# Patient Record
Sex: MALE | Race: WHITE | HISPANIC OR LATINO | Employment: FULL TIME | URBAN - METROPOLITAN AREA
[De-identification: names, ages, dates, MRNs, and addresses within clinical notes are randomized per-mention and may not be internally consistent; named-entity substitution may affect disease eponyms.]

---

## 2017-01-28 ENCOUNTER — HOSPITAL ENCOUNTER (OUTPATIENT)
Dept: MRI IMAGING | Facility: HOSPITAL | Age: 72
Discharge: HOME | End: 2017-01-28
Attending: INTERNAL MEDICINE | Admitting: INTERNAL MEDICINE

## 2017-01-28 LAB
BASOPHILS # BLD AUTO: 0.1 10*3/UL (ref 0–0.2)
BASOPHILS NFR BLD AUTO: 1 % (ref 0–2)
CREAT BLDA-MCNC: 0.5 MG/DL (ref 0.6–1.3)
DIFFERENTIAL METHOD BLD: (no result)
EOSINOPHIL # BLD AUTO: 0.2 10*3/UL (ref 0–0.3)
EOSINOPHIL # BLD AUTO: 3 % (ref 0–3)
ERYTHROCYTE [DISTWIDTH] IN BLOOD BY AUTOMATED COUNT: 14.2 % (ref 11.5–14.5)
HCT VFR BLD AUTO: 35.4 % (ref 40–54)
HGB BLD-MCNC: 12.1 G/DL (ref 14–18)
LYMPHOCYTES # BLD AUTO: 2.2 10*3/UL (ref 0.8–4.8)
LYMPHOCYTES NFR BLD AUTO: 31 % (ref 18–42)
MCH RBC QN AUTO: 30.6 PG (ref 26–32)
MCHC RBC AUTO-ENTMCNC: 34.2 G/DL (ref 32–36)
MCV RBC AUTO: 89.7 FL (ref 80–94)
MONOCYTES # BLD AUTO: 0.6 10*3/UL (ref 0.1–1.3)
MONOCYTES NFR BLD AUTO: 8 % (ref 2–11)
NEUTROPHILS # BLD AUTO: 4 10*3/UL (ref 2.3–8.6)
NEUTROPHILS NFR BLD AUTO: 57 % (ref 50–75)
NRBC BLD AUTO-RTO: 0 /100{WBCS}
NRBC/RBC NFR BLD MANUAL: 0 10*3/UL
PLATELET # BLD AUTO: 185 10*3/UL (ref 150–450)
PMV BLD AUTO: 8.3 FL (ref 7.4–10.4)
RBC # BLD AUTO: 3.95 10*6/UL (ref 4.6–6)
WBC # BLD AUTO: 7.1 10*3/UL (ref 4.5–11.5)

## 2017-01-31 ENCOUNTER — HOSPITAL ENCOUNTER (OUTPATIENT)
Dept: ONCOLOGY | Facility: CLINIC | Age: 72
Discharge: HOME | End: 2017-01-31
Attending: INTERNAL MEDICINE | Admitting: INTERNAL MEDICINE

## 2017-02-17 ENCOUNTER — HOSPITAL ENCOUNTER (OUTPATIENT)
Dept: CT IMAGING | Facility: HOSPITAL | Age: 72
Discharge: HOME | End: 2017-02-17
Attending: INTERNAL MEDICINE | Admitting: INTERNAL MEDICINE

## 2017-03-17 ENCOUNTER — HOSPITAL ENCOUNTER (OUTPATIENT)
Dept: OTHER | Facility: HOSPITAL | Age: 72
Setting detail: SPECIMEN
Discharge: HOME | End: 2017-03-17
Attending: NURSE PRACTITIONER | Admitting: NURSE PRACTITIONER

## 2017-03-17 LAB
ALBUMIN SERPL-MCNC: 4.4 G/DL (ref 3.5–4.8)
ALBUMIN/GLOB SERPL: 1.5 {RATIO} (ref 1–1.7)
ALP SERPL-CCNC: 94 IU/L (ref 32–91)
ALT SERPL-CCNC: 18 IU/L (ref 17–63)
ANION GAP SERPL CALC-SCNC: 14.1 MMOL/L (ref 10–20)
AST SERPL-CCNC: 17 IU/L (ref 15–41)
BILIRUB SERPL-MCNC: 0.6 MG/DL (ref 0.3–1.2)
BUN SERPL-MCNC: 14 MG/DL (ref 8–20)
BUN/CREAT SERPL: 23.3 (ref 6.2–20.3)
CALCIUM SERPL-MCNC: 10.5 MG/DL (ref 8.9–10.3)
CHLORIDE SERPL-SCNC: 103 MMOL/L (ref 101–111)
CHOLEST SERPL-MCNC: 165 MG/DL
CHOLEST/HDLC SERPL: 3.5 {RATIO}
CONV CO2: 28 MMOL/L (ref 22–32)
CONV LDL CHOLESTEROL DIRECT: 77 MG/DL (ref 0–100)
CONV TOTAL PROTEIN: 7.3 G/DL (ref 6.1–7.9)
CREAT UR-MCNC: 0.6 MG/DL (ref 0.7–1.2)
GLOBULIN UR ELPH-MCNC: 2.9 G/DL (ref 2.5–3.8)
GLUCOSE SERPL-MCNC: 136 MG/DL (ref 65–99)
HDLC SERPL-MCNC: 47 MG/DL
LDLC/HDLC SERPL: 1.6 {RATIO}
LIPID INTERPRETATION: ABNORMAL
POTASSIUM SERPL-SCNC: 4.1 MMOL/L (ref 3.6–5.1)
PSA SERPL-MCNC: 1.47 NG/ML (ref 0–4)
SODIUM SERPL-SCNC: 141 MMOL/L (ref 136–144)
TRIGL SERPL-MCNC: 301 MG/DL
VLDLC SERPL CALC-MCNC: 41.7 MG/DL

## 2017-06-06 ENCOUNTER — HOSPITAL ENCOUNTER (OUTPATIENT)
Dept: ONCOLOGY | Facility: CLINIC | Age: 72
Discharge: HOME | End: 2017-06-06
Attending: INTERNAL MEDICINE | Admitting: INTERNAL MEDICINE

## 2017-06-21 ENCOUNTER — HOSPITAL ENCOUNTER (OUTPATIENT)
Dept: OTHER | Facility: HOSPITAL | Age: 72
Setting detail: SPECIMEN
Discharge: HOME | End: 2017-06-21
Attending: NURSE PRACTITIONER | Admitting: NURSE PRACTITIONER

## 2017-06-21 LAB
ANION GAP SERPL CALC-SCNC: 13.9 MMOL/L (ref 10–20)
BUN SERPL-MCNC: 13 MG/DL (ref 8–20)
BUN/CREAT SERPL: 18.6 (ref 6.2–20.3)
CALCIUM SERPL-MCNC: 10.1 MG/DL (ref 8.9–10.3)
CHLORIDE SERPL-SCNC: 106 MMOL/L (ref 101–111)
CONV CO2: 25 MMOL/L (ref 22–32)
CREAT UR-MCNC: 0.7 MG/DL (ref 0.7–1.2)
GLUCOSE SERPL-MCNC: 177 MG/DL (ref 65–99)
POTASSIUM SERPL-SCNC: 3.9 MMOL/L (ref 3.6–5.1)
SODIUM SERPL-SCNC: 141 MMOL/L (ref 136–144)

## 2017-09-08 ENCOUNTER — HOSPITAL ENCOUNTER (OUTPATIENT)
Dept: ONCOLOGY | Facility: HOSPITAL | Age: 72
Discharge: HOME | End: 2017-09-08
Attending: INTERNAL MEDICINE | Admitting: INTERNAL MEDICINE

## 2017-09-19 ENCOUNTER — HOSPITAL ENCOUNTER (OUTPATIENT)
Dept: ONCOLOGY | Facility: CLINIC | Age: 72
Discharge: HOME | End: 2017-09-19
Attending: INTERNAL MEDICINE | Admitting: INTERNAL MEDICINE

## 2017-09-29 ENCOUNTER — HOSPITAL ENCOUNTER (OUTPATIENT)
Dept: OTHER | Facility: HOSPITAL | Age: 72
Setting detail: SPECIMEN
Discharge: HOME | End: 2017-09-29
Attending: NURSE PRACTITIONER | Admitting: NURSE PRACTITIONER

## 2018-03-06 ENCOUNTER — HOSPITAL ENCOUNTER (OUTPATIENT)
Dept: ONCOLOGY | Facility: CLINIC | Age: 73
Discharge: HOME | End: 2018-03-06
Attending: INTERNAL MEDICINE | Admitting: INTERNAL MEDICINE

## 2018-03-30 ENCOUNTER — HOSPITAL ENCOUNTER (OUTPATIENT)
Dept: MRI IMAGING | Facility: HOSPITAL | Age: 73
Discharge: HOME | End: 2018-03-30
Attending: INTERNAL MEDICINE | Admitting: INTERNAL MEDICINE

## 2018-04-03 ENCOUNTER — HOSPITAL ENCOUNTER (OUTPATIENT)
Dept: FAMILY MEDICINE CLINIC | Facility: CLINIC | Age: 73
Setting detail: SPECIMEN
Discharge: HOME | End: 2018-04-03
Attending: FAMILY MEDICINE | Admitting: FAMILY MEDICINE

## 2018-04-04 LAB
ALBUMIN SERPL-MCNC: 4.7 G/DL (ref 3.5–4.8)
ALBUMIN/GLOB SERPL: 1.6 {RATIO} (ref 1–1.7)
ALP SERPL-CCNC: 85 IU/L (ref 32–91)
ALT SERPL-CCNC: 21 IU/L (ref 17–63)
ANION GAP SERPL CALC-SCNC: 12.3 MMOL/L (ref 10–20)
AST SERPL-CCNC: 23 IU/L (ref 15–41)
BASOPHILS # BLD AUTO: 0.1 10*3/UL (ref 0–0.2)
BASOPHILS NFR BLD AUTO: 1 % (ref 0–2)
BILIRUB SERPL-MCNC: 0.1 MG/DL (ref 0.3–1.2)
BUN SERPL-MCNC: 12 MG/DL (ref 8–20)
BUN/CREAT SERPL: 20 (ref 6.2–20.3)
CALCIUM SERPL-MCNC: 10 MG/DL (ref 8.9–10.3)
CHLORIDE SERPL-SCNC: 104 MMOL/L (ref 101–111)
CONV CO2: 25 MMOL/L (ref 22–32)
CONV TOTAL PROTEIN: 7.6 G/DL (ref 6.1–7.9)
CREAT UR-MCNC: 0.6 MG/DL (ref 0.7–1.2)
DIFFERENTIAL METHOD BLD: (no result)
EOSINOPHIL # BLD AUTO: 0.3 10*3/UL (ref 0–0.3)
EOSINOPHIL # BLD AUTO: 4 % (ref 0–3)
ERYTHROCYTE [DISTWIDTH] IN BLOOD BY AUTOMATED COUNT: 14.6 % (ref 11.5–14.5)
GLOBULIN UR ELPH-MCNC: 2.9 G/DL (ref 2.5–3.8)
GLUCOSE SERPL-MCNC: 116 MG/DL (ref 65–99)
HCT VFR BLD AUTO: 36 % (ref 40–54)
HGB BLD-MCNC: 12 G/DL (ref 14–18)
LYMPHOCYTES # BLD AUTO: 3.6 10*3/UL (ref 0.8–4.8)
LYMPHOCYTES NFR BLD AUTO: 44 % (ref 18–42)
MCH RBC QN AUTO: 30.3 PG (ref 26–32)
MCHC RBC AUTO-ENTMCNC: 33.4 G/DL (ref 32–36)
MCV RBC AUTO: 90.6 FL (ref 80–94)
MONOCYTES # BLD AUTO: 0.6 10*3/UL (ref 0.1–1.3)
MONOCYTES NFR BLD AUTO: 8 % (ref 2–11)
NEUTROPHILS # BLD AUTO: 3.4 10*3/UL (ref 2.3–8.6)
NEUTROPHILS NFR BLD AUTO: 43 % (ref 50–75)
NRBC BLD AUTO-RTO: 0 /100{WBCS}
NRBC/RBC NFR BLD MANUAL: 0 10*3/UL
PLATELET # BLD AUTO: 215 10*3/UL (ref 150–450)
PMV BLD AUTO: 8.7 FL (ref 7.4–10.4)
POTASSIUM SERPL-SCNC: 4.3 MMOL/L (ref 3.6–5.1)
RBC # BLD AUTO: 3.97 10*6/UL (ref 4.6–6)
SODIUM SERPL-SCNC: 137 MMOL/L (ref 136–144)
WBC # BLD AUTO: 8 10*3/UL (ref 4.5–11.5)

## 2018-04-17 ENCOUNTER — HOSPITAL ENCOUNTER (OUTPATIENT)
Dept: ONCOLOGY | Facility: CLINIC | Age: 73
Discharge: HOME | End: 2018-04-17
Attending: INTERNAL MEDICINE | Admitting: INTERNAL MEDICINE

## 2018-08-24 ENCOUNTER — HOSPITAL ENCOUNTER (OUTPATIENT)
Dept: ONCOLOGY | Facility: HOSPITAL | Age: 73
Discharge: HOME | End: 2018-08-24
Attending: INTERNAL MEDICINE | Admitting: INTERNAL MEDICINE

## 2018-08-24 LAB — CREAT BLDA-MCNC: 1 MG/DL (ref 0.6–1.3)

## 2018-08-28 ENCOUNTER — HOSPITAL ENCOUNTER (OUTPATIENT)
Dept: ONCOLOGY | Facility: CLINIC | Age: 73
Discharge: HOME | End: 2018-08-28
Attending: INTERNAL MEDICINE | Admitting: INTERNAL MEDICINE

## 2018-09-05 ENCOUNTER — HOSPITAL ENCOUNTER (OUTPATIENT)
Dept: FAMILY MEDICINE CLINIC | Facility: CLINIC | Age: 73
Setting detail: SPECIMEN
Discharge: HOME | End: 2018-09-05
Attending: FAMILY MEDICINE | Admitting: FAMILY MEDICINE

## 2018-09-05 LAB
BASOPHILS # BLD AUTO: 0 10*3/UL (ref 0–0.2)
BASOPHILS NFR BLD AUTO: 1 % (ref 0–2)
CASTS URNS QL MICRO: NORMAL /[LPF]
CONV BACTERIA IN URINE MICRO: NEGATIVE
CONV HYALINE CASTS IN URINE MICRO: NORMAL /[LPF] (ref 0–5)
CONV MICROALBUM.,U,RANDOM: 26 MG/L
CONV SMALL ROUND CELLS: NORMAL /[HPF]
CREAT 24H UR-MCNC: 75.8 MG/DL
DIFFERENTIAL METHOD BLD: (no result)
EOSINOPHIL # BLD AUTO: 0.2 10*3/UL (ref 0–0.3)
EOSINOPHIL # BLD AUTO: 3 % (ref 0–3)
ERYTHROCYTE [DISTWIDTH] IN BLOOD BY AUTOMATED COUNT: 14.3 % (ref 11.5–14.5)
HCT VFR BLD AUTO: 35.3 % (ref 40–54)
HGB BLD-MCNC: 11.8 G/DL (ref 14–18)
LYMPHOCYTES # BLD AUTO: 2.5 10*3/UL (ref 0.8–4.8)
LYMPHOCYTES NFR BLD AUTO: 39 % (ref 18–42)
MCH RBC QN AUTO: 30.3 PG (ref 26–32)
MCHC RBC AUTO-ENTMCNC: 33.4 G/DL (ref 32–36)
MCV RBC AUTO: 90.9 FL (ref 80–94)
MICROALBUMIN/CREAT UR: 34.3 UG/MG
MONOCYTES # BLD AUTO: 0.5 10*3/UL (ref 0.1–1.3)
MONOCYTES NFR BLD AUTO: 8 % (ref 2–11)
NEUTROPHILS # BLD AUTO: 3.2 10*3/UL (ref 2.3–8.6)
NEUTROPHILS NFR BLD AUTO: 49 % (ref 50–75)
NRBC BLD AUTO-RTO: 0 /100{WBCS}
NRBC/RBC NFR BLD MANUAL: 0 10*3/UL
PLATELET # BLD AUTO: 195 10*3/UL (ref 150–450)
PMV BLD AUTO: 8.7 FL (ref 7.4–10.4)
RBC # BLD AUTO: 3.88 10*6/UL (ref 4.6–6)
RBC #/AREA URNS HPF: 1 /[HPF] (ref 0–3)
SPERM URNS QL MICRO: NORMAL /[HPF]
SQUAMOUS SPT QL MICRO: 0 /[HPF] (ref 0–5)
UNIDENT CRYS URNS QL MICRO: NORMAL /[HPF]
WBC # BLD AUTO: 6.4 10*3/UL (ref 4.5–11.5)
WBC #/AREA URNS HPF: 1 /[HPF] (ref 0–5)
YEAST SPEC QL WET PREP: NORMAL /[HPF]

## 2018-09-06 LAB
ALBUMIN SERPL-MCNC: 4.3 G/DL (ref 3.5–4.8)
ALBUMIN/GLOB SERPL: 1.4 {RATIO} (ref 1–1.7)
ALP SERPL-CCNC: 100 IU/L (ref 32–91)
ALT SERPL-CCNC: 18 IU/L (ref 17–63)
ANION GAP SERPL CALC-SCNC: 12.3 MMOL/L (ref 10–20)
AST SERPL-CCNC: 20 IU/L (ref 15–41)
BILIRUB SERPL-MCNC: 0.2 MG/DL (ref 0.3–1.2)
BUN SERPL-MCNC: 21 MG/DL (ref 8–20)
BUN/CREAT SERPL: 21 (ref 6.2–20.3)
CALCIUM SERPL-MCNC: 9.7 MG/DL (ref 8.9–10.3)
CHLORIDE SERPL-SCNC: 104 MMOL/L (ref 101–111)
CONV CO2: 26 MMOL/L (ref 22–32)
CONV TOTAL PROTEIN: 7.3 G/DL (ref 6.1–7.9)
CREAT UR-MCNC: 1 MG/DL (ref 0.7–1.2)
GLOBULIN UR ELPH-MCNC: 3 G/DL (ref 2.5–3.8)
GLUCOSE SERPL-MCNC: 368 MG/DL (ref 65–99)
IRON SERPL-MCNC: 59 UG/DL (ref 45–182)
POTASSIUM SERPL-SCNC: 4.3 MMOL/L (ref 3.6–5.1)
SODIUM SERPL-SCNC: 138 MMOL/L (ref 136–144)

## 2018-10-05 ENCOUNTER — HOSPITAL ENCOUNTER (OUTPATIENT)
Dept: OTHER | Facility: HOSPITAL | Age: 73
Discharge: HOME | End: 2018-10-05
Attending: INTERNAL MEDICINE | Admitting: INTERNAL MEDICINE

## 2019-03-05 ENCOUNTER — HOSPITAL ENCOUNTER (OUTPATIENT)
Dept: ONCOLOGY | Facility: CLINIC | Age: 74
Discharge: HOME | End: 2019-03-05
Attending: INTERNAL MEDICINE | Admitting: INTERNAL MEDICINE

## 2019-03-23 ENCOUNTER — HOSPITAL ENCOUNTER (OUTPATIENT)
Dept: MRI IMAGING | Facility: HOSPITAL | Age: 74
Discharge: HOME | End: 2019-03-23
Attending: INTERNAL MEDICINE | Admitting: INTERNAL MEDICINE

## 2019-03-25 LAB — CREAT BLDA-MCNC: 0.9 MG/DL (ref 0.6–1.3)

## 2019-05-07 ENCOUNTER — CONVERSION ENCOUNTER (OUTPATIENT)
Dept: OTHER | Facility: HOSPITAL | Age: 74
End: 2019-05-07

## 2019-05-08 ENCOUNTER — HOSPITAL ENCOUNTER (OUTPATIENT)
Dept: FAMILY MEDICINE CLINIC | Facility: CLINIC | Age: 74
Setting detail: SPECIMEN
Discharge: HOME | End: 2019-05-08
Attending: FAMILY MEDICINE | Admitting: FAMILY MEDICINE

## 2019-05-08 LAB
ALBUMIN SERPL-MCNC: 4.4 G/DL (ref 3.5–4.8)
ALBUMIN/GLOB SERPL: 1.6 {RATIO} (ref 1–1.7)
ALP SERPL-CCNC: 80 IU/L (ref 32–91)
ALT SERPL-CCNC: 15 IU/L (ref 17–63)
ANION GAP SERPL CALC-SCNC: 16.5 MMOL/L (ref 10–20)
AST SERPL-CCNC: 17 IU/L (ref 15–41)
BASOPHILS # BLD AUTO: 0 10*3/UL (ref 0–0.2)
BASOPHILS NFR BLD AUTO: 1 % (ref 0–2)
BILIRUB SERPL-MCNC: 0.9 MG/DL (ref 0.3–1.2)
BUN SERPL-MCNC: 27 MG/DL (ref 8–20)
BUN/CREAT SERPL: 20.8 (ref 6.2–20.3)
CALCIUM SERPL-MCNC: 9.7 MG/DL (ref 8.9–10.3)
CASTS URNS QL MICRO: NORMAL /[LPF]
CHLORIDE SERPL-SCNC: 105 MMOL/L (ref 101–111)
CHOLEST SERPL-MCNC: 111 MG/DL
CHOLEST/HDLC SERPL: 3.1 {RATIO}
CONV BACTERIA IN URINE MICRO: NEGATIVE
CONV CO2: 22 MMOL/L (ref 22–32)
CONV HYALINE CASTS IN URINE MICRO: 2 /[LPF] (ref 0–5)
CONV LDL CHOLESTEROL DIRECT: 44 MG/DL (ref 0–100)
CONV SMALL ROUND CELLS: NORMAL /[HPF]
CONV TOTAL PROTEIN: 7.2 G/DL (ref 6.1–7.9)
CREAT UR-MCNC: 1.3 MG/DL (ref 0.7–1.2)
DIFFERENTIAL METHOD BLD: (no result)
EOSINOPHIL # BLD AUTO: 0.3 10*3/UL (ref 0–0.3)
EOSINOPHIL # BLD AUTO: 4 % (ref 0–3)
ERYTHROCYTE [DISTWIDTH] IN BLOOD BY AUTOMATED COUNT: 13.9 % (ref 11.5–14.5)
GLOBULIN UR ELPH-MCNC: 2.8 G/DL (ref 2.5–3.8)
GLUCOSE SERPL-MCNC: 101 MG/DL (ref 65–99)
HCT VFR BLD AUTO: 34.1 % (ref 40–54)
HDLC SERPL-MCNC: 36 MG/DL
HGB BLD-MCNC: 11.4 G/DL (ref 14–18)
LDLC/HDLC SERPL: 1.2 {RATIO}
LIPID INTERPRETATION: ABNORMAL
LYMPHOCYTES # BLD AUTO: 2.9 10*3/UL (ref 0.8–4.8)
LYMPHOCYTES NFR BLD AUTO: 38 % (ref 18–42)
MCH RBC QN AUTO: 30.8 PG (ref 26–32)
MCHC RBC AUTO-ENTMCNC: 33.4 G/DL (ref 32–36)
MCV RBC AUTO: 92.2 FL (ref 80–94)
MONOCYTES # BLD AUTO: 0.5 10*3/UL (ref 0.1–1.3)
MONOCYTES NFR BLD AUTO: 7 % (ref 2–11)
NEUTROPHILS # BLD AUTO: 3.8 10*3/UL (ref 2.3–8.6)
NEUTROPHILS NFR BLD AUTO: 50 % (ref 50–75)
NRBC BLD AUTO-RTO: 0 /100{WBCS}
NRBC/RBC NFR BLD MANUAL: 0 10*3/UL
PLATELET # BLD AUTO: 168 10*3/UL (ref 150–450)
PMV BLD AUTO: 10.6 FL (ref 7.4–10.4)
POTASSIUM SERPL-SCNC: 4.5 MMOL/L (ref 3.6–5.1)
RBC # BLD AUTO: 3.69 10*6/UL (ref 4.6–6)
RBC #/AREA URNS HPF: 0 /[HPF] (ref 0–3)
SODIUM SERPL-SCNC: 139 MMOL/L (ref 136–144)
SPERM URNS QL MICRO: NORMAL /[HPF]
SQUAMOUS SPT QL MICRO: 0 /[HPF] (ref 0–5)
TRIGL SERPL-MCNC: 260 MG/DL
UNIDENT CRYS URNS QL MICRO: NORMAL /[HPF]
VLDLC SERPL CALC-MCNC: 31.2 MG/DL
WBC # BLD AUTO: 7.6 10*3/UL (ref 4.5–11.5)
WBC #/AREA URNS HPF: 0 /[HPF] (ref 0–5)
YEAST SPEC QL WET PREP: NORMAL /[HPF]

## 2019-05-09 LAB — 25(OH)D3 SERPL-MCNC: 32 NG/ML (ref 30–100)

## 2019-06-04 VITALS — BODY MASS INDEX: 24.72 KG/M2 | WEIGHT: 144.8 LBS | RESPIRATION RATE: 16 BRPM | HEIGHT: 64 IN

## 2019-06-06 NOTE — PROGRESS NOTES
Visit Type:  Follow-up Visit  Referring Provider:  Marla Bailey MD  Primary Provider:  Lynn BAILON, Marla RENEE    CC:  DM2 & HTN.    History of Present Illness:  Patient presents in office today for a follow up on his Type II DM and HTN.     Patient states that he does not test his blood sugar and is unable to tell when he is hyper/hypoglycemic. In house random glucose was 172. Patient reports that he had 2-3 cookies around 1 pm. In house A1c resulted at 7.2%.      Vital Signs:    Patient Profile:    74 Years Old Male  Height:     64 inches (162.56 cm)  Weight:     144.8 pounds  BMI:        24.85     Resp:       16 per minute  (left arm)    Patient has a risk of falls? Yes  Patient in pain?    No    Problems: Active problems were reviewed with the patient during this visit.  Medications: Medications were reviewed with the patient during this visit.  Allergies: Allergies were reviewed with the patient during this visit.  No Known Allergy.        Vitals Entered By: Devika Oquendo LPN (May  7, 2019 3:30 PM)    Active Medications (reviewed today):  METFORMIN HCL ER (OSM) 1000 MG ORAL TABLET EXTENDED RELEASE 24 HOUR (METFORMIN HCL) Take 1 tablet by mouth daily  TRADJENTA 5 MG ORAL TABLET (LINAGLIPTIN) take 1 tablet daily  NEEDS APPT TO RECEIVE FURTHER REFILLS  IRON 325 (65 FE) MG ORAL TABLET (FERROUS SULFATE) 1 tab po qd  GLIMEPIRIDE 4 MG ORAL TABLET (GLIMEPIRIDE) Take 1 tablet by mouth daily  NEEDS APPT TO RECEIVE FURTHER REFILLS  LIPITOR 40 MG ORAL TABLET (ATORVASTATIN CALCIUM) Take one by mouth at HS  NEEDS APPT TO RECEIVE FURTHER REFILLS  CARVEDILOL 12.5 MG ORAL TABLET (CARVEDILOL) 2 tabs po q am and 1 tab po q pm  NEEDS APPT TO RECEIVE FURTHER REFILLS  LOSARTAN POTASSIUM 100 MG TABLET (LOSARTAN POTASSIUM) TAKE ONE TABLET BY MOUTH EVERY DAY  NEEDS APPT TO RECEIVE FURTHER REFILLS  ZYRTEC ALLERGY 10 MG ORAL TABLET (CETIRIZINE HCL) 1 tab po qd  ASPIRIN 81 MG ORAL TABLET (ASPIRIN) 1 tab po qd  IRON SUPPLEMENT 325 (65 FE) MG  ORAL TABLET (FERROUS SULFATE) 1 tab po qd  FISH OIL 1000 MG ORAL CAPSULE (OMEGA-3 FATTY ACIDS) 1 cap po qd  BLOOD GLUCOSE MONITOR SYSTEM w/Device KIT (BLOOD GLUCOSE MONITORING SUPPL) test blood sugars twice daily    Current Allergies (reviewed today):  No known allergies    Current Medications (including medications started today):   METFORMIN HCL ER (OSM) 1000 MG ORAL TABLET EXTENDED RELEASE 24 HOUR (METFORMIN HCL) Take 1 tablet by mouth daily  TRADJENTA 5 MG ORAL TABLET (LINAGLIPTIN) take 1 tablet daily  IRON 325 (65 FE) MG ORAL TABLET (FERROUS SULFATE) 1 tab po qd  GLIMEPIRIDE 4 MG ORAL TABLET (GLIMEPIRIDE) Take 1 tablet by mouth daily  LIPITOR 40 MG ORAL TABLET (ATORVASTATIN CALCIUM) Take one by mouth at HS  CARVEDILOL 12.5 MG ORAL TABLET (CARVEDILOL) 2 tabs po q am and 1 tab po q pm  LOSARTAN POTASSIUM 100 MG TABLET (LOSARTAN POTASSIUM) TAKE ONE TABLET BY MOUTH EVERY DAY  ZYRTEC ALLERGY 10 MG ORAL TABLET (CETIRIZINE HCL) 1 tab po qd  ASPIRIN 81 MG ORAL TABLET (ASPIRIN) 1 tab po qd  IRON SUPPLEMENT 325 (65 FE) MG ORAL TABLET (FERROUS SULFATE) 1 tab po qd  FISH OIL 1000 MG ORAL CAPSULE (OMEGA-3 FATTY ACIDS) 1 cap po qd  BLOOD GLUCOSE MONITOR SYSTEM w/Device KIT (BLOOD GLUCOSE MONITORING SUPPL) test blood sugars twice daily      Past Medical History:     Reviewed history from 2016 and no changes required:        Diabetes, Type 2        G E R D        Hyperlipidemia        Hypertension        No Drug Allergies?          sinus mass 2015        non hodgkin b cell  lymphoma        osteoarthritis        BPH        CKD        microalbuminuria    Past Surgical History:     Reviewed history from 2013 and no changes required:        Back Surgery    Family History Summary:      Reviewed history Last on 2018 and no changes required:2019  First Degree Blood Relative - Has No Known Family History - Entered On: 2018    General Comments - FH:  Father:   Mother:   Siblings:       Social  History:     Reviewed history from 12/05/2018 and no changes required:        Passive Smoke: N        Alcohol Use: N        Drug Use: N        HIV/High Risk: N        Regular Exercise: Y        Hx Domestic Abuse: N        Scientology Affecting Care: N        Smoking History:        Patient has never smoked.        Risk Factors:     Smoked Tobacco Use:  Never smoker  Smokeless Tobacco Use:  Never  Passive smoke exposure:  no  Drug use:  no  HIV high-risk behavior:  no  Caffeine use:  <1 drinks per day  Alcohol use:  no  Exercise:  yes     Times per week:  1  Seatbelt use:  100 %  Sun Exposure:  occasionally    Dietary Counseling: yes    Previous Tobacco Use: Signed On - 12/05/2018  Smoked Tobacco Use:  Never smoker  Smokeless Tobacco Use:  Never  Passive smoke exposure:  no  Drug use:  no  HIV high-risk behavior:  no  Caffeine use:  <1 drinks per day    Previous Alcohol Use: Signed On - 12/05/2018  Alcohol use:  no  Exercise:  yes     Times per week:  1  Seatbelt use:  100 %  Sun Exposure:  occasionally    Dietary Counseling: yes        Review of Systems        See HPI      Physical Exam    General:      well developed, well nourished, in no acute distress.    Head:      normocephalic and atraumatic.    Eyes:      PERRL/EOM intact, conjunctiva and sclera clear with out nystagmus.    Ears:      TM's intact and clear with normal canals with grossly normal hearing.    Nose:      no deformity, discharge, inflammation, or lesions.    Mouth:      no deformity or lesions with good dentition.    Neck:      no masses, thyromegaly, or abnormal cervical nodes.    Lungs:      clear bilaterally to auscultation.    Heart:      non-displaced PMI, chest non-tender; regular rate and rhythm, S1, S2 without murmurs, rubs, or gallops  Abdomen:       normal bowel sounds; no hepatosplenomegaly no ventral,umbilical hernias or masses noted.    Msk:      no deformity or scoliosis noted of thoracic or lumbar spine.  joint tenderness.     Pulses:      pulses normal in all 4 extremities.    Neurologic:      no focal deficits, cranial nerves II-XII grossly intact with normal sensation, reflexes, coordination, muscle strength and tone.      Diabetes Management Exam:      Foot Exam (with socks and/or shoes not present):        Inspection:           Left foot: normal           Right foot: normal        Nails:           Left foot: fungal infection           Right foot: fungal infection        Sensory-Pinprick/Light touch:           Left medial foot (L-4): normal        Sensory-Monofilament:           Left foot: normal           Right foot: normal        Pulses:           pulses normal in all 4 extremities.        Labwork:   Most Recent Lab Results:   LDL: 91 mg/dL 09/29/2017  HbA1c: : 8.5 % 12/05/2018      Impression & Recommendations:    Problem # 1:  Diabetes mellitus, type 2, uncontrolled (ICD-250.00) (IKV03-M94.65)  Assessment: Improved    His updated medication list for this problem includes:     Metformin Hcl Er (osm) 1000 Mg Oral Tablet Extended Release 24 Hour (Metformin hcl) ..... Take 1 tablet by mouth daily     Tradjenta 5 Mg Oral Tablet (Linagliptin) ..... Take 1 tablet daily     Glimepiride 4 Mg Oral Tablet (Glimepiride) ..... Take 1 tablet by mouth daily     Losartan Potassium 100 Mg Tablet (Losartan potassium) ..... Take one tablet by mouth every day     Aspirin 81 Mg Oral Tablet (Aspirin) ..... 1 tab po qd    Orders:  Glucose (73364)  Hgb A1c - In house (CPT-50265)  [facility] COMPREHENSIVE METABOLIC PANEL (CMP) (MPC)  [facility] URINALYSIS MICROSCOPIC ONLY (UAMIC)  US BILATERAL RENAL (CPT-43995)      Problem # 2:  HYPERTENSION (ICD-401.9) (NXI96-W13)  Assessment: Improved    His updated medication list for this problem includes:     Carvedilol 12.5 Mg Oral Tablet (Carvedilol) ..... 2 tabs po q am and 1 tab po q pm     Losartan Potassium 100 Mg Tablet (Losartan potassium) ..... Take one tablet by mouth every day    Orders:  [facility] COMPREHENSIVE METABOLIC  PANEL (CMP) ([facility])   BILATERAL RENAL (CPT-04600)      Problem # 3:  HYPERLIPIDEMIA (ICD-272.4) (FBX40-H54.5)  Assessment: Improved    His updated medication list for this problem includes:     Lipitor 40 Mg Oral Tablet (Atorvastatin calcium) ..... Take one by mouth at hs    Orders:  [facility] LIPID PANEL (LIPID)      Problem # 4:  Onychomycosis, toenails (ICD-110.1) (DYS58-S93.1)  Assessment: Unchanged    Problem # 5:  Arthralgia (ICD-719.40) (QKT32-C72.50)  Assessment: Unchanged    Orders:  [facility] CBC W/DIFF; PATH REVIEW IF INDICATED (CBC)      Problem # 6:  Vitamin D deficiency (ICD-268.9) (USC25-Q36.9)  Assessment: Unchanged    Orders:  [facility] VITAMIN D TOTAL (VITD)      Problem # 7:  Microalbuminuria (ICD-791.0) (OFY97-U27.9)    Medications Added to Medication List This Visit:  1)  Tradjenta 5 Mg Oral Tablet (Linagliptin) .... Take 1 tablet daily  2)  Glimepiride 4 Mg Oral Tablet (Glimepiride) .... Take 1 tablet by mouth daily  3)  Lipitor 40 Mg Oral Tablet (Atorvastatin calcium) .... Take one by mouth at hs  4)  Carvedilol 12.5 Mg Oral Tablet (Carvedilol) .... 2 tabs po q am and 1 tab po q pm  5)  Losartan Potassium 100 Mg Tablet (Losartan potassium) .... Take one tablet by mouth every day    Medications:  CARVEDILOL 12.5 MG ORAL TABLET (CARVEDILOL) 2 tabs po q am and 1 tab po q pm  #90[Tablet] x 0      Entered by: Devika Oquendo LPN      Authorized by:  Marla Bailey MD      Electronically signed by:   Devika Oquendo LPN on 05/29/2019      Method used:    Electronically to               [facility]* (retail)              [address], [place]  [id number]              Ph: [phone]              Fax: [phone]      RxID:   [id number]  LIPITOR 40 MG ORAL TABLET (ATORVASTATIN CALCIUM) Take one by mouth at HS  #90[Tablet] x 0      Entered by: Devika Oquendo LPN      Authorized by:  Marla Bailey MD      Electronically signed by:   Devika Oquendo LPN on 05/29/2019      Method used:    Electronically to                [facility]* (retail)              [address] [facility], [place]  [id number]              Ph: [phone]              Fax: [phone]      RxID:   [id number]  TRADJENTA 5 MG ORAL TABLET (LINAGLIPTIN) take 1 tablet daily  #90[Tablet] x 0      Entered by: Devika Oquendo LPN      Authorized by:  Marla Bailey MD      Electronically signed by:   Devika Oquendo LPN on 05/29/2019      Method used:    Electronically to               [facility]* (retail)              [address] [place] [address], [place]  [id number]              Ph: [phone]              Fax: [phone]      RxID:   [id number]  GLIMEPIRIDE 4 MG ORAL TABLET (GLIMEPIRIDE) Take 1 tablet by mouth daily  #90[Tablet] x 0      Entered by: Devika Oquendo LPN      Authorized by:  Marla Bailey MD      Electronically signed by:   Devika Oquendo LPN on 05/29/2019      Method used:    Electronically to               [facility]* (retail)              [address] [place] [address], [place]  [id number]              Ph: [phone]              Fax: [phone]      RxID:   [id number]  TRADJENTA 5 MG ORAL TABLET (LINAGLIPTIN) take 1 tablet daily  #90[Tablet] x 0      Entered by: Devika Oquendo LPN      Authorized by:  Marla Bailey MD      Electronically signed by:   Devika Oquendo LPN on 05/08/2019      Method used:    Electronically to               [facility]* (retail)              [address] [place] [address], [place]  [id number]              Ph: [phone]              Fax: [phone]      RxID:   [id number]  LOSARTAN POTASSIUM 100 MG TABLET (LOSARTAN POTASSIUM) TAKE ONE TABLET BY MOUTH EVERY DAY  #90[Tablet] x 0      Entered by: Devika Oquendo LPN      Authorized by:  Marla Bailey MD      Electronically signed by:   Devika Oquendo LPN on 05/08/2019      Method used:    Electronically to               [facility]* (retail)              [address] [place] [address], [place]  [id number]               Ph: [phone]              Fax: [phone]      RxID:   [id number]  LIPITOR 40 MG ORAL TABLET (ATORVASTATIN CALCIUM) Take one by mouth at HS  #90[Tablet] x 0      Entered by: Devika Oquendo LPN      Authorized by:  Marla Bailey MD      Electronically signed by:   Devika Oquendo LPN on 05/08/2019      Method used:    Electronically to               [facility]* (retail)              [address], [place]  [id number]              Ph: [phone]              Fax: [phone]      RxID:   [id number]  GLIMEPIRIDE 4 MG ORAL TABLET (GLIMEPIRIDE) Take 1 tablet by mouth daily  #90[Tablet] x 0      Entered by: Devika Oquendo LPN      Authorized by:  Marla Bailey MD      Electronically signed by:   Devika Oquendo LPN on 05/08/2019      Method used:    Electronically to               [facility]* (retail)              [address], [place]  [id number]              Ph: [phone]              Fax: [phone]      RxID:   [id number]        Patient Instructions:  1)  fu labs  2)  refill meds as needed  3)  keep fu with oncology  4)  consider renal ultrasound    ]    Lab Entry  Test Date: 05/07/2019                          Value        Units        H/L   Reference    HgbA1c:               7.2          %             H    (4-7)          Electronically signed by Marla Bailey MD on 06/01/2019 at 4:15 PM  ________________________________________________________________________       Disclaimer: Converted Note message may not contain all data elements that existed in the legacy source system. Please see [employer] for the original note details.

## 2019-08-12 RX ORDER — LOSARTAN POTASSIUM 100 MG/1
1 TABLET ORAL EVERY 24 HOURS
COMMUNITY
Start: 2018-06-29 | End: 2019-08-12 | Stop reason: SDUPTHER

## 2019-08-12 RX ORDER — LOSARTAN POTASSIUM 100 MG/1
100 TABLET ORAL EVERY 24 HOURS
Qty: 30 TABLET | Refills: 6 | Status: SHIPPED | OUTPATIENT
Start: 2019-08-12 | End: 2020-03-09 | Stop reason: SDUPTHER

## 2019-08-22 ENCOUNTER — TRANSCRIBE ORDERS (OUTPATIENT)
Dept: ADMINISTRATIVE | Facility: HOSPITAL | Age: 74
End: 2019-08-22

## 2019-08-28 ENCOUNTER — TRANSCRIBE ORDERS (OUTPATIENT)
Dept: ADMINISTRATIVE | Facility: HOSPITAL | Age: 74
End: 2019-08-28

## 2019-08-28 DIAGNOSIS — C85.91: Primary | ICD-10-CM

## 2019-09-01 ENCOUNTER — HOSPITAL ENCOUNTER (OUTPATIENT)
Dept: MRI IMAGING | Facility: HOSPITAL | Age: 74
Discharge: HOME | End: 2019-09-01
Admitting: INTERNAL MEDICINE

## 2019-09-01 DIAGNOSIS — C85.91: ICD-10-CM

## 2019-09-01 PROCEDURE — 70553 MRI BRAIN STEM W/O & W/DYE: CPT

## 2019-09-01 PROCEDURE — A9576 INJ PROHANCE MULTIPACK: HCPCS | Performed by: INTERNAL MEDICINE

## 2019-09-01 PROCEDURE — 25010000002 GADOTERIDOL PER 1 ML: Performed by: INTERNAL MEDICINE

## 2019-09-01 RX ADMIN — GADOTERIDOL 15 ML: 279.3 INJECTION, SOLUTION INTRAVENOUS at 15:00

## 2019-09-10 ENCOUNTER — OFFICE VISIT (OUTPATIENT)
Dept: ONCOLOGY | Facility: CLINIC | Age: 74
End: 2019-09-10

## 2019-09-10 VITALS
WEIGHT: 143 LBS | HEIGHT: 60 IN | DIASTOLIC BLOOD PRESSURE: 67 MMHG | SYSTOLIC BLOOD PRESSURE: 180 MMHG | BODY MASS INDEX: 28.07 KG/M2 | TEMPERATURE: 98.2 F | HEART RATE: 60 BPM | RESPIRATION RATE: 18 BRPM

## 2019-09-10 DIAGNOSIS — M81.8 OTHER OSTEOPOROSIS WITHOUT CURRENT PATHOLOGICAL FRACTURE: ICD-10-CM

## 2019-09-10 DIAGNOSIS — C85.90 NON-HODGKIN'S LYMPHOMA, UNSPECIFIED BODY REGION, UNSPECIFIED NON-HODGKIN LYMPHOMA TYPE (HCC): Primary | ICD-10-CM

## 2019-09-10 DIAGNOSIS — D64.9 ANEMIA, UNSPECIFIED TYPE: ICD-10-CM

## 2019-09-10 DIAGNOSIS — M17.5 OTHER SECONDARY OSTEOARTHRITIS OF RIGHT KNEE: ICD-10-CM

## 2019-09-10 PROBLEM — R53.83 FATIGUE: Status: ACTIVE | Noted: 2018-09-05

## 2019-09-10 PROBLEM — N40.0 PROSTATIC HYPERTROPHY: Status: ACTIVE | Noted: 2019-09-10

## 2019-09-10 PROBLEM — R80.9 MICROALBUMINURIA: Status: ACTIVE | Noted: 2019-05-07

## 2019-09-10 PROBLEM — I10 HYPERTENSION: Status: ACTIVE | Noted: 2019-09-10

## 2019-09-10 PROBLEM — E11.65 TYPE 2 DIABETES MELLITUS WITH HYPERGLYCEMIA (HCC): Status: ACTIVE | Noted: 2017-09-29

## 2019-09-10 PROBLEM — E11.9 DIABETES MELLITUS, TYPE II: Status: ACTIVE | Noted: 2017-03-17

## 2019-09-10 PROBLEM — R63.4 WEIGHT LOSS: Status: ACTIVE | Noted: 2018-09-05

## 2019-09-10 PROBLEM — K76.9 LIVER LESION: Status: ACTIVE | Noted: 2019-09-10

## 2019-09-10 PROBLEM — K21.9 GASTROESOPHAGEAL REFLUX DISEASE: Status: ACTIVE | Noted: 2019-09-10

## 2019-09-10 PROBLEM — M25.561 KNEE PAIN, RIGHT: Status: ACTIVE | Noted: 2017-03-17

## 2019-09-10 PROBLEM — N13.8 BENIGN PROSTATIC HYPERPLASIA WITH URINARY OBSTRUCTION: Status: ACTIVE | Noted: 2017-03-17

## 2019-09-10 PROBLEM — E78.5 HYPERLIPIDEMIA: Status: ACTIVE | Noted: 2019-09-10

## 2019-09-10 PROBLEM — B35.1 ONYCHOMYCOSIS OF TOENAIL: Status: ACTIVE | Noted: 2019-05-07

## 2019-09-10 PROBLEM — M81.0 OSTEOPOROSIS: Status: ACTIVE | Noted: 2019-09-10

## 2019-09-10 PROBLEM — M25.50 ARTHRALGIA: Status: ACTIVE | Noted: 2018-09-05

## 2019-09-10 PROBLEM — E55.9 VITAMIN D DEFICIENCY: Status: ACTIVE | Noted: 2019-05-07

## 2019-09-10 PROBLEM — N40.1 BENIGN PROSTATIC HYPERPLASIA WITH URINARY OBSTRUCTION: Status: ACTIVE | Noted: 2017-03-17

## 2019-09-10 PROBLEM — M17.9 OSTEOARTHRITIS OF KNEE: Status: ACTIVE | Noted: 2019-09-10

## 2019-09-10 PROCEDURE — 99214 OFFICE O/P EST MOD 30 MIN: CPT | Performed by: INTERNAL MEDICINE

## 2019-09-10 PROCEDURE — G0463 HOSPITAL OUTPT CLINIC VISIT: HCPCS | Performed by: INTERNAL MEDICINE

## 2019-09-10 RX ORDER — POLYVINYL ALCOHOL, POVIDONE .5; .6 G/100ML; G/100ML
LIQUID OPHTHALMIC
Refills: 6 | COMMUNITY
Start: 2019-07-29 | End: 2020-10-13

## 2019-09-10 RX ORDER — GLIMEPIRIDE 4 MG/1
4 TABLET ORAL DAILY
Refills: 0 | COMMUNITY
Start: 2019-08-09 | End: 2019-10-23 | Stop reason: SDUPTHER

## 2019-09-10 RX ORDER — IBANDRONATE SODIUM 150 MG/1
TABLET, FILM COATED ORAL
Refills: 5 | COMMUNITY
Start: 2019-08-16 | End: 2020-06-09 | Stop reason: SDUPTHER

## 2019-09-10 RX ORDER — ATORVASTATIN CALCIUM 40 MG/1
40 TABLET, FILM COATED ORAL
Refills: 0 | COMMUNITY
Start: 2019-08-09 | End: 2019-11-20 | Stop reason: SDUPTHER

## 2019-09-10 RX ORDER — CARVEDILOL 12.5 MG/1
TABLET ORAL
Refills: 0 | COMMUNITY
Start: 2019-07-25 | End: 2019-09-11 | Stop reason: SDUPTHER

## 2019-09-10 RX ORDER — METFORMIN HYDROCHLORIDE 500 MG/1
500 TABLET, EXTENDED RELEASE ORAL 2 TIMES DAILY
Refills: 2 | COMMUNITY
Start: 2019-08-13 | End: 2019-10-22 | Stop reason: SDUPTHER

## 2019-09-10 NOTE — PROGRESS NOTES
HEMATOLOGY ONCOLOGY FOLLOW UP        Patient name: Ramon Silva  : 1945  MRN: [id number]  Primary Care Physician: Marla Bailey MD  Referring Physician: Marla Bailey MD  Reason For Consult:     Chief Complaint   Patient presents with   • Follow-up     hx of non-hodgkins B-cell lymphoma   • Follow-up     osteoporosis       History of Present Illness:  Mr. Silva is a 73-year-old male with a history of testicular lymphoma diagnosed in , status post chemotherapy with R-CHOP, who has been in complete remission for several years.  He presented to his primary care provider, Mariam Price, with symptoms of left-sided facial pain radiating to the eye, left-sided headache, and he was referred to ENT, Dr. Harry, for further evaluation.  CT scan of the paranasal sinuses was performed without contrast on 11/30/15 and it showed marked bony destruction and an expansile soft tissue mass involving the left frontal, left ethmoid, left sphenoid and left maxillary sinus.  There was a soft tissue mass encroaching in the left orbit and left side of the face towards the muscles of mastication as well as through the superior wall of the left sphenoid sinus.  After evaluation, Dr. Harry ordered MRI of the brain with and without contrast and that showed a multi-lobulated mass filling and expanding the left maxillary sinus as well as the left ethmoid air cells in the left locule of the sphenoid sinus, with extension into the left orbit and left medial cranial fossa.  Dr. Harry performed fiberoptic nasal endoscopy and biopsy of the left nasal mass on 12/11/15.  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  Cyclin D1 was negative.  All of these stains were done by immunohistochemistry.  Sections of the nasal mass showed diffuse infiltrate by predominantly  intermediate-to-large sized lymphoid cells.  The cells have a vesicular nuclei and inconspicuous cytoplasm.  FISH was negative for rearrangements involving BCL6, MYC, and IGH/BCL-2 [t(14;18)].  FISH for MYC/IGH was negative.  The patient denied any B symptoms, such as fevers, chills, night sweats, weight loss or fatigue.  He did not report any lymphadenopathy.  Dr. Harry referred the patient to us for further evaluation of his lymphoma.    Today, 12/30/15, the patient presents for initial evaluation.  He denies any fever, chills, night sweats, weight loss or lymph node swelling.  His only symptom is left facial pain.  The patient also reports left eye blurry vision.  • 12/30/15 - WBC 8.3, hemoglobin 13.3, platelet count 233, MCV 87.5.  • 1/7/16 - Echocardiogram:  Ejection fraction 50-55%.  Normal LVEF.  There is slight impaired LV relaxation.  • 1/8/16 - PET/CT scan:  Soft tissue mass originating in the left maxillary sinus erodes through the medial sinus wall spilling out into the nasopharynx.  It abates the nasal septum.  Lesion measures about 4 x 4 cm in AP and transverse dimension.  It measures about 8 cm in the cephalocaudal dimension.  SUV is 10.8  In the abdomen, there is a focus of activity in the left lobe of the liver which measures about 2 cm and has SUV 9.4.    • 1/11/16 - Bone marrow biopsy:  Normal cellular bone marrow 25-35%.  Adequate iron stores.  No malignancy.  Flow cytometry is negative.  Normal karyotype.  • 1/22/16 - Patient underwent left subclavian Port-A-Cath placement.  • 1/25/16 - WBC 8.1, hemoglobin 12.2, platelet count 216, MCV 87.1.  • 1/26/16 - Patient was seen at [facility] by Dr. Jamal Pisano.  He has recommended R-GCVP combination chemo treatment.  If the patient does not achieve complete response or if he has liver involvement, he will consider autologous stem cell transplantation after high-dose chemotherapy.  If lymphoma is present in the  liver, this tumor will likely represent a recurrent lymphoma rather than a new primary lymphoma of the sinuses.  • 2/2/16 - Hepatitis panel negative.  • 2/4/16 - Patient started chemotherapy with Rituximab, Gemcitabine, Cytoxan, Vincristine, and Prednisolone (R-GCVP q. 21 days regimen).  • 2/24/16 - Patient received cycle 2 of R-GCVP.  • 3/1/16 - Patient received intrathecal chemotherapy cycle 1 with Cytarabine plus Hydrocortisone plus Methotrexate.  • 3/2/16 - Patient received cycle 2, day 8 of Gemcitabine.  WBC 3.9, platelet count 145,000.  • 3/9/16 - WBC 9.1, hemoglobin 9.2, platelet count 33,000.  • 3/10/16 - Platelet count 32,000.  • 3/16/16 - Patient received cycle 3 of R-GCVP day 1.  • 3/23/16 - Creatinine 0.85, BUN 19.  Retic count 19,950.  Ferritin 369.  Erythropoietin 26.9.  Iron saturation 11%, TIBC 22.  CBC showed WBC 7.3, hemoglobin 8.1, platelet count 395,000, MCV 87.  • 3/23/16 - Patient received cycle 3, day 8 of Gemcitabine.  Patient received Neulasta 6 mg.   • 3/26/16 - Brain MRI with and without contrast:  There is residual soft tissue mass located in the left ethmoid and sphenoid sinus which is much smaller compared to the previous exam, now measuring 2.8 x 1.8 cm.  There is better aeration in the left maxillary sinus.  • 3/26/16 - MRI abdomen with and without contrast:   A small 1.3 x 1.1 cm rim enhancing lesion is seen near the dome of the medial segment of the left lobe of liver.  This area corresponds to the hypermetabolic activity seen on the previous PET scan.  • 3/30/16 - Neutrophils 53,000, hemoglobin 9.5, platelet count 328,000.  • 4/6/16 - Patient received cycle 4, day 1 of R-GCVP.  WBC 17.9, hemoglobin 9.6, platelet count 256,000.    • 4/7/16 - Patient received intrathecal chemotherapy with Cytarabine plus Hydrocortisone plus Methotrexate.    • 4/13/16 - Patient received cycle 4, day 8 of Gemcitabine.  WBC 4.8, hemoglobin 8.1, platelet count 147,000.  The patient had hypotension and  received IV fluids.    • 4/14/16 - Ultrasound of the liver:  There is a small nodule measuring 15 mm in the left lobe of the liver.    • 4/18/16 - WBC 3.6, hemoglobin 8.1, platelet count 73,000.    • 4/27/16 - Patient received cycle 5, day 1 R-GCVP.  • 4/28/16 - Patient received intrathecal chemotherapy.    • 5/4/16 - Patient received cycle 5, day 8 of Gemcitabine.  • 5/18/16 - Patient received cycle 6, day 1 of R-GCVP.  CBC shows WBC 10.7, hemoglobin 9.7, platelet count 417,000.  • 5/19/16 - Patient received cycle 5 of intrathecal chemotherapy with Methotrexate, Cytarabine and Hydrocortisone.  • 6/10/16 - Brain MRI with and without contrast:  Residual abnormal signal involving the region of the left sphenoid sinus.  There appears to be mild improvement compared to prior.  Findings may be due to chronic inflammation of sinusitis.  Residual tumor is possible but less likely.  Overall, there is improvement.  No evidence of acute focal ischemia.  Nonspecific white matter changes.    • 6/16/16 - Patient received the last, sixth cycle of intrathecal chemotherapy.  • 7/7/16 - PET/CT scan:  Small focal area of increased nuclear activity in the lateral segment of the left lobe of the liver is no longer visualized.    • 8/1/16 to 8/19/16 - Patient received 15 fractions of radiation therapy, total of 30 Gy.  • 11/3/16 - PET/CT scan:  Stable exam with no hypermetabolic activity seen within the neck, chest, abdomen and pelvis.  New sub-centimeter non-calcified pulmonary nodules within the posterior lateral aspect of the right upper lobe.  There may be inflammatory or infectious.  Stable bilateral sub-centimeter non-calcified pulmonary nodules which are not hypermetabolic.    • 1/28/17 - MRI of brain with and without contrast:  Abnormal signal in the left sphenoid sinus is redemonstrated.  It is similar in size to the previous MRI from June 2016 and measures about 23 x 14 mm.  No evidence of progression.  No evidence of  metastases.  • 2/17/17 - CT scan:  No abnormalities in the chest.  No lymphadenopathy.  • 5/8/17 - Patient underwent nasal endoscopy which showed maxillary sinusitis.  No tumor reported.  • 9/8/17 - PET/CT scan:  No evidence of metastasis or disease recurrence.  No hypermetabolic activity anywhere.  Stable partial opacification of the left sphenoid sinus likely due to chronic sinusitis.    • 3/3/18 - WBC 7.2, hemoglobin 11.4, platelet count 181,000, MCV 91.8.  Creatinine 0.77.  LFTs normal.  Albumin 4.    • 3/30/18 - Brain MRI with and without contrast:  Decreasing size of the abnormal soft tissue within the left pterygomaxillary fissure.  This area demonstrates mild heterogeneous contrast enhancement which is similar to prior studies.  No new enlarging soft tissue mass.  Mucosal thickening with the left maxillary and sphenoid sinuses.  No evidence of malignancy.  No acute intracranial abnormality.    • 8/24/18 - CT scan of chest, abdomen and pelvis:  No evidence of lymphadenopathy.  A 1 cm pulmonary nodule in the right middle lobe is not significantly changed.  No evidence of lymphadenopathy.  CT abdomen is also unremarkable.  Enlarged heterogenous prostate gland, hyperplasia is noted.  Small hiatal hernia.  Moderate osteopenia and degenerative changes in hips and spine.  Left renal cyst.    • 8/28/18 - Vitamin D 31.  PSA 1.6.    • 10/5/18 - DEXAscan:  Osteoporosis with T-score of -2.5 in the distal forearm.    • 3/4/19 - WBC 6.9, hemoglobin 12.8, platelet count 187,000, MCV 91.  Creatinine 1.03.  Total bilirubin 0.5.  .  • 5/8/2019 25 hydroxy vitamin D 32  • 8/28/2019: Brain MRI:1. The abnormal material in the left sphenoid sinus does not appear significantly changed.The brain appears stable and within normal for age. 3. Minor chronic maxillary sinus mucosal thickening        Subjective:09/10/19  Here for a follow up of his lymphoma..   Patient does not report any fevers chills or night sweats.  Does not  have any lymph node swelling.  Does not report any facial pain or headaches.  Continues to have right knee pain and sometimes occasionally the knee has been locking.  He has not been to orthopedics recently.  His osteoporosis he continues to take Boniva.  Remains anemic and the labs done in March.  But not symptomatic        The following portions of the patient's history were reviewed and updated as appropriate: allergies, current medications, past family history, past medical history, past social history, past surgical history and problem list.         Past Medical History:   Diagnosis Date   • Anemia    • Arthralgia    • Diabetes (CMS/HCC)    • Fatigue    • GERD (gastroesophageal reflux disease)    • Hyperlipidemia    • Hypertension    • Liver lesion    • Non Hodgkin's lymphoma (CMS/HCC)    • Osteoporosis    • Personal history of testicular cancer    • Vitamin D deficiency    • Weight loss        Past Surgical History:   Procedure Laterality Date   • HIP SURGERY  2005   • PORTACATH PLACEMENT     • TESTICLE SURGERY Right 07/17/2000    right testicular excision         Current Outpatient Medications:   •  ALLERGY RELIEF 50 MCG/ACT nasal spray, INHALE 2 PUFFS IN EACH NOSTRIL EVERY DAY, Disp: , Rfl: 6  •  atorvastatin (LIPITOR) 40 MG tablet, Take 40 mg by mouth every night at bedtime., Disp: , Rfl: 0  •  carvedilol (COREG) 12.5 MG tablet, TAKE TWO TABLETS BY MOUTH EVERY MORNING AND TAKE ONE TABLET EVERY EVENING, Disp: , Rfl: 0  •  glimepiride (AMARYL) 4 MG tablet, Take 4 mg by mouth Daily., Disp: , Rfl: 0  •  ibandronate (BONIVA) 150 MG tablet, TAKE ONE TABLET BY MOUTH ONCE monthly, Disp: , Rfl: 5  •  losartan (COZAAR) 100 MG tablet, Take 1 tablet by mouth Daily., Disp: 30 tablet, Rfl: 6  •  metFORMIN ER (GLUCOPHAGE-XR) 500 MG 24 hr tablet, Take 500 mg by mouth 2 (Two) Times a Day., Disp: , Rfl: 2    Allergies no known allergies    History reviewed. No pertinent family history.    Cancer-related family history is not  "on file.    Social History     Tobacco Use   • Smoking status: Never Smoker   • Smokeless tobacco: Never Used   Substance Use Topics   • Alcohol use: No     Frequency: Never   • Drug use: No         I have reviewed the history of present illness, past medical history, family history, social history, lab results, all notes and other records since the patient was last seen on  Visit date not found.    ROS:     Review of Systems    Objective:    Vitals:    09/10/19 1526   BP: 180/67   Pulse: 60   Resp: 18   Temp: 98.2 °F (36.8 °C)   TempSrc: Oral   Weight: 64.9 kg (143 lb)   Height: 152.4 cm (60\")   PainSc: 0-No pain     ECOG  (1) Restricted in physically strenuous activity, ambulatory and able to do work of light nature    Physical Exam:   Physical Exam   Constitutional: He is oriented to person, place, and time. He appears well-developed and well-nourished. No distress.   HENT:   Head: Normocephalic and atraumatic.   Eyes: Conjunctivae and EOM are normal. Right eye exhibits no discharge. Left eye exhibits no discharge. No scleral icterus.   Neck: Normal range of motion. Neck supple. No thyromegaly present.   Cardiovascular: Normal rate, regular rhythm and normal heart sounds. Exam reveals no gallop and no friction rub.   Pulmonary/Chest: Effort normal. No stridor. No respiratory distress. He has no wheezes.   Abdominal: Soft. Bowel sounds are normal. He exhibits no mass. There is no tenderness. There is no rebound and no guarding.   Musculoskeletal: Normal range of motion. He exhibits no tenderness.   Decreased range of motion in the right knee.   Lymphadenopathy:     He has no cervical adenopathy.   Neurological: He is alert and oriented to person, place, and time. He exhibits normal muscle tone.   Skin: Skin is warm. No rash noted. He is not diaphoretic. No erythema.   Psychiatric: He has a normal mood and affect. His behavior is normal.   Nursing note and vitals reviewed.            Lab Results - Last 18 Months "   Lab Units 05/08/19  1301 09/05/18  1528 04/03/18  1619   WBC 10*3/uL 7.6 6.4 8.0   HEMOGLOBIN g/dL 11.4* 11.8* 12.0*   HEMATOCRIT % 34.1* 35.3* 36.0*   PLATELETS 10*3/uL 168 195 215   MCV fL 92.2 90.9 90.6     Lab Results - Last 18 Months   Lab Units 05/08/19  1301 09/05/18  1528 04/03/18  1619   SODIUM mmol/L 139 138 137   POTASSIUM mmol/L 4.5 4.3 4.3   CHLORIDE mmol/L 105 104 104   TOTAL CO2 mmol/L 22 26 25   BUN mg/dL 27* 21* 12   CREATININE mg/dl 1.3* 1.0 0.6*   CALCIUM mg/dL 9.7 9.7 10.0   BILIRUBIN mg/dL 0.9 0.2* 0.1*   ALK PHOS IU/L 80 100* 85   ALT (SGPT) IU/L 15* 18 21   AST (SGOT) IU/L 17 20 23   GLUCOSE mg/dL 101* 368* 116*       Lab Results   Component Value Date    GLUCOSE 101 (H) 05/08/2019    BUN 27 (H) 05/08/2019    CREATININE 1.3 (H) 05/08/2019    EGFRIFAFRI >60 03/23/2019    BCR 20.8 (H) 05/08/2019    K 4.5 05/08/2019    CO2 22 05/08/2019    CALCIUM 9.7 05/08/2019    ALBUMIN 4.4 05/08/2019    LABIL2 1.6 05/08/2019    AST 17 05/08/2019    ALT 15 (L) 05/08/2019       No results for input(s): APTT, INR, PTT in the last 65750 hours.    Lab Results   Component Value Date    IRON 59 09/05/2018       No results found for: FOLATE    No results found for: OCCULTBLD    No results found for: RETICCTPCT    No results found for: FIQURDJR12  No results found for: SPEP, UPEP  No results found for: LDH, URICACID  No results found for: FOREST, RF, SEDRATE  No results found for: FIBRINOGEN, HAPTOGLOBIN  No results found for: PTT, INR  No results found for:   No results found for: CEA  No components found for: CA-19-9  Lab Results   Component Value Date    PSA 1.47 03/17/2017             Assessment/Plan     Assessment:  1. 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  It is unclear whether the patient has recurrent  versus new de zulma lymphoma.  His PET scan also showed an indeterminate lesion in the left lobe of the liver, which could not be biopsied.  He was treated with Rituximab-GCVP chemotherapy x6 cycles.  The patient also received six cycles of intrathecal chemotherapy with Methotrexate, Cytarabine, and Hydrocortisone.  PET/CT scan showed a complete response.  His restaging PET scan on 11/3/16 continues to show remission.  He also received consolidation radiation therapy for 30 Gy finished on 8/19/16.      PET/CT scan in September 2017 does not show any evidence of disease recurrence.  Patient was reporting left-sided headaches.  I ordered repeat MRI on 3/30/18 and it fails to show any evidence of disease progression or recurrence.  Restaging CT scan on 8/24/18 does not show any evidence of disease recurrence.  .  2. History of right knee osteoarthritis:  He previously received steroid injections.  He will require right knee replacement eventually.   3. History of anemia.  4. Benign prostatic hypertrophy:  He has presented with symptoms of urinary incontinence/nocturia.  He is on Flomax.  He is followed by Urology.  5. History of liver lesion:  This was noted initially on PET scan.  Repeat MRI showed a 1.3 x 1.7 cm, rim-enhancing lesion which could not be biopsied.  His restaging PET scan on 9/8/16 does not show this lesion anymore.   6. Osteoporosis:  This was seen on DEXAscan 10/5/18.  We have recommended Boniva and also vitamin D plus calcium supplements.        PLAN:    1. CT chest abdomen pelvis in 6 months and follow-up.   2. Check CBC, LDH and CMP prior to next visit.    3. Recommended that he take Boniva q. monthly.    4. Follow-up in six months.        Non-Hodgkin's lymphoma, unspecified body region, unspecified non-Hodgkin lymphoma type (CMS/HCC)  - CT chest w contrast  - CT abdomen pelvis w contrast  - [facility] Requisition    Anemia, unspecified type    Other osteoporosis without current pathological  fracture    Other secondary osteoarthritis of right knee    Orders Placed This Encounter   Procedures   • CT chest w contrast     Standing Status:   Future     Standing Expiration Date:   9/10/2020     Scheduling Instructions:      TO BE DONE IN 6 MONTHS   • CT abdomen pelvis w contrast     Standing Status:   Future     Standing Expiration Date:   9/10/2020     Scheduling Instructions:      TO BE DONE IN 6 MONTHS     Order Specific Question:   Will Oral Contrast be needed for this procedure?     Answer:   No   • [facility] Requisition     CBC, CMP, LDH to be done in 6 months at [facility]     Standing Status:   Future     Standing Expiration Date:   9/10/2020     Scheduling Instructions:      Lab #:      Collection Date/Time: 9/10/2019      Provider: GOSIA      Details:                     Thank you very much for providing the opportunity to participate in this patient’s care. Please do not hesitate to call if there are any other questions.    I have reviewed all relevant labs results,outside reports, imaging,,notes, vitals, medications and plan with the patient today.    Portions of the note have been Scribed by medical assistant/Nurse.  I have reviewed and made changes accordingly.

## 2019-10-22 RX ORDER — METFORMIN HYDROCHLORIDE 500 MG/1
500 TABLET, EXTENDED RELEASE ORAL 2 TIMES DAILY
Qty: 20 TABLET | Refills: 0 | Status: SHIPPED | OUTPATIENT
Start: 2019-10-22 | End: 2019-11-20 | Stop reason: DRUGHIGH

## 2019-10-23 ENCOUNTER — OFFICE VISIT (OUTPATIENT)
Dept: FAMILY MEDICINE CLINIC | Facility: CLINIC | Age: 74
End: 2019-10-23

## 2019-10-23 VITALS
WEIGHT: 143.8 LBS | HEIGHT: 60 IN | OXYGEN SATURATION: 98 % | TEMPERATURE: 98.3 F | DIASTOLIC BLOOD PRESSURE: 66 MMHG | BODY MASS INDEX: 28.23 KG/M2 | HEART RATE: 60 BPM | SYSTOLIC BLOOD PRESSURE: 134 MMHG

## 2019-10-23 DIAGNOSIS — E78.2 MIXED HYPERLIPIDEMIA: ICD-10-CM

## 2019-10-23 DIAGNOSIS — Z91.14 POOR COMPLIANCE WITH MEDICATION: ICD-10-CM

## 2019-10-23 DIAGNOSIS — E13.69 OTHER SPECIFIED DIABETES MELLITUS WITH OTHER SPECIFIED COMPLICATION, UNSPECIFIED WHETHER LONG TERM INSULIN USE (HCC): Primary | ICD-10-CM

## 2019-10-23 DIAGNOSIS — N18.30 CKD (CHRONIC KIDNEY DISEASE), STAGE III (HCC): ICD-10-CM

## 2019-10-23 DIAGNOSIS — I15.0 RENOVASCULAR HYPERTENSION: ICD-10-CM

## 2019-10-23 DIAGNOSIS — G62.9 NEUROPATHY: ICD-10-CM

## 2019-10-23 DIAGNOSIS — B35.1 ONYCHOMYCOSIS: ICD-10-CM

## 2019-10-23 LAB
GLUCOSE BLDC GLUCOMTR-MCNC: 407 MG/DL (ref 70–130)
HBA1C MFR BLD: 9.1 %

## 2019-10-23 PROCEDURE — 82962 GLUCOSE BLOOD TEST: CPT | Performed by: FAMILY MEDICINE

## 2019-10-23 PROCEDURE — 90471 IMMUNIZATION ADMIN: CPT | Performed by: FAMILY MEDICINE

## 2019-10-23 PROCEDURE — 36415 COLL VENOUS BLD VENIPUNCTURE: CPT | Performed by: FAMILY MEDICINE

## 2019-10-23 PROCEDURE — 90653 IIV ADJUVANT VACCINE IM: CPT | Performed by: FAMILY MEDICINE

## 2019-10-23 PROCEDURE — 85025 COMPLETE CBC W/AUTO DIFF WBC: CPT | Performed by: FAMILY MEDICINE

## 2019-10-23 PROCEDURE — 80053 COMPREHEN METABOLIC PANEL: CPT | Performed by: FAMILY MEDICINE

## 2019-10-23 PROCEDURE — 83036 HEMOGLOBIN GLYCOSYLATED A1C: CPT | Performed by: FAMILY MEDICINE

## 2019-10-23 PROCEDURE — 99214 OFFICE O/P EST MOD 30 MIN: CPT | Performed by: FAMILY MEDICINE

## 2019-10-23 RX ORDER — DIPHENHYDRAMINE HYDROCHLORIDE 25 MG/1
CAPSULE, LIQUID FILLED ORAL
Status: ON HOLD | COMMUNITY
Start: 2017-09-29 | End: 2022-07-05

## 2019-10-23 RX ORDER — GLIMEPIRIDE 4 MG/1
4 TABLET ORAL 2 TIMES DAILY WITH MEALS
Qty: 60 TABLET | Refills: 1 | Status: SHIPPED | OUTPATIENT
Start: 2019-10-23 | End: 2019-11-20 | Stop reason: SDUPTHER

## 2019-10-23 RX ORDER — CARVEDILOL 25 MG/1
25 TABLET ORAL 2 TIMES DAILY WITH MEALS
Qty: 30 TABLET | Refills: 1 | Status: SHIPPED | OUTPATIENT
Start: 2019-10-23 | End: 2019-10-23 | Stop reason: SDUPTHER

## 2019-10-23 RX ORDER — GABAPENTIN 100 MG/1
100 CAPSULE ORAL
Qty: 30 CAPSULE | Refills: 1 | Status: SHIPPED | OUTPATIENT
Start: 2019-10-23 | End: 2020-06-22 | Stop reason: SDDI

## 2019-10-23 RX ORDER — CARVEDILOL 25 MG/1
25 TABLET ORAL 2 TIMES DAILY WITH MEALS
Qty: 30 TABLET | Refills: 1 | Status: SHIPPED | OUTPATIENT
Start: 2019-10-23 | End: 2019-11-20 | Stop reason: SDUPTHER

## 2019-10-23 RX ORDER — CARVEDILOL 12.5 MG/1
1 TABLET ORAL 2 TIMES DAILY
COMMUNITY
Start: 2013-11-25 | End: 2019-10-23

## 2019-10-23 RX ORDER — CHLORAL HYDRATE 500 MG
1 CAPSULE ORAL EVERY 24 HOURS
COMMUNITY
End: 2020-03-10 | Stop reason: SDUPTHER

## 2019-10-23 NOTE — PATIENT INSTRUCTIONS
Finish current carveidilol 12.5 2 tablets daily  Then start taking carvedilol 25 mg 2x a day  Increase glimeperide to 4 mg 2x a day with meals  Continue losartan  For blood pressure  Continue atorvastatin fpr cholesterol;  Take gabapentin for foot pain  Bring in all medications with appointment

## 2019-10-23 NOTE — PROGRESS NOTES
Subjective   Ramon Silva is a 74 y.o. male.     Chief Complaint   Patient presents with   • Follow-up     Follow up, DM  refill meds       History of Present Illness   Fu HTN, DM, HLD, CKD  Labs reviewed  He is followed by oncology for Lymphonma and was recently started on monthly Boniva for osteoporosis  Pt has been taking 2 different carveilol prescriptions.  This was verified with pharmacy  He has been taking metformin Er and glimeperide   Glucose=  407, a1c=9.1  uncontrolled  Pt has declined trying insulin in the past   He does not monitor blood glucose, has declined Diabetes education  He is c/o pain in both feet  Pt lives alone, his daughter lives in [place]      The following portions of the patient's history were reviewed and updated as appropriate: allergies, current medications, past family history, past medical history, past social history, past surgical history and problem list.    Past Medical History:   Diagnosis Date   • Anemia    • Arthralgia    • Diabetes (CMS/HCC)    • Fatigue    • GERD (gastroesophageal reflux disease)    • Hyperlipidemia    • Hypertension    • Liver lesion    • Non Hodgkin's lymphoma (CMS/HCC)    • Osteoporosis    • Personal history of testicular cancer    • Vitamin D deficiency    • Weight loss        Past Surgical History:   Procedure Laterality Date   • HIP SURGERY  2005   • PORTACATH PLACEMENT     • TESTICLE SURGERY Right 07/17/2000    right testicular excision       Family History   Family history unknown: Yes       Review of Systems   Constitutional: Negative for fatigue, unexpected weight gain and unexpected weight loss.   HENT: Positive for hearing loss. Negative for congestion, sinus pressure and sore throat.    Eyes: Negative for blurred vision and visual disturbance.   Respiratory: Negative for cough, shortness of breath and wheezing.    Cardiovascular: Negative for chest pain, palpitations and leg swelling.   Gastrointestinal: Negative for abdominal pain,  constipation, diarrhea, nausea, vomiting, GERD and indigestion.   Musculoskeletal: Positive for arthralgias. Negative for back pain, gait problem, joint swelling and neck pain.        Right foot pain   Skin: Negative for rash, skin lesions and bruise.   Allergic/Immunologic: Negative for environmental allergies.   Neurological: Negative for dizziness, tremors, syncope, weakness, light-headedness, headache and memory problem.   Psychiatric/Behavioral: Negative for sleep disturbance, depressed mood and stress. The patient is not nervous/anxious.        Objective   Physical Exam   Constitutional: He is oriented to person, place, and time. He appears well-developed and well-nourished. No distress.   HENT:   Head: Normocephalic and atraumatic.   Right Ear: External ear normal.   Left Ear: External ear normal.   Nose: Nose normal.   Mouth/Throat: Oropharynx is clear and moist.   Eyes: EOM are normal. Pupils are equal, round, and reactive to light.   Neck: Normal range of motion. Neck supple. No thyromegaly present.   Cardiovascular: Normal rate, regular rhythm and normal heart sounds. Exam reveals no gallop and no friction rub.   No murmur heard.  Pulmonary/Chest: Effort normal. He has no wheezes.   Abdominal: Soft. There is no tenderness.   Musculoskeletal: Normal range of motion. He exhibits no edema, tenderness or deformity.    Diabetic foot exam performed: onychomycosis.   During the foot exam he had a monofilament test performed (decreased senstiion bilaterall feet).  Lymphadenopathy:     He has no cervical adenopathy.   Neurological: He is alert and oriented to person, place, and time. No cranial nerve deficit.   Skin: Skin is warm and dry. No rash noted. He is not diaphoretic.   Psychiatric: He has a normal mood and affect. His behavior is normal. Judgment and thought content normal.   Nursing note and vitals reviewed.        Assessment/Plan   Ramon was seen today for follow-up.    Diagnoses and all orders for this  visit:    Other specified diabetes mellitus with other specified complication, unspecified whether long term insulin use (CMS/[facility])  -     POC Glycosylated Hemoglobin (Hb A1C)  -     POC Glucose                 Vitals:    10/23/19 1355   BP: 134/66   Pulse: 60   Temp: 98.3 °F (36.8 °C)   SpO2: 98%     Body mass index is 28.08 kg/m².    Hemoglobin A1C   Date Value Ref Range Status   10/23/2019 9.1 % Final     Glucose   Date Value Ref Range Status   10/23/2019 407 (A) 70 - 130 mg/dL Final     LDL Cholesterol    Date Value Ref Range Status   05/08/2019 44 0 - 100 mg/dL Final   03/17/2017 77 0 - 100 mg/dL Final

## 2019-10-24 LAB
ALBUMIN SERPL-MCNC: 4.8 G/DL (ref 3.5–5.2)
ALBUMIN/GLOB SERPL: 1.7 G/DL
ALP SERPL-CCNC: 128 U/L (ref 39–117)
ALT SERPL W P-5'-P-CCNC: 13 U/L (ref 1–41)
ANION GAP SERPL CALCULATED.3IONS-SCNC: 12.8 MMOL/L (ref 5–15)
AST SERPL-CCNC: 14 U/L (ref 1–40)
BASOPHILS # BLD AUTO: 0.02 10*3/MM3 (ref 0–0.2)
BASOPHILS NFR BLD AUTO: 0.3 % (ref 0–1.5)
BILIRUB SERPL-MCNC: 0.3 MG/DL (ref 0.2–1.2)
BUN BLD-MCNC: 28 MG/DL (ref 8–23)
BUN/CREAT SERPL: 24.1 (ref 7–25)
CALCIUM SPEC-SCNC: 9.5 MG/DL (ref 8.6–10.5)
CHLORIDE SERPL-SCNC: 100 MMOL/L (ref 98–107)
CO2 SERPL-SCNC: 25.2 MMOL/L (ref 22–29)
CREAT BLD-MCNC: 1.16 MG/DL (ref 0.76–1.27)
DEPRECATED RDW RBC AUTO: 43.3 FL (ref 37–54)
EOSINOPHIL # BLD AUTO: 0.27 10*3/MM3 (ref 0–0.4)
EOSINOPHIL NFR BLD AUTO: 3.7 % (ref 0.3–6.2)
ERYTHROCYTE [DISTWIDTH] IN BLOOD BY AUTOMATED COUNT: 12.7 % (ref 12.3–15.4)
GFR SERPL CREATININE-BSD FRML MDRD: 62 ML/MIN/1.73
GFR SERPL CREATININE-BSD FRML MDRD: 75 ML/MIN/1.73
GLOBULIN UR ELPH-MCNC: 2.9 GM/DL
GLUCOSE BLD-MCNC: 347 MG/DL (ref 65–99)
HCT VFR BLD AUTO: 33.2 % (ref 37.5–51)
HGB BLD-MCNC: 10.8 G/DL (ref 13–17.7)
IMM GRANULOCYTES # BLD AUTO: 0.03 10*3/MM3 (ref 0–0.05)
IMM GRANULOCYTES NFR BLD AUTO: 0.4 % (ref 0–0.5)
LYMPHOCYTES # BLD AUTO: 3.26 10*3/MM3 (ref 0.7–3.1)
LYMPHOCYTES NFR BLD AUTO: 44.3 % (ref 19.6–45.3)
MCH RBC QN AUTO: 30 PG (ref 26.6–33)
MCHC RBC AUTO-ENTMCNC: 32.5 G/DL (ref 31.5–35.7)
MCV RBC AUTO: 92.2 FL (ref 79–97)
MONOCYTES # BLD AUTO: 0.51 10*3/MM3 (ref 0.1–0.9)
MONOCYTES NFR BLD AUTO: 6.9 % (ref 5–12)
NEUTROPHILS # BLD AUTO: 3.27 10*3/MM3 (ref 1.7–7)
NEUTROPHILS NFR BLD AUTO: 44.4 % (ref 42.7–76)
NRBC BLD AUTO-RTO: 0 /100 WBC (ref 0–0.2)
PLATELET # BLD AUTO: 180 10*3/MM3 (ref 140–450)
PMV BLD AUTO: 11.3 FL (ref 6–12)
POTASSIUM BLD-SCNC: 5.5 MMOL/L (ref 3.5–5.2)
PROT SERPL-MCNC: 7.7 G/DL (ref 6–8.5)
RBC # BLD AUTO: 3.6 10*6/MM3 (ref 4.14–5.8)
SODIUM BLD-SCNC: 138 MMOL/L (ref 136–145)
WBC NRBC COR # BLD: 7.36 10*3/MM3 (ref 3.4–10.8)

## 2019-11-20 ENCOUNTER — OFFICE VISIT (OUTPATIENT)
Dept: FAMILY MEDICINE CLINIC | Facility: CLINIC | Age: 74
End: 2019-11-20

## 2019-11-20 VITALS
WEIGHT: 145 LBS | OXYGEN SATURATION: 98 % | HEIGHT: 60 IN | SYSTOLIC BLOOD PRESSURE: 116 MMHG | BODY MASS INDEX: 28.47 KG/M2 | TEMPERATURE: 97.7 F | DIASTOLIC BLOOD PRESSURE: 61 MMHG | HEART RATE: 61 BPM

## 2019-11-20 DIAGNOSIS — I15.2 HYPERTENSION DUE TO ENDOCRINE DISORDER: ICD-10-CM

## 2019-11-20 DIAGNOSIS — E78.2 MIXED HYPERLIPIDEMIA: ICD-10-CM

## 2019-11-20 DIAGNOSIS — E11.65 TYPE 2 DIABETES MELLITUS WITH HYPERGLYCEMIA, WITHOUT LONG-TERM CURRENT USE OF INSULIN (HCC): Primary | ICD-10-CM

## 2019-11-20 PROCEDURE — 99213 OFFICE O/P EST LOW 20 MIN: CPT | Performed by: FAMILY MEDICINE

## 2019-11-20 RX ORDER — GLIMEPIRIDE 4 MG/1
4 TABLET ORAL 2 TIMES DAILY WITH MEALS
Qty: 60 TABLET | Refills: 2 | Status: SHIPPED | OUTPATIENT
Start: 2019-11-20 | End: 2020-05-07 | Stop reason: SDUPTHER

## 2019-11-20 RX ORDER — CARVEDILOL 25 MG/1
25 TABLET ORAL 2 TIMES DAILY WITH MEALS
Qty: 60 TABLET | Refills: 2 | Status: SHIPPED | OUTPATIENT
Start: 2019-11-20 | End: 2020-02-25 | Stop reason: SDUPTHER

## 2019-11-20 RX ORDER — METFORMIN HYDROCHLORIDE 500 MG/1
500 TABLET, EXTENDED RELEASE ORAL 2 TIMES DAILY
Qty: 20 TABLET | Refills: 0 | Status: SHIPPED | OUTPATIENT
Start: 2019-11-20 | End: 2020-01-07

## 2019-11-20 RX ORDER — ATORVASTATIN CALCIUM 40 MG/1
40 TABLET, FILM COATED ORAL
Qty: 30 TABLET | Refills: 2 | Status: SHIPPED | OUTPATIENT
Start: 2019-11-20 | End: 2020-03-09 | Stop reason: SDUPTHER

## 2019-11-20 RX ORDER — FERROUS SULFATE TAB EC 324 MG (65 MG FE EQUIVALENT) 324 (65 FE) MG
324 TABLET DELAYED RESPONSE ORAL
COMMUNITY
End: 2020-10-13

## 2019-11-20 NOTE — PROGRESS NOTES
Subjective   Ramon Silva is a 74 y.o. male.     Chief Complaint   Patient presents with   • Follow-up     1 month follow up       History of Present Illness   Fu DM  Labs show uncontrolled DM a1c=9.1  Will increase metformin  Refill meds as needed  Pt does not monitor blood glucse, lives alone    The following portions of the patient's history were reviewed and updated as appropriate: allergies, current medications, past family history, past medical history, past social history, past surgical history and problem list.    Past Medical History:   Diagnosis Date   • Anemia    • Arthralgia    • Diabetes (CMS/HCC)    • Fatigue    • GERD (gastroesophageal reflux disease)    • Hyperlipidemia    • Hypertension    • Liver lesion    • Non Hodgkin's lymphoma (CMS/HCC)    • Osteoporosis    • Personal history of testicular cancer    • Vitamin D deficiency    • Weight loss        Past Surgical History:   Procedure Laterality Date   • HIP SURGERY  2005   • PORTACATH PLACEMENT     • TESTICLE SURGERY Right 07/17/2000    right testicular excision       Family History   Family history unknown: Yes       Review of Systems   Constitutional: Negative for fatigue, unexpected weight gain and unexpected weight loss.   HENT: Negative for congestion, sinus pressure and sore throat.    Eyes: Negative for blurred vision and visual disturbance.   Respiratory: Negative for cough, shortness of breath and wheezing.    Cardiovascular: Negative for chest pain, palpitations and leg swelling.   Gastrointestinal: Negative for abdominal pain, constipation, diarrhea, nausea, vomiting, GERD and indigestion.   Musculoskeletal: Negative for arthralgias, back pain, gait problem, joint swelling and neck pain.   Skin: Negative for rash, skin lesions and bruise.   Allergic/Immunologic: Negative for environmental allergies.   Neurological: Negative for dizziness, tremors, syncope, weakness, light-headedness, headache and memory problem.    Psychiatric/Behavioral: Negative for sleep disturbance, depressed mood and stress. The patient is not nervous/anxious.        Objective   Physical Exam   Constitutional: He is oriented to person, place, and time. He appears well-developed and well-nourished. No distress.   HENT:   Head: Normocephalic and atraumatic.   Right Ear: External ear normal.   Left Ear: External ear normal.   Nose: Nose normal.   Mouth/Throat: Oropharynx is clear and moist.   Eyes: EOM are normal. Pupils are equal, round, and reactive to light.   Neck: Normal range of motion. Neck supple. No thyromegaly present.   Cardiovascular: Normal rate, regular rhythm and normal heart sounds. Exam reveals no gallop and no friction rub.   No murmur heard.  Pulmonary/Chest: Effort normal. He has no wheezes.   Abdominal: Soft. There is no tenderness.   Musculoskeletal: Normal range of motion. He exhibits no edema, tenderness or deformity.   Lymphadenopathy:     He has no cervical adenopathy.   Neurological: He is alert and oriented to person, place, and time. No cranial nerve deficit.   Skin: Skin is warm and dry. No rash noted. He is not diaphoretic.   Psychiatric: He has a normal mood and affect. His behavior is normal. Judgment and thought content normal.   Nursing note and vitals reviewed.        Assessment/Plan   Ramon was seen today for follow-up.    Diagnoses and all orders for this visit:    Type 2 diabetes mellitus with hyperglycemia, without long-term current use of insulin (CMS/[facility])    Hypertension due to endocrine disorder    Mixed hyperlipidemia    Other orders  -     carvedilol (COREG) 25 MG tablet; Take 1 tablet by mouth 2 (Two) Times a Day With Meals.  -     glimepiride (AMARYL) 4 MG tablet; Take 1 tablet by mouth 2 (Two) Times a Day With Meals.  -     metFORMIN ER (GLUCOPHAGE-XR) 500 MG 24 hr tablet; Take 1 tablet by mouth 2 (Two) Times a Day.  -     atorvastatin (LIPITOR) 40 MG tablet; Take 1 tablet by mouth every night at  bedtime.                 Vitals:    11/20/19 1445   BP: 116/61   Pulse: 61   Temp: 97.7 °F (36.5 °C)   SpO2: 98%     Body mass index is 28.32 kg/m².    Hemoglobin A1C   Date Value Ref Range Status   10/23/2019 9.1 % Final     Glucose   Date Value Ref Range Status   10/23/2019 407 (A) 70 - 130 mg/dL Final     LDL Cholesterol    Date Value Ref Range Status   05/08/2019 44 0 - 100 mg/dL Final   03/17/2017 77 0 - 100 mg/dL Final     Results for orders placed or performed in visit on 10/23/19   CBC Auto Differential   Result Value Ref Range    WBC 7.36 3.40 - 10.80 10*3/mm3    RBC 3.60 (L) 4.14 - 5.80 10*6/mm3    Hemoglobin 10.8 (L) 13.0 - 17.7 g/dL    Hematocrit 33.2 (L) 37.5 - 51.0 %    MCV 92.2 79.0 - 97.0 fL    MCH 30.0 26.6 - 33.0 pg    MCHC 32.5 31.5 - 35.7 g/dL    RDW 12.7 12.3 - 15.4 %    RDW-SD 43.3 37.0 - 54.0 fl    MPV 11.3 6.0 - 12.0 fL    Platelets 180 140 - 450 10*3/mm3    Neutrophil % 44.4 42.7 - 76.0 %    Lymphocyte % 44.3 19.6 - 45.3 %    Monocyte % 6.9 5.0 - 12.0 %    Eosinophil % 3.7 0.3 - 6.2 %    Basophil % 0.3 0.0 - 1.5 %    Immature Grans % 0.4 0.0 - 0.5 %    Neutrophils, Absolute 3.27 1.70 - 7.00 10*3/mm3    Lymphocytes, Absolute 3.26 (H) 0.70 - 3.10 10*3/mm3    Monocytes, Absolute 0.51 0.10 - 0.90 10*3/mm3    Eosinophils, Absolute 0.27 0.00 - 0.40 10*3/mm3    Basophils, Absolute 0.02 0.00 - 0.20 10*3/mm3    Immature Grans, Absolute 0.03 0.00 - 0.05 10*3/mm3    nRBC 0.0 0.0 - 0.2 /100 WBC   POC Glycosylated Hemoglobin (Hb A1C)   Result Value Ref Range    Hemoglobin A1C 9.1 %   POC Glucose   Result Value Ref Range    Glucose 407 (A) 70 - 130 mg/dL   Comprehensive Metabolic Panel   Result Value Ref Range    Glucose 347 (H) 65 - 99 mg/dL    BUN 28 (H) 8 - 23 mg/dL    Creatinine 1.16 0.76 - 1.27 mg/dL    Sodium 138 136 - 145 mmol/L    Potassium 5.5 (H) 3.5 - 5.2 mmol/L    Chloride 100 98 - 107 mmol/L    CO2 25.2 22.0 - 29.0 mmol/L    Calcium 9.5 8.6 - 10.5 mg/dL    Total Protein 7.7 6.0 - 8.5 g/dL     Albumin 4.80 3.50 - 5.20 g/dL    ALT (SGPT) 13 1 - 41 U/L    AST (SGOT) 14 1 - 40 U/L    Alkaline Phosphatase 128 (H) 39 - 117 U/L    Total Bilirubin 0.3 0.2 - 1.2 mg/dL    eGFR Non African Amer 62 >60 mL/min/1.73    eGFR  African Amer 75 >60 mL/min/1.73    Globulin 2.9 gm/dL    A/G Ratio 1.7 g/dL    BUN/Creatinine Ratio 24.1 7.0 - 25.0    Anion Gap 12.8 5.0 - 15.0 mmol/L   Results for orders placed or performed during the hospital encounter of 05/08/19   Urinalysis, Microscopic Only -   Result Value Ref Range    RBC, UA 0 0 - 3 /[HPF]    WBC, UA 0 0 - 5 /[HPF]    Bacteria, UA NEGATIVE NEGATIVE    Epithelial Cells, UA 0 0 - 5 /[HPF]    Hyaline Casts, UA 2 0 - 5 /[LPF]    Casts NONE NONE /[LPF]    Crystals, UA NONE NONE /[HPF]    Small Round Cells NONE NONE /[HPF]    Yeast, UA NONE NONE /[HPF]    Sperm NONE NONE /[HPF]   Vitamin D 25 Hydroxy   Result Value Ref Range    25 Hydroxy, Vitamin D 32 30 - 100 ng/mL   CBC & Differential   Result Value Ref Range    WBC 7.6 4.5 - 11.5 10*3/uL    RBC 3.69 (L) 4.60 - 6.00 10*6/uL    Hemoglobin 11.4 (L) 14.0 - 18.0 g/dL    Hematocrit 34.1 (L) 40 - 54 %    MCV 92.2 80 - 94 fL    MCH 30.8 26 - 32 pg    MCHC 33.4 32 - 36 g/dL    RDW 13.9 11.5 - 14.5 %    Platelets 168 150 - 450 10*3/uL    MPV 10.6 (H) 7.4 - 10.4 fL    Differential Type AUTO     Neutrophils Absolute 3.8 2.3 - 8.6 10*3/uL    Lymphocytes Absolute 2.9 0.8 - 4.8 10*3/uL    Monocytes Absolute 0.5 0.1 - 1.3 10*3/uL    Eosinophils Absolute 0.3 0.0 - 0.3 10*3/uL    Basophils Absolute 0.0 0 - 0.2 10*3/uL    Neutrophil Rel % 50 50 - 75 %    Lymphocyte Rel % 38 18 - 42 %    Monocyte Rel % 7 2 - 11 %    Eosinophil Rel % 4 (H) 0 - 3 %    Basophil Rel % 1 0 - 2 %    nRBC 0 0 /100[WBCs]    Absolute nRBC 0 10*3/uL   Lipid Panel   Result Value Ref Range    Total Cholesterol 111 <200 mg/dL    Triglycerides 260 (H) <150 mg/dL    HDL Cholesterol 36 (L) >39 mg/dL    LDL Cholesterol  44 0 - 100 mg/dL    VLDL Cholesterol 31.2 (H) <21 mg/dL     LDL/HDL Ratio 1.2     Chol/HDL Ratio 3.1     Lipid Interpretation (SEE BELOW)    Comprehensive Metabolic Panel   Result Value Ref Range    Sodium 139 136 - 144 mmol/L    Potassium 4.5 3.6 - 5.1 mmol/L    Chloride 105 101 - 111 mmol/L    CO2 22 22 - 32 mmol/L    Glucose 101 (H) 65 - 99 mg/dL    BUN 27 (H) 8 - 20 mg/dL    Creatinine 1.3 (H) 0.7 - 1.2 mg/dl    Calcium 9.7 8.9 - 10.3 mg/dL    Total Protein 7.2 6.1 - 7.9 g/dL    Albumin 4.4 3.5 - 4.8 g/dL    Total Bilirubin 0.9 0.3 - 1.2 mg/dL    Alkaline Phosphatase 80 32 - 91 IU/L    AST (SGOT) 17 15 - 41 IU/L    ALT (SGPT) 15 (L) 17 - 63 IU/L    Anion Gap 16.5 10 - 20    BUN/Creatinine Ratio 20.8 (H) 6.2 - 20.3    GFR MDRD Non African American 54 (L) >60 mL/min/1.73m2    GFR MDRD African American >60 >60 mL/min/1.73m2    Globulin 2.8 2.5 - 3.8 G/dL    A/G Ratio 1.6 1.0 - 1.7     *Note: Due to a large number of results and/or encounters for the requested time period, some results have not been displayed. A complete set of results can be found in Results Review.

## 2019-11-20 NOTE — PATIENT INSTRUCTIONS
inreae metformin er to 1000 mg 2x a day  Continue glimerperid,e atorvastatin, carvedilol, losartan

## 2020-01-07 RX ORDER — METFORMIN HYDROCHLORIDE 500 MG/1
TABLET, EXTENDED RELEASE ORAL
Qty: 60 TABLET | Refills: 3 | Status: SHIPPED | OUTPATIENT
Start: 2020-01-07 | End: 2020-03-04 | Stop reason: SDUPTHER

## 2020-02-19 ENCOUNTER — OFFICE VISIT (OUTPATIENT)
Dept: FAMILY MEDICINE CLINIC | Facility: CLINIC | Age: 75
End: 2020-02-19

## 2020-02-19 VITALS
WEIGHT: 145 LBS | HEART RATE: 60 BPM | OXYGEN SATURATION: 100 % | HEIGHT: 60 IN | DIASTOLIC BLOOD PRESSURE: 60 MMHG | SYSTOLIC BLOOD PRESSURE: 147 MMHG | BODY MASS INDEX: 28.47 KG/M2 | TEMPERATURE: 97.7 F

## 2020-02-19 DIAGNOSIS — E55.9 VITAMIN D DEFICIENCY: ICD-10-CM

## 2020-02-19 DIAGNOSIS — R53.83 OTHER FATIGUE: ICD-10-CM

## 2020-02-19 DIAGNOSIS — E03.9 HYPOTHYROIDISM, UNSPECIFIED TYPE: ICD-10-CM

## 2020-02-19 DIAGNOSIS — D50.8 OTHER IRON DEFICIENCY ANEMIA: ICD-10-CM

## 2020-02-19 DIAGNOSIS — Z12.5 SCREENING PSA (PROSTATE SPECIFIC ANTIGEN): ICD-10-CM

## 2020-02-19 DIAGNOSIS — R35.0 URINARY FREQUENCY: ICD-10-CM

## 2020-02-19 DIAGNOSIS — E53.8 VITAMIN B12 DEFICIENCY: ICD-10-CM

## 2020-02-19 DIAGNOSIS — E11.69 TYPE 2 DIABETES MELLITUS WITH OTHER SPECIFIED COMPLICATION, UNSPECIFIED WHETHER LONG TERM INSULIN USE (HCC): Primary | ICD-10-CM

## 2020-02-19 DIAGNOSIS — E78.2 MIXED HYPERLIPIDEMIA: ICD-10-CM

## 2020-02-19 LAB
BILIRUB BLD-MCNC: NEGATIVE MG/DL
CLARITY, POC: CLEAR
COLOR UR: YELLOW
GLUCOSE BLDC GLUCOMTR-MCNC: 447 MG/DL (ref 70–130)
GLUCOSE UR STRIP-MCNC: ABNORMAL MG/DL
HBA1C MFR BLD: 7.9 %
KETONES UR QL: NEGATIVE
LEUKOCYTE EST, POC: NEGATIVE
NITRITE UR-MCNC: NEGATIVE MG/ML
PH UR: 5.5 [PH] (ref 5–8)
PROT UR STRIP-MCNC: NEGATIVE MG/DL
RBC # UR STRIP: NEGATIVE /UL
SP GR UR: 1.01 (ref 1–1.03)
UROBILINOGEN UR QL: NORMAL

## 2020-02-19 PROCEDURE — 99213 OFFICE O/P EST LOW 20 MIN: CPT | Performed by: FAMILY MEDICINE

## 2020-02-19 PROCEDURE — 83036 HEMOGLOBIN GLYCOSYLATED A1C: CPT | Performed by: NURSE PRACTITIONER

## 2020-02-19 PROCEDURE — 80053 COMPREHEN METABOLIC PANEL: CPT | Performed by: NURSE PRACTITIONER

## 2020-02-19 PROCEDURE — 82607 VITAMIN B-12: CPT | Performed by: NURSE PRACTITIONER

## 2020-02-19 PROCEDURE — 82306 VITAMIN D 25 HYDROXY: CPT | Performed by: NURSE PRACTITIONER

## 2020-02-19 PROCEDURE — 36415 COLL VENOUS BLD VENIPUNCTURE: CPT | Performed by: FAMILY MEDICINE

## 2020-02-19 PROCEDURE — 80061 LIPID PANEL: CPT | Performed by: NURSE PRACTITIONER

## 2020-02-19 PROCEDURE — 84443 ASSAY THYROID STIM HORMONE: CPT | Performed by: NURSE PRACTITIONER

## 2020-02-19 PROCEDURE — 83036 HEMOGLOBIN GLYCOSYLATED A1C: CPT | Performed by: FAMILY MEDICINE

## 2020-02-19 PROCEDURE — 81003 URINALYSIS AUTO W/O SCOPE: CPT | Performed by: FAMILY MEDICINE

## 2020-02-19 PROCEDURE — 82043 UR ALBUMIN QUANTITATIVE: CPT | Performed by: NURSE PRACTITIONER

## 2020-02-19 PROCEDURE — 84439 ASSAY OF FREE THYROXINE: CPT | Performed by: NURSE PRACTITIONER

## 2020-02-19 PROCEDURE — 82962 GLUCOSE BLOOD TEST: CPT | Performed by: FAMILY MEDICINE

## 2020-02-19 PROCEDURE — 85025 COMPLETE CBC W/AUTO DIFF WBC: CPT | Performed by: NURSE PRACTITIONER

## 2020-02-19 RX ORDER — TAMSULOSIN HYDROCHLORIDE 0.4 MG/1
1 CAPSULE ORAL DAILY
Qty: 30 CAPSULE | Refills: 0 | Status: SHIPPED | OUTPATIENT
Start: 2020-02-19 | End: 2020-06-22 | Stop reason: SDUPTHER

## 2020-02-19 RX ORDER — CHLORAL HYDRATE 500 MG
CAPSULE ORAL EVERY 24 HOURS
COMMUNITY
End: 2020-10-13

## 2020-02-20 LAB
25(OH)D3 SERPL-MCNC: 31.3 NG/ML (ref 30–100)
ALBUMIN SERPL-MCNC: 4.2 G/DL (ref 3.5–5.2)
ALBUMIN UR-MCNC: <1.2 MG/DL
ALBUMIN/GLOB SERPL: 1.8 G/DL
ALP SERPL-CCNC: 122 U/L (ref 39–117)
ALT SERPL W P-5'-P-CCNC: 12 U/L (ref 1–41)
ANION GAP SERPL CALCULATED.3IONS-SCNC: 11.9 MMOL/L (ref 5–15)
AST SERPL-CCNC: 11 U/L (ref 1–40)
BASOPHILS # BLD AUTO: 0 10*3/MM3 (ref 0–0.2)
BASOPHILS NFR BLD AUTO: 0.3 % (ref 0–1.5)
BILIRUB SERPL-MCNC: 0.3 MG/DL (ref 0.2–1.2)
BUN BLD-MCNC: 29 MG/DL (ref 8–23)
BUN/CREAT SERPL: 33 (ref 7–25)
CALCIUM SPEC-SCNC: 9.3 MG/DL (ref 8.6–10.5)
CHLORIDE SERPL-SCNC: 98 MMOL/L (ref 98–107)
CHOLEST SERPL-MCNC: 113 MG/DL (ref 0–200)
CO2 SERPL-SCNC: 22.1 MMOL/L (ref 22–29)
CREAT BLD-MCNC: 0.88 MG/DL (ref 0.76–1.27)
DEPRECATED RDW RBC AUTO: 52.9 FL (ref 37–54)
EOSINOPHIL # BLD AUTO: 0 10*3/MM3 (ref 0–0.4)
EOSINOPHIL NFR BLD AUTO: 0.1 % (ref 0.3–6.2)
ERYTHROCYTE [DISTWIDTH] IN BLOOD BY AUTOMATED COUNT: 16.3 % (ref 12.3–15.4)
GFR SERPL CREATININE-BSD FRML MDRD: 103 ML/MIN/1.73
GFR SERPL CREATININE-BSD FRML MDRD: 85 ML/MIN/1.73
GLOBULIN UR ELPH-MCNC: 2.4 GM/DL
GLUCOSE BLD-MCNC: 401 MG/DL (ref 65–99)
HBA1C MFR BLD: 7.4 % (ref 3.5–5.6)
HCT VFR BLD AUTO: 28.9 % (ref 37.5–51)
HDLC SERPL-MCNC: 38 MG/DL (ref 40–60)
HGB BLD-MCNC: 9.2 G/DL (ref 13–17.7)
LDLC SERPL CALC-MCNC: 24 MG/DL (ref 0–100)
LDLC/HDLC SERPL: 0.63 {RATIO}
LYMPHOCYTES # BLD AUTO: 1.2 10*3/MM3 (ref 0.7–3.1)
LYMPHOCYTES NFR BLD AUTO: 13.9 % (ref 19.6–45.3)
MCH RBC QN AUTO: 29.5 PG (ref 26.6–33)
MCHC RBC AUTO-ENTMCNC: 31.8 G/DL (ref 31.5–35.7)
MCV RBC AUTO: 92.7 FL (ref 79–97)
MONOCYTES # BLD AUTO: 0.3 10*3/MM3 (ref 0.1–0.9)
MONOCYTES NFR BLD AUTO: 3.9 % (ref 5–12)
NEUTROPHILS # BLD AUTO: 7.2 10*3/MM3 (ref 1.7–7)
NEUTROPHILS NFR BLD AUTO: 81.8 % (ref 42.7–76)
NRBC BLD AUTO-RTO: 0.1 /100 WBC (ref 0–0.2)
PLATELET # BLD AUTO: 280 10*3/MM3 (ref 140–450)
PMV BLD AUTO: 9 FL (ref 6–12)
POTASSIUM BLD-SCNC: 5.1 MMOL/L (ref 3.5–5.2)
PROT SERPL-MCNC: 6.6 G/DL (ref 6–8.5)
RBC # BLD AUTO: 3.12 10*6/MM3 (ref 4.14–5.8)
SODIUM BLD-SCNC: 132 MMOL/L (ref 136–145)
T4 FREE SERPL-MCNC: 1.26 NG/DL (ref 0.93–1.7)
TRIGL SERPL-MCNC: 255 MG/DL (ref 0–150)
TSH SERPL DL<=0.05 MIU/L-ACNC: 0.89 UIU/ML (ref 0.27–4.2)
VIT B12 BLD-MCNC: 640 PG/ML (ref 211–946)
VLDLC SERPL-MCNC: 51 MG/DL (ref 5–40)
WBC NRBC COR # BLD: 8.8 10*3/MM3 (ref 3.4–10.8)

## 2020-02-25 RX ORDER — CARVEDILOL 25 MG/1
25 TABLET ORAL 2 TIMES DAILY WITH MEALS
Qty: 60 TABLET | Refills: 2 | Status: SHIPPED | OUTPATIENT
Start: 2020-02-25 | End: 2020-06-08

## 2020-03-04 ENCOUNTER — OFFICE VISIT (OUTPATIENT)
Dept: FAMILY MEDICINE CLINIC | Facility: CLINIC | Age: 75
End: 2020-03-04

## 2020-03-04 VITALS
HEIGHT: 60 IN | BODY MASS INDEX: 28.07 KG/M2 | HEART RATE: 63 BPM | WEIGHT: 143 LBS | SYSTOLIC BLOOD PRESSURE: 138 MMHG | TEMPERATURE: 97.8 F | OXYGEN SATURATION: 99 % | DIASTOLIC BLOOD PRESSURE: 60 MMHG

## 2020-03-04 DIAGNOSIS — E11.69 TYPE 2 DIABETES MELLITUS WITH OTHER SPECIFIED COMPLICATION, UNSPECIFIED WHETHER LONG TERM INSULIN USE (HCC): Primary | ICD-10-CM

## 2020-03-04 LAB — GLUCOSE BLDC GLUCOMTR-MCNC: 236 MG/DL (ref 70–130)

## 2020-03-04 PROCEDURE — 99213 OFFICE O/P EST LOW 20 MIN: CPT | Performed by: NURSE PRACTITIONER

## 2020-03-04 PROCEDURE — 82962 GLUCOSE BLOOD TEST: CPT | Performed by: NURSE PRACTITIONER

## 2020-03-04 RX ORDER — METFORMIN HYDROCHLORIDE 500 MG/1
TABLET, EXTENDED RELEASE ORAL
Qty: 60 TABLET | Refills: 3 | Status: SHIPPED | OUTPATIENT
Start: 2020-03-04 | End: 2020-06-22 | Stop reason: SDUPTHER

## 2020-03-04 NOTE — PROGRESS NOTES
Chief Complaint   Patient presents with   • Follow-up     2 week follow up, DM        Subjective   Ramon Silva is a 74 y.o.  male who presents today for   Brought in glucose numbers today.  Reviewed and is doing better  Monitor daily twice daily     Diabetes   He presents for his follow-up diabetic visit. He has type 2 diabetes mellitus. His disease course has been worsening. There are no hypoglycemic associated symptoms. There are no diabetic associated symptoms. There are no hypoglycemic complications. Symptoms are worsening. There are no diabetic complications. Risk factors for coronary artery disease include diabetes mellitus and hypertension. When asked about current treatments, none were reported. He is following a diabetic diet. He has not had a previous visit with a dietitian. He does not see a podiatrist.Eye exam is not current.     Ramon Silva  has a past medical history of Anemia, Arthralgia, Diabetes (CMS/HCC), Fatigue, GERD (gastroesophageal reflux disease), Hyperlipidemia, Hypertension, Liver lesion, Non Hodgkin's lymphoma (CMS/HCC), Osteoporosis, Personal history of testicular cancer, Vitamin D deficiency, and Weight loss.    No Known Allergies    Current Outpatient Medications:   •  ALLERGY RELIEF 50 MCG/ACT nasal spray, INHALE 2 PUFFS IN EACH NOSTRIL EVERY DAY, Disp: , Rfl: 6  •  aspirin 81 MG tablet, Take 81 mg by mouth Daily., Disp: , Rfl:   •  Blood Glucose Monitoring Suppl (BLOOD GLUCOSE MONITOR SYSTEM) w/Device kit, BLOOD GLUCOSE MONITOR SYSTEM w/Device KIT, Disp: , Rfl:   •  carvedilol (COREG) 25 MG tablet, Take 1 tablet by mouth 2 (Two) Times a Day With Meals., Disp: 60 tablet, Rfl: 2  •  ferrous sulfate 324 (65 Fe) MG tablet delayed-release EC tablet, Take 324 mg by mouth Daily With Breakfast., Disp: , Rfl:   •  gabapentin (NEURONTIN) 100 MG capsule, Take 1 capsule by mouth every night at bedtime., Disp: 30 capsule, Rfl: 1  •  glimepiride (AMARYL) 4 MG tablet, Take 1 tablet by  mouth 2 (Two) Times a Day With Meals., Disp: 60 tablet, Rfl: 2  •  ibandronate (BONIVA) 150 MG tablet, TAKE ONE TABLET BY MOUTH ONCE monthly, Disp: , Rfl: 5  •  metFORMIN ER (GLUCOPHAGE-XR) 500 MG 24 hr tablet, Take two tablets in AM and one in PM, Disp: 60 tablet, Rfl: 3  •  Omega-3 Fatty Acids (FISH OIL) 1000 MG capsule capsule, Daily., Disp: , Rfl:   •  tamsulosin (FLOMAX) 0.4 MG capsule 24 hr capsule, Take 1 capsule by mouth Daily., Disp: 30 capsule, Rfl: 0  •  atorvastatin (LIPITOR) 40 MG tablet, Take 1 tablet by mouth every night at bedtime., Disp: 30 tablet, Rfl: 2  •  losartan (COZAAR) 100 MG tablet, Take 1 tablet by mouth Daily., Disp: 30 tablet, Rfl: 6  Past Medical History:   Diagnosis Date   • Anemia    • Arthralgia    • Diabetes (CMS/HCC)    • Fatigue    • GERD (gastroesophageal reflux disease)    • Hyperlipidemia    • Hypertension    • Liver lesion    • Non Hodgkin's lymphoma (CMS/HCC)    • Osteoporosis    • Personal history of testicular cancer    • Vitamin D deficiency    • Weight loss      Past Surgical History:   Procedure Laterality Date   • HIP SURGERY  2005   • PORTACATH PLACEMENT     • TESTICLE SURGERY Right 07/17/2000    right testicular excision     Social History     Socioeconomic History   • Marital status:      Spouse name: Not on file   • Number of children: Not on file   • Years of education: Not on file   • Highest education level: Not on file   Tobacco Use   • Smoking status: Never Smoker   • Smokeless tobacco: Never Used   Substance and Sexual Activity   • Alcohol use: No     Frequency: Never   • Drug use: No   • Sexual activity: Defer   Social History Narrative    He Lives in [place], has 2 grown children, does not smoke, drink alcohol or take recreational drugs.     Family History   Family history unknown: Yes     PHQ-2 Depression Screening  Little interest or pleasure in doing things? 0   Feeling down, depressed, or hopeless? 0   PHQ-2 Total Score 0     PHQ-9 Depression  "Screening  Little interest or pleasure in doing things? 0   Feeling down, depressed, or hopeless? 0   Trouble falling or staying asleep, or sleeping too much?     Feeling tired or having little energy?     Poor appetite or overeating?     Feeling bad about yourself - or that you are a failure or have let yourself or your family down?     Trouble concentrating on things, such as reading the newspaper or watching television?     Moving or speaking so slowly that other people could have noticed? Or the opposite - being so fidgety or restless that you have been moving around a lot more than usual?     Thoughts that you would be better off dead, or of hurting yourself in some way?     PHQ-9 Total Score 0   If you checked off any problems, how difficult have these problems made it for you to do your work, take care of things at home, or get along with other people?         Family history, surgical history, past medical history, Allergies and med's reviewed with patient today and updated in [employer].     ROS:  Review of Systems    OBJECTIVE:  Vitals:    03/04/20 1502   BP: 138/60   Pulse: 63   Temp: 97.8 °F (36.6 °C)   TempSrc: Oral   SpO2: 99%   Weight: 64.9 kg (143 lb)   Height: 152.4 cm (60\")     Body mass index is 27.93 kg/m².  Physical Exam   Constitutional: He is oriented to person, place, and time. He appears well-developed and well-nourished.   HENT:   Head: Normocephalic.   Eyes: Pupils are equal, round, and reactive to light.   Neck: Normal range of motion. Neck supple.   Cardiovascular: Normal rate.   Pulmonary/Chest: Effort normal and breath sounds normal.   Abdominal: Soft. Bowel sounds are normal.   Musculoskeletal: Normal range of motion.   Neurological: He is alert and oriented to person, place, and time.   Skin: Skin is warm and dry.   Psychiatric: He has a normal mood and affect. His behavior is normal. Judgment and thought content normal.   Nursing note and vitals reviewed.      ASSESSMENT/ PLAN:    Ramon " was seen today for follow-up.    Diagnoses and all orders for this visit:    Type 2 diabetes mellitus with other specified complication, unspecified whether long term insulin use (CMS/[facility])  -     POC Glucose    Other orders  -     metFORMIN ER (GLUCOPHAGE-XR) 500 MG 24 hr tablet; Take two tablets in AM and one in PM        Orders Placed Today:     New Medications Ordered This Visit   Medications   • metFORMIN ER (GLUCOPHAGE-XR) 500 MG 24 hr tablet     Sig: Take two tablets in AM and one in PM     Dispense:  60 tablet     Refill:  3        Management Plan:     An After Visit Summary was printed and given to the patient at discharge.    Follow-up: Return in about 4 weeks (around 4/1/2020).    Clara Molina, CON 3/25/2020 16:34  This note was electronically signed.

## 2020-03-06 ENCOUNTER — TELEPHONE (OUTPATIENT)
Dept: ONCOLOGY | Facility: CLINIC | Age: 75
End: 2020-03-06

## 2020-03-06 NOTE — TELEPHONE ENCOUNTER
Pt daughter, Virginia, called requesting to know if the pt can have his lab work done at the hospital. Virginia wants to know if pt can have lab work done before his CT SCAN on 3/7/20 or can pt go to [facility] because pt is wanting Dr. Peterson to go over his lab results on his appt on 3/10/20.  Please call Virginia in regards to this at [phone].

## 2020-03-07 ENCOUNTER — HOSPITAL ENCOUNTER (OUTPATIENT)
Dept: CT IMAGING | Facility: HOSPITAL | Age: 75
Discharge: HOME | End: 2020-03-07
Admitting: NURSE PRACTITIONER

## 2020-03-07 DIAGNOSIS — C85.90 NON-HODGKIN'S LYMPHOMA, UNSPECIFIED BODY REGION, UNSPECIFIED NON-HODGKIN LYMPHOMA TYPE (HCC): ICD-10-CM

## 2020-03-07 PROCEDURE — 71260 CT THORAX DX C+: CPT

## 2020-03-07 PROCEDURE — 0 IOPAMIDOL PER 1 ML: Performed by: NURSE PRACTITIONER

## 2020-03-07 PROCEDURE — 74177 CT ABD & PELVIS W/CONTRAST: CPT

## 2020-03-07 RX ADMIN — IOPAMIDOL 100 ML: 755 INJECTION, SOLUTION INTRAVENOUS at 12:15

## 2020-03-09 RX ORDER — LOSARTAN POTASSIUM 100 MG/1
100 TABLET ORAL EVERY 24 HOURS
Qty: 30 TABLET | Refills: 6 | Status: SHIPPED | OUTPATIENT
Start: 2020-03-09 | End: 2020-10-22 | Stop reason: SDUPTHER

## 2020-03-09 RX ORDER — ATORVASTATIN CALCIUM 40 MG/1
40 TABLET, FILM COATED ORAL
Qty: 30 TABLET | Refills: 2 | Status: SHIPPED | OUTPATIENT
Start: 2020-03-09 | End: 2020-06-22 | Stop reason: SDUPTHER

## 2020-03-09 NOTE — PROGRESS NOTES
HEMATOLOGY ONCOLOGY FOLLOW UP        Patient name: Ramon Silva  : 1945  MRN: [id number]  Primary Care Physician: Marla Bailey MD  Referring Physician: Marla Bailey MD  Reason For Consult:     Chief Complaint   Patient presents with   • Follow-up     Non-Hodgkin's lymphoma, unspecified body region, unspecified non-Hodgkin lymphoma type        History of Present Illness:  Mr. Silva is a 73-year-old male with a history of testicular lymphoma diagnosed in , status post chemotherapy with R-CHOP, who has been in complete remission for several years.  He presented to his primary care provider, Mariam Price, with symptoms of left-sided facial pain radiating to the eye, left-sided headache, and he was referred to ENT, Dr. Harry, for further evaluation.  CT scan of the paranasal sinuses was performed without contrast on 11/30/15 and it showed marked bony destruction and an expansile soft tissue mass involving the left frontal, left ethmoid, left sphenoid and left maxillary sinus.  There was a soft tissue mass encroaching in the left orbit and left side of the face towards the muscles of mastication as well as through the superior wall of the left sphenoid sinus.  After evaluation, Dr. Harry ordered MRI of the brain with and without contrast and that showed a multi-lobulated mass filling and expanding the left maxillary sinus as well as the left ethmoid air cells in the left locule of the sphenoid sinus, with extension into the left orbit and left medial cranial fossa.  Dr. Harry performed fiberoptic nasal endoscopy and biopsy of the left nasal mass on 12/11/15.  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  Cyclin D1 was negative.  All of these stains were done by immunohistochemistry.  Sections of the nasal mass showed diffuse infiltrate by  predominantly intermediate-to-large sized lymphoid cells.  The cells have a vesicular nuclei and inconspicuous cytoplasm.  FISH was negative for rearrangements involving BCL6, MYC, and IGH/BCL-2 [t(14;18)].  FISH for MYC/IGH was negative.  The patient denied any B symptoms, such as fevers, chills, night sweats, weight loss or fatigue.  He did not report any lymphadenopathy.  Dr. Harry referred the patient to us for further evaluation of his lymphoma.    Today, 12/30/15, the patient presents for initial evaluation.  He denies any fever, chills, night sweats, weight loss or lymph node swelling.  His only symptom is left facial pain.  The patient also reports left eye blurry vision.  • 12/30/15 - WBC 8.3, hemoglobin 13.3, platelet count 233, MCV 87.5.  • 1/7/16 - Echocardiogram:  Ejection fraction 50-55%.  Normal LVEF.  There is slight impaired LV relaxation.  • 1/8/16 - PET/CT scan:  Soft tissue mass originating in the left maxillary sinus erodes through the medial sinus wall spilling out into the nasopharynx.  It abates the nasal septum.  Lesion measures about 4 x 4 cm in AP and transverse dimension.  It measures about 8 cm in the cephalocaudal dimension.  SUV is 10.8  In the abdomen, there is a focus of activity in the left lobe of the liver which measures about 2 cm and has SUV 9.4.    • 1/11/16 - Bone marrow biopsy:  Normal cellular bone marrow 25-35%.  Adequate iron stores.  No malignancy.  Flow cytometry is negative.  Normal karyotype.  • 1/22/16 - Patient underwent left subclavian Port-A-Cath placement.  • 1/25/16 - WBC 8.1, hemoglobin 12.2, platelet count 216, MCV 87.1.  • 1/26/16 - Patient was seen at [facility] by Dr. Jamal Pisano.  He has recommended R-GCVP combination chemo treatment.  If the patient does not achieve complete response or if he has liver involvement, he will consider autologous stem cell transplantation after high-dose chemotherapy.  If lymphoma is  present in the liver, this tumor will likely represent a recurrent lymphoma rather than a new primary lymphoma of the sinuses.  • 2/2/16 - Hepatitis panel negative.  • 2/4/16 - Patient started chemotherapy with Rituximab, Gemcitabine, Cytoxan, Vincristine, and Prednisolone (R-GCVP q. 21 days regimen).  • 2/24/16 - Patient received cycle 2 of R-GCVP.  • 3/1/16 - Patient received intrathecal chemotherapy cycle 1 with Cytarabine plus Hydrocortisone plus Methotrexate.  • 3/2/16 - Patient received cycle 2, day 8 of Gemcitabine.  WBC 3.9, platelet count 145,000.  • 3/9/16 - WBC 9.1, hemoglobin 9.2, platelet count 33,000.  • 3/10/16 - Platelet count 32,000.  • 3/16/16 - Patient received cycle 3 of R-GCVP day 1.  • 3/23/16 - Creatinine 0.85, BUN 19.  Retic count 19,950.  Ferritin 369.  Erythropoietin 26.9.  Iron saturation 11%, TIBC 22.  CBC showed WBC 7.3, hemoglobin 8.1, platelet count 395,000, MCV 87.  • 3/23/16 - Patient received cycle 3, day 8 of Gemcitabine.  Patient received Neulasta 6 mg.   • 3/26/16 - Brain MRI with and without contrast:  There is residual soft tissue mass located in the left ethmoid and sphenoid sinus which is much smaller compared to the previous exam, now measuring 2.8 x 1.8 cm.  There is better aeration in the left maxillary sinus.  • 3/26/16 - MRI abdomen with and without contrast:   A small 1.3 x 1.1 cm rim enhancing lesion is seen near the dome of the medial segment of the left lobe of liver.  This area corresponds to the hypermetabolic activity seen on the previous PET scan.  • 3/30/16 - Neutrophils 53,000, hemoglobin 9.5, platelet count 328,000.  • 4/6/16 - Patient received cycle 4, day 1 of R-GCVP.  WBC 17.9, hemoglobin 9.6, platelet count 256,000.    • 4/7/16 - Patient received intrathecal chemotherapy with Cytarabine plus Hydrocortisone plus Methotrexate.    • 4/13/16 - Patient received cycle 4, day 8 of Gemcitabine.  WBC 4.8, hemoglobin 8.1, platelet count 147,000.  The patient had  hypotension and received IV fluids.    • 4/14/16 - Ultrasound of the liver:  There is a small nodule measuring 15 mm in the left lobe of the liver.    • 4/18/16 - WBC 3.6, hemoglobin 8.1, platelet count 73,000.    • 4/27/16 - Patient received cycle 5, day 1 R-GCVP.  • 4/28/16 - Patient received intrathecal chemotherapy.    • 5/4/16 - Patient received cycle 5, day 8 of Gemcitabine.  • 5/18/16 - Patient received cycle 6, day 1 of R-GCVP.  CBC shows WBC 10.7, hemoglobin 9.7, platelet count 417,000.  • 5/19/16 - Patient received cycle 5 of intrathecal chemotherapy with Methotrexate, Cytarabine and Hydrocortisone.  • 6/10/16 - Brain MRI with and without contrast:  Residual abnormal signal involving the region of the left sphenoid sinus.  There appears to be mild improvement compared to prior.  Findings may be due to chronic inflammation of sinusitis.  Residual tumor is possible but less likely.  Overall, there is improvement.  No evidence of acute focal ischemia.  Nonspecific white matter changes.    • 6/16/16 - Patient received the last, sixth cycle of intrathecal chemotherapy.  • 7/7/16 - PET/CT scan:  Small focal area of increased nuclear activity in the lateral segment of the left lobe of the liver is no longer visualized.    • 8/1/16 to 8/19/16 - Patient received 15 fractions of radiation therapy, total of 30 Gy.  • 11/3/16 - PET/CT scan:  Stable exam with no hypermetabolic activity seen within the neck, chest, abdomen and pelvis.  New sub-centimeter non-calcified pulmonary nodules within the posterior lateral aspect of the right upper lobe.  There may be inflammatory or infectious.  Stable bilateral sub-centimeter non-calcified pulmonary nodules which are not hypermetabolic.    • 1/28/17 - MRI of brain with and without contrast:  Abnormal signal in the left sphenoid sinus is redemonstrated.  It is similar in size to the previous MRI from June 2016 and measures about 23 x 14 mm.  No evidence of progression.  No  evidence of metastases.  • 2/17/17 - CT scan:  No abnormalities in the chest.  No lymphadenopathy.  • 5/8/17 - Patient underwent nasal endoscopy which showed maxillary sinusitis.  No tumor reported.  • 9/8/17 - PET/CT scan:  No evidence of metastasis or disease recurrence.  No hypermetabolic activity anywhere.  Stable partial opacification of the left sphenoid sinus likely due to chronic sinusitis.    • 3/3/18 - WBC 7.2, hemoglobin 11.4, platelet count 181,000, MCV 91.8.  Creatinine 0.77.  LFTs normal.  Albumin 4.    • 3/30/18 - Brain MRI with and without contrast:  Decreasing size of the abnormal soft tissue within the left pterygomaxillary fissure.  This area demonstrates mild heterogeneous contrast enhancement which is similar to prior studies.  No new enlarging soft tissue mass.  Mucosal thickening with the left maxillary and sphenoid sinuses.  No evidence of malignancy.  No acute intracranial abnormality.    • 8/24/18 - CT scan of chest, abdomen and pelvis:  No evidence of lymphadenopathy.  A 1 cm pulmonary nodule in the right middle lobe is not significantly changed.  No evidence of lymphadenopathy.  CT abdomen is also unremarkable.  Enlarged heterogenous prostate gland, hyperplasia is noted.  Small hiatal hernia.  Moderate osteopenia and degenerative changes in hips and spine.  Left renal cyst.    • 8/28/18 - Vitamin D 31.  PSA 1.6.    • 10/5/18 - DEXAscan:  Osteoporosis with T-score of -2.5 in the distal forearm.    • 3/4/19 - WBC 6.9, hemoglobin 12.8, platelet count 187,000, MCV 91.  Creatinine 1.03.  Total bilirubin 0.5.  .  • 5/8/2019 25 hydroxy vitamin D 32  • 8/28/2019: Brain MRI:1. The abnormal material in the left sphenoid sinus does not appear significantly changed.The brain appears stable and within normal for age. 3. Minor chronic maxillary sinus mucosal thickening \  • 2/19/2020: WBC 8.8, hemoglobin 9.2, MCV 92.7, platelets 280, creatinine 0.88, alk phos 122, 25-hydroxy vitamin D 31.3, B12  640, TSH 0.886  • 3/7/2020: CT abdomen and pelvis- No evidence of lymphadenopathy in the abdomen pelvis or chest.  Stable right middle lobe noncalcified pulmonary nodule unchanged since 2017.  Degenerative changes of the lumbar spine and hips.  Chest with contrast:.  Stable 1 cm right middle lobe lung nodule.  No evidence of malignancy.        Subjective:03/10/2020  Patient presents for a follow up on lymphoma. He does not report any enlarged lymph nodes, blood in stool, any other abnormal bleeding, fevers, chills, or night sweats. Patient continues to have right knee pain, and sometimes occasionally the knee has been locking. He states that he has arthritis in his R knee and nothing can be done for that. He continues to take Boniva.   He denies any bleeding per rectum..  He is more anemic.  Does not recall when his previous colonoscopy was.      The following portions of the patient's history were reviewed and updated as appropriate: allergies, current medications, past family history, past medical history, past social history, past surgical history and problem list.         Past Medical History:   Diagnosis Date   • Anemia    • Arthralgia    • Diabetes (CMS/HCC)    • Fatigue    • GERD (gastroesophageal reflux disease)    • Hyperlipidemia    • Hypertension    • Liver lesion    • Non Hodgkin's lymphoma (CMS/HCC)    • Osteoporosis    • Personal history of testicular cancer    • Vitamin D deficiency    • Weight loss        Past Surgical History:   Procedure Laterality Date   • HIP SURGERY  2005   • PORTACATH PLACEMENT     • TESTICLE SURGERY Right 07/17/2000    right testicular excision         Current Outpatient Medications:   •  ALLERGY RELIEF 50 MCG/ACT nasal spray, INHALE 2 PUFFS IN EACH NOSTRIL EVERY DAY, Disp: , Rfl: 6  •  aspirin 81 MG tablet, Take 81 mg by mouth Daily., Disp: , Rfl:   •  atorvastatin (LIPITOR) 40 MG tablet, Take 1 tablet by mouth every night at bedtime., Disp: 30 tablet, Rfl: 2  •  Blood  Glucose Monitoring Suppl (BLOOD GLUCOSE MONITOR SYSTEM) w/Device kit, BLOOD GLUCOSE MONITOR SYSTEM w/Device KIT, Disp: , Rfl:   •  carvedilol (COREG) 25 MG tablet, Take 1 tablet by mouth 2 (Two) Times a Day With Meals., Disp: 60 tablet, Rfl: 2  •  ferrous sulfate 324 (65 Fe) MG tablet delayed-release EC tablet, Take 324 mg by mouth Daily With Breakfast., Disp: , Rfl:   •  gabapentin (NEURONTIN) 100 MG capsule, Take 1 capsule by mouth every night at bedtime., Disp: 30 capsule, Rfl: 1  •  glimepiride (AMARYL) 4 MG tablet, Take 1 tablet by mouth 2 (Two) Times a Day With Meals., Disp: 60 tablet, Rfl: 2  •  ibandronate (BONIVA) 150 MG tablet, TAKE ONE TABLET BY MOUTH ONCE monthly, Disp: , Rfl: 5  •  losartan (COZAAR) 100 MG tablet, Take 1 tablet by mouth Daily., Disp: 30 tablet, Rfl: 6  •  metFORMIN ER (GLUCOPHAGE-XR) 500 MG 24 hr tablet, Take two tablets in AM and one in PM, Disp: 60 tablet, Rfl: 3  •  Omega-3 Fatty Acids (FISH OIL) 1000 MG capsule capsule, Daily., Disp: , Rfl:   •  tamsulosin (FLOMAX) 0.4 MG capsule 24 hr capsule, Take 1 capsule by mouth Daily., Disp: 30 capsule, Rfl: 0    No Known Allergies    Family History   Family history unknown: Yes       Family history is unknown by patient.    Social History     Tobacco Use   • Smoking status: Never Smoker   • Smokeless tobacco: Never Used   Substance Use Topics   • Alcohol use: No     Frequency: Never   • Drug use: No         I have reviewed the history of present illness, past medical history, family history, social history, lab results, all notes and other records since the patient was last seen on  Visit date not found.    ROS:     Review of Systems   Constitutional: Negative for activity change, chills and fever.   HENT: Negative for drooling, ear discharge, mouth sores, nosebleeds and sore throat.    Eyes: Negative for photophobia, pain, redness and visual disturbance.   Respiratory: Negative for cough, choking, shortness of breath and wheezing.   "  Cardiovascular: Negative for chest pain and palpitations.   Gastrointestinal: Negative for abdominal pain, diarrhea, nausea and vomiting.   Genitourinary: Negative for dysuria.   Musculoskeletal: Positive for gait problem (walks with limp, right side). Negative for neck stiffness.   Skin: Negative for rash.   Neurological: Negative for seizures, syncope and speech difficulty.   Psychiatric/Behavioral: Negative for agitation, confusion and hallucinations.       Objective:    Vitals:    03/10/20 1458   BP: 111/51   Pulse: 80   Resp: 18   Temp: 97.8 °F (36.6 °C)   Weight: 65.8 kg (145 lb)   Height: 152.4 cm (60\")   PainSc: 0-No pain     ECOG  (1) Restricted in physically strenuous activity, ambulatory and able to do work of light nature    Physical Exam:   Physical Exam   Constitutional: He is oriented to person, place, and time. He appears well-developed and well-nourished. No distress.   HENT:   Head: Normocephalic and atraumatic.   Eyes: Conjunctivae and EOM are normal. Right eye exhibits no discharge. Left eye exhibits no discharge. No scleral icterus.   Neck: Normal range of motion. Neck supple. No thyromegaly present.   Cardiovascular: Normal rate, regular rhythm and normal heart sounds. Exam reveals no gallop and no friction rub.   Pulmonary/Chest: Effort normal. No stridor. No respiratory distress. He has no wheezes.   Abdominal: Soft. Bowel sounds are normal. He exhibits no mass. There is no tenderness. There is no rebound and no guarding.   Musculoskeletal: Normal range of motion. He exhibits no tenderness.   Decreased range of motion in the right knee.   Lymphadenopathy:     He has no cervical adenopathy.   Neurological: He is alert and oriented to person, place, and time. He exhibits normal muscle tone.   Skin: Skin is warm. No rash noted. He is not diaphoretic. No erythema.   Psychiatric: He has a normal mood and affect. His behavior is normal.   Nursing note and vitals reviewed.            Lab Results - " Last 18 Months   Lab Units 02/19/20  1611 10/23/19  1524 05/08/19  1301   WBC 10*3/mm3 8.80 7.36 7.6   HEMOGLOBIN g/dL 9.2* 10.8* 11.4*   HEMATOCRIT % 28.9* 33.2* 34.1*   PLATELETS 10*3/mm3 280 180 168   MCV fL 92.7 92.2 92.2     Lab Results - Last 18 Months   Lab Units 02/19/20  1602 10/23/19  1524 05/08/19  1301   SODIUM mmol/L 132* 138 139   POTASSIUM mmol/L 5.1 5.5* 4.5   CHLORIDE mmol/L 98 100 105   TOTAL CO2 mmol/L  --   --  22   CO2 mmol/L 22.1 25.2  --    BUN mg/dL 29* 28* 27*   CREATININE mg/dL 0.88 1.16 1.3*   CALCIUM mg/dL 9.3 9.5 9.7   BILIRUBIN mg/dL 0.3 0.3 0.9   ALK PHOS U/L 122* 128* 80   ALT (SGPT) U/L 12 13 15*   AST (SGOT) U/L 11 14 17   GLUCOSE mg/dL 401* 347* 101*       Lab Results   Component Value Date    GLUCOSE 401 (C) 02/19/2020    BUN 29 (H) 02/19/2020    CREATININE 0.88 02/19/2020    EGFRIFNONA 85 02/19/2020    EGFRIFAFRI 103 02/19/2020    BCR 33.0 (H) 02/19/2020    K 5.1 02/19/2020    CO2 22.1 02/19/2020    CALCIUM 9.3 02/19/2020    ALBUMIN 4.20 02/19/2020    LABIL2 1.6 05/08/2019    AST 11 02/19/2020    ALT 12 02/19/2020       No results for input(s): APTT, INR, PTT in the last 46515 hours.    Lab Results   Component Value Date    IRON 59 09/05/2018       No results found for: FOLATE    No results found for: OCCULTBLD    No results found for: RETICCTPCT    Lab Results   Component Value Date    ILKWEXPW46 640 02/19/2020     No results found for: SPEP, UPEP  No results found for: LDH, URICACID  No results found for: FOREST, RF, SEDRATE  No results found for: FIBRINOGEN, HAPTOGLOBIN  No results found for: PTT, INR  No results found for:   No results found for: CEA  No components found for: CA-19-9  Lab Results   Component Value Date    PSA 1.47 03/17/2017             Assessment/Plan     Assessment:  1. 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  It is unclear whether the patient has recurrent versus new de zulma lymphoma.  His PET scan also showed an indeterminate lesion in the left lobe of the liver, which could not be biopsied.  He was treated with Rituximab-GCVP chemotherapy x6 cycles.  The patient also received six cycles of intrathecal chemotherapy with Methotrexate, Cytarabine, and Hydrocortisone.  PET/CT scan showed a complete response.  His restaging PET scan on 11/3/16 continues to show remission.  He also received consolidation radiation therapy for 30 Gy finished on 8/19/16.      PET/CT scan in September 2017 does not show any evidence of disease recurrence.  Patient was reporting left-sided headaches.  I ordered repeat MRI on 3/30/18 and it fails to show any evidence of disease progression or recurrence.  Restaging CT scan on 3/7/2020 does not show any evidence of disease recurrence.  2. History of right knee osteoarthritis:  He previously received steroid injections.  He will require right knee replacement eventually.   3. Anemia: Hemoglobin is worsening.  4. Benign prostatic hypertrophy:  He has presented with symptoms of urinary incontinence/nocturia.  He is on Flomax.  He is followed by Urology.  5. History of liver lesion:  This was noted initially on PET scan.  Repeat MRI showed a 1.3 x 1.7 cm, rim-enhancing lesion which could not be biopsied.  His restaging PET scan on 9/8/16 does not show this lesion anymore.   6. Osteoporosis:  This was seen on DEXAscan 10/5/18.  We have recommended Boniva and also vitamin D plus calcium supplements.        PLAN:      1. Check CBC, LDH and CMP prior to next visit.    2. Anemia work-up will check ferritin iron panel folate, ESR, LDH, stool Hemoccult test.  3. Recommended that he  continue to take Boniva q. monthly.    4. Follow-up in 4 months with repeat labs      Anemia, unspecified type  - Sedimentation Rate  - Folate  - Iron Profile  -  Ferritin  - CBC & Differential  - CBC & Differential  - Comprehensive Metabolic Panel  - Lactate Dehydrogenase    Non-Hodgkin's lymphoma, unspecified body region, unspecified non-Hodgkin lymphoma type (CMS/HCC)  - CBC & Differential  - Comprehensive Metabolic Panel  - Lactate Dehydrogenase    Orders Placed This Encounter   Procedures   • Sedimentation Rate   • Folate   • Iron Profile     Labs today     Scheduling Instructions:      Labs today   • Ferritin     Scheduling Instructions:      Please draw these labs prior to the next visit.   • Comprehensive Metabolic Panel     Please draw these labs prior to the next visit.     Standing Status:   Future     Scheduling Instructions:      Please draw these labs prior to the next visit.   • Lactate Dehydrogenase     Standing Status:   Future     Standing Expiration Date:   3/10/2021     Scheduling Instructions:      Please draw these labs prior to the next visit.   • CBC & Differential     Order Specific Question:   Manual Differential     Answer:   No   • CBC & Differential     Please draw these labs prior to the next visit.     Standing Status:   Future     Scheduling Instructions:      Please draw these labs prior to the next visit.     Order Specific Question:   Manual Differential     Answer:   No                     Thank you very much for providing the opportunity to participate in this patient’s care. Please do not hesitate to call if there are any other questions.    I have reviewed all relevant labs results,outside reports, imaging,,notes, vitals, medications and plan with the patient today.    Portions of the note have been Scribed by medical assistant/Nurse.  I have reviewed and made changes accordingly.      Electronically signed by Ted Peterson MD, 03/10/20, 3:54 PM.

## 2020-03-10 ENCOUNTER — OFFICE VISIT (OUTPATIENT)
Dept: ONCOLOGY | Facility: CLINIC | Age: 75
End: 2020-03-10

## 2020-03-10 ENCOUNTER — RESULTS ENCOUNTER (OUTPATIENT)
Dept: ONCOLOGY | Facility: CLINIC | Age: 75
End: 2020-03-10

## 2020-03-10 VITALS
WEIGHT: 145 LBS | SYSTOLIC BLOOD PRESSURE: 111 MMHG | RESPIRATION RATE: 18 BRPM | HEIGHT: 60 IN | TEMPERATURE: 97.8 F | HEART RATE: 80 BPM | BODY MASS INDEX: 28.47 KG/M2 | DIASTOLIC BLOOD PRESSURE: 51 MMHG

## 2020-03-10 DIAGNOSIS — C85.90 NON-HODGKIN'S LYMPHOMA, UNSPECIFIED BODY REGION, UNSPECIFIED NON-HODGKIN LYMPHOMA TYPE (HCC): ICD-10-CM

## 2020-03-10 DIAGNOSIS — D64.9 ANEMIA, UNSPECIFIED TYPE: Primary | ICD-10-CM

## 2020-03-10 PROCEDURE — 99214 OFFICE O/P EST MOD 30 MIN: CPT | Performed by: INTERNAL MEDICINE

## 2020-03-11 LAB
BASOPHILS # BLD AUTO: 0 X10E3/UL (ref 0–0.2)
BASOPHILS NFR BLD AUTO: 0 %
EOSINOPHIL # BLD AUTO: 0.3 X10E3/UL (ref 0–0.4)
EOSINOPHIL NFR BLD AUTO: 5 %
ERYTHROCYTE [DISTWIDTH] IN BLOOD BY AUTOMATED COUNT: 13.8 % (ref 11.6–15.4)
ERYTHROCYTE [SEDIMENTATION RATE] IN BLOOD BY WESTERGREN METHOD: 25 MM/HR (ref 0–30)
FERRITIN SERPL-MCNC: 92 NG/ML (ref 30–400)
FOLATE SERPL-MCNC: >20 NG/ML
HCT VFR BLD AUTO: 29.9 % (ref 37.5–51)
HGB BLD-MCNC: 9.4 G/DL (ref 13–17.7)
IMM GRANULOCYTES # BLD AUTO: 0 X10E3/UL (ref 0–0.1)
IMM GRANULOCYTES NFR BLD AUTO: 0 %
IRON SATN MFR SERPL: 14 % (ref 15–55)
IRON SERPL-MCNC: 35 UG/DL (ref 38–169)
LYMPHOCYTES # BLD AUTO: 2.3 X10E3/UL (ref 0.7–3.1)
LYMPHOCYTES NFR BLD AUTO: 40 %
MCH RBC QN AUTO: 28.7 PG (ref 26.6–33)
MCHC RBC AUTO-ENTMCNC: 31.4 G/DL (ref 31.5–35.7)
MCV RBC AUTO: 91 FL (ref 79–97)
MONOCYTES # BLD AUTO: 0.5 X10E3/UL (ref 0.1–0.9)
MONOCYTES NFR BLD AUTO: 9 %
NEUTROPHILS # BLD AUTO: 2.7 X10E3/UL (ref 1.4–7)
NEUTROPHILS NFR BLD AUTO: 46 %
PLATELET # BLD AUTO: 228 X10E3/UL (ref 150–450)
RBC # BLD AUTO: 3.28 X10E6/UL (ref 4.14–5.8)
TIBC SERPL-MCNC: 258 UG/DL (ref 250–450)
UIBC SERPL-MCNC: 223 UG/DL (ref 111–343)
WBC # BLD AUTO: 5.8 X10E3/UL (ref 3.4–10.8)

## 2020-03-16 DIAGNOSIS — D50.8 OTHER IRON DEFICIENCY ANEMIA: Primary | ICD-10-CM

## 2020-03-17 PROBLEM — K90.9 MALABSORPTION OF IRON: Status: ACTIVE | Noted: 2020-03-17

## 2020-03-17 PROBLEM — D50.9 IDA (IRON DEFICIENCY ANEMIA): Status: ACTIVE | Noted: 2020-03-17

## 2020-03-25 ENCOUNTER — TELEPHONE (OUTPATIENT)
Dept: ONCOLOGY | Facility: CLINIC | Age: 75
End: 2020-03-25

## 2020-05-07 RX ORDER — GLIMEPIRIDE 4 MG/1
4 TABLET ORAL 2 TIMES DAILY WITH MEALS
Qty: 60 TABLET | Refills: 2 | Status: SHIPPED | OUTPATIENT
Start: 2020-05-07 | End: 2020-07-21 | Stop reason: ALTCHOICE

## 2020-05-07 NOTE — TELEPHONE ENCOUNTER
Date of last refill:11/20/2019    Is there an Inspect on file: N/A    Last appt date:3/4/2020     Next appt date:05/20/2020      Additional information:

## 2020-05-20 ENCOUNTER — OFFICE VISIT (OUTPATIENT)
Dept: FAMILY MEDICINE CLINIC | Facility: CLINIC | Age: 75
End: 2020-05-20

## 2020-05-20 VITALS — BODY MASS INDEX: 28.32 KG/M2 | HEIGHT: 60 IN

## 2020-05-20 DIAGNOSIS — E11.69 TYPE 2 DIABETES MELLITUS WITH OTHER SPECIFIED COMPLICATION, UNSPECIFIED WHETHER LONG TERM INSULIN USE (HCC): Primary | ICD-10-CM

## 2020-05-20 PROCEDURE — 99441 PR PHYS/QHP TELEPHONE EVALUATION 5-10 MIN: CPT | Performed by: NURSE PRACTITIONER

## 2020-05-27 DIAGNOSIS — C85.90 NON-HODGKIN'S LYMPHOMA, UNSPECIFIED BODY REGION, UNSPECIFIED NON-HODGKIN LYMPHOMA TYPE (HCC): Primary | ICD-10-CM

## 2020-05-27 DIAGNOSIS — R11.0 NAUSEA: ICD-10-CM

## 2020-05-27 RX ORDER — PROMETHAZINE HYDROCHLORIDE 25 MG/1
25 TABLET ORAL EVERY 8 HOURS PRN
Qty: 90 TABLET | Refills: 1 | Status: SHIPPED | OUTPATIENT
Start: 2020-05-27 | End: 2020-06-22

## 2020-06-08 RX ORDER — CARVEDILOL 25 MG/1
TABLET ORAL
Qty: 60 TABLET | Refills: 0 | Status: SHIPPED | OUTPATIENT
Start: 2020-06-08 | End: 2020-07-01 | Stop reason: SDUPTHER

## 2020-06-08 NOTE — TELEPHONE ENCOUNTER
Date of last refill: 2-    Is there an Inspect on file: n/a    Last appt date: 5-     Next appt date: 6-    Additional information:

## 2020-06-09 DIAGNOSIS — R11.0 NAUSEA: ICD-10-CM

## 2020-06-09 DIAGNOSIS — M81.8 OTHER OSTEOPOROSIS WITHOUT CURRENT PATHOLOGICAL FRACTURE: ICD-10-CM

## 2020-06-09 DIAGNOSIS — C85.90 NON-HODGKIN'S LYMPHOMA, UNSPECIFIED BODY REGION, UNSPECIFIED NON-HODGKIN LYMPHOMA TYPE (HCC): Primary | ICD-10-CM

## 2020-06-09 RX ORDER — IBANDRONATE SODIUM 150 MG/1
150 TABLET, FILM COATED ORAL
Qty: 1 TABLET | Refills: 5 | Status: SHIPPED | OUTPATIENT
Start: 2020-06-09 | End: 2021-02-01

## 2020-06-22 ENCOUNTER — OFFICE VISIT (OUTPATIENT)
Dept: FAMILY MEDICINE CLINIC | Facility: CLINIC | Age: 75
End: 2020-06-22

## 2020-06-22 ENCOUNTER — TELEPHONE (OUTPATIENT)
Dept: ONCOLOGY | Facility: CLINIC | Age: 75
End: 2020-06-22

## 2020-06-22 VITALS
RESPIRATION RATE: 16 BRPM | DIASTOLIC BLOOD PRESSURE: 69 MMHG | BODY MASS INDEX: 28 KG/M2 | SYSTOLIC BLOOD PRESSURE: 136 MMHG | HEART RATE: 68 BPM | HEIGHT: 60 IN | TEMPERATURE: 98.4 F | WEIGHT: 142.6 LBS | OXYGEN SATURATION: 97 %

## 2020-06-22 DIAGNOSIS — E11.65 TYPE 2 DIABETES MELLITUS WITH HYPERGLYCEMIA, WITHOUT LONG-TERM CURRENT USE OF INSULIN (HCC): Primary | ICD-10-CM

## 2020-06-22 DIAGNOSIS — R30.0 DYSURIA: ICD-10-CM

## 2020-06-22 DIAGNOSIS — R59.0 ENLARGED LYMPH NODE IN NECK: ICD-10-CM

## 2020-06-22 DIAGNOSIS — Z12.11 SCREEN FOR COLON CANCER: ICD-10-CM

## 2020-06-22 DIAGNOSIS — Z23 NEED FOR PNEUMOCOCCAL VACCINE: ICD-10-CM

## 2020-06-22 LAB
EXPIRATION DATE: ABNORMAL
HBA1C MFR BLD: 9.4 %
Lab: ABNORMAL

## 2020-06-22 PROCEDURE — 81003 URINALYSIS AUTO W/O SCOPE: CPT | Performed by: NURSE PRACTITIONER

## 2020-06-22 PROCEDURE — 83036 HEMOGLOBIN GLYCOSYLATED A1C: CPT | Performed by: NURSE PRACTITIONER

## 2020-06-22 PROCEDURE — 99214 OFFICE O/P EST MOD 30 MIN: CPT | Performed by: NURSE PRACTITIONER

## 2020-06-22 RX ORDER — METFORMIN HYDROCHLORIDE 500 MG/1
TABLET, EXTENDED RELEASE ORAL
Qty: 60 TABLET | Refills: 3 | Status: SHIPPED | OUTPATIENT
Start: 2020-06-22 | End: 2020-06-22

## 2020-06-22 RX ORDER — METFORMIN HYDROCHLORIDE 500 MG/1
TABLET, EXTENDED RELEASE ORAL
Qty: 60 TABLET | Refills: 3 | Status: SHIPPED | OUTPATIENT
Start: 2020-06-22 | End: 2020-12-09

## 2020-06-22 RX ORDER — TAMSULOSIN HYDROCHLORIDE 0.4 MG/1
1 CAPSULE ORAL DAILY
Qty: 90 CAPSULE | Refills: 0 | Status: SHIPPED | OUTPATIENT
Start: 2020-06-22 | End: 2020-10-13

## 2020-06-22 RX ORDER — ATORVASTATIN CALCIUM 40 MG/1
40 TABLET, FILM COATED ORAL
Qty: 30 TABLET | Refills: 2 | Status: SHIPPED | OUTPATIENT
Start: 2020-06-22 | End: 2020-09-22

## 2020-06-22 NOTE — TELEPHONE ENCOUNTER
Date of last refill:05/22/2020    Is there an Inspect on file:N/A    Last appt date:05/20/2020     Next appt date:06/22/2020      Additional information:

## 2020-06-23 LAB
BILIRUB UR QL STRIP: NEGATIVE
CLARITY UR: CLEAR
COLOR UR: YELLOW
GLUCOSE UR STRIP-MCNC: ABNORMAL MG/DL
HGB UR QL STRIP.AUTO: NEGATIVE
KETONES UR QL STRIP: NEGATIVE
LEUKOCYTE ESTERASE UR QL STRIP.AUTO: NEGATIVE
NITRITE UR QL STRIP: NEGATIVE
PH UR STRIP.AUTO: 7.5 [PH] (ref 5–8)
PROT UR QL STRIP: NEGATIVE
SP GR UR STRIP: 1.03 (ref 1–1.03)
UROBILINOGEN UR QL STRIP: ABNORMAL

## 2020-06-30 ENCOUNTER — OFFICE VISIT (OUTPATIENT)
Dept: ONCOLOGY | Facility: CLINIC | Age: 75
End: 2020-06-30

## 2020-06-30 VITALS
BODY MASS INDEX: 27.88 KG/M2 | WEIGHT: 142 LBS | HEIGHT: 60 IN | TEMPERATURE: 97.8 F | SYSTOLIC BLOOD PRESSURE: 132 MMHG | HEART RATE: 73 BPM | RESPIRATION RATE: 16 BRPM | DIASTOLIC BLOOD PRESSURE: 54 MMHG

## 2020-06-30 DIAGNOSIS — D64.9 ANEMIA, UNSPECIFIED TYPE: ICD-10-CM

## 2020-06-30 DIAGNOSIS — C85.90 NON-HODGKIN'S LYMPHOMA, UNSPECIFIED BODY REGION, UNSPECIFIED NON-HODGKIN LYMPHOMA TYPE (HCC): ICD-10-CM

## 2020-06-30 DIAGNOSIS — R09.81 NASAL CONGESTION: ICD-10-CM

## 2020-06-30 DIAGNOSIS — D50.8 OTHER IRON DEFICIENCY ANEMIA: Primary | ICD-10-CM

## 2020-06-30 PROCEDURE — 99214 OFFICE O/P EST MOD 30 MIN: CPT | Performed by: INTERNAL MEDICINE

## 2020-06-30 NOTE — PROGRESS NOTES
HEMATOLOGY ONCOLOGY FOLLOW UP        Patient name: Ramon Silva  : 1945  MRN: [id number]  Primary Care Physician: Marla Bailey MD  Referring Physician: Marla Bailey MD  Reason For Consult:     Chief Complaint   Patient presents with   • Follow-up     Anemia, unspecified type       History of Present Illness:  Mr. Silva is a 73-year-old male with a history of testicular lymphoma diagnosed in , status post chemotherapy with R-CHOP, who has been in complete remission for several years.  He presented to his primary care provider, Mariam Price, with symptoms of left-sided facial pain radiating to the eye, left-sided headache, and he was referred to ENT, Dr. Harry, for further evaluation.  CT scan of the paranasal sinuses was performed without contrast on 11/30/15 and it showed marked bony destruction and an expansile soft tissue mass involving the left frontal, left ethmoid, left sphenoid and left maxillary sinus.  There was a soft tissue mass encroaching in the left orbit and left side of the face towards the muscles of mastication as well as through the superior wall of the left sphenoid sinus.  After evaluation, Dr. Harry ordered MRI of the brain with and without contrast and that showed a multi-lobulated mass filling and expanding the left maxillary sinus as well as the left ethmoid air cells in the left locule of the sphenoid sinus, with extension into the left orbit and left medial cranial fossa.  Dr. Harry performed fiberoptic nasal endoscopy and biopsy of the left nasal mass on 12/11/15.  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  Cyclin D1 was negative.  All of these stains were done by immunohistochemistry.  Sections of the nasal mass showed diffuse infiltrate by predominantly intermediate-to-large sized lymphoid cells.  The cells  have a vesicular nuclei and inconspicuous cytoplasm.  FISH was negative for rearrangements involving BCL6, MYC, and IGH/BCL-2 [t(14;18)].  FISH for MYC/IGH was negative.  The patient denied any B symptoms, such as fevers, chills, night sweats, weight loss or fatigue.  He did not report any lymphadenopathy.  Dr. Harry referred the patient to us for further evaluation of his lymphoma.    Today, 12/30/15, the patient presents for initial evaluation.  He denies any fever, chills, night sweats, weight loss or lymph node swelling.  His only symptom is left facial pain.  The patient also reports left eye blurry vision.  • 12/30/15 - WBC 8.3, hemoglobin 13.3, platelet count 233, MCV 87.5.  • 1/7/16 - Echocardiogram:  Ejection fraction 50-55%.  Normal LVEF.  There is slight impaired LV relaxation.  • 1/8/16 - PET/CT scan:  Soft tissue mass originating in the left maxillary sinus erodes through the medial sinus wall spilling out into the nasopharynx.  It abates the nasal septum.  Lesion measures about 4 x 4 cm in AP and transverse dimension.  It measures about 8 cm in the cephalocaudal dimension.  SUV is 10.8  In the abdomen, there is a focus of activity in the left lobe of the liver which measures about 2 cm and has SUV 9.4.    • 1/11/16 - Bone marrow biopsy:  Normal cellular bone marrow 25-35%.  Adequate iron stores.  No malignancy.  Flow cytometry is negative.  Normal karyotype.  • 1/22/16 - Patient underwent left subclavian Port-A-Cath placement.  • 1/25/16 - WBC 8.1, hemoglobin 12.2, platelet count 216, MCV 87.1.  • 1/26/16 - Patient was seen at [facility] by Dr. Jamal Pisano.  He has recommended R-GCVP combination chemo treatment.  If the patient does not achieve complete response or if he has liver involvement, he will consider autologous stem cell transplantation after high-dose chemotherapy.  If lymphoma is present in the liver, this tumor will likely represent a recurrent  lymphoma rather than a new primary lymphoma of the sinuses.  • 2/2/16 - Hepatitis panel negative.  • 2/4/16 - Patient started chemotherapy with Rituximab, Gemcitabine, Cytoxan, Vincristine, and Prednisolone (R-GCVP q. 21 days regimen).  • 2/24/16 - Patient received cycle 2 of R-GCVP.  • 3/1/16 - Patient received intrathecal chemotherapy cycle 1 with Cytarabine plus Hydrocortisone plus Methotrexate.  • 3/2/16 - Patient received cycle 2, day 8 of Gemcitabine.  WBC 3.9, platelet count 145,000.  • 3/9/16 - WBC 9.1, hemoglobin 9.2, platelet count 33,000.  • 3/10/16 - Platelet count 32,000.  • 3/16/16 - Patient received cycle 3 of R-GCVP day 1.  • 3/23/16 - Creatinine 0.85, BUN 19.  Retic count 19,950.  Ferritin 369.  Erythropoietin 26.9.  Iron saturation 11%, TIBC 22.  CBC showed WBC 7.3, hemoglobin 8.1, platelet count 395,000, MCV 87.  • 3/23/16 - Patient received cycle 3, day 8 of Gemcitabine.  Patient received Neulasta 6 mg.   • 3/26/16 - Brain MRI with and without contrast:  There is residual soft tissue mass located in the left ethmoid and sphenoid sinus which is much smaller compared to the previous exam, now measuring 2.8 x 1.8 cm.  There is better aeration in the left maxillary sinus.  • 3/26/16 - MRI abdomen with and without contrast:   A small 1.3 x 1.1 cm rim enhancing lesion is seen near the dome of the medial segment of the left lobe of liver.  This area corresponds to the hypermetabolic activity seen on the previous PET scan.  • 3/30/16 - Neutrophils 53,000, hemoglobin 9.5, platelet count 328,000.  • 4/6/16 - Patient received cycle 4, day 1 of R-GCVP.  WBC 17.9, hemoglobin 9.6, platelet count 256,000.    • 4/7/16 - Patient received intrathecal chemotherapy with Cytarabine plus Hydrocortisone plus Methotrexate.    • 4/13/16 - Patient received cycle 4, day 8 of Gemcitabine.  WBC 4.8, hemoglobin 8.1, platelet count 147,000.  The patient had hypotension and received IV fluids.    • 4/14/16 - Ultrasound of the  liver:  There is a small nodule measuring 15 mm in the left lobe of the liver.    • 4/18/16 - WBC 3.6, hemoglobin 8.1, platelet count 73,000.    • 4/27/16 - Patient received cycle 5, day 1 R-GCVP.  • 4/28/16 - Patient received intrathecal chemotherapy.    • 5/4/16 - Patient received cycle 5, day 8 of Gemcitabine.  • 5/18/16 - Patient received cycle 6, day 1 of R-GCVP.  CBC shows WBC 10.7, hemoglobin 9.7, platelet count 417,000.  • 5/19/16 - Patient received cycle 5 of intrathecal chemotherapy with Methotrexate, Cytarabine and Hydrocortisone.  • 6/10/16 - Brain MRI with and without contrast:  Residual abnormal signal involving the region of the left sphenoid sinus.  There appears to be mild improvement compared to prior.  Findings may be due to chronic inflammation of sinusitis.  Residual tumor is possible but less likely.  Overall, there is improvement.  No evidence of acute focal ischemia.  Nonspecific white matter changes.    • 6/16/16 - Patient received the last, sixth cycle of intrathecal chemotherapy.  • 7/7/16 - PET/CT scan:  Small focal area of increased nuclear activity in the lateral segment of the left lobe of the liver is no longer visualized.    • 8/1/16 to 8/19/16 - Patient received 15 fractions of radiation therapy, total of 30 Gy.  • 11/3/16 - PET/CT scan:  Stable exam with no hypermetabolic activity seen within the neck, chest, abdomen and pelvis.  New sub-centimeter non-calcified pulmonary nodules within the posterior lateral aspect of the right upper lobe.  There may be inflammatory or infectious.  Stable bilateral sub-centimeter non-calcified pulmonary nodules which are not hypermetabolic.    • 1/28/17 - MRI of brain with and without contrast:  Abnormal signal in the left sphenoid sinus is redemonstrated.  It is similar in size to the previous MRI from June 2016 and measures about 23 x 14 mm.  No evidence of progression.  No evidence of metastases.  • 2/17/17 - CT scan:  No abnormalities in the  chest.  No lymphadenopathy.  • 5/8/17 - Patient underwent nasal endoscopy which showed maxillary sinusitis.  No tumor reported.  • 9/8/17 - PET/CT scan:  No evidence of metastasis or disease recurrence.  No hypermetabolic activity anywhere.  Stable partial opacification of the left sphenoid sinus likely due to chronic sinusitis.    • 3/3/18 - WBC 7.2, hemoglobin 11.4, platelet count 181,000, MCV 91.8.  Creatinine 0.77.  LFTs normal.  Albumin 4.    • 3/30/18 - Brain MRI with and without contrast:  Decreasing size of the abnormal soft tissue within the left pterygomaxillary fissure.  This area demonstrates mild heterogeneous contrast enhancement which is similar to prior studies.  No new enlarging soft tissue mass.  Mucosal thickening with the left maxillary and sphenoid sinuses.  No evidence of malignancy.  No acute intracranial abnormality.    • 8/24/18 - CT scan of chest, abdomen and pelvis:  No evidence of lymphadenopathy.  A 1 cm pulmonary nodule in the right middle lobe is not significantly changed.  No evidence of lymphadenopathy.  CT abdomen is also unremarkable.  Enlarged heterogenous prostate gland, hyperplasia is noted.  Small hiatal hernia.  Moderate osteopenia and degenerative changes in hips and spine.  Left renal cyst.    • 8/28/18 - Vitamin D 31.  PSA 1.6.    • 10/5/18 - DEXAscan:  Osteoporosis with T-score of -2.5 in the distal forearm.    • 3/4/19 - WBC 6.9, hemoglobin 12.8, platelet count 187,000, MCV 91.  Creatinine 1.03.  Total bilirubin 0.5.  .  • 5/8/2019 25 hydroxy vitamin D 32  • 8/28/2019: Brain MRI:1. The abnormal material in the left sphenoid sinus does not appear significantly changed.The brain appears stable and within normal for age. 3. Minor chronic maxillary sinus mucosal thickening \  • 2/19/2020: WBC 8.8, hemoglobin 9.2, MCV 92.7, platelets 280, creatinine 0.88, alk phos 122, 25-hydroxy vitamin D 31.3, B12 640, TSH 0.886  • 3/7/2020: CT abdomen and pelvis- No evidence of  lymphadenopathy in the abdomen pelvis or chest.  Stable right middle lobe noncalcified pulmonary nodule unchanged since 2017.  Degenerative changes of the lumbar spine and hips.  Chest with contrast:.  Stable 1 cm right middle lobe lung nodule.  No evidence of malignancy.  • 3/10/2020: Iron 35 low, ferritin 92, iron saturation 14% low, TIBC 258, folate greater than 20, WBC 5.8, hemoglobin 9.4, MCV 91, platelets 228, ESR 25,        Subjective:  Patient presents for a follow regarding lymphoma. He does not report any enlarged lymph nodes, blood in stool, any other abnormal bleeding, fevers, chills, or night sweats. Patient continues to have right knee pain, and sometimes occasionally the knee has been locking. He states that he has arthritis in his R knee and nothing can be done for that. He continues to take Boniva.   He denies any bleeding per rectum..  He is more anemic.  Does not recall when his previous colonoscopy was.  He denies any pain. He has some fatigue. He continues to take iron. He denies any dark, tarry stool. He states that he has been feeling a lump in his throat that has not gone down.    The following portions of the patient's history were reviewed and updated as appropriate: allergies, current medications, past family history, past medical history, past social history, past surgical history and problem list.         Past Medical History:   Diagnosis Date   • Anemia    • Arthralgia    • Diabetes (CMS/HCC)    • Fatigue    • GERD (gastroesophageal reflux disease)    • Hyperlipidemia    • Hypertension    • Liver lesion    • Non Hodgkin's lymphoma (CMS/HCC)    • Osteoporosis    • Personal history of testicular cancer    • Vitamin D deficiency    • Weight loss        Past Surgical History:   Procedure Laterality Date   • HIP SURGERY  2005   • PORTACATH PLACEMENT     • TESTICLE SURGERY Right 07/17/2000    right testicular excision         Current Outpatient Medications:   •  ALLERGY RELIEF 50 MCG/ACT  nasal spray, INHALE 2 PUFFS IN EACH NOSTRIL EVERY DAY, Disp: , Rfl: 6  •  aspirin 81 MG tablet, Take 81 mg by mouth Daily., Disp: , Rfl:   •  atorvastatin (LIPITOR) 40 MG tablet, Take 1 tablet by mouth every night at bedtime., Disp: 30 tablet, Rfl: 2  •  Blood Glucose Monitoring Suppl (BLOOD GLUCOSE MONITOR SYSTEM) w/Device kit, BLOOD GLUCOSE MONITOR SYSTEM w/Device KIT, Disp: , Rfl:   •  carvedilol (COREG) 25 MG tablet, TAKE ONE TABLET BY MOUTH TWICE DAILY WITH MEALS, Disp: 60 tablet, Rfl: 0  •  ibandronate (BONIVA) 150 MG tablet, Take 1 tablet by mouth Every 30 (Thirty) Days., Disp: 1 tablet, Rfl: 5  •  losartan (COZAAR) 100 MG tablet, Take 1 tablet by mouth Daily., Disp: 30 tablet, Rfl: 6  •  metFORMIN ER (GLUCOPHAGE-XR) 500 MG 24 hr tablet, Take two tablets in AM and two in PM, Disp: 60 tablet, Rfl: 3  •  Omega-3 Fatty Acids (FISH OIL) 1000 MG capsule capsule, Daily., Disp: , Rfl:   •  tamsulosin (FLOMAX) 0.4 MG capsule 24 hr capsule, Take 1 capsule by mouth Daily., Disp: 90 capsule, Rfl: 0  •  ferrous sulfate 324 (65 Fe) MG tablet delayed-release EC tablet, Take 324 mg by mouth Daily With Breakfast., Disp: , Rfl:   •  glimepiride (Amaryl) 4 MG tablet, Take 1 tablet by mouth 2 (Two) Times a Day With Meals., Disp: 60 tablet, Rfl: 2    No Known Allergies    Family History   Family history unknown: Yes       Family history is unknown by patient.    Social History     Tobacco Use   • Smoking status: Never Smoker   • Smokeless tobacco: Never Used   Substance Use Topics   • Alcohol use: No     Frequency: Never   • Drug use: No         I have reviewed the history of present illness, past medical history, family history, social history, lab results, all notes and other records since the patient was last seen on  Visit date not found.    ROS:     Review of Systems   Constitutional: Positive for fatigue. Negative for activity change, chills and fever.   HENT: Positive for congestion. Negative for drooling, ear discharge,  "mouth sores, nosebleeds and sore throat.         Soreness in throat.  Feeling of lump in throat.   Eyes: Negative for photophobia, pain, redness and visual disturbance.   Respiratory: Negative for cough, choking, shortness of breath and wheezing.    Cardiovascular: Negative for chest pain and palpitations.   Gastrointestinal: Negative for abdominal pain, diarrhea, nausea and vomiting.   Genitourinary: Negative for dysuria.   Musculoskeletal: Positive for arthralgias and joint swelling. Negative for neck stiffness.   Skin: Negative for rash.   Neurological: Negative for seizures, syncope and speech difficulty.   Psychiatric/Behavioral: Negative for agitation, confusion and hallucinations.       Objective:    Vitals:    06/30/20 1507   BP: 132/54   Pulse: 73   Resp: 16   Temp: 97.8 °F (36.6 °C)   Weight: 64.4 kg (142 lb)   Height: 152.4 cm (60\")   PainSc: 0-No pain     ECOG  (1) Restricted in physically strenuous activity, ambulatory and able to do work of light nature    Physical Exam:   Physical Exam   Constitutional: He is oriented to person, place, and time. He appears well-developed and well-nourished. No distress.   HENT:   Head: Normocephalic and atraumatic.   Eyes: Conjunctivae and EOM are normal. Right eye exhibits no discharge. Left eye exhibits no discharge. No scleral icterus.   Neck: Normal range of motion. Neck supple. No thyromegaly present.   Cardiovascular: Normal rate, regular rhythm and normal heart sounds. Exam reveals no gallop and no friction rub.   Pulmonary/Chest: Effort normal. No stridor. No respiratory distress. He has no wheezes. He has no rales.   Abdominal: Soft. Bowel sounds are normal. He exhibits no mass. There is no tenderness. There is no rebound and no guarding.   Musculoskeletal: Normal range of motion. He exhibits no edema, tenderness or deformity.   Decreased range of motion in the right knee.   Lymphadenopathy:     He has no cervical adenopathy.   Neurological: He is alert and " oriented to person, place, and time. He exhibits normal muscle tone. Coordination normal.   Skin: Skin is warm. No rash noted. He is not diaphoretic. No erythema.   Psychiatric: He has a normal mood and affect. His behavior is normal.   Nursing note and vitals reviewed.            Lab Results - Last 18 Months   Lab Units 03/10/20  1549 02/19/20  1611 10/23/19  1524   WBC x10E3/uL 5.8 8.80 7.36   HEMOGLOBIN g/dL 9.4* 9.2* 10.8*   HEMATOCRIT % 29.9* 28.9* 33.2*   PLATELETS x10E3/uL 228 280 180   MCV fL 91 92.7 92.2     Lab Results - Last 18 Months   Lab Units 02/19/20  1602 10/23/19  1524 05/08/19  1301   SODIUM mmol/L 132* 138 139   POTASSIUM mmol/L 5.1 5.5* 4.5   CHLORIDE mmol/L 98 100 105   TOTAL CO2 mmol/L  --   --  22   CO2 mmol/L 22.1 25.2  --    BUN mg/dL 29* 28* 27*   CREATININE mg/dL 0.88 1.16 1.3*   CALCIUM mg/dL 9.3 9.5 9.7   BILIRUBIN mg/dL 0.3 0.3 0.9   ALK PHOS U/L 122* 128* 80   ALT (SGPT) U/L 12 13 15*   AST (SGOT) U/L 11 14 17   GLUCOSE mg/dL 401* 347* 101*       Lab Results   Component Value Date    GLUCOSE 401 (C) 02/19/2020    BUN 29 (H) 02/19/2020    CREATININE 0.88 02/19/2020    EGFRIFNONA 85 02/19/2020    EGFRIFAFRI 103 02/19/2020    BCR 33.0 (H) 02/19/2020    K 5.1 02/19/2020    CO2 22.1 02/19/2020    CALCIUM 9.3 02/19/2020    ALBUMIN 4.20 02/19/2020    LABIL2 1.6 05/08/2019    AST 11 02/19/2020    ALT 12 02/19/2020       No results for input(s): APTT, INR, PTT in the last 94706 hours.    Lab Results   Component Value Date    IRON 59 09/05/2018    TIBC 258 03/10/2020    FERRITIN 92 03/10/2020       Lab Results   Component Value Date    FOLATE >20.0 03/10/2020       No results found for: OCCULTBLD    No results found for: RETICCTPCT    Lab Results   Component Value Date    ZMJDJAJB32 640 02/19/2020     No results found for: SPEP, UPEP  No results found for: LDH, URICACID  Lab Results   Component Value Date    SEDRATE 25 03/10/2020     No results found for: FIBRINOGEN, HAPTOGLOBIN  No results  found for: PTT, INR  No results found for:   No results found for: CEA  No components found for: CA-19-9  Lab Results   Component Value Date    PSA 1.47 03/17/2017             Assessment/Plan     Assessment:  1. 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  It is unclear whether the patient has recurrent versus new de zulma lymphoma.  His PET scan also showed an indeterminate lesion in the left lobe of the liver, which could not be biopsied.  He was treated with Rituximab-GCVP chemotherapy x6 cycles.  The patient also received six cycles of intrathecal chemotherapy with Methotrexate, Cytarabine, and Hydrocortisone.  PET/CT scan showed a complete response.  His restaging PET scan on 11/3/16 continues to show remission.  He also received consolidation radiation therapy for 30 Gy finished on 8/19/16.      PET/CT scan in September 2017 does not show any evidence of disease recurrence.  Patient was reporting left-sided headaches.  I ordered repeat MRI on 3/30/18 and it fails to show any evidence of disease progression or recurrence.  Restaging CT scan on 3/7/2020 does not show any evidence of disease recurrence.  2. Lump in the throat: Patient reports feeling a lump in the throat, also reports congestion, ongoing sinus issues..  3. History of right knee osteoarthritis:  He previously received steroid injections.  He will require right knee replacement eventually.   4. Anemia: Hemoglobin is worsening.  Work-up showed iron deficiency  5. Benign prostatic hypertrophy:  He has presented with symptoms of urinary incontinence/nocturia.  He is on Flomax.  He is followed by Urology.  6. History of liver lesion:  This was noted initially on PET scan.  Repeat MRI showed a 1.3 x 1.7 cm, rim-enhancing lesion which could not be  biopsied.  His restaging PET scan on 9/8/16 does not show this lesion anymore.   7. Osteoporosis:  This was seen on DEXAscan 10/5/18.  We have recommended Boniva and also vitamin D plus calcium supplements.        PLAN:      1. Check CBC, LDH and CMP today.  2. We will refer patient to see ENT for surveillance nasopharyngeal endoscopy.  Patient reports throat discomfort and also feeling a lump in his neck.  However on my exam I do not feel anything today.  Patient also has ongoing sinus and allergy issues.  3. Continue Boniva.  4. Stool Hemoccult test for anemia.  5. Follow-up in 2 months.      Other iron deficiency anemia  - [facility] Requisition ([facility] Only)    Anemia, unspecified type  - [facility] Requisition ([facility] Only)    Non-Hodgkin's lymphoma, unspecified body region, unspecified non-Hodgkin lymphoma type (CMS/HCC)  - Ambulatory Referral to ENT (Otolaryngology)    Nasal congestion  - Ambulatory Referral to ENT (Otolaryngology)    Orders Placed This Encounter   Procedures   • [facility] Requisition ([facility] Only)     CBC  LDH  CMP  Iron   Ferritin     Standing Status:   Future     Standing Expiration Date:   6/30/2021   • Ambulatory Referral to ENT (Otolaryngology)     Referral Priority:   Urgent     Referral Type:   Consultation     Referral Reason:   Specialty Services Required     Requested Specialty:   Otolaryngology     Number of Visits Requested:   1                     Thank you very much for providing the opportunity to participate in this patient’s care. Please do not hesitate to call if there are any other questions.    I have reviewed all relevant labs results,outside reports, imaging,,notes, vitals, medications and plan with the patient today.    Portions of the note have been Scribed by medical assistant/Nurse.  I have reviewed and made changes accordingly.      Electronically signed by Ted Peterson MD, 03/10/20, 3:54 PM.

## 2020-07-01 ENCOUNTER — TELEPHONE (OUTPATIENT)
Dept: FAMILY MEDICINE CLINIC | Facility: CLINIC | Age: 75
End: 2020-07-01

## 2020-07-01 NOTE — TELEPHONE ENCOUNTER
PHARMACY CALLED TO CHECK ON A PRESCRIPTION THAT WAS SUPPOSED TO BE SENT IN TO [facility] - [place], [place] - [address] - [phone]  - [phone]   [phone]  PT DOES NOT KNOW THE NAME OF THE MEDICATION OR WHAT IT IS FOR, BUT HE KNOWS IT WAS SUPPOSED TO BE SENT IN.     PLEASE ADVISE.    CALLBACK NUMBER: [phone] (ASK FOR ADITYA MCDOWELL)

## 2020-07-01 NOTE — TELEPHONE ENCOUNTER
Called and s/w pharmacist Darren @ [facility]. Browsed through patient's recent medications that were sent at last OV and it was noted that his Metformin XR had failed to send along with his Flomax. Verbal gave to pharmacist Darren for the qty of the medication sent over originally by ADOLFO Molina.

## 2020-07-01 NOTE — TELEPHONE ENCOUNTER
Date of last refill: 6-8-2020    Is there an Inspect on file: n/a    Last appt date: 6-     Next appt date: 7-      Additional information:

## 2020-07-02 RX ORDER — CARVEDILOL 25 MG/1
25 TABLET ORAL 2 TIMES DAILY WITH MEALS
Qty: 180 TABLET | Refills: 0 | Status: SHIPPED | OUTPATIENT
Start: 2020-07-02 | End: 2020-07-15

## 2020-07-05 ENCOUNTER — RESULTS ENCOUNTER (OUTPATIENT)
Dept: ONCOLOGY | Facility: CLINIC | Age: 75
End: 2020-07-05

## 2020-07-05 DIAGNOSIS — D50.8 OTHER IRON DEFICIENCY ANEMIA: ICD-10-CM

## 2020-07-05 DIAGNOSIS — D64.9 ANEMIA, UNSPECIFIED TYPE: ICD-10-CM

## 2020-07-06 ENCOUNTER — TELEPHONE (OUTPATIENT)
Dept: ONCOLOGY | Facility: CLINIC | Age: 75
End: 2020-07-06

## 2020-07-06 NOTE — TELEPHONE ENCOUNTER
Patient scheduled to see Dr. WORTHINGTON on Thursday 7-9-2020 at 1:00.  Ms. Silva notified and v/u.

## 2020-07-13 RX ORDER — CARVEDILOL 25 MG/1
TABLET ORAL
Qty: 60 TABLET | Refills: 0 | OUTPATIENT
Start: 2020-07-13

## 2020-07-13 NOTE — TELEPHONE ENCOUNTER
Date of last refill:    Is there an Inspect on file:    Last appt date:     Next appt date:      Additional information:

## 2020-07-15 RX ORDER — CARVEDILOL 25 MG/1
TABLET ORAL
Qty: 180 TABLET | Refills: 0 | Status: SHIPPED | OUTPATIENT
Start: 2020-07-15 | End: 2021-01-15

## 2020-07-15 NOTE — TELEPHONE ENCOUNTER
Date of last refill:  7-2-2020    Is there an Inspect on file: n/a    Last appt date:  6-     Next appt date: 7-      Additional information:

## 2020-07-21 ENCOUNTER — OFFICE VISIT (OUTPATIENT)
Dept: FAMILY MEDICINE CLINIC | Facility: CLINIC | Age: 75
End: 2020-07-21

## 2020-07-21 VITALS
OXYGEN SATURATION: 99 % | SYSTOLIC BLOOD PRESSURE: 125 MMHG | BODY MASS INDEX: 27.92 KG/M2 | HEART RATE: 58 BPM | WEIGHT: 142.2 LBS | DIASTOLIC BLOOD PRESSURE: 68 MMHG | HEIGHT: 60 IN | TEMPERATURE: 98 F

## 2020-07-21 DIAGNOSIS — E13.9 DIABETES 1.5, MANAGED AS TYPE 2 (HCC): Primary | ICD-10-CM

## 2020-07-21 LAB — GLUCOSE BLDC GLUCOMTR-MCNC: 190 MG/DL (ref 70–130)

## 2020-07-21 PROCEDURE — 99213 OFFICE O/P EST LOW 20 MIN: CPT | Performed by: NURSE PRACTITIONER

## 2020-07-21 PROCEDURE — 82962 GLUCOSE BLOOD TEST: CPT | Performed by: NURSE PRACTITIONER

## 2020-08-11 ENCOUNTER — OFFICE VISIT (OUTPATIENT)
Dept: FAMILY MEDICINE CLINIC | Facility: CLINIC | Age: 75
End: 2020-08-11

## 2020-08-11 VITALS
HEIGHT: 60 IN | WEIGHT: 140 LBS | BODY MASS INDEX: 27.48 KG/M2 | DIASTOLIC BLOOD PRESSURE: 62 MMHG | TEMPERATURE: 99.2 F | OXYGEN SATURATION: 98 % | HEART RATE: 59 BPM | SYSTOLIC BLOOD PRESSURE: 150 MMHG

## 2020-08-11 DIAGNOSIS — E11.43 TYPE 2 DIABETES MELLITUS WITH AUTONOMIC NEUROPATHY, UNSPECIFIED WHETHER LONG TERM INSULIN USE (HCC): Primary | ICD-10-CM

## 2020-08-11 DIAGNOSIS — R59.0 CERVICAL ADENOPATHY: ICD-10-CM

## 2020-08-11 LAB — GLUCOSE BLDC GLUCOMTR-MCNC: 172 MG/DL (ref 70–130)

## 2020-08-11 PROCEDURE — 82962 GLUCOSE BLOOD TEST: CPT | Performed by: NURSE PRACTITIONER

## 2020-08-11 PROCEDURE — 99213 OFFICE O/P EST LOW 20 MIN: CPT | Performed by: NURSE PRACTITIONER

## 2020-08-24 RX ORDER — DAPAGLIFLOZIN 5 MG/1
TABLET, FILM COATED ORAL
Qty: 30 TABLET | Refills: 0 | Status: SHIPPED | OUTPATIENT
Start: 2020-08-24 | End: 2020-09-25

## 2020-08-24 RX ORDER — GLIMEPIRIDE 4 MG/1
TABLET ORAL
Qty: 60 TABLET | Refills: 2 | OUTPATIENT
Start: 2020-08-24

## 2020-08-31 ENCOUNTER — TELEPHONE (OUTPATIENT)
Dept: ONCOLOGY | Facility: CLINIC | Age: 75
End: 2020-08-31

## 2020-09-01 ENCOUNTER — OFFICE VISIT (OUTPATIENT)
Dept: ONCOLOGY | Facility: CLINIC | Age: 75
End: 2020-09-01

## 2020-09-01 VITALS
SYSTOLIC BLOOD PRESSURE: 122 MMHG | RESPIRATION RATE: 16 BRPM | TEMPERATURE: 98 F | HEIGHT: 60 IN | DIASTOLIC BLOOD PRESSURE: 66 MMHG | BODY MASS INDEX: 27.48 KG/M2 | HEART RATE: 88 BPM | WEIGHT: 140 LBS

## 2020-09-01 DIAGNOSIS — D64.9 ANEMIA, UNSPECIFIED TYPE: ICD-10-CM

## 2020-09-01 DIAGNOSIS — D50.8 OTHER IRON DEFICIENCY ANEMIA: Primary | ICD-10-CM

## 2020-09-01 PROCEDURE — 99214 OFFICE O/P EST MOD 30 MIN: CPT | Performed by: INTERNAL MEDICINE

## 2020-09-01 NOTE — PROGRESS NOTES
HEMATOLOGY ONCOLOGY FOLLOW UP        Patient name: Ramon Silva  : 1945  MRN: [id number]  Primary Care Physician: Clara Molina APRN  Referring Physician: Clara Molina AP*  Reason For Consult:     Chief Complaint   Patient presents with   • Follow-up     Anemia, unspecified type       History of Present Illness:  Mr. Silva is a 75-year-old male with a history of testicular lymphoma diagnosed in , status post chemotherapy with R-CHOP, who has been in complete remission for several years.  He presented to his primary care provider, Mariam Price, with symptoms of left-sided facial pain radiating to the eye, left-sided headache, and he was referred to ENT, Dr. Harry, for further evaluation.  CT scan of the paranasal sinuses was performed without contrast on 11/30/15 and it showed marked bony destruction and an expansile soft tissue mass involving the left frontal, left ethmoid, left sphenoid and left maxillary sinus.  There was a soft tissue mass encroaching in the left orbit and left side of the face towards the muscles of mastication as well as through the superior wall of the left sphenoid sinus.  After evaluation, Dr. Harry ordered MRI of the brain with and without contrast and that showed a multi-lobulated mass filling and expanding the left maxillary sinus as well as the left ethmoid air cells in the left locule of the sphenoid sinus, with extension into the left orbit and left medial cranial fossa.  Dr. Harry performed fiberoptic nasal endoscopy and biopsy of the left nasal mass on 12/11/15.  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  Cyclin D1 was negative.  All of these stains were done by immunohistochemistry.  Sections of the nasal mass showed diffuse infiltrate by predominantly intermediate-to-large sized lymphoid cells.   Meka Vela is a pleasant 78 year old female who presents to the clinic for annual wellness Medicare visit.     Past Medical History:  Past Medical History:   Diagnosis Date   • Chest pain    • Claudication (CMS/[facility])    • Coronary Artery Disease    • Hyperlipidemia    • Hypertension    • Impaired mobility and ADLs     uses cane/ walker   • Lung nodule    • Myocardial infarction (CMS/[facility])    • Osteoporosis 05/18/2011   • PVD (peripheral vascular disease) (CMS/[facility])    • Rheumatoid arthritis(714.0)    • Unspecified sinusitis (chronic)    • Wears dentures    • Wears glasses     reading glasses       ALLERGIES:   ALLERGIES:   Allergen Reactions   • Tramadol VOMITING   • Atorvastatin Other (See Comments)     Muscle cramping and pain   • Pletal [Cilostazol] DIARRHEA and Palpitations     \"heart was racing\"   • Pravastatin MYALGIA       Current Medications:  Current Outpatient Medications   Medication Sig Dispense Refill   • rosuvastatin (CRESTOR) 5 MG tablet Take 1 tablet by mouth daily. 90 tablet 1   • ranitidine (ZANTAC) 150 MG tablet Take 1 tablet by mouth 2 times daily. 180 tablet 1   • metoPROLOL tartrate (LOPRESSOR) 50 MG tablet TAKE ONE TABLET BY MOUTH EVERY MORNING AND ONE-HALF TABLET EVERY EVENING 135 tablet 3   • pentoxifylline (TRENTAL) 400 MG CR tablet TAKE 1 TABLET BY MOUTH TWICE DAILY 180 tablet 3   • Amlodipine-Olmesartan 10-40 MG Tab Take 1 tablet by mouth daily. 90 tablet 3   • ezetimibe (ZETIA) 10 MG tablet Take 1 tablet by mouth daily. 90 tablet 3   • metoPROLOL tartrate (LOPRESSOR) 50 MG tablet Take 1 tablet by mouth 2 times daily. 180 tablet 3   • DISPENSE 4 wheeled walker with a seat   DX: unsteady gait 1 each 0   • aspirin (ECOTRIN) 325 MG EC tablet Take 1 tablet by mouth daily. 30 tablet 6   • Omega-3 Fatty Acids (FISH OIL) 1000 MG capsule Take 2 g by mouth 2 times daily. 1200mg bid currently     • acetaminophen (TYLENOL) 325 MG tablet Take 325 mg by mouth every 4 hours as needed.     • Calcium  600 MG tablet Take 1 tablet by mouth daily. 600mg contain vitamin d 800 iu     • Cholecalciferol (VITAMIN D) 2000 UNITS tablet Take 2,000 Units by mouth daily.     • Amlodipine-Olmesartan 10-40 MG Tab TAKE 1 TABLET BY MOUTH DAILY 90 tablet 2   • Artificial Tear Solution SOLN Place 1-2 drops into both eyes 2 times daily.       No current facility-administered medications for this visit.        Past Surgical History:  Past Surgical History:   Procedure Laterality Date   • Aorta bifemoral angiogram/possible pta/possible stent - cv  02/08/2017   • Cdl aorta bifem angios  7/3/2013   • Colonoscopy  6/13/2011    3 years   • Colonoscopy  01/18/2016    3 year colonoscopy   • Dexa bone density axial skeleton  05/31/2011   • Echo heart resting w doppler & color flow  06/24/2016   • Eye surgery  11/2012    Bilateral cataract removal with lens implant   • Hysterectomy      in thirties   • Intertrochanteric hip fracture surgery  11/22/2014    ORIF inter/subtrochanteric femoral neck fracture. Dr Terrazas. [facility]   • Pta with stent  7/19/2013    stent Right SFA and R popliteal artery   • Ptca  1/28/2010    PTCA LAD   • Ptca  2/1/2010    staged PTCA drug eluting stent mid circ/M1   • Service to gastroenterology  6/13/2011    Colonoscopy - 2 polyps removed   • Tonsillectomy and adenoidectomy  as a child       Family History:  Family History   Problem Relation Age of Onset   • Heart disease Brother         murmur, ?valve replacement   • Hypertension Mother    • Stroke Mother    • Stroke Father    • Hypertension Father    • Other Father         ETOH abuse and cirrhosis       List of current providers:   Tammie Boggs MD as PCP - General (Family Practice)  Lilian Beavers MD as Consulting Physician (Internal Medicine- Interventional Cardiology)  Bart Sanches MD (Pulmonary Medicine)  Floyd Dickerson MD (General Surgery)  Ilia Terrazas MD (Orthopedic Surgery)  Judy Alvarez NP (Nurse Practitioner- Acute Care)      The cells have a vesicular nuclei and inconspicuous cytoplasm.  FISH was negative for rearrangements involving BCL6, MYC, and IGH/BCL-2 [t(14;18)].  FISH for MYC/IGH was negative.  The patient denied any B symptoms, such as fevers, chills, night sweats, weight loss or fatigue.  He did not report any lymphadenopathy.  Dr. Harry referred the patient to us for further evaluation of his lymphoma.    Today, 12/30/15, the patient presents for initial evaluation.  He denies any fever, chills, night sweats, weight loss or lymph node swelling.  His only symptom is left facial pain.  The patient also reports left eye blurry vision.  • 12/30/15 - WBC 8.3, hemoglobin 13.3, platelet count 233, MCV 87.5.  • 1/7/16 - Echocardiogram:  Ejection fraction 50-55%.  Normal LVEF.  There is slight impaired LV relaxation.  • 1/8/16 - PET/CT scan:  Soft tissue mass originating in the left maxillary sinus erodes through the medial sinus wall spilling out into the nasopharynx.  It abates the nasal septum.  Lesion measures about 4 x 4 cm in AP and transverse dimension.  It measures about 8 cm in the cephalocaudal dimension.  SUV is 10.8  In the abdomen, there is a focus of activity in the left lobe of the liver which measures about 2 cm and has SUV 9.4.    • 1/11/16 - Bone marrow biopsy:  Normal cellular bone marrow 25-35%.  Adequate iron stores.  No malignancy.  Flow cytometry is negative.  Normal karyotype.  • 1/22/16 - Patient underwent left subclavian Port-A-Cath placement.  • 1/25/16 - WBC 8.1, hemoglobin 12.2, platelet count 216, MCV 87.1.  • 1/26/16 - Patient was seen at [facility] by Dr. Jamal Pisano.  He has recommended R-GCVP combination chemo treatment.  If the patient does not achieve complete response or if he has liver involvement, he will consider autologous stem cell transplantation after high-dose chemotherapy.  If lymphoma is present in the liver, this tumor will likely represent a  factors and conditions:   Risks and benefits of medications discussed with patient    Psycho/social activity:   Social History     Socioeconomic History   • Marital status: Single     Spouse name: Not on file   • Number of children: Not on file   • Years of education: Not on file   • Highest education level: Not on file   Social Needs   • Financial resource strain: Not on file   • Food insecurity - worry: Not on file   • Food insecurity - inability: Not on file   • Transportation needs - medical: Not on file   • Transportation needs - non-medical: Not on file   Occupational History   • Occupation: Housekeeping   Tobacco Use   • Smoking status: Former Smoker     Packs/day: 0.20     Years: 50.00     Pack years: 10.00     Types: Cigarettes     Last attempt to quit: 2013     Years since quittin.3   • Smokeless tobacco: Never Used   Substance and Sexual Activity   • Alcohol use: No     Alcohol/week: 0.0 oz   • Drug use: No   • Sexual activity: No   Other Topics Concern   •  Service No   • Blood Transfusions No   • Caffeine Concern No   • Occupational Exposure No   • Hobby Hazards No   • Sleep Concern No   • Stress Concern Yes     Comment: only when get mail   • Weight Concern Yes     Comment: want to lose weight   • Special Diet Yes     Comment: salt and sugars   • Back Care No   • Exercise Yes     Comment: walking   • Bike Helmet No   • Seat Belt Yes   • Self-Exams No   Social History Narrative    .  Lives in apt.  No pets/birds/hot tubs.           Diet and physical activity:   Patient is trying to exercise as tolerated. She feels she has good diet.     Depression screening:   Patient denies depression. She denies feeling down, depressed or hopeless over the past few weeks. No past history of depression or mood disorders.     Functional ability and safety evaluation:   Patient is able to hear whispering  She is able to perform daily activities with some help  She denies risks or falls, uses a  recurrent lymphoma rather than a new primary lymphoma of the sinuses.  • 2/2/16 - Hepatitis panel negative.  • 2/4/16 - Patient started chemotherapy with Rituximab, Gemcitabine, Cytoxan, Vincristine, and Prednisolone (R-GCVP q. 21 days regimen).  • 2/24/16 - Patient received cycle 2 of R-GCVP.  • 3/1/16 - Patient received intrathecal chemotherapy cycle 1 with Cytarabine plus Hydrocortisone plus Methotrexate.  • 3/2/16 - Patient received cycle 2, day 8 of Gemcitabine.  WBC 3.9, platelet count 145,000.  • 3/9/16 - WBC 9.1, hemoglobin 9.2, platelet count 33,000.  • 3/10/16 - Platelet count 32,000.  • 3/16/16 - Patient received cycle 3 of R-GCVP day 1.  • 3/23/16 - Creatinine 0.85, BUN 19.  Retic count 19,950.  Ferritin 369.  Erythropoietin 26.9.  Iron saturation 11%, TIBC 22.  CBC showed WBC 7.3, hemoglobin 8.1, platelet count 395,000, MCV 87.  • 3/23/16 - Patient received cycle 3, day 8 of Gemcitabine.  Patient received Neulasta 6 mg.   • 3/26/16 - Brain MRI with and without contrast:  There is residual soft tissue mass located in the left ethmoid and sphenoid sinus which is much smaller compared to the previous exam, now measuring 2.8 x 1.8 cm.  There is better aeration in the left maxillary sinus.  • 3/26/16 - MRI abdomen with and without contrast:   A small 1.3 x 1.1 cm rim enhancing lesion is seen near the dome of the medial segment of the left lobe of liver.  This area corresponds to the hypermetabolic activity seen on the previous PET scan.  • 3/30/16 - Neutrophils 53,000, hemoglobin 9.5, platelet count 328,000.  • 4/6/16 - Patient received cycle 4, day 1 of R-GCVP.  WBC 17.9, hemoglobin 9.6, platelet count 256,000.    • 4/7/16 - Patient received intrathecal chemotherapy with Cytarabine plus Hydrocortisone plus Methotrexate.    • 4/13/16 - Patient received cycle 4, day 8 of Gemcitabine.  WBC 4.8, hemoglobin 8.1, platelet count 147,000.  The patient had hypotension and received IV fluids.    • 4/14/16 -  Ultrasound of the liver:  There is a small nodule measuring 15 mm in the left lobe of the liver.    • 4/18/16 - WBC 3.6, hemoglobin 8.1, platelet count 73,000.    • 4/27/16 - Patient received cycle 5, day 1 R-GCVP.  • 4/28/16 - Patient received intrathecal chemotherapy.    • 5/4/16 - Patient received cycle 5, day 8 of Gemcitabine.  • 5/18/16 - Patient received cycle 6, day 1 of R-GCVP.  CBC shows WBC 10.7, hemoglobin 9.7, platelet count 417,000.  • 5/19/16 - Patient received cycle 5 of intrathecal chemotherapy with Methotrexate, Cytarabine and Hydrocortisone.  • 6/10/16 - Brain MRI with and without contrast:  Residual abnormal signal involving the region of the left sphenoid sinus.  There appears to be mild improvement compared to prior.  Findings may be due to chronic inflammation of sinusitis.  Residual tumor is possible but less likely.  Overall, there is improvement.  No evidence of acute focal ischemia.  Nonspecific white matter changes.    • 6/16/16 - Patient received the last, sixth cycle of intrathecal chemotherapy.  • 7/7/16 - PET/CT scan:  Small focal area of increased nuclear activity in the lateral segment of the left lobe of the liver is no longer visualized.    • 8/1/16 to 8/19/16 - Patient received 15 fractions of radiation therapy, total of 30 Gy.  • 11/3/16 - PET/CT scan:  Stable exam with no hypermetabolic activity seen within the neck, chest, abdomen and pelvis.  New sub-centimeter non-calcified pulmonary nodules within the posterior lateral aspect of the right upper lobe.  There may be inflammatory or infectious.  Stable bilateral sub-centimeter non-calcified pulmonary nodules which are not hypermetabolic.    • 1/28/17 - MRI of brain with and without contrast:  Abnormal signal in the left sphenoid sinus is redemonstrated.  It is similar in size to the previous MRI from June 2016 and measures about 23 x 14 mm.  No evidence of progression.  No evidence of metastases.  • 2/17/17 - CT scan:  No  walker and she feels safe at home.     Detection of cognitive impairment:   Patient denies problems with memory    PHYSICAL EXAMINATION:   GENERAL: The patient is alert, awake, oriented ×3, in no acute distress.   VITAL SIGNS:   Visit Vitals  /70   Pulse 55   Temp 98.2 °F (36.8 °C) (Oral)   Resp 14   Ht 5' 6\" (1.676 m)   Wt 79.1 kg   SpO2 98%   BMI 28.13 kg/m²     Hearing Screening Comments: Passed whisper test  Vision Screening Comments: Patient had eye exam recently  EYES: PERRL (pupils equal, round, and reactive to light), conjunctivae normal.   HEENT: Pupils equal, round and reactive to light and accommodation. Extraocular muscles intact. Anicteric sclerae. Tympanic membranes clear. Throat clear.   NECK: Supple with full range of motion. No submandibular, cervical, or supraclavicular lymphadenopathy. No thyromegaly, no jugular venous distension or audible bruit.   HEART: Regular rate and rhythm. No S3 or S4, 1/6 systolic murmurs.   LUNGS: Clear to auscultation bilaterally. No rhonchi or rales. Adequate air entry. Normal respiratory effort.   ABDOMEN: Soft, nontender, nondistended with audible bowel sounds. No hepatosplenomegaly, no masses.   EXTREMITIES: No edema, clubbing or cyanosis. Dorsalis pedis 2+ bilaterally.   Psychiatry: Normal mood. Varied appropriate affect      We have discussed advanced care planning    Assessment of preventative screening   Abdominal aortic aneurysm screening: None applicable  Last Pneumovax was April 10, 2013 Prevnar April 16, 2015  We have discussed risks and benefits of shingles vaccine and risks of not receiving vaccine. Handouts for Shingrix given.  Discussed influenza vaccine  She does not have risk factors for hepatitis B   Patient's last mammogram was October 11, 2017  Last colonoscopy was January 18, 2016  Prostate cancer screening: Not applicable   Osteoporosis screening: Patient declined. Made aware of risks area  Nutrition needs: We discussed proper diet   Patient  abnormalities in the chest.  No lymphadenopathy.  • 5/8/17 - Patient underwent nasal endoscopy which showed maxillary sinusitis.  No tumor reported.  • 9/8/17 - PET/CT scan:  No evidence of metastasis or disease recurrence.  No hypermetabolic activity anywhere.  Stable partial opacification of the left sphenoid sinus likely due to chronic sinusitis.    • 3/3/18 - WBC 7.2, hemoglobin 11.4, platelet count 181,000, MCV 91.8.  Creatinine 0.77.  LFTs normal.  Albumin 4.    • 3/30/18 - Brain MRI with and without contrast:  Decreasing size of the abnormal soft tissue within the left pterygomaxillary fissure.  This area demonstrates mild heterogeneous contrast enhancement which is similar to prior studies.  No new enlarging soft tissue mass.  Mucosal thickening with the left maxillary and sphenoid sinuses.  No evidence of malignancy.  No acute intracranial abnormality.    • 8/24/18 - CT scan of chest, abdomen and pelvis:  No evidence of lymphadenopathy.  A 1 cm pulmonary nodule in the right middle lobe is not significantly changed.  No evidence of lymphadenopathy.  CT abdomen is also unremarkable.  Enlarged heterogenous prostate gland, hyperplasia is noted.  Small hiatal hernia.  Moderate osteopenia and degenerative changes in hips and spine.  Left renal cyst.    • 8/28/18 - Vitamin D 31.  PSA 1.6.    • 10/5/18 - DEXAscan:  Osteoporosis with T-score of -2.5 in the distal forearm.    • 3/4/19 - WBC 6.9, hemoglobin 12.8, platelet count 187,000, MCV 91.  Creatinine 1.03.  Total bilirubin 0.5.  .  • 5/8/2019 25 hydroxy vitamin D 32  • 8/28/2019: Brain MRI:1. The abnormal material in the left sphenoid sinus does not appear significantly changed.The brain appears stable and within normal for age. 3. Minor chronic maxillary sinus mucosal thickening \  • 2/19/2020: WBC 8.8, hemoglobin 9.2, MCV 92.7, platelets 280, creatinine 0.88, alk phos 122, 25-hydroxy vitamin D 31.3, B12 640, TSH 0.886  • 3/7/2020: CT abdomen and pelvis-  does not smoke   Glaucoma screening is done regularly by [facility]        Patient given handouts for Medicare wellness visit for preventative care.          No evidence of lymphadenopathy in the abdomen pelvis or chest.  Stable right middle lobe noncalcified pulmonary nodule unchanged since 2017.  Degenerative changes of the lumbar spine and hips.  Chest with contrast:.  Stable 1 cm right middle lobe lung nodule.  No evidence of malignancy.  • 3/10/2020: Iron 35 low, ferritin 92, iron saturation 14% low, TIBC 258, folate greater than 20, WBC 5.8, hemoglobin 9.4, MCV 91, platelets 228, ESR 25  • 6/30/2020: Creatinine 0.9, LFTs normal, , WBC 6.48, hemoglobin 10.6, platelets 202, ferritin 71, iron saturation 25%, TIBC 242,        Subjective:  Patient presents for a follow regarding lymphoma.  In the interim, he had seen ENT and states that he had a scope done.  Has not done stool Hemoccult testing yet.  He does not report any enlarged lymph nodes, blood in stool, any other abnormal bleeding, fevers, chills, or night sweats. Patient continues to have right knee pain, and sometimes occasionally the knee has been locking. He states that he has arthritis in his R knee and nothing can be done for that. He continues to take Boniva.   He denies any bleeding per rectum.  He continues to take iron. He denies any dark, tarry stool.        The following portions of the patient's history were reviewed and updated as appropriate: allergies, current medications, past family history, past medical history, past social history, past surgical history and problem list.       Past Medical History:   Diagnosis Date   • Anemia    • Arthralgia    • Diabetes (CMS/HCC)    • Fatigue    • GERD (gastroesophageal reflux disease)    • Hyperlipidemia    • Hypertension    • Liver lesion    • Non Hodgkin's lymphoma (CMS/HCC)    • Osteoporosis    • Personal history of testicular cancer    • Vitamin D deficiency    • Weight loss        Past Surgical History:   Procedure Laterality Date   • HIP SURGERY  2005   • PORTACATH PLACEMENT     • TESTICLE SURGERY Right 07/17/2000    right testicular excision        Current Outpatient Medications:   •  ALLERGY RELIEF 50 MCG/ACT nasal spray, INHALE 2 PUFFS IN EACH NOSTRIL EVERY DAY, Disp: , Rfl: 6  •  aspirin 81 MG tablet, Take 81 mg by mouth Daily., Disp: , Rfl:   •  atorvastatin (LIPITOR) 40 MG tablet, Take 1 tablet by mouth every night at bedtime., Disp: 30 tablet, Rfl: 2  •  Blood Glucose Monitoring Suppl (BLOOD GLUCOSE MONITOR SYSTEM) w/Device kit, BLOOD GLUCOSE MONITOR SYSTEM w/Device KIT, Disp: , Rfl:   •  carvedilol (COREG) 25 MG tablet, TAKE ONE TABLET BY MOUTH TWICE DAILY WITH MEALS, Disp: 180 tablet, Rfl: 0  •  FARXIGA 5 MG tablet tablet, TAKE ONE TABLET BY MOUTH DAILY, Disp: 30 tablet, Rfl: 0  •  ferrous sulfate 324 (65 Fe) MG tablet delayed-release EC tablet, Take 324 mg by mouth Daily With Breakfast., Disp: , Rfl:   •  ibandronate (BONIVA) 150 MG tablet, Take 1 tablet by mouth Every 30 (Thirty) Days., Disp: 1 tablet, Rfl: 5  •  losartan (COZAAR) 100 MG tablet, Take 1 tablet by mouth Daily., Disp: 30 tablet, Rfl: 6  •  metFORMIN ER (GLUCOPHAGE-XR) 500 MG 24 hr tablet, Take two tablets in AM and two in PM, Disp: 60 tablet, Rfl: 3  •  Omega-3 Fatty Acids (FISH OIL) 1000 MG capsule capsule, Daily., Disp: , Rfl:   •  tamsulosin (FLOMAX) 0.4 MG capsule 24 hr capsule, Take 1 capsule by mouth Daily., Disp: 90 capsule, Rfl: 0    No Known Allergies    Family History   Family history unknown: Yes       Family history is unknown by patient.    Social History     Tobacco Use   • Smoking status: Never Smoker   • Smokeless tobacco: Never Used   Substance Use Topics   • Alcohol use: No     Frequency: Never   • Drug use: No       I have reviewed the history of present illness, past medical history, family history, social history, lab results, all notes and other records since the patient was last seen on  Visit date not found.    ROS:   Review of Systems   Constitutional: Positive for fatigue. Negative for activity change, chills and fever.   HENT: Positive for congestion.  "Negative for drooling, ear discharge, mouth sores, nosebleeds and sore throat.         Soreness in throat.  Feeling of lump in throat.   Eyes: Negative for photophobia, pain, redness and visual disturbance.   Respiratory: Negative for cough, choking, shortness of breath and wheezing.    Cardiovascular: Negative for chest pain and palpitations.   Gastrointestinal: Negative for abdominal pain, diarrhea, nausea and vomiting.   Genitourinary: Negative for dysuria.   Musculoskeletal: Positive for arthralgias and joint swelling. Negative for neck stiffness.   Skin: Negative for rash.   Neurological: Negative for seizures, syncope and speech difficulty.   Psychiatric/Behavioral: Negative for agitation, confusion and hallucinations.       Objective:    Vitals:    09/01/20 1510   BP: 122/66   Pulse: 88   Resp: 16   Temp: 98 °F (36.7 °C)   Weight: 63.5 kg (140 lb)   Height: 152.4 cm (60\")     ECOG  (1) Restricted in physically strenuous activity, ambulatory and able to do work of light nature    Physical Exam:   Physical Exam   Constitutional: He is oriented to person, place, and time. He appears well-developed and well-nourished. No distress.   HENT:   Head: Normocephalic and atraumatic.   Eyes: Conjunctivae and EOM are normal. Right eye exhibits no discharge. Left eye exhibits no discharge. No scleral icterus.   Neck: Normal range of motion. Neck supple. No thyromegaly present.   Cardiovascular: Normal rate, regular rhythm and normal heart sounds. Exam reveals no gallop and no friction rub.   Pulmonary/Chest: Effort normal. No stridor. No respiratory distress. He has no wheezes. He has no rales.   Abdominal: Soft. Bowel sounds are normal. He exhibits no mass. There is no tenderness. There is no rebound and no guarding.   Musculoskeletal: Normal range of motion. He exhibits no edema, tenderness or deformity.   Decreased range of motion in the right knee.   Lymphadenopathy:     He has no cervical adenopathy.   Neurological: " He is alert and oriented to person, place, and time. He exhibits normal muscle tone. Coordination normal.   Skin: Skin is warm. No rash noted. He is not diaphoretic. No erythema.   Psychiatric: He has a normal mood and affect. His behavior is normal.   Nursing note and vitals reviewed.    I have reexamined the patient and the results are consistent with the previously documented exam. Ted Peterson MD     Lab Results - Last 18 Months   Lab Units 03/10/20  1549 02/19/20  1611 10/23/19  1524   WBC x10E3/uL 5.8 8.80 7.36   HEMOGLOBIN g/dL 9.4* 9.2* 10.8*   HEMATOCRIT % 29.9* 28.9* 33.2*   PLATELETS x10E3/uL 228 280 180   MCV fL 91 92.7 92.2     Lab Results - Last 18 Months   Lab Units 02/19/20  1602 10/23/19  1524 05/08/19  1301   SODIUM mmol/L 132* 138 139   POTASSIUM mmol/L 5.1 5.5* 4.5   CHLORIDE mmol/L 98 100 105   TOTAL CO2 mmol/L  --   --  22   CO2 mmol/L 22.1 25.2  --    BUN mg/dL 29* 28* 27*   CREATININE mg/dL 0.88 1.16 1.3*   CALCIUM mg/dL 9.3 9.5 9.7   BILIRUBIN mg/dL 0.3 0.3 0.9   ALK PHOS U/L 122* 128* 80   ALT (SGPT) U/L 12 13 15*   AST (SGOT) U/L 11 14 17   GLUCOSE mg/dL 401* 347* 101*       Lab Results   Component Value Date    GLUCOSE 401 (C) 02/19/2020    BUN 29 (H) 02/19/2020    CREATININE 0.88 02/19/2020    EGFRIFNONA 85 02/19/2020    EGFRIFAFRI 103 02/19/2020    BCR 33.0 (H) 02/19/2020    K 5.1 02/19/2020    CO2 22.1 02/19/2020    CALCIUM 9.3 02/19/2020    ALBUMIN 4.20 02/19/2020    LABIL2 1.6 05/08/2019    AST 11 02/19/2020    ALT 12 02/19/2020       No results for input(s): APTT, INR, PTT in the last 78927 hours.    Lab Results   Component Value Date    IRON 59 09/05/2018    TIBC 258 03/10/2020    FERRITIN 92 03/10/2020       Lab Results   Component Value Date    FOLATE >20.0 03/10/2020       No results found for: OCCULTBLD    No results found for: RETICCTPCT    Lab Results   Component Value Date    GSDDGLSG98 640 02/19/2020     No results found for: SPEP, UPEP  No results found for: LDH,  URICACID  Lab Results   Component Value Date    SEDRATE 25 03/10/2020     No results found for: FIBRINOGEN, HAPTOGLOBIN  No results found for: PTT, INR  No results found for:   No results found for: CEA  No components found for: CA-19-9  Lab Results   Component Value Date    PSA 1.47 03/17/2017             Assessment/Plan     Assessment:  1. 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  It is unclear whether the patient has recurrent versus new de zulma lymphoma.  His PET scan also showed an indeterminate lesion in the left lobe of the liver, which could not be biopsied.  He was treated with Rituximab-GCVP chemotherapy x6 cycles.  The patient also received six cycles of intrathecal chemotherapy with Methotrexate, Cytarabine, and Hydrocortisone.  PET/CT scan showed a complete response.  His restaging PET scan on 11/3/16 continues to show remission.  He also received consolidation radiation therapy for 30 Gy finished on 8/19/16.      PET/CT scan in September 2017 does not show any evidence of disease recurrence.  Patient was reporting left-sided headaches.  I ordered repeat MRI on 3/30/18 and it fails to show any evidence of disease progression or recurrence.  Restaging CT scan on 3/7/2020 does not show any evidence of disease recurrence.  2. Lump in the throat: Patient reports feeling a lump in the throat, also reports congestion, ongoing sinus issues..  3. History of right knee osteoarthritis:  He previously received steroid injections.  He will require right knee replacement eventually.   4. Anemia: Hemoglobin is worsening.  Work-up showed iron deficiency  5. Benign prostatic hypertrophy:  He has presented with symptoms of urinary incontinence/nocturia.  He is on Flomax.  He is followed by Urology.  6. History of  liver lesion:  This was noted initially on PET scan.  Repeat MRI showed a 1.3 x 1.7 cm, rim-enhancing lesion which could not be biopsied.  His restaging PET scan on 9/8/16 does not show this lesion anymore.   7. Osteoporosis:  This was seen on DEXAscan 10/5/18.  We have recommended Boniva and also vitamin D plus calcium supplements.        PLAN:      1. Patient continues to remain anemic.  We will repeat work-up.  We will check inflammatory markers.  2. Stool Hemoccult test.  3. Continue Boniva.  4. Follow-up in 3 months.      I have reviewed and confirmed the accuracy of the patient's history: Chief complaint, HPI, ROS and Subjective as entered by the MA/LPN/RN. Ted Peterson MD 09/01/20       Other iron deficiency anemia  - [facility] Requisition ([facility] Only)    Anemia, unspecified type  - [facility] Requisition ([facility] Only)    Orders Placed This Encounter   Procedures   • [facility] Requisition ([facility] Only)     CBC  ESR  CRP  CMP     Standing Status:   Future     Standing Expiration Date:   9/1/2021        Thank you very much for providing the opportunity to participate in this patient’s care. Please do not hesitate to call if there are any other questions.    I have reviewed all relevant labs results,outside reports, imaging,,notes, vitals, medications and plan with the patient today.    Portions of the note have been Scribed by medical assistant/Nurse.  I have reviewed and made changes accordingly.      Electronically signed by Ted Peterson MD, 09/01/20, 3:11 PM.

## 2020-09-06 ENCOUNTER — RESULTS ENCOUNTER (OUTPATIENT)
Dept: ONCOLOGY | Facility: CLINIC | Age: 75
End: 2020-09-06

## 2020-09-06 DIAGNOSIS — D64.9 ANEMIA, UNSPECIFIED TYPE: ICD-10-CM

## 2020-09-06 DIAGNOSIS — D50.8 OTHER IRON DEFICIENCY ANEMIA: ICD-10-CM

## 2020-09-22 RX ORDER — ATORVASTATIN CALCIUM 40 MG/1
TABLET, FILM COATED ORAL
Qty: 30 TABLET | Refills: 2 | Status: SHIPPED | OUTPATIENT
Start: 2020-09-22 | End: 2020-12-28

## 2020-09-22 NOTE — TELEPHONE ENCOUNTER
Date of last refill: 6-    Is there an Inspect on file: N/A    Last appt date: 8-     Next appt date: NONE       Additional information:

## 2020-09-25 RX ORDER — DAPAGLIFLOZIN 5 MG/1
TABLET, FILM COATED ORAL
Qty: 30 TABLET | Refills: 0 | Status: SHIPPED | OUTPATIENT
Start: 2020-09-25 | End: 2020-10-13

## 2020-09-25 NOTE — TELEPHONE ENCOUNTER
Date of last refill: 8-    Is there an Inspect on file: NO    Last appt date: 8-     Next appt date: 10-      Additional information:

## 2020-10-13 ENCOUNTER — OFFICE VISIT (OUTPATIENT)
Dept: FAMILY MEDICINE CLINIC | Facility: CLINIC | Age: 75
End: 2020-10-13

## 2020-10-13 VITALS
BODY MASS INDEX: 26.31 KG/M2 | TEMPERATURE: 97.7 F | OXYGEN SATURATION: 99 % | HEIGHT: 60 IN | SYSTOLIC BLOOD PRESSURE: 102 MMHG | HEART RATE: 65 BPM | WEIGHT: 134 LBS | DIASTOLIC BLOOD PRESSURE: 60 MMHG

## 2020-10-13 DIAGNOSIS — E11.43 TYPE 2 DIABETES MELLITUS WITH AUTONOMIC NEUROPATHY, UNSPECIFIED WHETHER LONG TERM INSULIN USE (HCC): Primary | ICD-10-CM

## 2020-10-13 LAB
EXPIRATION DATE: NORMAL
GLUCOSE BLDC GLUCOMTR-MCNC: 106 MG/DL (ref 70–130)
HBA1C MFR BLD: 8.1 %
Lab: NORMAL

## 2020-10-13 PROCEDURE — 83036 HEMOGLOBIN GLYCOSYLATED A1C: CPT | Performed by: NURSE PRACTITIONER

## 2020-10-13 PROCEDURE — 99213 OFFICE O/P EST LOW 20 MIN: CPT | Performed by: NURSE PRACTITIONER

## 2020-10-13 PROCEDURE — 82962 GLUCOSE BLOOD TEST: CPT | Performed by: NURSE PRACTITIONER

## 2020-10-13 RX ORDER — DAPAGLIFLOZIN 10 MG/1
10 TABLET, FILM COATED ORAL DAILY
Qty: 90 TABLET | Refills: 0 | Status: SHIPPED | OUTPATIENT
Start: 2020-10-13 | End: 2021-02-01

## 2020-10-13 RX ORDER — FERROUS SULFATE TAB EC 324 MG (65 MG FE EQUIVALENT) 324 (65 FE) MG
324 TABLET DELAYED RESPONSE ORAL
COMMUNITY
End: 2021-02-23 | Stop reason: SDUPTHER

## 2020-10-13 RX ORDER — PANTOPRAZOLE SODIUM 40 MG/1
40 TABLET, DELAYED RELEASE ORAL DAILY
COMMUNITY
Start: 2020-10-12 | End: 2021-02-23 | Stop reason: SDUPTHER

## 2020-10-13 RX ORDER — FAMOTIDINE 40 MG/1
TABLET, FILM COATED ORAL
COMMUNITY
Start: 2020-10-12 | End: 2020-10-13 | Stop reason: ALTCHOICE

## 2020-10-13 NOTE — PROGRESS NOTES
Chief Complaint   Patient presents with   • Diabetes   • Follow-up        Subjective   Ramon Silva is a 75 y.o.  male who presents today for   Pt reports that he feels like he hasn't seen a difference in adding the Farxiga will increase today  Pt continues to work full time and is active  Does monitor blood sugars brought them in for review. Recommended to continue log and f/u 4 weeks after increasing medication     Diabetes  He presents for his follow-up diabetic visit. He has type 2 diabetes mellitus. No MedicAlert identification noted. His disease course has been fluctuating. There are no hypoglycemic associated symptoms. There are no diabetic associated symptoms. There are no hypoglycemic complications. Symptoms are stable. There are no diabetic complications. Current diabetic treatment includes oral agent (dual therapy). He is compliant with treatment all of the time. He is following a diabetic diet. Meal planning includes avoidance of concentrated sweets. He has not had a previous visit with a dietitian.     Ramon Silva  has a past medical history of Anemia, Arthralgia, Diabetes (CMS/HCC), Fatigue, GERD (gastroesophageal reflux disease), Hyperlipidemia, Hypertension, Liver lesion, Non Hodgkin's lymphoma (CMS/HCC), Osteoporosis, Personal history of testicular cancer, Vitamin D deficiency, and Weight loss.    No Known Allergies    Current Outpatient Medications:   •  aspirin 81 MG tablet, Take 81 mg by mouth Daily., Disp: , Rfl:   •  atorvastatin (LIPITOR) 40 MG tablet, TAKE ONE TABLET BY MOUTH AT BEDTIME, Disp: 30 tablet, Rfl: 2  •  Blood Glucose Monitoring Suppl (BLOOD GLUCOSE MONITOR SYSTEM) w/Device kit, BLOOD GLUCOSE MONITOR SYSTEM w/Device KIT, Disp: , Rfl:   •  carvedilol (COREG) 25 MG tablet, TAKE ONE TABLET BY MOUTH TWICE DAILY WITH MEALS, Disp: 180 tablet, Rfl: 0  •  ferrous sulfate 324 (65 Fe) MG tablet delayed-release EC tablet, Take 324 mg by mouth Daily With Breakfast., Disp: , Rfl:    •  ibandronate (BONIVA) 150 MG tablet, Take 1 tablet by mouth Every 30 (Thirty) Days., Disp: 1 tablet, Rfl: 5  •  losartan (COZAAR) 100 MG tablet, Take 1 tablet by mouth Daily., Disp: 30 tablet, Rfl: 6  •  metFORMIN ER (GLUCOPHAGE-XR) 500 MG 24 hr tablet, Take two tablets in AM and two in PM, Disp: 60 tablet, Rfl: 3  •  pantoprazole (PROTONIX) 40 MG EC tablet, , Disp: , Rfl:   •  Dapagliflozin Propanediol (Farxiga) 10 MG tablet, Take 10 mg by mouth Daily for 90 days., Disp: 90 tablet, Rfl: 0  Past Medical History:   Diagnosis Date   • Anemia    • Arthralgia    • Diabetes (CMS/HCC)    • Fatigue    • GERD (gastroesophageal reflux disease)    • Hyperlipidemia    • Hypertension    • Liver lesion    • Non Hodgkin's lymphoma (CMS/HCC)    • Osteoporosis    • Personal history of testicular cancer    • Vitamin D deficiency    • Weight loss      Past Surgical History:   Procedure Laterality Date   • HIP SURGERY  2005   • PORTACATH PLACEMENT     • TESTICLE SURGERY Right 07/17/2000    right testicular excision     Social History     Socioeconomic History   • Marital status:      Spouse name: Not on file   • Number of children: Not on file   • Years of education: Not on file   • Highest education level: Not on file   Tobacco Use   • Smoking status: Never Smoker   • Smokeless tobacco: Never Used   Substance and Sexual Activity   • Alcohol use: No     Frequency: Never   • Drug use: No   • Sexual activity: Defer   Social History Narrative    He Lives in [place], has 2 grown children, does not smoke, drink alcohol or take recreational drugs.     Family History   Family history unknown: Yes     PHQ-2 Depression Screening  Little interest or pleasure in doing things?     Feeling down, depressed, or hopeless?     PHQ-2 Total Score       PHQ-9 Depression Screening  Little interest or pleasure in doing things?     Feeling down, depressed, or hopeless?     Trouble falling or staying asleep, or sleeping too much?     Feeling tired  "or having little energy?     Poor appetite or overeating?     Feeling bad about yourself - or that you are a failure or have let yourself or your family down?     Trouble concentrating on things, such as reading the newspaper or watching television?     Moving or speaking so slowly that other people could have noticed? Or the opposite - being so fidgety or restless that you have been moving around a lot more than usual?     Thoughts that you would be better off dead, or of hurting yourself in some way?     PHQ-9 Total Score     If you checked off any problems, how difficult have these problems made it for you to do your work, take care of things at home, or get along with other people?         Family history, surgical history, past medical history, Allergies and med's reviewed with patient today and updated in [employer] EMR.     ROS:  Review of Systems   All other systems reviewed and are negative.      OBJECTIVE:  Vitals:    10/13/20 1605   BP: 102/60   Pulse: 65   Temp: 97.7 °F (36.5 °C)   TempSrc: Infrared   SpO2: 99%   Weight: 60.8 kg (134 lb)   Height: 152.4 cm (60\")     Body mass index is 26.17 kg/m².  Physical Exam  Vitals signs and nursing note reviewed.   Constitutional:       Appearance: Normal appearance.   HENT:      Head: Normocephalic.      Nose: Nose normal.      Mouth/Throat:      Mouth: Mucous membranes are moist.   Eyes:      Pupils: Pupils are equal, round, and reactive to light.   Neck:      Musculoskeletal: Normal range of motion.   Cardiovascular:      Rate and Rhythm: Normal rate.      Pulses: Normal pulses.   Pulmonary:      Effort: Pulmonary effort is normal.      Breath sounds: Normal breath sounds.   Abdominal:      General: Abdomen is flat. Bowel sounds are normal.   Musculoskeletal: Normal range of motion.   Skin:     General: Skin is warm and dry.   Neurological:      General: No focal deficit present.      Mental Status: He is alert and oriented to person, place, and time.   Psychiatric:    "      Mood and Affect: Mood normal.         Behavior: Behavior normal.         Thought Content: Thought content normal.         Judgment: Judgment normal.         ASSESSMENT/ PLAN:    Diagnoses and all orders for this visit:    1. Type 2 diabetes mellitus with autonomic neuropathy, unspecified whether long term insulin use (CMS/[facility]) (Primary)  -     POCT glycated hemoglobin, total  -     POC Glucose    Other orders  -     Dapagliflozin Propanediol (Farxiga) 10 MG tablet; Take 10 mg by mouth Daily for 90 days.  Dispense: 90 tablet; Refill: 0        Orders Placed Today:     New Medications Ordered This Visit   Medications   • Dapagliflozin Propanediol (Farxiga) 10 MG tablet     Sig: Take 10 mg by mouth Daily for 90 days.     Dispense:  90 tablet     Refill:  0        Management Plan:   Increase Farxiga to 10mg  Continue blood sugar logs  F/U 4 weeks     An After Visit Summary was printed and given to the patient at discharge.    Follow-up: Return in about 4 weeks (around 11/10/2020), or Bring in blood sugar number.    CON Delgado 10/14/2020 08:48 EDT  This note was electronically signed.

## 2020-10-14 ENCOUNTER — TELEPHONE (OUTPATIENT)
Dept: FAMILY MEDICINE CLINIC | Facility: CLINIC | Age: 75
End: 2020-10-14

## 2020-10-14 NOTE — TELEPHONE ENCOUNTER
CALLED PT. NO ANSWER. PER HIPAA, MAY LEAVE DETAILED VM. VM LEFT ADVISING PT OF PROVIDER'S RESPONSE RE: LABS AND TO CONTACT OFFICE WITH QUESTIONS AND/OR CONCERNS.

## 2020-10-14 NOTE — TELEPHONE ENCOUNTER
----- Message from CON Strickland sent at 10/14/2020  8:53 AM EDT -----  Let patient know that he is doing a great job with his Diabetes his HGBa1c droppes from 9.4 to 8.1

## 2020-10-22 NOTE — TELEPHONE ENCOUNTER
Date of last refill: 10- & 3-9-2020     Is there an Inspect on file: N/A    Last appt date: 10-     Next appt date: 11-      Additional information:

## 2020-10-23 RX ORDER — DAPAGLIFLOZIN 5 MG/1
TABLET, FILM COATED ORAL
Qty: 30 TABLET | Refills: 0 | OUTPATIENT
Start: 2020-10-23

## 2020-10-23 RX ORDER — LOSARTAN POTASSIUM 100 MG/1
100 TABLET ORAL EVERY 24 HOURS
Qty: 30 TABLET | Refills: 6 | Status: SHIPPED | OUTPATIENT
Start: 2020-10-23 | End: 2021-02-23 | Stop reason: SDUPTHER

## 2020-11-18 ENCOUNTER — OFFICE VISIT (OUTPATIENT)
Dept: FAMILY MEDICINE CLINIC | Facility: CLINIC | Age: 75
End: 2020-11-18

## 2020-11-18 VITALS
TEMPERATURE: 97.1 F | WEIGHT: 138 LBS | OXYGEN SATURATION: 98 % | SYSTOLIC BLOOD PRESSURE: 138 MMHG | HEART RATE: 60 BPM | HEIGHT: 60 IN | BODY MASS INDEX: 27.09 KG/M2 | DIASTOLIC BLOOD PRESSURE: 62 MMHG

## 2020-11-18 DIAGNOSIS — E78.2 MIXED HYPERLIPIDEMIA: ICD-10-CM

## 2020-11-18 DIAGNOSIS — E11.43 TYPE 2 DIABETES MELLITUS WITH AUTONOMIC NEUROPATHY, UNSPECIFIED WHETHER LONG TERM INSULIN USE (HCC): Primary | ICD-10-CM

## 2020-11-18 DIAGNOSIS — I15.2 HYPERTENSION DUE TO ENDOCRINE DISORDER: ICD-10-CM

## 2020-11-18 PROCEDURE — 99213 OFFICE O/P EST LOW 20 MIN: CPT | Performed by: NURSE PRACTITIONER

## 2020-11-18 NOTE — PROGRESS NOTES
Chief Complaint   Patient presents with   • Diabetes   • Hypertension   • GI Problem   • Follow-up        Subjective   Ramon Silva is a 75 y.o.  male who presents today for   Well today did bring his blood sugars in for review Farxiga seems to be making a decrease of his blood sugars daily he reports that alcohol hypoglycemia reports he is doing well with medication he continues to work every day  Is following with hematology and oncology due to history of lymphoma    Ramon Silva  has a past medical history of Anemia, Arthralgia, Diabetes (CMS/HCC), Fatigue, GERD (gastroesophageal reflux disease), Hyperlipidemia, Hypertension, Liver lesion, Non Hodgkin's lymphoma (CMS/HCC), Osteoporosis, Personal history of testicular cancer, Vitamin D deficiency, and Weight loss.    No Known Allergies    Current Outpatient Medications:   •  aspirin 81 MG tablet, Take 81 mg by mouth Daily., Disp: , Rfl:   •  atorvastatin (LIPITOR) 40 MG tablet, TAKE ONE TABLET BY MOUTH AT BEDTIME, Disp: 30 tablet, Rfl: 2  •  Blood Glucose Monitoring Suppl (BLOOD GLUCOSE MONITOR SYSTEM) w/Device kit, BLOOD GLUCOSE MONITOR SYSTEM w/Device KIT, Disp: , Rfl:   •  carvedilol (COREG) 25 MG tablet, TAKE ONE TABLET BY MOUTH TWICE DAILY WITH MEALS, Disp: 180 tablet, Rfl: 0  •  Dapagliflozin Propanediol (Farxiga) 10 MG tablet, Take 10 mg by mouth Daily for 90 days., Disp: 90 tablet, Rfl: 0  •  ferrous sulfate 324 (65 Fe) MG tablet delayed-release EC tablet, Take 324 mg by mouth Daily With Breakfast., Disp: , Rfl:   •  ibandronate (BONIVA) 150 MG tablet, Take 1 tablet by mouth Every 30 (Thirty) Days., Disp: 1 tablet, Rfl: 5  •  losartan (COZAAR) 100 MG tablet, Take 1 tablet by mouth Daily., Disp: 30 tablet, Rfl: 6  •  metFORMIN ER (GLUCOPHAGE-XR) 500 MG 24 hr tablet, Take two tablets in AM and two in PM (Patient taking differently: 500 mg. Take 1 tablet in AM and two tablets in PM), Disp: 60 tablet, Rfl: 3  •  pantoprazole (PROTONIX) 40 MG EC  tablet, , Disp: , Rfl:   Past Medical History:   Diagnosis Date   • Anemia    • Arthralgia    • Diabetes (CMS/HCC)    • Fatigue    • GERD (gastroesophageal reflux disease)    • Hyperlipidemia    • Hypertension    • Liver lesion    • Non Hodgkin's lymphoma (CMS/HCC)    • Osteoporosis    • Personal history of testicular cancer    • Vitamin D deficiency    • Weight loss      Past Surgical History:   Procedure Laterality Date   • HIP SURGERY  2005   • PORTACATH PLACEMENT     • TESTICLE SURGERY Right 07/17/2000    right testicular excision     Social History     Socioeconomic History   • Marital status:      Spouse name: Not on file   • Number of children: Not on file   • Years of education: Not on file   • Highest education level: Not on file   Tobacco Use   • Smoking status: Never Smoker   • Smokeless tobacco: Never Used   Substance and Sexual Activity   • Alcohol use: No     Frequency: Never   • Drug use: No   • Sexual activity: Defer   Social History Narrative    He Lives in [place], has 2 grown children, does not smoke, drink alcohol or take recreational drugs.     Family History   Family history unknown: Yes     PHQ-2 Depression Screening  Little interest or pleasure in doing things?     Feeling down, depressed, or hopeless?     PHQ-2 Total Score       PHQ-9 Depression Screening  Little interest or pleasure in doing things?     Feeling down, depressed, or hopeless?     Trouble falling or staying asleep, or sleeping too much?     Feeling tired or having little energy?     Poor appetite or overeating?     Feeling bad about yourself - or that you are a failure or have let yourself or your family down?     Trouble concentrating on things, such as reading the newspaper or watching television?     Moving or speaking so slowly that other people could have noticed? Or the opposite - being so fidgety or restless that you have been moving around a lot more than usual?     Thoughts that you would be better off dead, or  "of hurting yourself in some way?     PHQ-9 Total Score     If you checked off any problems, how difficult have these problems made it for you to do your work, take care of things at home, or get along with other people?         Family history, surgical history, past medical history, Allergies and med's reviewed with patient today and updated in [employer] EMR.     ROS:  Review of Systems   All other systems reviewed and are negative.      OBJECTIVE:  Vitals:    11/18/20 1600   BP: 138/62   Pulse: 60   Temp: 97.1 °F (36.2 °C)   TempSrc: Infrared   SpO2: 98%   Weight: 62.6 kg (138 lb)   Height: 152.4 cm (60\")     Body mass index is 26.95 kg/m².  Physical Exam  Vitals signs and nursing note reviewed.   Constitutional:       Appearance: Normal appearance.   HENT:      Head: Normocephalic.      Nose: Nose normal.      Mouth/Throat:      Mouth: Mucous membranes are moist.   Eyes:      Pupils: Pupils are equal, round, and reactive to light.   Neck:      Musculoskeletal: Normal range of motion.   Cardiovascular:      Rate and Rhythm: Normal rate.      Pulses: Normal pulses.      Heart sounds: Normal heart sounds.   Pulmonary:      Effort: Pulmonary effort is normal.   Abdominal:      General: Abdomen is flat. Bowel sounds are normal.      Palpations: Abdomen is soft.   Musculoskeletal: Normal range of motion.   Skin:     General: Skin is warm and dry.   Neurological:      General: No focal deficit present.      Mental Status: He is alert.   Psychiatric:         Mood and Affect: Mood normal.         Behavior: Behavior normal.         Thought Content: Thought content normal.         Judgment: Judgment normal.         ASSESSMENT/ PLAN:    Diagnoses and all orders for this visit:    1. Type 2 diabetes mellitus with autonomic neuropathy, unspecified whether long term insulin use (CMS/[facility]) (Primary)    2. Mixed hyperlipidemia    3. Hypertension due to endocrine disorder        Orders Placed Today:     No orders of the defined types " were placed in this encounter.       Management Plan:   Continue to monitor blood sugars daily  No changes today     An After Visit Summary was printed and given to the patient at discharge.    Follow-up: Return in about 4 weeks (around 12/16/2020).    CON Delgado 11/30/2020 10:18 EST  This note was electronically signed.

## 2020-12-09 DIAGNOSIS — E11.65 TYPE 2 DIABETES MELLITUS WITH HYPERGLYCEMIA, WITHOUT LONG-TERM CURRENT USE OF INSULIN (HCC): ICD-10-CM

## 2020-12-09 RX ORDER — METFORMIN HYDROCHLORIDE 500 MG/1
TABLET, EXTENDED RELEASE ORAL
Qty: 360 TABLET | Refills: 0 | Status: SHIPPED | OUTPATIENT
Start: 2020-12-09 | End: 2021-02-23 | Stop reason: SDUPTHER

## 2020-12-09 NOTE — TELEPHONE ENCOUNTER
Date of last refill: 6-    Is there an Inspect on file: N/A    Last appt date: 11-     Next appt date:  12-      Additional information:

## 2020-12-22 ENCOUNTER — OFFICE VISIT (OUTPATIENT)
Dept: FAMILY MEDICINE CLINIC | Facility: CLINIC | Age: 75
End: 2020-12-22

## 2020-12-22 VITALS
HEART RATE: 58 BPM | TEMPERATURE: 97.3 F | BODY MASS INDEX: 25.91 KG/M2 | WEIGHT: 132 LBS | OXYGEN SATURATION: 99 % | SYSTOLIC BLOOD PRESSURE: 118 MMHG | DIASTOLIC BLOOD PRESSURE: 59 MMHG | HEIGHT: 60 IN

## 2020-12-22 DIAGNOSIS — E11.65 TYPE 2 DIABETES MELLITUS WITH HYPERGLYCEMIA, WITHOUT LONG-TERM CURRENT USE OF INSULIN (HCC): Primary | ICD-10-CM

## 2020-12-22 LAB — GLUCOSE BLDC GLUCOMTR-MCNC: 135 MG/DL (ref 70–130)

## 2020-12-22 PROCEDURE — 99213 OFFICE O/P EST LOW 20 MIN: CPT | Performed by: NURSE PRACTITIONER

## 2020-12-22 PROCEDURE — 82962 GLUCOSE BLOOD TEST: CPT | Performed by: NURSE PRACTITIONER

## 2020-12-28 RX ORDER — ATORVASTATIN CALCIUM 40 MG/1
TABLET, FILM COATED ORAL
Qty: 90 TABLET | Refills: 0 | Status: SHIPPED | OUTPATIENT
Start: 2020-12-28 | End: 2021-02-23 | Stop reason: SDUPTHER

## 2020-12-28 NOTE — TELEPHONE ENCOUNTER
Date of last refill: 9-    Is there an Inspect on file: N/A     Last appt date: 12-     Next appt date: 2-      Additional information:

## 2021-01-05 ENCOUNTER — OFFICE VISIT (OUTPATIENT)
Dept: ONCOLOGY | Facility: CLINIC | Age: 76
End: 2021-01-05

## 2021-01-05 VITALS
BODY MASS INDEX: 25.91 KG/M2 | DIASTOLIC BLOOD PRESSURE: 58 MMHG | WEIGHT: 132 LBS | SYSTOLIC BLOOD PRESSURE: 132 MMHG | HEIGHT: 60 IN | TEMPERATURE: 96.9 F

## 2021-01-05 DIAGNOSIS — C85.90 NON-HODGKIN'S LYMPHOMA, UNSPECIFIED BODY REGION, UNSPECIFIED NON-HODGKIN LYMPHOMA TYPE (HCC): Primary | ICD-10-CM

## 2021-01-05 DIAGNOSIS — D64.9 ANEMIA, UNSPECIFIED TYPE: ICD-10-CM

## 2021-01-05 DIAGNOSIS — D50.8 OTHER IRON DEFICIENCY ANEMIA: ICD-10-CM

## 2021-01-05 PROCEDURE — 99214 OFFICE O/P EST MOD 30 MIN: CPT | Performed by: INTERNAL MEDICINE

## 2021-01-15 RX ORDER — CARVEDILOL 25 MG/1
TABLET ORAL
Qty: 180 TABLET | Refills: 0 | Status: SHIPPED | OUTPATIENT
Start: 2021-01-15 | End: 2021-02-23 | Stop reason: SDUPTHER

## 2021-01-15 NOTE — TELEPHONE ENCOUNTER
Date of last refill:07/15/2020    Is there an Inspect on file: NA    Last appt date:12/22/2020     Next appt date:02/23/2021      Additional information:

## 2021-01-29 DIAGNOSIS — C85.90 NON-HODGKIN'S LYMPHOMA, UNSPECIFIED BODY REGION, UNSPECIFIED NON-HODGKIN LYMPHOMA TYPE (HCC): ICD-10-CM

## 2021-01-29 DIAGNOSIS — M81.8 OTHER OSTEOPOROSIS WITHOUT CURRENT PATHOLOGICAL FRACTURE: ICD-10-CM

## 2021-01-29 DIAGNOSIS — R11.0 NAUSEA: ICD-10-CM

## 2021-02-01 RX ORDER — DAPAGLIFLOZIN 10 MG/1
TABLET, FILM COATED ORAL
Qty: 90 TABLET | Refills: 0 | Status: SHIPPED | OUTPATIENT
Start: 2021-02-01 | End: 2021-02-23 | Stop reason: SDUPTHER

## 2021-02-01 RX ORDER — IBANDRONATE SODIUM 150 MG/1
TABLET, FILM COATED ORAL
Qty: 1 TABLET | Refills: 5 | Status: SHIPPED | OUTPATIENT
Start: 2021-02-01 | End: 2021-11-12 | Stop reason: SDUPTHER

## 2021-02-23 ENCOUNTER — OFFICE VISIT (OUTPATIENT)
Dept: FAMILY MEDICINE CLINIC | Facility: CLINIC | Age: 76
End: 2021-02-23

## 2021-02-23 VITALS
HEART RATE: 62 BPM | RESPIRATION RATE: 16 BRPM | DIASTOLIC BLOOD PRESSURE: 61 MMHG | BODY MASS INDEX: 26.03 KG/M2 | HEIGHT: 60 IN | TEMPERATURE: 98.6 F | OXYGEN SATURATION: 96 % | SYSTOLIC BLOOD PRESSURE: 139 MMHG | WEIGHT: 132.6 LBS

## 2021-02-23 DIAGNOSIS — N18.30 STAGE 3 CHRONIC KIDNEY DISEASE, UNSPECIFIED WHETHER STAGE 3A OR 3B CKD (HCC): ICD-10-CM

## 2021-02-23 DIAGNOSIS — C85.90 NON-HODGKIN'S LYMPHOMA, UNSPECIFIED BODY REGION, UNSPECIFIED NON-HODGKIN LYMPHOMA TYPE (HCC): ICD-10-CM

## 2021-02-23 DIAGNOSIS — M81.8 OTHER OSTEOPOROSIS WITHOUT CURRENT PATHOLOGICAL FRACTURE: ICD-10-CM

## 2021-02-23 DIAGNOSIS — B35.1 ONYCHOMYCOSIS: Primary | ICD-10-CM

## 2021-02-23 DIAGNOSIS — R35.0 URINARY FREQUENCY: ICD-10-CM

## 2021-02-23 DIAGNOSIS — E11.65 TYPE 2 DIABETES MELLITUS WITH HYPERGLYCEMIA, WITHOUT LONG-TERM CURRENT USE OF INSULIN (HCC): ICD-10-CM

## 2021-02-23 DIAGNOSIS — Z11.59 NEED FOR HEPATITIS C SCREENING TEST: ICD-10-CM

## 2021-02-23 DIAGNOSIS — E78.2 MIXED HYPERLIPIDEMIA: ICD-10-CM

## 2021-02-23 DIAGNOSIS — Z12.5 PROSTATE CANCER SCREENING: ICD-10-CM

## 2021-02-23 DIAGNOSIS — R11.0 NAUSEA: ICD-10-CM

## 2021-02-23 LAB
EXPIRATION DATE: ABNORMAL
GLUCOSE BLDC GLUCOMTR-MCNC: 118 MG/DL (ref 70–130)
HBA1C MFR BLD: 7.9 %
Lab: ABNORMAL

## 2021-02-23 PROCEDURE — 86803 HEPATITIS C AB TEST: CPT | Performed by: FAMILY MEDICINE

## 2021-02-23 PROCEDURE — 82962 GLUCOSE BLOOD TEST: CPT | Performed by: FAMILY MEDICINE

## 2021-02-23 PROCEDURE — 87086 URINE CULTURE/COLONY COUNT: CPT | Performed by: FAMILY MEDICINE

## 2021-02-23 PROCEDURE — 83036 HEMOGLOBIN GLYCOSYLATED A1C: CPT | Performed by: FAMILY MEDICINE

## 2021-02-23 PROCEDURE — G0103 PSA SCREENING: HCPCS | Performed by: FAMILY MEDICINE

## 2021-02-23 PROCEDURE — 85025 COMPLETE CBC W/AUTO DIFF WBC: CPT | Performed by: FAMILY MEDICINE

## 2021-02-23 PROCEDURE — 36415 COLL VENOUS BLD VENIPUNCTURE: CPT | Performed by: FAMILY MEDICINE

## 2021-02-23 PROCEDURE — 99214 OFFICE O/P EST MOD 30 MIN: CPT | Performed by: FAMILY MEDICINE

## 2021-02-23 PROCEDURE — 80061 LIPID PANEL: CPT | Performed by: FAMILY MEDICINE

## 2021-02-23 PROCEDURE — 80053 COMPREHEN METABOLIC PANEL: CPT | Performed by: FAMILY MEDICINE

## 2021-02-23 RX ORDER — ATORVASTATIN CALCIUM 40 MG/1
40 TABLET, FILM COATED ORAL
Qty: 90 TABLET | Refills: 1 | Status: SHIPPED | OUTPATIENT
Start: 2021-02-23 | End: 2021-09-26 | Stop reason: SDUPTHER

## 2021-02-23 RX ORDER — METFORMIN HYDROCHLORIDE 500 MG/1
TABLET, EXTENDED RELEASE ORAL
Qty: 360 TABLET | Refills: 1 | Status: CANCELLED | OUTPATIENT
Start: 2021-02-23

## 2021-02-23 RX ORDER — PANTOPRAZOLE SODIUM 40 MG/1
40 TABLET, DELAYED RELEASE ORAL DAILY
Qty: 90 TABLET | Refills: 1 | Status: SHIPPED | OUTPATIENT
Start: 2021-02-23 | End: 2021-04-21 | Stop reason: SDUPTHER

## 2021-02-23 RX ORDER — DAPAGLIFLOZIN 10 MG/1
1 TABLET, FILM COATED ORAL DAILY
Qty: 90 TABLET | Refills: 1 | Status: CANCELLED | OUTPATIENT
Start: 2021-02-23

## 2021-02-23 RX ORDER — FINASTERIDE 5 MG/1
5 TABLET, FILM COATED ORAL DAILY
Qty: 30 TABLET | Refills: 0 | Status: SHIPPED | OUTPATIENT
Start: 2021-02-23 | End: 2021-04-05 | Stop reason: SDUPTHER

## 2021-02-23 RX ORDER — ATORVASTATIN CALCIUM 40 MG/1
40 TABLET, FILM COATED ORAL
Qty: 90 TABLET | Refills: 1 | Status: CANCELLED | OUTPATIENT
Start: 2021-02-23

## 2021-02-23 RX ORDER — LOSARTAN POTASSIUM 100 MG/1
100 TABLET ORAL EVERY 24 HOURS
Qty: 90 TABLET | Refills: 1 | Status: SHIPPED | OUTPATIENT
Start: 2021-02-23 | End: 2021-07-06 | Stop reason: SDUPTHER

## 2021-02-23 RX ORDER — FERROUS SULFATE TAB EC 324 MG (65 MG FE EQUIVALENT) 324 (65 FE) MG
324 TABLET DELAYED RESPONSE ORAL
Qty: 90 TABLET | Refills: 1 | Status: CANCELLED | OUTPATIENT
Start: 2021-02-23

## 2021-02-23 RX ORDER — METFORMIN HYDROCHLORIDE 500 MG/1
TABLET, EXTENDED RELEASE ORAL
Qty: 360 TABLET | Refills: 1 | Status: SHIPPED | OUTPATIENT
Start: 2021-02-23 | End: 2021-04-21 | Stop reason: SDUPTHER

## 2021-02-23 RX ORDER — CARVEDILOL 25 MG/1
25 TABLET ORAL 2 TIMES DAILY WITH MEALS
Qty: 180 TABLET | Refills: 1 | Status: CANCELLED | OUTPATIENT
Start: 2021-02-23

## 2021-02-23 RX ORDER — FERROUS SULFATE TAB EC 324 MG (65 MG FE EQUIVALENT) 324 (65 FE) MG
324 TABLET DELAYED RESPONSE ORAL
Qty: 90 TABLET | Refills: 1 | Status: SHIPPED | OUTPATIENT
Start: 2021-02-23

## 2021-02-23 RX ORDER — PANTOPRAZOLE SODIUM 40 MG/1
40 TABLET, DELAYED RELEASE ORAL DAILY
Qty: 90 TABLET | Refills: 1 | Status: CANCELLED | OUTPATIENT
Start: 2021-02-23

## 2021-02-23 RX ORDER — CARVEDILOL 25 MG/1
25 TABLET ORAL 2 TIMES DAILY WITH MEALS
Qty: 180 TABLET | Refills: 1 | Status: SHIPPED | OUTPATIENT
Start: 2021-02-23 | End: 2021-11-02 | Stop reason: SDUPTHER

## 2021-02-23 RX ORDER — LOSARTAN POTASSIUM 100 MG/1
100 TABLET ORAL EVERY 24 HOURS
Qty: 90 TABLET | Refills: 1 | Status: CANCELLED | OUTPATIENT
Start: 2021-02-23

## 2021-02-23 RX ORDER — DAPAGLIFLOZIN 10 MG/1
1 TABLET, FILM COATED ORAL DAILY
Qty: 90 TABLET | Refills: 1 | Status: SHIPPED | OUTPATIENT
Start: 2021-02-23 | End: 2021-07-06 | Stop reason: SDUPTHER

## 2021-02-24 PROCEDURE — 82043 UR ALBUMIN QUANTITATIVE: CPT | Performed by: FAMILY MEDICINE

## 2021-02-25 LAB
ALBUMIN SERPL-MCNC: 4.4 G/DL (ref 3.5–5.2)
ALBUMIN UR-MCNC: 2.6 MG/DL
ALBUMIN/GLOB SERPL: 1.8 G/DL
ALP SERPL-CCNC: 88 U/L (ref 39–117)
ALT SERPL W P-5'-P-CCNC: 11 U/L (ref 1–41)
ANION GAP SERPL CALCULATED.3IONS-SCNC: 12.3 MMOL/L (ref 5–15)
AST SERPL-CCNC: 15 U/L (ref 1–40)
BASOPHILS # BLD AUTO: 0.03 10*3/MM3 (ref 0–0.2)
BASOPHILS NFR BLD AUTO: 0.4 % (ref 0–1.5)
BILIRUB SERPL-MCNC: 0.3 MG/DL (ref 0–1.2)
BUN SERPL-MCNC: 24 MG/DL (ref 8–23)
BUN/CREAT SERPL: 24.7 (ref 7–25)
CALCIUM SPEC-SCNC: 9.7 MG/DL (ref 8.6–10.5)
CHLORIDE SERPL-SCNC: 102 MMOL/L (ref 98–107)
CHOLEST SERPL-MCNC: 119 MG/DL (ref 0–200)
CO2 SERPL-SCNC: 24.7 MMOL/L (ref 22–29)
CREAT SERPL-MCNC: 0.97 MG/DL (ref 0.76–1.27)
DEPRECATED RDW RBC AUTO: 42.9 FL (ref 37–54)
EOSINOPHIL # BLD AUTO: 0.2 10*3/MM3 (ref 0–0.4)
EOSINOPHIL NFR BLD AUTO: 2.8 % (ref 0.3–6.2)
ERYTHROCYTE [DISTWIDTH] IN BLOOD BY AUTOMATED COUNT: 13.1 % (ref 12.3–15.4)
GFR SERPL CREATININE-BSD FRML MDRD: 75 ML/MIN/1.73
GFR SERPL CREATININE-BSD FRML MDRD: 91 ML/MIN/1.73
GLOBULIN UR ELPH-MCNC: 2.4 GM/DL
GLUCOSE SERPL-MCNC: 108 MG/DL (ref 65–99)
HCT VFR BLD AUTO: 36.4 % (ref 37.5–51)
HCV AB SER DONR QL: NORMAL
HDLC SERPL-MCNC: 44 MG/DL (ref 40–60)
HGB BLD-MCNC: 12 G/DL (ref 13–17.7)
IMM GRANULOCYTES # BLD AUTO: 0.02 10*3/MM3 (ref 0–0.05)
IMM GRANULOCYTES NFR BLD AUTO: 0.3 % (ref 0–0.5)
LDLC SERPL CALC-MCNC: 50 MG/DL (ref 0–100)
LDLC/HDLC SERPL: 1.03 {RATIO}
LYMPHOCYTES # BLD AUTO: 2.81 10*3/MM3 (ref 0.7–3.1)
LYMPHOCYTES NFR BLD AUTO: 39.7 % (ref 19.6–45.3)
MCH RBC QN AUTO: 29.9 PG (ref 26.6–33)
MCHC RBC AUTO-ENTMCNC: 33 G/DL (ref 31.5–35.7)
MCV RBC AUTO: 90.5 FL (ref 79–97)
MONOCYTES # BLD AUTO: 0.53 10*3/MM3 (ref 0.1–0.9)
MONOCYTES NFR BLD AUTO: 7.5 % (ref 5–12)
NEUTROPHILS NFR BLD AUTO: 3.48 10*3/MM3 (ref 1.7–7)
NEUTROPHILS NFR BLD AUTO: 49.3 % (ref 42.7–76)
NRBC BLD AUTO-RTO: 0 /100 WBC (ref 0–0.2)
PLATELET # BLD AUTO: 211 10*3/MM3 (ref 140–450)
PMV BLD AUTO: 11.1 FL (ref 6–12)
POTASSIUM SERPL-SCNC: 4.6 MMOL/L (ref 3.5–5.2)
PROT SERPL-MCNC: 6.8 G/DL (ref 6–8.5)
PSA SERPL-MCNC: 2.13 NG/ML (ref 0–4)
RBC # BLD AUTO: 4.02 10*6/MM3 (ref 4.14–5.8)
SODIUM SERPL-SCNC: 139 MMOL/L (ref 136–145)
TRIGL SERPL-MCNC: 148 MG/DL (ref 0–150)
VLDLC SERPL-MCNC: 25 MG/DL (ref 5–40)
WBC # BLD AUTO: 7.07 10*3/MM3 (ref 3.4–10.8)

## 2021-02-26 LAB — BACTERIA SPEC AEROBE CULT: NORMAL

## 2021-03-17 NOTE — PROGRESS NOTES
Subjective   Ramon Silva is a 75 y.o. male.     Chief Complaint   Patient presents with   • Diabetes   • Hypertension       History of Present Illness   Hx DM, Lymphoma, BPH, HTN, HLD, Anemia, BPH  Follow Up DM  Current Meds Farxiga, metformin  Tolerating meds  Does not monitor blood glucose    Glucose Level 118  A1c Level 7.9  Reminded to get eye diabetic eye exam  Diet low carb            The following portions of the patient's history were reviewed and updated as appropriate: allergies, current medications, past family history, past medical history, past social history, past surgical history and problem list.    Past Medical History:   Diagnosis Date   • Anemia    • Arthralgia    • Diabetes (CMS/HCC)    • Fatigue    • GERD (gastroesophageal reflux disease)    • Hyperlipidemia    • Hypertension    • Liver lesion    • Non Hodgkin's lymphoma (CMS/HCC)    • Osteoporosis    • Personal history of testicular cancer    • Vitamin D deficiency    • Weight loss        Past Surgical History:   Procedure Laterality Date   • HIP SURGERY  2005   • PORTACATH PLACEMENT     • TESTICLE SURGERY Right 07/17/2000    right testicular excision       Family History   Family history unknown: Yes       Review of Systems   Constitutional: Negative for fatigue, unexpected weight gain and unexpected weight loss.   HENT: Positive for hearing loss. Negative for congestion, sinus pressure and sore throat.         Normal smell/taste   Eyes: Negative for blurred vision and visual disturbance.   Respiratory: Negative for cough, shortness of breath and wheezing.    Cardiovascular: Negative for chest pain, palpitations and leg swelling.   Gastrointestinal: Negative for abdominal pain, constipation, diarrhea, nausea, vomiting, GERD and indigestion.   Musculoskeletal: Negative for arthralgias, back pain, gait problem, joint swelling and neck pain.   Skin: Negative for rash, skin lesions and bruise.   Allergic/Immunologic: Negative for  environmental allergies.   Neurological: Negative for dizziness, tremors, syncope, weakness, light-headedness, headache and memory problem.   Psychiatric/Behavioral: Negative for sleep disturbance, depressed mood and stress. The patient is not nervous/anxious.        Objective   Physical Exam  Vitals and nursing note reviewed.   Constitutional:       General: He is not in acute distress.     Appearance: He is well-developed. He is not diaphoretic.   HENT:      Head: Normocephalic and atraumatic.      Right Ear: External ear normal.      Left Ear: External ear normal.      Nose: Nose normal.   Eyes:      Pupils: Pupils are equal, round, and reactive to light.   Neck:      Thyroid: No thyromegaly.   Cardiovascular:      Rate and Rhythm: Normal rate and regular rhythm.      Heart sounds: Normal heart sounds. No murmur heard.   No friction rub. No gallop.    Pulmonary:      Effort: Pulmonary effort is normal.      Breath sounds: No wheezing.   Abdominal:      Palpations: Abdomen is soft.      Tenderness: There is no abdominal tenderness.   Musculoskeletal:         General: No tenderness or deformity. Normal range of motion.      Cervical back: Normal range of motion and neck supple.   Lymphadenopathy:      Cervical: No cervical adenopathy.   Skin:     General: Skin is warm and dry.      Findings: No rash.   Neurological:      Mental Status: He is alert and oriented to person, place, and time.      Cranial Nerves: No cranial nerve deficit.   Psychiatric:         Behavior: Behavior normal.         Thought Content: Thought content normal.         Judgment: Judgment normal.         Vitals:    02/23/21 1601   BP: 139/61   Pulse: 62   Resp: 16   Temp: 98.6 °F (37 °C)   SpO2: 96%     Body mass index is 25.9 kg/m².    Hemoglobin A1C   Date Value Ref Range Status   02/23/2021 7.9 % Final   10/13/2020 8.1 % Final   06/22/2020 9.4 % Final     Glucose   Date Value Ref Range Status   02/23/2021 118 70 - 130 mg/dL Final   12/22/2020  135 (A) 70 - 130 mg/dL Final   10/13/2020 106 70 - 130 mg/dL Final     LDL Cholesterol    Date Value Ref Range Status   02/23/2021 50 0 - 100 mg/dL Final   02/19/2020 24 0 - 100 mg/dL Final   05/08/2019 44 0 - 100 mg/dL Final   03/17/2017 77 0 - 100 mg/dL Final     TSH   Date Value Ref Range Status   02/19/2020 0.886 0.270 - 4.200 uIU/mL Final     Results for orders placed or performed in visit on 02/23/21   PSA SCREENING    Specimen: Arm, Right; Blood   Result Value Ref Range    PSA 2.130 0.000 - 4.000 ng/mL   CBC Auto Differential    Specimen: Arm, Right; Blood   Result Value Ref Range    WBC 7.07 3.40 - 10.80 10*3/mm3    RBC 4.02 (L) 4.14 - 5.80 10*6/mm3    Hemoglobin 12.0 (L) 13.0 - 17.7 g/dL    Hematocrit 36.4 (L) 37.5 - 51.0 %    MCV 90.5 79.0 - 97.0 fL    MCH 29.9 26.6 - 33.0 pg    MCHC 33.0 31.5 - 35.7 g/dL    RDW 13.1 12.3 - 15.4 %    RDW-SD 42.9 37.0 - 54.0 fl    MPV 11.1 6.0 - 12.0 fL    Platelets 211 140 - 450 10*3/mm3    Neutrophil % 49.3 42.7 - 76.0 %    Lymphocyte % 39.7 19.6 - 45.3 %    Monocyte % 7.5 5.0 - 12.0 %    Eosinophil % 2.8 0.3 - 6.2 %    Basophil % 0.4 0.0 - 1.5 %    Immature Grans % 0.3 0.0 - 0.5 %    Neutrophils, Absolute 3.48 1.70 - 7.00 10*3/mm3    Lymphocytes, Absolute 2.81 0.70 - 3.10 10*3/mm3    Monocytes, Absolute 0.53 0.10 - 0.90 10*3/mm3    Eosinophils, Absolute 0.20 0.00 - 0.40 10*3/mm3    Basophils, Absolute 0.03 0.00 - 0.20 10*3/mm3    Immature Grans, Absolute 0.02 0.00 - 0.05 10*3/mm3    nRBC 0.0 0.0 - 0.2 /100 WBC   Hepatitis C antibody    Specimen: Arm, Right; Blood   Result Value Ref Range    Hepatitis C Ab Non-Reactive Non-Reactive   MicroAlbumin, Urine, Random - Urine, Clean Catch    Specimen: Urine, Clean Catch   Result Value Ref Range    Microalbumin, Urine 2.6 mg/dL   POCT glycated hemoglobin, total    Specimen: Urine   Result Value Ref Range    Hemoglobin A1C 7.9 %    Lot Number [id number]     Expiration Date 11-    POCT Glucose    Specimen: Blood   Result  Value Ref Range    Glucose 118 70 - 130 mg/dL   Urine Culture - Urine, Urine, Clean Catch    Specimen: Urine, Clean Catch   Result Value Ref Range    Urine Culture >100,000 CFU/mL Mixed [place] Isolated    Lipid panel    Specimen: Arm, Right; Blood   Result Value Ref Range    Total Cholesterol 119 0 - 200 mg/dL    Triglycerides 148 0 - 150 mg/dL    HDL Cholesterol 44 40 - 60 mg/dL    LDL Cholesterol  50 0 - 100 mg/dL    VLDL Cholesterol 25 5 - 40 mg/dL    LDL/HDL Ratio 1.03    Comprehensive metabolic panel    Specimen: Arm, Right; Blood   Result Value Ref Range    Glucose 108 (H) 65 - 99 mg/dL    BUN 24 (H) 8 - 23 mg/dL    Creatinine 0.97 0.76 - 1.27 mg/dL    Sodium 139 136 - 145 mmol/L    Potassium 4.6 3.5 - 5.2 mmol/L    Chloride 102 98 - 107 mmol/L    CO2 24.7 22.0 - 29.0 mmol/L    Calcium 9.7 8.6 - 10.5 mg/dL    Total Protein 6.8 6.0 - 8.5 g/dL    Albumin 4.40 3.50 - 5.20 g/dL    ALT (SGPT) 11 1 - 41 U/L    AST (SGOT) 15 1 - 40 U/L    Alkaline Phosphatase 88 39 - 117 U/L    Total Bilirubin 0.3 0.0 - 1.2 mg/dL    eGFR Non African Amer 75 >60 mL/min/1.73    eGFR  African Amer 91 >60 mL/min/1.73    Globulin 2.4 gm/dL    A/G Ratio 1.8 g/dL    BUN/Creatinine Ratio 24.7 7.0 - 25.0    Anion Gap 12.3 5.0 - 15.0 mmol/L   Results for orders placed or performed in visit on 12/22/20   POC Glucose    Specimen: Blood   Result Value Ref Range    Glucose 135 (A) 70 - 130 mg/dL   Results for orders placed or performed in visit on 10/13/20   POCT glycated hemoglobin, total    Specimen: Urine   Result Value Ref Range    Hemoglobin A1C 8.1 %    Lot Number [id number]     Expiration Date 04/22/2022    POC Glucose    Specimen: Blood   Result Value Ref Range    Glucose 106 70 - 130 mg/dL   Results for orders placed or performed in visit on 08/11/20   POC Glucose    Specimen: Blood   Result Value Ref Range    Glucose 172 (A) 70 - 130 mg/dL   Results for orders placed or performed in visit on 07/21/20   POC Glucose    Specimen: Blood    Result Value Ref Range    Glucose 190 (A) 70 - 130 mg/dL   Results for orders placed or performed in visit on 06/22/20   Urinalysis With Culture If Indicated - Urine, Clean Catch    Specimen: Urine, Clean Catch   Result Value Ref Range    Color, UA Yellow Yellow, Straw    Appearance, UA Clear Clear    pH, UA 7.5 5.0 - 8.0    Specific Gravity, UA 1.028 1.005 - 1.030    Glucose, UA >=1000 mg/dL (3+) (A) Negative    Ketones, UA Negative Negative    Bilirubin, UA Negative Negative    Blood, UA Negative Negative    Protein, UA Negative Negative    Leuk Esterase, UA Negative Negative    Nitrite, UA Negative Negative    Urobilinogen, UA 0.2 E.U./dL 0.2 - 1.0 E.U./dL   POCT glycated hemoglobin, total    Specimen: Urine   Result Value Ref Range    Hemoglobin A1C 9.4 %    Lot Number [id number]     Expiration Date 01-    Results for orders placed or performed in visit on 03/10/20   Iron Profile    Specimen: Blood   Result Value Ref Range    TIBC 258 250 - 450 ug/dL    UIBC 223 111 - 343 ug/dL    Iron 35 (L) 38 - 169 ug/dL    Iron Saturation 14 (L) 15 - 55 %   Sedimentation Rate    Specimen: Blood   Result Value Ref Range    Sed Rate 25 0 - 30 mm/hr   CBC & Differential    Specimen: Blood   Result Value Ref Range    WBC 5.8 3.4 - 10.8 x10E3/uL    RBC 3.28 (L) 4.14 - 5.80 x10E6/uL    Hemoglobin 9.4 (L) 13.0 - 17.7 g/dL    Hematocrit 29.9 (L) 37.5 - 51.0 %    MCV 91 79 - 97 fL    MCH 28.7 26.6 - 33.0 pg    MCHC 31.4 (L) 31.5 - 35.7 g/dL    RDW 13.8 11.6 - 15.4 %    Platelets 228 150 - 450 x10E3/uL    Neutrophil Rel % 46 Not Estab. %    Lymphocyte Rel % 40 Not Estab. %    Monocyte Rel % 9 Not Estab. %    Eosinophil Rel % 5 Not Estab. %    Basophil Rel % 0 Not Estab. %    Neutrophils Absolute 2.7 1.4 - 7.0 x10E3/uL    Lymphocytes Absolute 2.3 0.7 - 3.1 x10E3/uL    Monocytes Absolute 0.5 0.1 - 0.9 x10E3/uL    Eosinophils Absolute 0.3 0.0 - 0.4 x10E3/uL    Basophils Absolute 0.0 0.0 - 0.2 x10E3/uL    Immature Granulocyte Rel  % 0 Not Estab. %    Immature Grans Absolute 0.0 0.0 - 0.1 x10E3/uL     *Note: Due to a large number of results and/or encounters for the requested time period, some results have not been displayed. A complete set of results can be found in Results Review.       Assessment/Plan   Diagnoses and all orders for this visit:    1. Onychomycosis (Primary)  -     Ambulatory Referral to Podiatry  -     MicroAlbumin, Urine, Random - Urine, Clean Catch; Future  -     MicroAlbumin, Urine, Random - Urine, Clean Catch    2. Non-Hodgkin's lymphoma, unspecified body region, unspecified non-Hodgkin lymphoma type (CMS/[facility])  -     MicroAlbumin, Urine, Random - Urine, Clean Catch; Future  -     MicroAlbumin, Urine, Random - Urine, Clean Catch    3. Nausea  -     MicroAlbumin, Urine, Random - Urine, Clean Catch; Future  -     MicroAlbumin, Urine, Random - Urine, Clean Catch    4. Other osteoporosis without current pathological fracture  -     MicroAlbumin, Urine, Random - Urine, Clean Catch; Future  -     MicroAlbumin, Urine, Random - Urine, Clean Catch    5. Type 2 diabetes mellitus with hyperglycemia, without long-term current use of insulin (CMS/[facility])  -     POCT glycated hemoglobin, total  -     POCT Glucose  -     metFORMIN ER (GLUCOPHAGE-XR) 500 MG 24 hr tablet; TAKE TWO TABLETS IN THE MORNING AND 2 TABLETS IN THE EVENING  Dispense: 360 tablet; Refill: 1  -     Ambulatory Referral to Podiatry  -     CBC & Differential  -     Comprehensive metabolic panel; Future  -     MicroAlbumin, Urine, Random - Urine, Clean Catch; Future  -     Comprehensive metabolic panel  -     Cancel: MicroAlbumin, Urine, Random - Urine, Clean Catch  -     MicroAlbumin, Urine, Random - Urine, Clean Catch; Future  -     MicroAlbumin, Urine, Random - Urine, Clean Catch    6. Stage 3 chronic kidney disease, unspecified whether stage 3a or 3b CKD (CMS/[facility])  -     Comprehensive metabolic panel; Future  -     Comprehensive metabolic panel  -     MicroAlbumin,  Urine, Random - Urine, Clean Catch; Future  -     MicroAlbumin, Urine, Random - Urine, Clean Catch    7. Need for hepatitis C screening test  -     Hepatitis C antibody; Future  -     Hepatitis C antibody  -     MicroAlbumin, Urine, Random - Urine, Clean Catch; Future  -     MicroAlbumin, Urine, Random - Urine, Clean Catch    8. Prostate cancer screening  -     PSA SCREENING; Future  -     PSA SCREENING  -     MicroAlbumin, Urine, Random - Urine, Clean Catch; Future  -     MicroAlbumin, Urine, Random - Urine, Clean Catch    9. Mixed hyperlipidemia  -     Lipid panel; Future  -     Lipid panel  -     MicroAlbumin, Urine, Random - Urine, Clean Catch; Future  -     MicroAlbumin, Urine, Random - Urine, Clean Catch    10. Urinary frequency  -     Urine Culture - Urine, Urine, Clean Catch; Future  -     Urine Culture - Urine, Urine, Clean Catch  -     MicroAlbumin, Urine, Random - Urine, Clean Catch; Future  -     MicroAlbumin, Urine, Random - Urine, Clean Catch    Other orders  -     Cancel: atorvastatin (LIPITOR) 40 MG tablet; Take 1 tablet by mouth every night at bedtime.  Dispense: 90 tablet; Refill: 1  -     Cancel: carvedilol (COREG) 25 MG tablet; Take 1 tablet by mouth 2 (Two) Times a Day With Meals.  Dispense: 180 tablet; Refill: 1  -     Cancel: Dapagliflozin Propanediol (Farxiga) 10 MG tablet; Take 10 mg by mouth Daily.  Dispense: 90 tablet; Refill: 1  -     Cancel: ferrous sulfate 324 (65 Fe) MG tablet delayed-release EC tablet; Take 1 tablet by mouth Daily With Breakfast.  Dispense: 90 tablet; Refill: 1  -     Cancel: losartan (COZAAR) 100 MG tablet; Take 1 tablet by mouth Daily.  Dispense: 90 tablet; Refill: 1  -     Cancel: metFORMIN ER (GLUCOPHAGE-XR) 500 MG 24 hr tablet; TAKE TWO TABLETS IN THE MORNING AND 2 TABLETS IN THE EVENING  Dispense: 360 tablet; Refill: 1  -     Cancel: pantoprazole (PROTONIX) 40 MG EC tablet; Take 1 tablet by mouth Daily.  Dispense: 90 tablet; Refill: 1  -     atorvastatin  (LIPITOR) 40 MG tablet; Take 1 tablet by mouth every night at bedtime.  Dispense: 90 tablet; Refill: 1  -     carvedilol (COREG) 25 MG tablet; Take 1 tablet by mouth 2 (Two) Times a Day With Meals.  Dispense: 180 tablet; Refill: 1  -     Dapagliflozin Propanediol (Farxiga) 10 MG tablet; Take 10 mg by mouth Daily.  Dispense: 90 tablet; Refill: 1  -     ferrous sulfate 324 (65 Fe) MG tablet delayed-release EC tablet; Take 1 tablet by mouth Daily With Breakfast.  Dispense: 90 tablet; Refill: 1  -     losartan (COZAAR) 100 MG tablet; Take 1 tablet by mouth Daily.  Dispense: 90 tablet; Refill: 1  -     pantoprazole (PROTONIX) 40 MG EC tablet; Take 1 tablet by mouth Daily.  Dispense: 90 tablet; Refill: 1  -     finasteride (Proscar) 5 MG tablet; Take 1 tablet by mouth Daily.  Dispense: 30 tablet; Refill: 0      Fu labs  Refill meds as needed  Refer to podiatry

## 2021-03-23 ENCOUNTER — LAB (OUTPATIENT)
Dept: LAB | Facility: HOSPITAL | Age: 76
End: 2021-03-23

## 2021-03-23 DIAGNOSIS — D64.9 ANEMIA, UNSPECIFIED TYPE: ICD-10-CM

## 2021-03-23 DIAGNOSIS — D50.8 OTHER IRON DEFICIENCY ANEMIA: ICD-10-CM

## 2021-03-23 LAB — HEMOCCULT STL QL IA: POSITIVE

## 2021-03-23 PROCEDURE — 82274 ASSAY TEST FOR BLOOD FECAL: CPT

## 2021-04-05 RX ORDER — FINASTERIDE 5 MG/1
5 TABLET, FILM COATED ORAL DAILY
Qty: 30 TABLET | Refills: 0 | Status: SHIPPED | OUTPATIENT
Start: 2021-04-05 | End: 2021-07-06 | Stop reason: SDUPTHER

## 2021-04-05 NOTE — TELEPHONE ENCOUNTER
Date of last refill:02/23/2021    Is there an Inspect on file:N/A    Last appt date:02/23/2021     Next appt date:05/25/2021      Additional information:

## 2021-04-21 DIAGNOSIS — E11.65 TYPE 2 DIABETES MELLITUS WITH HYPERGLYCEMIA, WITHOUT LONG-TERM CURRENT USE OF INSULIN (HCC): ICD-10-CM

## 2021-04-22 RX ORDER — PANTOPRAZOLE SODIUM 40 MG/1
40 TABLET, DELAYED RELEASE ORAL DAILY
Qty: 90 TABLET | Refills: 1 | Status: SHIPPED | OUTPATIENT
Start: 2021-04-22 | End: 2021-07-06 | Stop reason: SDUPTHER

## 2021-04-22 RX ORDER — METFORMIN HYDROCHLORIDE 500 MG/1
TABLET, EXTENDED RELEASE ORAL
Qty: 360 TABLET | Refills: 1 | Status: SHIPPED | OUTPATIENT
Start: 2021-04-22 | End: 2021-07-06 | Stop reason: SDUPTHER

## 2021-04-22 NOTE — TELEPHONE ENCOUNTER
Date of last refill:    PROTONIX: 2-  METFORMIN: 2-    Is there an Inspect on file: N/A     Last appt date: 2-     Next appt date: 5-      Additional information:

## 2021-04-27 ENCOUNTER — TELEPHONE (OUTPATIENT)
Dept: ONCOLOGY | Facility: CLINIC | Age: 76
End: 2021-04-27

## 2021-04-27 NOTE — TELEPHONE ENCOUNTER
Spoke with patients daughter Virginia. Advised that he will need labs prior to his appt. She is going to contact her father to see if he is able to come to the [place] office today for lab work prior to MD visit.

## 2021-04-27 NOTE — TELEPHONE ENCOUNTER
Patients daughter states that he is able to come into the office today in [place] to have lab work drawn prior to the appt next week.

## 2021-04-27 NOTE — TELEPHONE ENCOUNTER
Caller: fabio reece    Relationship: Emergency Contact    Best call back number: [phone]    What is the best time to reach you:ANY     Who are you requesting to speak with: DR. ELISE'S NURSE    Do you know the name of the person who called: REECE     What was the call regarding: INQURING IF PATIENT NEEDS A LAB APPT. BEFORE HIS 5/4/2021 FOLLOW UP 1 W/ DR. ELISE    Do you require a callback: PLEASE CALL RACHAEL AND IF NO CONNECTION, PLEASE LEAVE A DETAILED MSG, THANK YOU

## 2021-04-28 ENCOUNTER — LAB (OUTPATIENT)
Dept: LAB | Facility: HOSPITAL | Age: 76
End: 2021-04-28

## 2021-04-28 DIAGNOSIS — D64.9 ANEMIA, UNSPECIFIED TYPE: ICD-10-CM

## 2021-04-28 LAB
ALBUMIN SERPL-MCNC: 4.5 G/DL (ref 3.5–5.2)
ALBUMIN/GLOB SERPL: 1.7 G/DL
ALP SERPL-CCNC: 103 U/L (ref 39–117)
ALT SERPL W P-5'-P-CCNC: 12 U/L (ref 1–41)
ANION GAP SERPL CALCULATED.3IONS-SCNC: 12 MMOL/L (ref 5–15)
AST SERPL-CCNC: 14 U/L (ref 1–40)
BASOPHILS # BLD AUTO: 0.04 10*3/MM3 (ref 0–0.2)
BASOPHILS NFR BLD AUTO: 0.6 % (ref 0–1.5)
BILIRUB SERPL-MCNC: 0.3 MG/DL (ref 0–1.2)
BUN SERPL-MCNC: 27 MG/DL (ref 8–23)
BUN/CREAT SERPL: 25.5 (ref 7–25)
CALCIUM SPEC-SCNC: 9.6 MG/DL (ref 8.6–10.5)
CHLORIDE SERPL-SCNC: 104 MMOL/L (ref 98–107)
CO2 SERPL-SCNC: 24 MMOL/L (ref 22–29)
CREAT SERPL-MCNC: 1.06 MG/DL (ref 0.76–1.27)
DEPRECATED RDW RBC AUTO: 44 FL (ref 37–54)
EOSINOPHIL # BLD AUTO: 0.33 10*3/MM3 (ref 0–0.4)
EOSINOPHIL NFR BLD AUTO: 4.6 % (ref 0.3–6.2)
ERYTHROCYTE [DISTWIDTH] IN BLOOD BY AUTOMATED COUNT: 13.4 % (ref 12.3–15.4)
FERRITIN SERPL-MCNC: 77.37 NG/ML (ref 30–400)
GFR SERPL CREATININE-BSD FRML MDRD: 68 ML/MIN/1.73
GLOBULIN UR ELPH-MCNC: 2.6 GM/DL
GLUCOSE SERPL-MCNC: 127 MG/DL (ref 65–99)
HCT VFR BLD AUTO: 36.4 % (ref 37.5–51)
HGB BLD-MCNC: 11.6 G/DL (ref 13–17.7)
IRON 24H UR-MRATE: 59 MCG/DL (ref 59–158)
IRON SATN MFR SERPL: 21 % (ref 20–50)
LDH SERPL-CCNC: 150 U/L (ref 135–225)
LYMPHOCYTES # BLD AUTO: 2.65 10*3/MM3 (ref 0.7–3.1)
LYMPHOCYTES NFR BLD AUTO: 37.2 % (ref 19.6–45.3)
MCH RBC QN AUTO: 30 PG (ref 26.6–33)
MCHC RBC AUTO-ENTMCNC: 31.9 G/DL (ref 31.5–35.7)
MCV RBC AUTO: 94.1 FL (ref 79–97)
MONOCYTES # BLD AUTO: 0.56 10*3/MM3 (ref 0.1–0.9)
MONOCYTES NFR BLD AUTO: 7.9 % (ref 5–12)
NEUTROPHILS NFR BLD AUTO: 3.55 10*3/MM3 (ref 1.7–7)
NEUTROPHILS NFR BLD AUTO: 49.7 % (ref 42.7–76)
PLATELET # BLD AUTO: 163 10*3/MM3 (ref 140–450)
PMV BLD AUTO: 9.6 FL (ref 6–12)
POTASSIUM SERPL-SCNC: 4.5 MMOL/L (ref 3.5–5.2)
PROT SERPL-MCNC: 7.1 G/DL (ref 6–8.5)
RBC # BLD AUTO: 3.87 10*6/MM3 (ref 4.14–5.8)
SODIUM SERPL-SCNC: 140 MMOL/L (ref 136–145)
TIBC SERPL-MCNC: 286 MCG/DL (ref 298–536)
TRANSFERRIN SERPL-MCNC: 192 MG/DL (ref 200–360)
WBC # BLD AUTO: 7.13 10*3/MM3 (ref 3.4–10.8)

## 2021-04-28 PROCEDURE — 80053 COMPREHEN METABOLIC PANEL: CPT

## 2021-04-28 PROCEDURE — 85025 COMPLETE CBC W/AUTO DIFF WBC: CPT

## 2021-04-28 PROCEDURE — 36415 COLL VENOUS BLD VENIPUNCTURE: CPT

## 2021-04-28 PROCEDURE — 83540 ASSAY OF IRON: CPT

## 2021-04-28 PROCEDURE — 83615 LACTATE (LD) (LDH) ENZYME: CPT

## 2021-04-28 PROCEDURE — 84466 ASSAY OF TRANSFERRIN: CPT

## 2021-04-28 PROCEDURE — 82728 ASSAY OF FERRITIN: CPT

## 2021-05-04 ENCOUNTER — OFFICE VISIT (OUTPATIENT)
Dept: ONCOLOGY | Facility: CLINIC | Age: 76
End: 2021-05-04

## 2021-05-04 VITALS
HEIGHT: 60 IN | RESPIRATION RATE: 18 BRPM | WEIGHT: 130 LBS | BODY MASS INDEX: 25.52 KG/M2 | TEMPERATURE: 97.5 F | DIASTOLIC BLOOD PRESSURE: 60 MMHG | HEART RATE: 62 BPM | SYSTOLIC BLOOD PRESSURE: 100 MMHG

## 2021-05-04 DIAGNOSIS — D64.9 ANEMIA, UNSPECIFIED TYPE: Primary | ICD-10-CM

## 2021-05-04 PROCEDURE — 99214 OFFICE O/P EST MOD 30 MIN: CPT | Performed by: INTERNAL MEDICINE

## 2021-05-12 ENCOUNTER — TELEPHONE (OUTPATIENT)
Dept: FAMILY MEDICINE CLINIC | Facility: CLINIC | Age: 76
End: 2021-05-12

## 2021-05-12 DIAGNOSIS — K92.1 BLOOD IN STOOL: Primary | ICD-10-CM

## 2021-05-12 NOTE — TELEPHONE ENCOUNTER
Caller: monster mccarthy    Relationship: Emergency Contact    Best call back number: [phone] (M)    What orders are you requesting: ORDERS FOR COLONOSCOPY    In what timeframe would the patient need to come in: AS SOON AS POSSIBLE    Where will you receive your lab/imaging services: WHEREVER IT IS SCHEDULED    Additional notes:PATIENT DAUGHTER STATED THAT DR. MARTINEZ HAS TALKED TO PATIENT BEFORE ABOUT HAVING A COLONOSCOPY. STATED HE RECENTLY HAD A FECAL TEST THAT WAS POSITIVE FOR BLOOD, SO WOULD LIKE TO GO AHEAD AND GET IT SCHEDULED. PLEASE ADVISE. THANK YOU.

## 2021-05-12 NOTE — TELEPHONE ENCOUNTER
PATIENT'S DTR REECE CONTACTED AND NOTIFIED OF GI PROCESS (PACKET). SHE VOICED UNDERSTANDING AND EITHER HERSELF OR PATIENT WILL COME BY OFFICE TO  PACKET TO COMPLETE, AND THEN RETURN IT.

## 2021-05-25 ENCOUNTER — OFFICE VISIT (OUTPATIENT)
Dept: FAMILY MEDICINE CLINIC | Facility: CLINIC | Age: 76
End: 2021-05-25

## 2021-05-25 VITALS
DIASTOLIC BLOOD PRESSURE: 54 MMHG | HEIGHT: 60 IN | SYSTOLIC BLOOD PRESSURE: 108 MMHG | RESPIRATION RATE: 16 BRPM | HEART RATE: 65 BPM | WEIGHT: 129 LBS | OXYGEN SATURATION: 98 % | TEMPERATURE: 97.3 F | BODY MASS INDEX: 25.32 KG/M2

## 2021-05-25 DIAGNOSIS — E53.8 VITAMIN B12 DEFICIENCY: ICD-10-CM

## 2021-05-25 DIAGNOSIS — E78.2 MIXED HYPERLIPIDEMIA: ICD-10-CM

## 2021-05-25 DIAGNOSIS — E55.9 VITAMIN D DEFICIENCY: ICD-10-CM

## 2021-05-25 DIAGNOSIS — E11.65 TYPE 2 DIABETES MELLITUS WITH HYPERGLYCEMIA, WITHOUT LONG-TERM CURRENT USE OF INSULIN (HCC): Primary | ICD-10-CM

## 2021-05-25 DIAGNOSIS — D50.8 OTHER IRON DEFICIENCY ANEMIA: ICD-10-CM

## 2021-05-25 DIAGNOSIS — E03.9 HYPOTHYROIDISM, UNSPECIFIED TYPE: ICD-10-CM

## 2021-05-25 DIAGNOSIS — R53.83 OTHER FATIGUE: ICD-10-CM

## 2021-05-25 LAB
25(OH)D3 SERPL-MCNC: 33 NG/ML
ALBUMIN SERPL-MCNC: 4.4 G/DL (ref 3.5–5.2)
ALBUMIN/GLOB SERPL: 1.8 G/DL
ALP SERPL-CCNC: 125 U/L (ref 39–117)
ALT SERPL W P-5'-P-CCNC: 8 U/L (ref 1–41)
ANION GAP SERPL CALCULATED.3IONS-SCNC: 9.8 MMOL/L (ref 5–15)
AST SERPL-CCNC: 11 U/L (ref 1–40)
BASOPHILS # BLD AUTO: 0.02 10*3/MM3 (ref 0–0.2)
BASOPHILS NFR BLD AUTO: 0.4 % (ref 0–1.5)
BILIRUB SERPL-MCNC: 0.3 MG/DL (ref 0–1.2)
BUN SERPL-MCNC: 27 MG/DL (ref 8–23)
BUN/CREAT SERPL: 24.1 (ref 7–25)
CALCIUM SPEC-SCNC: 9.3 MG/DL (ref 8.6–10.5)
CHLORIDE SERPL-SCNC: 104 MMOL/L (ref 98–107)
CHOLEST SERPL-MCNC: 136 MG/DL (ref 0–200)
CO2 SERPL-SCNC: 22.2 MMOL/L (ref 22–29)
CREAT SERPL-MCNC: 1.12 MG/DL (ref 0.76–1.27)
DEPRECATED RDW RBC AUTO: 41 FL (ref 37–54)
EOSINOPHIL # BLD AUTO: 0.2 10*3/MM3 (ref 0–0.4)
EOSINOPHIL NFR BLD AUTO: 3.6 % (ref 0.3–6.2)
ERYTHROCYTE [DISTWIDTH] IN BLOOD BY AUTOMATED COUNT: 12.7 % (ref 12.3–15.4)
EXPIRATION DATE: ABNORMAL
GFR SERPL CREATININE-BSD FRML MDRD: 64 ML/MIN/1.73
GLOBULIN UR ELPH-MCNC: 2.5 GM/DL
GLUCOSE BLDC GLUCOMTR-MCNC: 191 MG/DL (ref 70–130)
GLUCOSE SERPL-MCNC: 184 MG/DL (ref 65–99)
HBA1C MFR BLD: 8.8 %
HCT VFR BLD AUTO: 34.3 % (ref 37.5–51)
HDLC SERPL-MCNC: 38 MG/DL (ref 40–60)
HGB BLD-MCNC: 11.6 G/DL (ref 13–17.7)
IMM GRANULOCYTES # BLD AUTO: 0.01 10*3/MM3 (ref 0–0.05)
IMM GRANULOCYTES NFR BLD AUTO: 0.2 % (ref 0–0.5)
LDLC SERPL CALC-MCNC: 50 MG/DL (ref 0–100)
LDLC/HDLC SERPL: 0.95 {RATIO}
LYMPHOCYTES # BLD AUTO: 2.26 10*3/MM3 (ref 0.7–3.1)
LYMPHOCYTES NFR BLD AUTO: 40.2 % (ref 19.6–45.3)
Lab: ABNORMAL
MCH RBC QN AUTO: 30.2 PG (ref 26.6–33)
MCHC RBC AUTO-ENTMCNC: 33.8 G/DL (ref 31.5–35.7)
MCV RBC AUTO: 89.3 FL (ref 79–97)
MONOCYTES # BLD AUTO: 0.45 10*3/MM3 (ref 0.1–0.9)
MONOCYTES NFR BLD AUTO: 8 % (ref 5–12)
NEUTROPHILS NFR BLD AUTO: 2.68 10*3/MM3 (ref 1.7–7)
NEUTROPHILS NFR BLD AUTO: 47.6 % (ref 42.7–76)
NRBC BLD AUTO-RTO: 0 /100 WBC (ref 0–0.2)
PLATELET # BLD AUTO: 178 10*3/MM3 (ref 140–450)
PMV BLD AUTO: 11 FL (ref 6–12)
POTASSIUM SERPL-SCNC: 4.4 MMOL/L (ref 3.5–5.2)
PROT SERPL-MCNC: 6.9 G/DL (ref 6–8.5)
RBC # BLD AUTO: 3.84 10*6/MM3 (ref 4.14–5.8)
SODIUM SERPL-SCNC: 136 MMOL/L (ref 136–145)
T4 FREE SERPL-MCNC: 1.31 NG/DL (ref 0.93–1.7)
TRIGL SERPL-MCNC: 310 MG/DL (ref 0–150)
TSH SERPL DL<=0.05 MIU/L-ACNC: 1.55 UIU/ML (ref 0.27–4.2)
VIT B12 BLD-MCNC: 507 PG/ML (ref 211–946)
VLDLC SERPL-MCNC: 48 MG/DL (ref 5–40)
WBC # BLD AUTO: 5.62 10*3/MM3 (ref 3.4–10.8)

## 2021-05-25 PROCEDURE — 83036 HEMOGLOBIN GLYCOSYLATED A1C: CPT | Performed by: NURSE PRACTITIONER

## 2021-05-25 PROCEDURE — 84439 ASSAY OF FREE THYROXINE: CPT | Performed by: NURSE PRACTITIONER

## 2021-05-25 PROCEDURE — 82306 VITAMIN D 25 HYDROXY: CPT | Performed by: NURSE PRACTITIONER

## 2021-05-25 PROCEDURE — 80061 LIPID PANEL: CPT | Performed by: NURSE PRACTITIONER

## 2021-05-25 PROCEDURE — 84443 ASSAY THYROID STIM HORMONE: CPT | Performed by: NURSE PRACTITIONER

## 2021-05-25 PROCEDURE — 82607 VITAMIN B-12: CPT | Performed by: NURSE PRACTITIONER

## 2021-05-25 PROCEDURE — 85025 COMPLETE CBC W/AUTO DIFF WBC: CPT | Performed by: NURSE PRACTITIONER

## 2021-05-25 PROCEDURE — 80053 COMPREHEN METABOLIC PANEL: CPT | Performed by: NURSE PRACTITIONER

## 2021-05-25 PROCEDURE — 99213 OFFICE O/P EST LOW 20 MIN: CPT | Performed by: NURSE PRACTITIONER

## 2021-06-16 ENCOUNTER — ON CAMPUS - OUTPATIENT (OUTPATIENT)
Dept: URBAN - METROPOLITAN AREA HOSPITAL 77 | Facility: HOSPITAL | Age: 76
End: 2021-06-16

## 2021-06-16 DIAGNOSIS — K57.90 DIVERTICULOSIS OF INTESTINE, PART UNSPECIFIED, WITHOUT PERFO: ICD-10-CM

## 2021-06-16 DIAGNOSIS — K64.8 OTHER HEMORRHOIDS: ICD-10-CM

## 2021-06-16 DIAGNOSIS — Z12.11 ENCOUNTER FOR SCREENING FOR MALIGNANT NEOPLASM OF COLON: ICD-10-CM

## 2021-06-16 PROCEDURE — 45378 DIAGNOSTIC COLONOSCOPY: CPT | Mod: 33 | Performed by: INTERNAL MEDICINE

## 2021-07-06 ENCOUNTER — OFFICE VISIT (OUTPATIENT)
Dept: FAMILY MEDICINE CLINIC | Facility: CLINIC | Age: 76
End: 2021-07-06

## 2021-07-06 VITALS
HEIGHT: 60 IN | WEIGHT: 125 LBS | HEART RATE: 67 BPM | SYSTOLIC BLOOD PRESSURE: 95 MMHG | BODY MASS INDEX: 24.54 KG/M2 | DIASTOLIC BLOOD PRESSURE: 53 MMHG | OXYGEN SATURATION: 98 % | TEMPERATURE: 97.8 F

## 2021-07-06 DIAGNOSIS — M25.562 PAIN IN BOTH KNEES, UNSPECIFIED CHRONICITY: Primary | ICD-10-CM

## 2021-07-06 DIAGNOSIS — M25.561 PAIN IN BOTH KNEES, UNSPECIFIED CHRONICITY: Primary | ICD-10-CM

## 2021-07-06 DIAGNOSIS — E11.65 TYPE 2 DIABETES MELLITUS WITH HYPERGLYCEMIA, WITHOUT LONG-TERM CURRENT USE OF INSULIN (HCC): ICD-10-CM

## 2021-07-06 DIAGNOSIS — M81.8 OTHER OSTEOPOROSIS WITHOUT CURRENT PATHOLOGICAL FRACTURE: ICD-10-CM

## 2021-07-06 PROCEDURE — 99213 OFFICE O/P EST LOW 20 MIN: CPT | Performed by: NURSE PRACTITIONER

## 2021-07-06 RX ORDER — DAPAGLIFLOZIN 10 MG/1
1 TABLET, FILM COATED ORAL DAILY
Qty: 90 TABLET | Refills: 1 | Status: SHIPPED | OUTPATIENT
Start: 2021-07-06 | End: 2022-01-25

## 2021-07-06 RX ORDER — METFORMIN HYDROCHLORIDE 500 MG/1
TABLET, EXTENDED RELEASE ORAL
Qty: 360 TABLET | Refills: 1 | Status: SHIPPED | OUTPATIENT
Start: 2021-07-06 | End: 2022-06-14 | Stop reason: SDUPTHER

## 2021-07-06 RX ORDER — PREDNISONE 50 MG/1
TABLET ORAL
Status: ON HOLD | COMMUNITY
End: 2022-07-05

## 2021-07-06 RX ORDER — CETIRIZINE HYDROCHLORIDE 10 MG/1
10 CAPSULE, LIQUID FILLED ORAL DAILY
COMMUNITY

## 2021-07-06 RX ORDER — LOSARTAN POTASSIUM 100 MG/1
100 TABLET ORAL EVERY 24 HOURS
Qty: 90 TABLET | Refills: 1 | Status: SHIPPED | OUTPATIENT
Start: 2021-07-06 | End: 2021-08-17 | Stop reason: ALTCHOICE

## 2021-07-06 RX ORDER — FINASTERIDE 5 MG/1
5 TABLET, FILM COATED ORAL DAILY
Qty: 30 TABLET | Refills: 0 | Status: SHIPPED | OUTPATIENT
Start: 2021-07-06 | End: 2021-09-26 | Stop reason: SDUPTHER

## 2021-07-06 RX ORDER — PANTOPRAZOLE SODIUM 40 MG/1
40 TABLET, DELAYED RELEASE ORAL DAILY
Qty: 90 TABLET | Refills: 1 | Status: SHIPPED | OUTPATIENT
Start: 2021-07-06 | End: 2022-08-22

## 2021-07-06 NOTE — PROGRESS NOTES
Chief Complaint   Patient presents with   • Diabetes   • Hyperlipidemia   • Hypertension   • Follow-up        Subjective   Ramon Silva is a 76 y.o.  male who presents today for   • Diabetes stable doing well   • Hyperlipidemia following labs  • Hypertension  • Follow-up bilateral knee pain wants sticker for car due to handicap recommend xrays to evaluate as well as we haven't discussed knee pain in the past he reports daily pain pt does have osteoporosis that he is treated for need xray to look for fractures , he continues to work everyday      Ramon Silva  has a past medical history of Anemia, Arthralgia, Diabetes (CMS/HCC), Fatigue, GERD (gastroesophageal reflux disease), Hyperlipidemia, Hypertension, Liver lesion, Non Hodgkin's lymphoma (CMS/HCC), Osteoporosis, Personal history of testicular cancer, Vitamin D deficiency, and Weight loss.    No Known Allergies    Current Outpatient Medications:   •  aspirin 81 MG tablet, Take 81 mg by mouth Daily., Disp: , Rfl:   •  atorvastatin (LIPITOR) 40 MG tablet, Take 1 tablet by mouth every night at bedtime., Disp: 90 tablet, Rfl: 1  •  Blood Glucose Monitoring Suppl (BLOOD GLUCOSE MONITOR SYSTEM) w/Device kit, BLOOD GLUCOSE MONITOR SYSTEM w/Device KIT, Disp: , Rfl:   •  carvedilol (COREG) 25 MG tablet, Take 1 tablet by mouth 2 (Two) Times a Day With Meals., Disp: 180 tablet, Rfl: 1  •  Cetirizine HCl (ZyrTEC Allergy) 10 MG capsule, , Disp: , Rfl:   •  Dapagliflozin Propanediol (Farxiga) 10 MG tablet, Take 10 mg by mouth Daily., Disp: 90 tablet, Rfl: 1  •  ferrous sulfate 324 (65 Fe) MG tablet delayed-release EC tablet, Take 1 tablet by mouth Daily With Breakfast., Disp: 90 tablet, Rfl: 1  •  finasteride (Proscar) 5 MG tablet, Take 1 tablet by mouth Daily., Disp: 30 tablet, Rfl: 0  •  ibandronate (BONIVA) 150 MG tablet, TAKE ONE TABLET BY MOUTH EVERY 30 DAYS, Disp: 1 tablet, Rfl: 5  •  losartan (COZAAR) 100 MG tablet, Take 1 tablet by mouth Daily., Disp: 90  tablet, Rfl: 1  •  metFORMIN ER (GLUCOPHAGE-XR) 500 MG 24 hr tablet, TAKE TWO TABLETS IN THE MORNING AND 2 TABLETS IN THE EVENING, Disp: 360 tablet, Rfl: 1  •  Omega-3 Fatty Acids (OMEGA-3 FISH OIL PO), Take 1 capsule by mouth Daily., Disp: , Rfl:   •  pantoprazole (PROTONIX) 40 MG EC tablet, Take 1 tablet by mouth Daily., Disp: 90 tablet, Rfl: 1  •  predniSONE (DELTASONE) 50 MG tablet, , Disp: , Rfl:   Past Medical History:   Diagnosis Date   • Anemia    • Arthralgia    • Diabetes (CMS/HCC)    • Fatigue    • GERD (gastroesophageal reflux disease)    • Hyperlipidemia    • Hypertension    • Liver lesion    • Non Hodgkin's lymphoma (CMS/HCC)    • Osteoporosis    • Personal history of testicular cancer    • Vitamin D deficiency    • Weight loss      Past Surgical History:   Procedure Laterality Date   • HIP SURGERY  2005   • PORTACATH PLACEMENT     • TESTICLE SURGERY Right 07/17/2000    right testicular excision     Social History     Socioeconomic History   • Marital status:      Spouse name: Not on file   • Number of children: Not on file   • Years of education: Not on file   • Highest education level: Not on file   Tobacco Use   • Smoking status: Never Smoker   • Smokeless tobacco: Never Used   Substance and Sexual Activity   • Alcohol use: No   • Drug use: No   • Sexual activity: Defer     Family History   Family history unknown: Yes     PHQ-2 Depression Screening  Little interest or pleasure in doing things?     Feeling down, depressed, or hopeless?     PHQ-2 Total Score       PHQ-9 Depression Screening  Little interest or pleasure in doing things?     Feeling down, depressed, or hopeless?     Trouble falling or staying asleep, or sleeping too much?     Feeling tired or having little energy?     Poor appetite or overeating?     Feeling bad about yourself - or that you are a failure or have let yourself or your family down?     Trouble concentrating on things, such as reading the newspaper or watching  "television?     Moving or speaking so slowly that other people could have noticed? Or the opposite - being so fidgety or restless that you have been moving around a lot more than usual?     Thoughts that you would be better off dead, or of hurting yourself in some way?     PHQ-9 Total Score     If you checked off any problems, how difficult have these problems made it for you to do your work, take care of things at home, or get along with other people?         Family history, surgical history, past medical history, Allergies and med's reviewed with patient today and updated in [employer] EMR.     ROS:  Review of Systems   Musculoskeletal: Positive for joint swelling.   All other systems reviewed and are negative.      OBJECTIVE:  Vitals:    07/06/21 1520   BP: 95/53   Pulse: 67   Temp: 97.8 °F (36.6 °C)   TempSrc: Infrared   SpO2: 98%   Weight: 56.7 kg (125 lb)   Height: 152.4 cm (60\")     Body mass index is 24.41 kg/m².  Physical Exam  Vitals and nursing note reviewed.   Constitutional:       Appearance: Normal appearance. He is well-developed.   HENT:      Head: Normocephalic.   Eyes:      Pupils: Pupils are equal, round, and reactive to light.   Cardiovascular:      Rate and Rhythm: Normal rate and regular rhythm.   Pulmonary:      Effort: Pulmonary effort is normal.      Breath sounds: Normal breath sounds.   Abdominal:      General: Bowel sounds are normal.      Palpations: Abdomen is soft.   Musculoskeletal:         General: Normal range of motion.      Cervical back: Normal range of motion and neck supple.   Skin:     General: Skin is warm and dry.   Neurological:      Mental Status: He is alert and oriented to person, place, and time.   Psychiatric:         Behavior: Behavior normal.         Thought Content: Thought content normal.         Judgment: Judgment normal.         ASSESSMENT/ PLAN:    Diagnoses and all orders for this visit:    1. Pain in both knees, unspecified chronicity (Primary)  -     XR Knee 3 View " Bilateral; Future    2. Type 2 diabetes mellitus with hyperglycemia, without long-term current use of insulin (CMS/[facility])  -     metFORMIN ER (GLUCOPHAGE-XR) 500 MG 24 hr tablet; TAKE TWO TABLETS IN THE MORNING AND 2 TABLETS IN THE EVENING  Dispense: 360 tablet; Refill: 1    3. Other osteoporosis without current pathological fracture    Other orders  -     pantoprazole (PROTONIX) 40 MG EC tablet; Take 1 tablet by mouth Daily.  Dispense: 90 tablet; Refill: 1  -     losartan (COZAAR) 100 MG tablet; Take 1 tablet by mouth Daily.  Dispense: 90 tablet; Refill: 1  -     finasteride (Proscar) 5 MG tablet; Take 1 tablet by mouth Daily.  Dispense: 30 tablet; Refill: 0  -     Dapagliflozin Propanediol (Farxiga) 10 MG tablet; Take 10 mg by mouth Daily.  Dispense: 90 tablet; Refill: 1        Orders Placed Today:     New Medications Ordered This Visit   Medications   • pantoprazole (PROTONIX) 40 MG EC tablet     Sig: Take 1 tablet by mouth Daily.     Dispense:  90 tablet     Refill:  1   • metFORMIN ER (GLUCOPHAGE-XR) 500 MG 24 hr tablet     Sig: TAKE TWO TABLETS IN THE MORNING AND 2 TABLETS IN THE EVENING     Dispense:  360 tablet     Refill:  1   • losartan (COZAAR) 100 MG tablet     Sig: Take 1 tablet by mouth Daily.     Dispense:  90 tablet     Refill:  1   • finasteride (Proscar) 5 MG tablet     Sig: Take 1 tablet by mouth Daily.     Dispense:  30 tablet     Refill:  0   • Dapagliflozin Propanediol (Farxiga) 10 MG tablet     Sig: Take 10 mg by mouth Daily.     Dispense:  90 tablet     Refill:  1        Management Plan:     An After Visit Summary was printed and given to the patient at discharge.    Follow-up: Return in about 3 months (around 10/6/2021).    CON Delgado 7/26/2021 15:02 EDT  This note was electronically signed.

## 2021-08-17 ENCOUNTER — OFFICE VISIT (OUTPATIENT)
Dept: FAMILY MEDICINE CLINIC | Facility: CLINIC | Age: 76
End: 2021-08-17

## 2021-08-17 VITALS
BODY MASS INDEX: 25.72 KG/M2 | WEIGHT: 131 LBS | SYSTOLIC BLOOD PRESSURE: 104 MMHG | OXYGEN SATURATION: 96 % | HEIGHT: 60 IN | DIASTOLIC BLOOD PRESSURE: 51 MMHG | HEART RATE: 62 BPM | TEMPERATURE: 97.7 F

## 2021-08-17 DIAGNOSIS — I15.2 HYPERTENSION DUE TO ENDOCRINE DISORDER: ICD-10-CM

## 2021-08-17 DIAGNOSIS — R53.83 OTHER FATIGUE: ICD-10-CM

## 2021-08-17 DIAGNOSIS — E55.9 VITAMIN D DEFICIENCY: ICD-10-CM

## 2021-08-17 DIAGNOSIS — E53.8 VITAMIN B12 DEFICIENCY: Primary | ICD-10-CM

## 2021-08-17 DIAGNOSIS — E11.65 TYPE 2 DIABETES MELLITUS WITH HYPERGLYCEMIA, WITHOUT LONG-TERM CURRENT USE OF INSULIN (HCC): ICD-10-CM

## 2021-08-17 DIAGNOSIS — N18.30 STAGE 3 CHRONIC KIDNEY DISEASE, UNSPECIFIED WHETHER STAGE 3A OR 3B CKD (HCC): ICD-10-CM

## 2021-08-17 DIAGNOSIS — E78.2 MIXED HYPERLIPIDEMIA: ICD-10-CM

## 2021-08-17 LAB
25(OH)D3 SERPL-MCNC: 30.7 NG/ML (ref 30–100)
ALBUMIN SERPL-MCNC: 4.4 G/DL (ref 3.5–5.2)
ALBUMIN/GLOB SERPL: 1.6 G/DL
ALP SERPL-CCNC: 117 U/L (ref 39–117)
ALT SERPL W P-5'-P-CCNC: 11 U/L (ref 1–41)
ANION GAP SERPL CALCULATED.3IONS-SCNC: 8.4 MMOL/L (ref 5–15)
AST SERPL-CCNC: 12 U/L (ref 1–40)
BASOPHILS # BLD AUTO: 0.03 10*3/MM3 (ref 0–0.2)
BASOPHILS NFR BLD AUTO: 0.5 % (ref 0–1.5)
BILIRUB SERPL-MCNC: 0.2 MG/DL (ref 0–1.2)
BUN SERPL-MCNC: 16 MG/DL (ref 8–23)
BUN/CREAT SERPL: 13.3 (ref 7–25)
CALCIUM SPEC-SCNC: 9.2 MG/DL (ref 8.6–10.5)
CHLORIDE SERPL-SCNC: 100 MMOL/L (ref 98–107)
CHOLEST SERPL-MCNC: 114 MG/DL (ref 0–200)
CO2 SERPL-SCNC: 24.6 MMOL/L (ref 22–29)
CREAT SERPL-MCNC: 1.2 MG/DL (ref 0.76–1.27)
DEPRECATED RDW RBC AUTO: 44.6 FL (ref 37–54)
EOSINOPHIL # BLD AUTO: 0.24 10*3/MM3 (ref 0–0.4)
EOSINOPHIL NFR BLD AUTO: 3.8 % (ref 0.3–6.2)
ERYTHROCYTE [DISTWIDTH] IN BLOOD BY AUTOMATED COUNT: 13.1 % (ref 12.3–15.4)
EXPIRATION DATE: NORMAL
GFR SERPL CREATININE-BSD FRML MDRD: 59 ML/MIN/1.73
GLOBULIN UR ELPH-MCNC: 2.7 GM/DL
GLUCOSE BLDC GLUCOMTR-MCNC: 234 MG/DL (ref 70–130)
GLUCOSE SERPL-MCNC: 240 MG/DL (ref 65–99)
HBA1C MFR BLD: 8.1 %
HCT VFR BLD AUTO: 36.3 % (ref 37.5–51)
HDLC SERPL-MCNC: 34 MG/DL (ref 40–60)
HGB BLD-MCNC: 11.9 G/DL (ref 13–17.7)
IMM GRANULOCYTES # BLD AUTO: 0.06 10*3/MM3 (ref 0–0.05)
IMM GRANULOCYTES NFR BLD AUTO: 1 % (ref 0–0.5)
LDLC SERPL CALC-MCNC: 31 MG/DL (ref 0–100)
LDLC/HDLC SERPL: 0.39 {RATIO}
LYMPHOCYTES # BLD AUTO: 2.4 10*3/MM3 (ref 0.7–3.1)
LYMPHOCYTES NFR BLD AUTO: 38.2 % (ref 19.6–45.3)
Lab: NORMAL
MCH RBC QN AUTO: 30.4 PG (ref 26.6–33)
MCHC RBC AUTO-ENTMCNC: 32.8 G/DL (ref 31.5–35.7)
MCV RBC AUTO: 92.8 FL (ref 79–97)
MONOCYTES # BLD AUTO: 0.43 10*3/MM3 (ref 0.1–0.9)
MONOCYTES NFR BLD AUTO: 6.8 % (ref 5–12)
NEUTROPHILS NFR BLD AUTO: 3.13 10*3/MM3 (ref 1.7–7)
NEUTROPHILS NFR BLD AUTO: 49.7 % (ref 42.7–76)
NRBC BLD AUTO-RTO: 0 /100 WBC (ref 0–0.2)
PLATELET # BLD AUTO: 165 10*3/MM3 (ref 140–450)
PMV BLD AUTO: 11.2 FL (ref 6–12)
POTASSIUM SERPL-SCNC: 4.9 MMOL/L (ref 3.5–5.2)
PROT SERPL-MCNC: 7.1 G/DL (ref 6–8.5)
RBC # BLD AUTO: 3.91 10*6/MM3 (ref 4.14–5.8)
SODIUM SERPL-SCNC: 133 MMOL/L (ref 136–145)
T4 FREE SERPL-MCNC: 1.28 NG/DL (ref 0.93–1.7)
TRIGL SERPL-MCNC: 334 MG/DL (ref 0–150)
TSH SERPL DL<=0.05 MIU/L-ACNC: 1.37 UIU/ML (ref 0.27–4.2)
VIT B12 BLD-MCNC: 419 PG/ML (ref 211–946)
VLDLC SERPL-MCNC: 49 MG/DL (ref 5–40)
WBC # BLD AUTO: 6.29 10*3/MM3 (ref 3.4–10.8)

## 2021-08-17 PROCEDURE — 85025 COMPLETE CBC W/AUTO DIFF WBC: CPT | Performed by: NURSE PRACTITIONER

## 2021-08-17 PROCEDURE — 99214 OFFICE O/P EST MOD 30 MIN: CPT | Performed by: NURSE PRACTITIONER

## 2021-08-17 PROCEDURE — 83036 HEMOGLOBIN GLYCOSYLATED A1C: CPT | Performed by: NURSE PRACTITIONER

## 2021-08-17 PROCEDURE — 84439 ASSAY OF FREE THYROXINE: CPT | Performed by: NURSE PRACTITIONER

## 2021-08-17 PROCEDURE — 82607 VITAMIN B-12: CPT | Performed by: NURSE PRACTITIONER

## 2021-08-17 PROCEDURE — 36415 COLL VENOUS BLD VENIPUNCTURE: CPT | Performed by: NURSE PRACTITIONER

## 2021-08-17 PROCEDURE — 84443 ASSAY THYROID STIM HORMONE: CPT | Performed by: NURSE PRACTITIONER

## 2021-08-17 PROCEDURE — 80061 LIPID PANEL: CPT | Performed by: NURSE PRACTITIONER

## 2021-08-17 PROCEDURE — 80053 COMPREHEN METABOLIC PANEL: CPT | Performed by: NURSE PRACTITIONER

## 2021-08-17 PROCEDURE — 82306 VITAMIN D 25 HYDROXY: CPT | Performed by: NURSE PRACTITIONER

## 2021-08-17 RX ORDER — LOSARTAN POTASSIUM 50 MG/1
50 TABLET ORAL DAILY
Qty: 30 TABLET | Refills: 0 | Status: SHIPPED | OUTPATIENT
Start: 2021-08-17 | End: 2021-09-26 | Stop reason: SDUPTHER

## 2021-08-17 NOTE — PATIENT INSTRUCTIONS
Stop losartan 100 mg and start Losartan 50 mg  Blood pressure is running low  Blood pressure check in 2 weeks f/u in office in 4 weeks

## 2021-08-17 NOTE — PROGRESS NOTES
Chief Complaint   Patient presents with   • Diabetes   • Knee Pain   • Follow-up        Subjective   Ramon Silva is a 76 y.o.  male who presents today for   • Diabetes stable hgba1c is down to 8.1 from 8.8 continue to decrease sugars and carbs  • Knee Pain recommend x-rays updates and patients is ok with not doing this would like some cream for pain  • Blood pressure has been mildly low recommend decreasing losartin to 50 mg daily due to it being so low the last few visits        Ramon Silva  has a past medical history of Anemia, Arthralgia, Diabetes (CMS/HCC), Fatigue, GERD (gastroesophageal reflux disease), Hyperlipidemia, Hypertension, Liver lesion, Non Hodgkin's lymphoma (CMS/HCC), Osteoporosis, Personal history of testicular cancer, Vitamin D deficiency, and Weight loss.    No Known Allergies    Current Outpatient Medications:   •  aspirin 81 MG tablet, Take 81 mg by mouth Daily., Disp: , Rfl:   •  atorvastatin (LIPITOR) 40 MG tablet, Take 1 tablet by mouth every night at bedtime., Disp: 90 tablet, Rfl: 1  •  Blood Glucose Monitoring Suppl (BLOOD GLUCOSE MONITOR SYSTEM) w/Device kit, BLOOD GLUCOSE MONITOR SYSTEM w/Device KIT, Disp: , Rfl:   •  carvedilol (COREG) 25 MG tablet, Take 1 tablet by mouth 2 (Two) Times a Day With Meals., Disp: 180 tablet, Rfl: 1  •  Cetirizine HCl (ZyrTEC Allergy) 10 MG capsule, , Disp: , Rfl:   •  Dapagliflozin Propanediol (Farxiga) 10 MG tablet, Take 10 mg by mouth Daily., Disp: 90 tablet, Rfl: 1  •  ferrous sulfate 324 (65 Fe) MG tablet delayed-release EC tablet, Take 1 tablet by mouth Daily With Breakfast., Disp: 90 tablet, Rfl: 1  •  finasteride (Proscar) 5 MG tablet, Take 1 tablet by mouth Daily., Disp: 30 tablet, Rfl: 0  •  ibandronate (BONIVA) 150 MG tablet, TAKE ONE TABLET BY MOUTH EVERY 30 DAYS, Disp: 1 tablet, Rfl: 5  •  metFORMIN ER (GLUCOPHAGE-XR) 500 MG 24 hr tablet, TAKE TWO TABLETS IN THE MORNING AND 2 TABLETS IN THE EVENING, Disp: 360 tablet, Rfl: 1  •   Omega-3 Fatty Acids (OMEGA-3 FISH OIL PO), Take 1 capsule by mouth Daily., Disp: , Rfl:   •  pantoprazole (PROTONIX) 40 MG EC tablet, Take 1 tablet by mouth Daily., Disp: 90 tablet, Rfl: 1  •  Diclofenac Sodium (VOLTAREN) 1 % gel gel, Apply 4 g topically to the appropriate area as directed 4 (Four) Times a Day for 30 days., Disp: 100 g, Rfl: 0  •  losartan (Cozaar) 50 MG tablet, Take 1 tablet by mouth Daily for 30 days., Disp: 30 tablet, Rfl: 0  •  predniSONE (DELTASONE) 50 MG tablet, , Disp: , Rfl:   Past Medical History:   Diagnosis Date   • Anemia    • Arthralgia    • Diabetes (CMS/HCC)    • Fatigue    • GERD (gastroesophageal reflux disease)    • Hyperlipidemia    • Hypertension    • Liver lesion    • Non Hodgkin's lymphoma (CMS/HCC)    • Osteoporosis    • Personal history of testicular cancer    • Vitamin D deficiency    • Weight loss      Past Surgical History:   Procedure Laterality Date   • HIP SURGERY  2005   • PORTACATH PLACEMENT     • TESTICLE SURGERY Right 07/17/2000    right testicular excision     Social History     Socioeconomic History   • Marital status:      Spouse name: Not on file   • Number of children: Not on file   • Years of education: Not on file   • Highest education level: Not on file   Tobacco Use   • Smoking status: Never Smoker   • Smokeless tobacco: Never Used   Substance and Sexual Activity   • Alcohol use: No   • Drug use: No   • Sexual activity: Defer     Family History   Family history unknown: Yes     PHQ-2 Depression Screening  Little interest or pleasure in doing things?     Feeling down, depressed, or hopeless?     PHQ-2 Total Score       PHQ-9 Depression Screening  Little interest or pleasure in doing things?     Feeling down, depressed, or hopeless?     Trouble falling or staying asleep, or sleeping too much?     Feeling tired or having little energy?     Poor appetite or overeating?     Feeling bad about yourself - or that you are a failure or have let yourself or  "your family down?     Trouble concentrating on things, such as reading the newspaper or watching television?     Moving or speaking so slowly that other people could have noticed? Or the opposite - being so fidgety or restless that you have been moving around a lot more than usual?     Thoughts that you would be better off dead, or of hurting yourself in some way?     PHQ-9 Total Score     If you checked off any problems, how difficult have these problems made it for you to do your work, take care of things at home, or get along with other people?         Family history, surgical history, past medical history, Allergies and med's reviewed with patient today and updated in [employer] EMR.     ROS:  Review of Systems   Musculoskeletal: Positive for arthralgias.   All other systems reviewed and are negative.      OBJECTIVE:  Vitals:    08/17/21 1535   BP: 104/51   Pulse: 62   Temp: 97.7 °F (36.5 °C)   TempSrc: Infrared   SpO2: 96%   Weight: 59.4 kg (131 lb)   Height: 152.4 cm (60\")     Body mass index is 25.58 kg/m².  Physical Exam  Vitals and nursing note reviewed.   Constitutional:       Appearance: Normal appearance. He is well-developed.   HENT:      Head: Normocephalic.   Eyes:      Pupils: Pupils are equal, round, and reactive to light.   Cardiovascular:      Rate and Rhythm: Normal rate and regular rhythm.   Pulmonary:      Effort: Pulmonary effort is normal.      Breath sounds: Normal breath sounds.   Abdominal:      General: Bowel sounds are normal.      Palpations: Abdomen is soft.   Musculoskeletal:         General: Normal range of motion.      Cervical back: Normal range of motion and neck supple.   Skin:     General: Skin is warm and dry.   Neurological:      Mental Status: He is alert and oriented to person, place, and time.   Psychiatric:         Behavior: Behavior normal.         Thought Content: Thought content normal.         Judgment: Judgment normal.         ASSESSMENT/ PLAN:    Diagnoses and all orders " for this visit:    1. Vitamin B12 deficiency (Primary)  -     Vitamin B12; Future  -     Vitamin B12    2. Vitamin D deficiency  -     Vitamin D 25 Hydroxy; Future  -     Vitamin D 25 Hydroxy    3. Stage 3 chronic kidney disease, unspecified whether stage 3a or 3b CKD (CMS/[facility])  -     Comprehensive Metabolic Panel; Future  -     Comprehensive Metabolic Panel    4. Type 2 diabetes mellitus with hyperglycemia, without long-term current use of insulin (CMS/[facility])  -     Hemoglobin A1c; Future  -     POC Glucose  -     POCT glycated hemoglobin, total    5. Hypertension due to endocrine disorder  -     CBC & Differential; Future  -     CBC & Differential    6. Other fatigue  -     TSH; Future  -     T4, Free; Future  -     TSH  -     T4, Free    7. Mixed hyperlipidemia  -     Lipid Panel; Future  -     Lipid Panel    Other orders  -     Diclofenac Sodium (VOLTAREN) 1 % gel gel; Apply 4 g topically to the appropriate area as directed 4 (Four) Times a Day for 30 days.  Dispense: 100 g; Refill: 0  -     losartan (Cozaar) 50 MG tablet; Take 1 tablet by mouth Daily for 30 days.  Dispense: 30 tablet; Refill: 0        Orders Placed Today:     New Medications Ordered This Visit   Medications   • Diclofenac Sodium (VOLTAREN) 1 % gel gel     Sig: Apply 4 g topically to the appropriate area as directed 4 (Four) Times a Day for 30 days.     Dispense:  100 g     Refill:  0   • losartan (Cozaar) 50 MG tablet     Sig: Take 1 tablet by mouth Daily for 30 days.     Dispense:  30 tablet     Refill:  0        Management Plan:  Decrease Losartan  Start Voltaren cream daily  F/U 2 weeks for blood pressure check  4 week for in office      An After Visit Summary was printed and given to the patient at discharge.    Follow-up: Return in about 4 weeks (around 9/14/2021), or 2 week blood pressure check.    Clara Molina, APRN 8/17/2021 16:19 EDT  This note was electronically signed.

## 2021-09-07 ENCOUNTER — CLINICAL SUPPORT (OUTPATIENT)
Dept: FAMILY MEDICINE CLINIC | Facility: CLINIC | Age: 76
End: 2021-09-07

## 2021-09-07 ENCOUNTER — TELEPHONE (OUTPATIENT)
Dept: FAMILY MEDICINE CLINIC | Facility: CLINIC | Age: 76
End: 2021-09-07

## 2021-09-07 VITALS
SYSTOLIC BLOOD PRESSURE: 132 MMHG | HEART RATE: 61 BPM | DIASTOLIC BLOOD PRESSURE: 61 MMHG | OXYGEN SATURATION: 99 % | TEMPERATURE: 98 F

## 2021-09-07 DIAGNOSIS — I95.9 HYPOTENSION, UNSPECIFIED HYPOTENSION TYPE: ICD-10-CM

## 2021-09-07 NOTE — TELEPHONE ENCOUNTER
Patient came in for a BP check today, was improved at 132/61, he stopped taking Losartan 100 mg, has a follow up with you next  Tues

## 2021-09-07 NOTE — TELEPHONE ENCOUNTER
Caller: monster mccarthy     Relationship to Patient: DAUGHTER    Phone Number: [phone]    Reason for Call: PATIENT WAS TAKING LOSARTIN 100MG AND 50MG, AND ALSO carvedilol (COREG) 25 MG tablet AND BELIEVES THAT'S WHY HIS BLOOD PRESSURE WAS LOW. PATIENT'S DAUGHTER CALLED TO INFORM BOB MARTINEZ OF THIS FOR HIS APPOINTMENT TODAY.

## 2021-09-14 ENCOUNTER — OFFICE VISIT (OUTPATIENT)
Dept: FAMILY MEDICINE CLINIC | Facility: CLINIC | Age: 76
End: 2021-09-14

## 2021-09-14 VITALS
BODY MASS INDEX: 25.29 KG/M2 | HEART RATE: 58 BPM | WEIGHT: 128.8 LBS | OXYGEN SATURATION: 98 % | DIASTOLIC BLOOD PRESSURE: 66 MMHG | TEMPERATURE: 97.5 F | HEIGHT: 60 IN | SYSTOLIC BLOOD PRESSURE: 138 MMHG | RESPIRATION RATE: 16 BRPM

## 2021-09-14 DIAGNOSIS — I95.9 HYPOTENSION, UNSPECIFIED HYPOTENSION TYPE: Primary | ICD-10-CM

## 2021-09-14 DIAGNOSIS — E53.8 VITAMIN B12 DEFICIENCY: ICD-10-CM

## 2021-09-14 DIAGNOSIS — I15.2 HYPERTENSION DUE TO ENDOCRINE DISORDER: ICD-10-CM

## 2021-09-14 DIAGNOSIS — E11.65 TYPE 2 DIABETES MELLITUS WITH HYPERGLYCEMIA, WITHOUT LONG-TERM CURRENT USE OF INSULIN (HCC): ICD-10-CM

## 2021-09-14 DIAGNOSIS — N18.30 STAGE 3 CHRONIC KIDNEY DISEASE, UNSPECIFIED WHETHER STAGE 3A OR 3B CKD (HCC): ICD-10-CM

## 2021-09-14 PROCEDURE — 99212 OFFICE O/P EST SF 10 MIN: CPT | Performed by: NURSE PRACTITIONER

## 2021-09-14 RX ORDER — FAMOTIDINE 40 MG/1
1 TABLET, FILM COATED ORAL DAILY
COMMUNITY
Start: 2021-09-09 | End: 2023-04-04 | Stop reason: SDUPTHER

## 2021-09-15 ENCOUNTER — APPOINTMENT (OUTPATIENT)
Dept: CT IMAGING | Facility: HOSPITAL | Age: 76
End: 2021-09-15

## 2021-09-15 ENCOUNTER — HOSPITAL ENCOUNTER (OUTPATIENT)
Dept: CT IMAGING | Facility: HOSPITAL | Age: 76
Discharge: HOME | End: 2021-09-15
Admitting: INTERNAL MEDICINE

## 2021-09-15 DIAGNOSIS — D64.9 ANEMIA, UNSPECIFIED TYPE: ICD-10-CM

## 2021-09-15 PROCEDURE — 74177 CT ABD & PELVIS W/CONTRAST: CPT

## 2021-09-15 PROCEDURE — 70487 CT MAXILLOFACIAL W/DYE: CPT

## 2021-09-15 PROCEDURE — 71260 CT THORAX DX C+: CPT

## 2021-09-15 PROCEDURE — 0 IOPAMIDOL PER 1 ML: Performed by: INTERNAL MEDICINE

## 2021-09-15 RX ADMIN — IOPAMIDOL 75 ML: 755 INJECTION, SOLUTION INTRAVENOUS at 19:12

## 2021-09-15 RX ADMIN — IOPAMIDOL 50 ML: 755 INJECTION, SOLUTION INTRAVENOUS at 19:12

## 2021-09-16 ENCOUNTER — TRANSCRIBE ORDERS (OUTPATIENT)
Dept: ADMINISTRATIVE | Facility: HOSPITAL | Age: 76
End: 2021-09-16

## 2021-09-16 DIAGNOSIS — D64.9 ANEMIA, UNSPECIFIED TYPE: Primary | ICD-10-CM

## 2021-09-27 RX ORDER — ATORVASTATIN CALCIUM 40 MG/1
40 TABLET, FILM COATED ORAL
Qty: 90 TABLET | Refills: 1 | Status: SHIPPED | OUTPATIENT
Start: 2021-09-27 | End: 2022-04-13

## 2021-09-27 RX ORDER — FINASTERIDE 5 MG/1
5 TABLET, FILM COATED ORAL DAILY
Qty: 90 TABLET | Refills: 0 | Status: SHIPPED | OUTPATIENT
Start: 2021-09-27 | End: 2022-09-08

## 2021-09-27 RX ORDER — LOSARTAN POTASSIUM 50 MG/1
50 TABLET ORAL DAILY
Qty: 90 TABLET | Refills: 0 | Status: SHIPPED | OUTPATIENT
Start: 2021-09-27 | End: 2021-12-29

## 2021-09-27 NOTE — TELEPHONE ENCOUNTER
Rx Refill Note  Requested Prescriptions     Pending Prescriptions Disp Refills   • finasteride (Proscar) 5 MG tablet 30 tablet 0     Sig: Take 1 tablet by mouth Daily.   • losartan (Cozaar) 50 MG tablet 30 tablet 0     Sig: Take 1 tablet by mouth Daily for 30 days.      Last office visit with prescribing clinician: 9/14/2021      Next office visit with prescribing clinician: 11/15/2021            Devika Clay LPN  09/27/21, 08:06 EDT

## 2021-09-27 NOTE — TELEPHONE ENCOUNTER
Rx Refill Note  Requested Prescriptions     Pending Prescriptions Disp Refills   • atorvastatin (LIPITOR) 40 MG tablet 90 tablet 1     Sig: Take 1 tablet by mouth every night at bedtime.      Last office visit with prescribing clinician:   9-  Next office visit with prescribing clinician: XOCHILT Clay LPN  09/27/21, 08:05 EDT

## 2021-09-29 ENCOUNTER — OFFICE VISIT (OUTPATIENT)
Dept: FAMILY MEDICINE CLINIC | Facility: CLINIC | Age: 76
End: 2021-09-29

## 2021-09-29 VITALS
TEMPERATURE: 98.2 F | HEART RATE: 60 BPM | HEIGHT: 60 IN | WEIGHT: 132 LBS | OXYGEN SATURATION: 97 % | DIASTOLIC BLOOD PRESSURE: 73 MMHG | SYSTOLIC BLOOD PRESSURE: 140 MMHG | BODY MASS INDEX: 25.91 KG/M2

## 2021-09-29 DIAGNOSIS — L03.012 CELLULITIS OF LEFT THUMB: Primary | ICD-10-CM

## 2021-09-29 PROCEDURE — 99213 OFFICE O/P EST LOW 20 MIN: CPT | Performed by: NURSE PRACTITIONER

## 2021-09-29 RX ORDER — CEPHALEXIN 500 MG/1
500 CAPSULE ORAL 3 TIMES DAILY
Qty: 30 CAPSULE | Refills: 0 | Status: SHIPPED | OUTPATIENT
Start: 2021-09-29 | End: 2021-10-09

## 2021-10-02 ENCOUNTER — HOSPITAL ENCOUNTER (OUTPATIENT)
Dept: CT IMAGING | Facility: HOSPITAL | Age: 76
Discharge: HOME | End: 2021-10-02

## 2021-10-02 DIAGNOSIS — D64.9 ANEMIA, UNSPECIFIED TYPE: ICD-10-CM

## 2021-10-02 LAB — CREAT BLDA-MCNC: 1.2 MG/DL (ref 0.6–1.3)

## 2021-10-02 PROCEDURE — 82565 ASSAY OF CREATININE: CPT

## 2021-10-02 PROCEDURE — 74177 CT ABD & PELVIS W/CONTRAST: CPT

## 2021-10-02 PROCEDURE — 0 IOPAMIDOL PER 1 ML: Performed by: NURSE PRACTITIONER

## 2021-10-02 PROCEDURE — 71260 CT THORAX DX C+: CPT

## 2021-10-02 RX ADMIN — IOPAMIDOL 100 ML: 755 INJECTION, SOLUTION INTRAVENOUS at 10:15

## 2021-10-05 ENCOUNTER — APPOINTMENT (OUTPATIENT)
Dept: ONCOLOGY | Facility: CLINIC | Age: 76
End: 2021-10-05

## 2021-10-06 NOTE — PROGRESS NOTES
HEMATOLOGY ONCOLOGY FOLLOW UP        Patient name: Ramon Silva  : 1945  MRN: [id number]  Primary Care Physician: Clara Molina APRN  Referring Physician: Clara Molina AP*  Diagnosis:   Sinus Non-Hodgkin's lymphoma  Anemia  Chief Complaint   Patient presents with   • Follow-up     Anemia, unspecified type       History of Present Illness:  Mr. Silva is a 75-year-old male with a history of testicular lymphoma diagnosed in , status post chemotherapy with R-CHOP, who has been in complete remission for several years.  He presented to his primary care provider, Mariam Price, with symptoms of left-sided facial pain radiating to the eye, left-sided headache, and he was referred to ENT, Dr. Harry, for further evaluation.  CT scan of the paranasal sinuses was performed without contrast on 11/30/15 and it showed marked bony destruction and an expansile soft tissue mass involving the left frontal, left ethmoid, left sphenoid and left maxillary sinus.  There was a soft tissue mass encroaching in the left orbit and left side of the face towards the muscles of mastication as well as through the superior wall of the left sphenoid sinus.  After evaluation, Dr. Harry ordered MRI of the brain with and without contrast and that showed a multi-lobulated mass filling and expanding the left maxillary sinus as well as the left ethmoid air cells in the left locule of the sphenoid sinus, with extension into the left orbit and left medial cranial fossa.  Dr. Harry performed fiberoptic nasal endoscopy and biopsy of the left nasal mass on 12/11/15.  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  Cyclin D1 was negative.  All of these stains were done by immunohistochemistry.  Sections of the nasal mass showed diffuse infiltrate by predominantly  intermediate-to-large sized lymphoid cells.  The cells have a vesicular nuclei and inconspicuous cytoplasm.  FISH was negative for rearrangements involving BCL6, MYC, and IGH/BCL-2 [t(14;18)].  FISH for MYC/IGH was negative.  The patient denied any B symptoms, such as fevers, chills, night sweats, weight loss or fatigue.  He did not report any lymphadenopathy.  Dr. Harry referred the patient to us for further evaluation of his lymphoma.    Today, 12/30/15, the patient presents for initial evaluation.  He denies any fever, chills, night sweats, weight loss or lymph node swelling.  His only symptom is left facial pain.  The patient also reports left eye blurry vision.  • 12/30/15 - WBC 8.3, hemoglobin 13.3, platelet count 233, MCV 87.5.  • 1/7/16 - Echocardiogram:  Ejection fraction 50-55%.  Normal LVEF.  There is slight impaired LV relaxation.  • 1/8/16 - PET/CT scan:  Soft tissue mass originating in the left maxillary sinus erodes through the medial sinus wall spilling out into the nasopharynx.  It abates the nasal septum.  Lesion measures about 4 x 4 cm in AP and transverse dimension.  It measures about 8 cm in the cephalocaudal dimension.  SUV is 10.8  In the abdomen, there is a focus of activity in the left lobe of the liver which measures about 2 cm and has SUV 9.4.    • 1/11/16 - Bone marrow biopsy:  Normal cellular bone marrow 25-35%.  Adequate iron stores.  No malignancy.  Flow cytometry is negative.  Normal karyotype.  • 1/22/16 - Patient underwent left subclavian Port-A-Cath placement.  • 1/25/16 - WBC 8.1, hemoglobin 12.2, platelet count 216, MCV 87.1.  • 1/26/16 - Patient was seen at [facility] by Dr. Jamal Pisano.  He has recommended R-GCVP combination chemo treatment.  If the patient does not achieve complete response or if he has liver involvement, he will consider autologous stem cell transplantation after high-dose chemotherapy.  If lymphoma is present in the  liver, this tumor will likely represent a recurrent lymphoma rather than a new primary lymphoma of the sinuses.  • 2/2/16 - Hepatitis panel negative.  • 2/4/16 - Patient started chemotherapy with Rituximab, Gemcitabine, Cytoxan, Vincristine, and Prednisolone (R-GCVP q. 21 days regimen).  • 2/24/16 - Patient received cycle 2 of R-GCVP.  • 3/1/16 - Patient received intrathecal chemotherapy cycle 1 with Cytarabine plus Hydrocortisone plus Methotrexate.  • 3/2/16 - Patient received cycle 2, day 8 of Gemcitabine.  WBC 3.9, platelet count 145,000.  • 3/9/16 - WBC 9.1, hemoglobin 9.2, platelet count 33,000.  • 3/10/16 - Platelet count 32,000.  • 3/16/16 - Patient received cycle 3 of R-GCVP day 1.  • 3/23/16 - Creatinine 0.85, BUN 19.  Retic count 19,950.  Ferritin 369.  Erythropoietin 26.9.  Iron saturation 11%, TIBC 22.  CBC showed WBC 7.3, hemoglobin 8.1, platelet count 395,000, MCV 87.  • 3/23/16 - Patient received cycle 3, day 8 of Gemcitabine.  Patient received Neulasta 6 mg.   • 3/26/16 - Brain MRI with and without contrast:  There is residual soft tissue mass located in the left ethmoid and sphenoid sinus which is much smaller compared to the previous exam, now measuring 2.8 x 1.8 cm.  There is better aeration in the left maxillary sinus.  • 3/26/16 - MRI abdomen with and without contrast:   A small 1.3 x 1.1 cm rim enhancing lesion is seen near the dome of the medial segment of the left lobe of liver.  This area corresponds to the hypermetabolic activity seen on the previous PET scan.  • 3/30/16 - Neutrophils 53,000, hemoglobin 9.5, platelet count 328,000.  • 4/6/16 - Patient received cycle 4, day 1 of R-GCVP.  WBC 17.9, hemoglobin 9.6, platelet count 256,000.    • 4/7/16 - Patient received intrathecal chemotherapy with Cytarabine plus Hydrocortisone plus Methotrexate.    • 4/13/16 - Patient received cycle 4, day 8 of Gemcitabine.  WBC 4.8, hemoglobin 8.1, platelet count 147,000.  The patient had hypotension and  received IV fluids.    • 4/14/16 - Ultrasound of the liver:  There is a small nodule measuring 15 mm in the left lobe of the liver.    • 4/18/16 - WBC 3.6, hemoglobin 8.1, platelet count 73,000.    • 4/27/16 - Patient received cycle 5, day 1 R-GCVP.  • 4/28/16 - Patient received intrathecal chemotherapy.    • 5/4/16 - Patient received cycle 5, day 8 of Gemcitabine.  • 5/18/16 - Patient received cycle 6, day 1 of R-GCVP.  CBC shows WBC 10.7, hemoglobin 9.7, platelet count 417,000.  • 5/19/16 - Patient received cycle 5 of intrathecal chemotherapy with Methotrexate, Cytarabine and Hydrocortisone.  • 6/10/16 - Brain MRI with and without contrast:  Residual abnormal signal involving the region of the left sphenoid sinus.  There appears to be mild improvement compared to prior.  Findings may be due to chronic inflammation of sinusitis.  Residual tumor is possible but less likely.  Overall, there is improvement.  No evidence of acute focal ischemia.  Nonspecific white matter changes.    • 6/16/16 - Patient received the last, sixth cycle of intrathecal chemotherapy.  • 7/7/16 - PET/CT scan:  Small focal area of increased nuclear activity in the lateral segment of the left lobe of the liver is no longer visualized.    • 8/1/16 to 8/19/16 - Patient received 15 fractions of radiation therapy, total of 30 Gy.  • 11/3/16 - PET/CT scan:  Stable exam with no hypermetabolic activity seen within the neck, chest, abdomen and pelvis.  New sub-centimeter non-calcified pulmonary nodules within the posterior lateral aspect of the right upper lobe.  There may be inflammatory or infectious.  Stable bilateral sub-centimeter non-calcified pulmonary nodules which are not hypermetabolic.    • 1/28/17 - MRI of brain with and without contrast:  Abnormal signal in the left sphenoid sinus is redemonstrated.  It is similar in size to the previous MRI from June 2016 and measures about 23 x 14 mm.  No evidence of progression.  No evidence of  metastases.  • 2/17/17 - CT scan:  No abnormalities in the chest.  No lymphadenopathy.  • 5/8/17 - Patient underwent nasal endoscopy which showed maxillary sinusitis.  No tumor reported.  • 9/8/17 - PET/CT scan:  No evidence of metastasis or disease recurrence.  No hypermetabolic activity anywhere.  Stable partial opacification of the left sphenoid sinus likely due to chronic sinusitis.    • 3/3/18 - WBC 7.2, hemoglobin 11.4, platelet count 181,000, MCV 91.8.  Creatinine 0.77.  LFTs normal.  Albumin 4.    • 3/30/18 - Brain MRI with and without contrast:  Decreasing size of the abnormal soft tissue within the left pterygomaxillary fissure.  This area demonstrates mild heterogeneous contrast enhancement which is similar to prior studies.  No new enlarging soft tissue mass.  Mucosal thickening with the left maxillary and sphenoid sinuses.  No evidence of malignancy.  No acute intracranial abnormality.    • 8/24/18 - CT scan of chest, abdomen and pelvis:  No evidence of lymphadenopathy.  A 1 cm pulmonary nodule in the right middle lobe is not significantly changed.  No evidence of lymphadenopathy.  CT abdomen is also unremarkable.  Enlarged heterogenous prostate gland, hyperplasia is noted.  Small hiatal hernia.  Moderate osteopenia and degenerative changes in hips and spine.  Left renal cyst.    • 8/28/18 - Vitamin D 31.  PSA 1.6.    • 10/5/18 - DEXAscan:  Osteoporosis with T-score of -2.5 in the distal forearm.    • 3/4/19 - WBC 6.9, hemoglobin 12.8, platelet count 187,000, MCV 91.  Creatinine 1.03.  Total bilirubin 0.5.  .  • 5/8/2019 25 hydroxy vitamin D 32  • 8/28/2019: Brain MRI:1. The abnormal material in the left sphenoid sinus does not appear significantly changed.The brain appears stable and within normal for age. 3. Minor chronic maxillary sinus mucosal thickening \  • 2/19/2020: WBC 8.8, hemoglobin 9.2, MCV 92.7, platelets 280, creatinine 0.88, alk phos 122, 25-hydroxy vitamin D 31.3, B12 640, TSH  0.886  • 3/7/2020: CT abdomen and pelvis- No evidence of lymphadenopathy in the abdomen pelvis or chest.  Stable right middle lobe noncalcified pulmonary nodule unchanged since 2017.  Degenerative changes of the lumbar spine and hips.  Chest with contrast:.  Stable 1 cm right middle lobe lung nodule.  No evidence of malignancy.  • 3/10/2020: Iron 35 low, ferritin 92, iron saturation 14% low, TIBC 258, folate greater than 20, WBC 5.8, hemoglobin 9.4, MCV 91, platelets 228, ESR 25  • 6/30/2020: Creatinine 0.9, LFTs normal, , WBC 6.48, hemoglobin 10.6, platelets 202, ferritin 71, iron saturation 25%, TIBC 242,  • 9/1/2020: Creatinine 1.33, BUN 26, LFTs normal, WBC 6.7, hemoglobin 10.9, platelets 190,, ESR 15, CRP 0.1,  • 3/23/2021: Stool Hemoccult is positive  • 4/28/2021: WBC 7.13, hemoglobin 11.6, platelets 163, MCV 94.1, ferritin 77.3, iron 59, iron saturation 41%, TIBC 286, LFTs normal, , creatinine 1.06,  • 10/2/2021 : Stable 9 mm nodule in the medial segment of the right middle lobe.        Subjective:    Patient is seen for follow-up no new symptoms no constitutional symptoms.  No cough or shortness of breath.  No weight loss night sweats fatigue..        The following portions of the patient's history were reviewed and updated as appropriate: allergies, current medications, past family history, past medical history, past social history, past surgical history and problem list.       Past Medical History:   Diagnosis Date   • Anemia    • Arthralgia    • Diabetes (HCC)    • Fatigue    • GERD (gastroesophageal reflux disease)    • Hyperlipidemia    • Hypertension    • Liver lesion    • Non Hodgkin's lymphoma (HCC)    • Osteoporosis    • Personal history of testicular cancer    • Vitamin D deficiency    • Weight loss        Past Surgical History:   Procedure Laterality Date   • HIP SURGERY  2005   • PORTACATH PLACEMENT     • TESTICLE SURGERY Right 07/17/2000    right testicular excision       Current  Outpatient Medications:   •  aspirin 81 MG tablet, Take 81 mg by mouth Daily., Disp: , Rfl:   •  atorvastatin (LIPITOR) 40 MG tablet, Take 1 tablet by mouth every night at bedtime., Disp: 90 tablet, Rfl: 1  •  Blood Glucose Monitoring Suppl (BLOOD GLUCOSE MONITOR SYSTEM) w/Device kit, BLOOD GLUCOSE MONITOR SYSTEM w/Device KIT, Disp: , Rfl:   •  carvedilol (COREG) 25 MG tablet, Take 1 tablet by mouth 2 (Two) Times a Day With Meals., Disp: 180 tablet, Rfl: 1  •  cephalexin (Keflex) 500 MG capsule, Take 1 capsule by mouth 3 (Three) Times a Day for 10 days., Disp: 30 capsule, Rfl: 0  •  Cetirizine HCl (ZyrTEC Allergy) 10 MG capsule, Take 10 mg by mouth Daily., Disp: , Rfl:   •  Dapagliflozin Propanediol (Farxiga) 10 MG tablet, Take 10 mg by mouth Daily., Disp: 90 tablet, Rfl: 1  •  famotidine (PEPCID) 40 MG tablet, Take 1 tablet by mouth Daily., Disp: , Rfl:   •  ferrous sulfate 324 (65 Fe) MG tablet delayed-release EC tablet, Take 1 tablet by mouth Daily With Breakfast., Disp: 90 tablet, Rfl: 1  •  finasteride (Proscar) 5 MG tablet, Take 1 tablet by mouth Daily., Disp: 90 tablet, Rfl: 0  •  ibandronate (BONIVA) 150 MG tablet, TAKE ONE TABLET BY MOUTH EVERY 30 DAYS, Disp: 1 tablet, Rfl: 5  •  losartan (Cozaar) 50 MG tablet, Take 1 tablet by mouth Daily for 30 days., Disp: 90 tablet, Rfl: 0  •  metFORMIN ER (GLUCOPHAGE-XR) 500 MG 24 hr tablet, TAKE TWO TABLETS IN THE MORNING AND 2 TABLETS IN THE EVENING, Disp: 360 tablet, Rfl: 1  •  Omega-3 Fatty Acids (OMEGA-3 FISH OIL PO), Take 1 capsule by mouth Daily., Disp: , Rfl:   •  pantoprazole (PROTONIX) 40 MG EC tablet, Take 1 tablet by mouth Daily., Disp: 90 tablet, Rfl: 1  •  predniSONE (DELTASONE) 50 MG tablet, , Disp: , Rfl:     No Known Allergies    Family History   Family history unknown: Yes       Family history is unknown by patient.    Social History     Tobacco Use   • Smoking status: Never Smoker   • Smokeless tobacco: Never Used   Substance Use Topics   • Alcohol  use: No   • Drug use: No       I have reviewed the history of present illness, past medical history, family history, social history, lab results, all notes and other records since the patient was last seen on  Visit date not found.    ROS:     12 point review system negative.  No significant fatigue.    Objective:    Vitals:    10/07/21 1346   BP: 149/75   Pulse: 61   PainSc: 0-No pain     ECOG  (1) Restricted in physically strenuous activity, ambulatory and able to do work of light nature    Physical Exam:     Physical Exam  Constitutional:       Appearance: Normal appearance.   HENT:      Head: Normocephalic and atraumatic.   Eyes:      Pupils: Pupils are equal, round, and reactive to light.   Cardiovascular:      Rate and Rhythm: Normal rate and regular rhythm.      Pulses: Normal pulses.      Heart sounds: No murmur heard.     Pulmonary:      Effort: Pulmonary effort is normal.      Breath sounds: Normal breath sounds.   Abdominal:      General: There is no distension.      Palpations: Abdomen is soft. There is no mass.      Tenderness: There is no abdominal tenderness.   Musculoskeletal:         General: Normal range of motion.      Cervical back: Normal range of motion.   Skin:     General: Skin is warm.   Neurological:      General: No focal deficit present.      Mental Status: He is alert.   Psychiatric:         Mood and Affect: Mood normal.           Lab Results - Last 18 Months   Lab Units 08/17/21  1548 05/25/21  1521 04/28/21  1344   WBC 10*3/mm3 6.29 5.62 7.13   HEMOGLOBIN g/dL 11.9* 11.6* 11.6*   HEMATOCRIT % 36.3* 34.3* 36.4*   PLATELETS 10*3/mm3 165 178 163   MCV fL 92.8 89.3 94.1     Lab Results - Last 18 Months   Lab Units 10/02/21  0910 08/17/21  1548 05/25/21  1521 04/28/21  1344 04/28/21  1344   SODIUM mmol/L  --  133* 136  --  140   POTASSIUM mmol/L  --  4.9 4.4  --  4.5   CHLORIDE mmol/L  --  100 104  --  104   CO2 mmol/L  --  24.6 22.2  --  24.0   BUN mg/dL  --  16 27*  --  27*   CREATININE  mg/dL 1.20 1.20 1.12   < > 1.06   CALCIUM mg/dL  --  9.2 9.3  --  9.6   BILIRUBIN mg/dL  --  0.2 0.3  --  0.3   ALK PHOS U/L  --  117 125*  --  103   ALT (SGPT) U/L  --  11 8  --  12   AST (SGOT) U/L  --  12 11  --  14   GLUCOSE mg/dL  --  240* 184*  --  127*    < > = values in this interval not displayed.       Lab Results   Component Value Date    GLUCOSE 240 (H) 08/17/2021    BUN 16 08/17/2021    CREATININE 1.20 10/02/2021    EGFRIFNONA 59 (L) 08/17/2021    EGFRIFAFRI 91 02/23/2021    BCR 13.3 08/17/2021    K 4.9 08/17/2021    CO2 24.6 08/17/2021    CALCIUM 9.2 08/17/2021    ALBUMIN 4.40 08/17/2021    LABIL2 1.6 05/08/2019    AST 12 08/17/2021    ALT 11 08/17/2021       No results for input(s): APTT, INR, PTT in the last 19032 hours.    Lab Results   Component Value Date    IRON 59 04/28/2021    TIBC 286 (L) 04/28/2021    FERRITIN 77.37 04/28/2021       Lab Results   Component Value Date    FOLATE >20.0 03/10/2020       No results found for: OCCULTBLD    No results found for: RETICCTPCT    Lab Results   Component Value Date    UZWTNKDO11 419 08/17/2021     No results found for: SPEP, UPEP  LDH   Date Value Ref Range Status   04/28/2021 150 135 - 225 U/L Final     Lab Results   Component Value Date    SEDRATE 25 03/10/2020     No results found for: FIBRINOGEN, HAPTOGLOBIN  No results found for: PTT, INR  No results found for:   No results found for: CEA  No components found for: CA-19-9  Lab Results   Component Value Date    PSA 2.130 02/23/2021         Assessment/Plan     Assessment:  1. 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  It is unclear whether the patient has recurrent versus new de zulma lymphoma.  His PET scan also showed an indeterminate lesion in the left lobe of the liver, which could not be  biopsied.  He was treated with Rituximab-GCVP chemotherapy x6 cycles.  The patient also received six cycles of intrathecal chemotherapy with Methotrexate, Cytarabine, and Hydrocortisone.  PET/CT scan showed a complete response.  His restaging PET scan on 11/3/16 continues to show remission.  He also received consolidation radiation therapy for 30 Gy finished on 8/19/16.    PET/CT scan in September 2017 does not show any evidence of disease recurrence.  Patient was reporting left-sided headaches.  repeat MRI on 3/30/18 and it fails to show any evidence of disease progression or recurrence.  Restaging CT scan on 3/7/2020 does not show any evidence of disease recurrence.  Now again had a repeat CT scan in October 2021 with no concerning findings for recurrent disease.  There is a stable 9 mm nodule in the right middle lobe which has been stable for the last several years.  No further CT imaging needed unless symptoms dictate.    2. Mild persistent anemia: Appears to be from chronic disease  3. History of testicular lymphoma: In 2000  4. History of right knee osteoarthritis:  He previously received steroid injections.  He will require right knee replacement eventually.   5. Anemia: Hemoglobin has been stable.  Recheck CBC and iron studies today.  6. Mild iron deficiency  7. Benign prostatic hypertrophy followed by urology  8. History of liver lesion:  This was noted initially on PET scan.  Repeat MRI showed a 1.3 x 1.7 cm, rim-enhancing lesion which could not be biopsied.  His restaging PET scan on 9/8/16 does not show this lesion anymore.  No findings October 2021 CT  9. Osteoporosis:  This was seen on DEXAscan 10/5/18.  Calcium vitamin D.    PLAN:      1. Patient is more than 5 years out from treatment.  Normal CT imaging needed.  Recent CT with no concerns of recurrent disease.  2. Positive stool Hemoccult test: Patient had a colonoscopy which was unremarkable this was in June 2021  3. Continue Boniva.  4. Continue  oral iron recheck iron studies and CBC today.  5. Continue surveillance with ENT and fiberoptic endoscopies.  6. Follow-up with me in 6 months.      I have reviewed and confirmed the accuracy of the patient's history: Chief complaint, HPI, ROS and Subjective as entered by the MA/DORISN/RN. Jessica Kwon MD 10/07/21       Anemia, unspecified type  - CBC & Differential  - CBC & Differential  - Iron Profile  - Ferritin    Orders Placed This Encounter   Procedures   • Iron Profile     Order Specific Question:   Release to patient     Answer:   Immediate   • Ferritin     Order Specific Question:   Release to patient     Answer:   Immediate   • CBC & Differential     Standing Status:   Future     Standing Expiration Date:   10/6/2022     Order Specific Question:   Manual Differential     Answer:   No     Order Specific Question:   Release to patient     Answer:   Immediate   • CBC & Differential     Order Specific Question:   Manual Differential     Answer:   No     Order Specific Question:   Release to patient     Answer:   Immediate        Thank you very much for providing the opportunity to participate in this patient’s care. Please do not hesitate to call if there are any other questions.  Patient is new to me. Time spent on encounter including record review, history taking, exam, discussion, counseling and documentation at: 30 minutes  I have reviewed and confirmed the accuracy of the patient's history: Chief complaint, HPI, ROS, Subjective and Past Family Social History as entered by the MA/DORISN/RN.     Jessica Kwon MD 10/07/21

## 2021-10-07 ENCOUNTER — OFFICE VISIT (OUTPATIENT)
Dept: ONCOLOGY | Facility: CLINIC | Age: 76
End: 2021-10-07

## 2021-10-07 ENCOUNTER — CLINICAL SUPPORT (OUTPATIENT)
Dept: FAMILY MEDICINE CLINIC | Facility: CLINIC | Age: 76
End: 2021-10-07

## 2021-10-07 VITALS — DIASTOLIC BLOOD PRESSURE: 75 MMHG | HEART RATE: 61 BPM | SYSTOLIC BLOOD PRESSURE: 149 MMHG

## 2021-10-07 DIAGNOSIS — D64.9 ANEMIA, UNSPECIFIED TYPE: Primary | ICD-10-CM

## 2021-10-07 DIAGNOSIS — D64.9 ANEMIA, UNSPECIFIED TYPE: ICD-10-CM

## 2021-10-07 LAB
BASOPHILS # BLD AUTO: 0 10*3/MM3 (ref 0–0.2)
BASOPHILS NFR BLD AUTO: 0.7 % (ref 0–1.5)
DEPRECATED RDW RBC AUTO: 45.1 FL (ref 37–54)
EOSINOPHIL # BLD AUTO: 0.3 10*3/MM3 (ref 0–0.4)
EOSINOPHIL NFR BLD AUTO: 3.8 % (ref 0.3–6.2)
ERYTHROCYTE [DISTWIDTH] IN BLOOD BY AUTOMATED COUNT: 14.2 % (ref 12.3–15.4)
FERRITIN SERPL-MCNC: 81.54 NG/ML (ref 30–400)
HCT VFR BLD AUTO: 37.3 % (ref 37.5–51)
HGB BLD-MCNC: 12.6 G/DL (ref 13–17.7)
IRON 24H UR-MRATE: 47 MCG/DL (ref 59–158)
IRON SATN MFR SERPL: 16 % (ref 20–50)
LDH SERPL-CCNC: 178 U/L (ref 135–225)
LYMPHOCYTES # BLD AUTO: 2.7 10*3/MM3 (ref 0.7–3.1)
LYMPHOCYTES NFR BLD AUTO: 38.8 % (ref 19.6–45.3)
MCH RBC QN AUTO: 30.5 PG (ref 26.6–33)
MCHC RBC AUTO-ENTMCNC: 33.8 G/DL (ref 31.5–35.7)
MCV RBC AUTO: 90.3 FL (ref 79–97)
MONOCYTES # BLD AUTO: 0.4 10*3/MM3 (ref 0.1–0.9)
MONOCYTES NFR BLD AUTO: 5.8 % (ref 5–12)
NEUTROPHILS NFR BLD AUTO: 3.5 10*3/MM3 (ref 1.7–7)
NEUTROPHILS NFR BLD AUTO: 50.9 % (ref 42.7–76)
NRBC BLD AUTO-RTO: 0.1 /100 WBC (ref 0–0.2)
PLATELET # BLD AUTO: 184 10*3/MM3 (ref 140–450)
PMV BLD AUTO: 9.1 FL (ref 6–12)
RBC # BLD AUTO: 4.13 10*6/MM3 (ref 4.14–5.8)
TIBC SERPL-MCNC: 299 MCG/DL (ref 298–536)
TRANSFERRIN SERPL-MCNC: 201 MG/DL (ref 200–360)
WBC # BLD AUTO: 7 10*3/MM3 (ref 3.4–10.8)

## 2021-10-07 PROCEDURE — 99214 OFFICE O/P EST MOD 30 MIN: CPT | Performed by: INTERNAL MEDICINE

## 2021-10-07 PROCEDURE — 84466 ASSAY OF TRANSFERRIN: CPT | Performed by: INTERNAL MEDICINE

## 2021-10-07 PROCEDURE — 36415 COLL VENOUS BLD VENIPUNCTURE: CPT | Performed by: INTERNAL MEDICINE

## 2021-10-07 PROCEDURE — 82728 ASSAY OF FERRITIN: CPT | Performed by: INTERNAL MEDICINE

## 2021-10-07 PROCEDURE — 83540 ASSAY OF IRON: CPT | Performed by: INTERNAL MEDICINE

## 2021-10-07 PROCEDURE — 83615 LACTATE (LD) (LDH) ENZYME: CPT | Performed by: INTERNAL MEDICINE

## 2021-10-07 PROCEDURE — 85025 COMPLETE CBC W/AUTO DIFF WBC: CPT | Performed by: INTERNAL MEDICINE

## 2021-10-07 NOTE — PROGRESS NOTES
Venipuncture Blood Specimen Collection  Venipuncture performed in [place] by Neil Bhardwaj MA with good hemostasis.    10/07/21   Neil Bhardwaj MA

## 2021-10-11 ENCOUNTER — TELEPHONE (OUTPATIENT)
Dept: ONCOLOGY | Facility: CLINIC | Age: 76
End: 2021-10-11

## 2021-10-11 NOTE — TELEPHONE ENCOUNTER
CALLED AND LVM ON DAUGHTER REECE'S CELL TO PLEASE CALL ME TO GET [facility] SCHEDULED FOR INFECTAFER.

## 2021-10-11 NOTE — TELEPHONE ENCOUNTER
PATIENT'S DAUGHTER RETURNED MY CALL AND I SCHEDULED [facility] FOR INJECTAFER X2 FOR 10/15 AND 10/22 @3PM, DAUGHTER V/U

## 2021-10-15 ENCOUNTER — HOSPITAL ENCOUNTER (OUTPATIENT)
Dept: ONCOLOGY | Facility: HOSPITAL | Age: 76
Setting detail: INFUSION SERIES
Discharge: HOME | End: 2021-10-15

## 2021-10-15 VITALS
HEART RATE: 54 BPM | HEIGHT: 60 IN | TEMPERATURE: 97.3 F | BODY MASS INDEX: 25.27 KG/M2 | WEIGHT: 128.7 LBS | RESPIRATION RATE: 18 BRPM | SYSTOLIC BLOOD PRESSURE: 122 MMHG | DIASTOLIC BLOOD PRESSURE: 71 MMHG

## 2021-10-15 DIAGNOSIS — D50.9 IRON DEFICIENCY ANEMIA, UNSPECIFIED IRON DEFICIENCY ANEMIA TYPE: Primary | ICD-10-CM

## 2021-10-15 DIAGNOSIS — K90.9 MALABSORPTION OF IRON: ICD-10-CM

## 2021-10-15 PROCEDURE — 96365 THER/PROPH/DIAG IV INF INIT: CPT

## 2021-10-15 PROCEDURE — 25010000002 FERRIC CARBOXYMALTOSE 750 MG/15ML SOLUTION 15 ML VIAL: Performed by: INTERNAL MEDICINE

## 2021-10-15 PROCEDURE — 36415 COLL VENOUS BLD VENIPUNCTURE: CPT

## 2021-10-15 RX ORDER — SODIUM CHLORIDE 9 MG/ML
250 INJECTION, SOLUTION INTRAVENOUS ONCE
Status: COMPLETED | OUTPATIENT
Start: 2021-10-15 | End: 2021-10-15

## 2021-10-15 RX ADMIN — SODIUM CHLORIDE 250 ML: 9 INJECTION, SOLUTION INTRAVENOUS at 15:56

## 2021-10-15 RX ADMIN — FERRIC CARBOXYMALTOSE INJECTION 750 MG: 50 INJECTION, SOLUTION INTRAVENOUS at 15:56

## 2021-10-15 NOTE — PROGRESS NOTES
Patient came in today for Injectafer. Patient denies issues or questions regarding todays treatment. treatment tolerated, denies further needs today. Next appts given, daughter at bedside.

## 2021-10-22 ENCOUNTER — HOSPITAL ENCOUNTER (OUTPATIENT)
Dept: ONCOLOGY | Facility: HOSPITAL | Age: 76
Setting detail: INFUSION SERIES
Discharge: HOME | End: 2021-10-22

## 2021-10-22 VITALS
WEIGHT: 130 LBS | SYSTOLIC BLOOD PRESSURE: 110 MMHG | RESPIRATION RATE: 18 BRPM | DIASTOLIC BLOOD PRESSURE: 65 MMHG | HEART RATE: 60 BPM | TEMPERATURE: 96.6 F | BODY MASS INDEX: 25.52 KG/M2 | HEIGHT: 60 IN

## 2021-10-22 DIAGNOSIS — D50.9 IRON DEFICIENCY ANEMIA, UNSPECIFIED IRON DEFICIENCY ANEMIA TYPE: Primary | ICD-10-CM

## 2021-10-22 DIAGNOSIS — K90.9 MALABSORPTION OF IRON: ICD-10-CM

## 2021-10-22 PROCEDURE — 36415 COLL VENOUS BLD VENIPUNCTURE: CPT

## 2021-10-22 PROCEDURE — 96365 THER/PROPH/DIAG IV INF INIT: CPT

## 2021-10-22 PROCEDURE — 25010000002 FERRIC CARBOXYMALTOSE 750 MG/15ML SOLUTION 15 ML VIAL: Performed by: INTERNAL MEDICINE

## 2021-10-22 RX ORDER — SODIUM CHLORIDE 9 MG/ML
250 INJECTION, SOLUTION INTRAVENOUS ONCE
Status: COMPLETED | OUTPATIENT
Start: 2021-10-22 | End: 2021-10-22

## 2021-10-22 RX ADMIN — FERRIC CARBOXYMALTOSE INJECTION 750 MG: 50 INJECTION, SOLUTION INTRAVENOUS at 15:11

## 2021-10-22 RX ADMIN — SODIUM CHLORIDE 250 ML: 9 INJECTION, SOLUTION INTRAVENOUS at 15:11

## 2021-11-02 RX ORDER — CARVEDILOL 25 MG/1
25 TABLET ORAL 2 TIMES DAILY WITH MEALS
Qty: 180 TABLET | Refills: 1 | Status: SHIPPED | OUTPATIENT
Start: 2021-11-02 | End: 2022-05-16

## 2021-11-02 NOTE — TELEPHONE ENCOUNTER
Rx Refill Note  Requested Prescriptions     Pending Prescriptions Disp Refills   • carvedilol (COREG) 25 MG tablet 180 tablet 1     Sig: Take 1 tablet by mouth 2 (Two) Times a Day With Meals.      Last office visit with prescribing clinician: 9/29/2021      Next office visit with prescribing clinician: 11/22/2021            Aj Ray Rep  11/02/21, 11:33 EDT

## 2021-11-12 DIAGNOSIS — C85.90 NON-HODGKIN'S LYMPHOMA, UNSPECIFIED BODY REGION, UNSPECIFIED NON-HODGKIN LYMPHOMA TYPE (HCC): ICD-10-CM

## 2021-11-12 DIAGNOSIS — M81.8 OTHER OSTEOPOROSIS WITHOUT CURRENT PATHOLOGICAL FRACTURE: ICD-10-CM

## 2021-11-12 DIAGNOSIS — R11.0 NAUSEA: ICD-10-CM

## 2021-11-18 RX ORDER — IBANDRONATE SODIUM 150 MG/1
150 TABLET, FILM COATED ORAL
Qty: 1 TABLET | Refills: 5 | Status: SHIPPED | OUTPATIENT
Start: 2021-11-18 | End: 2023-01-03

## 2021-11-22 ENCOUNTER — OFFICE VISIT (OUTPATIENT)
Dept: FAMILY MEDICINE CLINIC | Facility: CLINIC | Age: 76
End: 2021-11-22

## 2021-11-22 VITALS
SYSTOLIC BLOOD PRESSURE: 120 MMHG | HEIGHT: 60 IN | DIASTOLIC BLOOD PRESSURE: 60 MMHG | OXYGEN SATURATION: 99 % | TEMPERATURE: 97.3 F | BODY MASS INDEX: 25.72 KG/M2 | WEIGHT: 131 LBS | HEART RATE: 60 BPM

## 2021-11-22 DIAGNOSIS — I10 PRIMARY HYPERTENSION: ICD-10-CM

## 2021-11-22 DIAGNOSIS — E11.65 TYPE 2 DIABETES MELLITUS WITH HYPERGLYCEMIA, WITHOUT LONG-TERM CURRENT USE OF INSULIN (HCC): Primary | ICD-10-CM

## 2021-11-22 LAB
EXPIRATION DATE: NORMAL
GLUCOSE BLDC GLUCOMTR-MCNC: 123 MG/DL (ref 70–130)
HBA1C MFR BLD: 7.8 %
Lab: NORMAL

## 2021-11-22 PROCEDURE — 99213 OFFICE O/P EST LOW 20 MIN: CPT | Performed by: NURSE PRACTITIONER

## 2021-11-22 PROCEDURE — 83036 HEMOGLOBIN GLYCOSYLATED A1C: CPT | Performed by: NURSE PRACTITIONER

## 2021-11-22 PROCEDURE — 82962 GLUCOSE BLOOD TEST: CPT | Performed by: NURSE PRACTITIONER

## 2021-12-29 RX ORDER — LOSARTAN POTASSIUM 50 MG/1
TABLET ORAL
Qty: 90 TABLET | Refills: 0 | Status: SHIPPED | OUTPATIENT
Start: 2021-12-29 | End: 2022-04-13

## 2021-12-29 NOTE — TELEPHONE ENCOUNTER
Rx Refill Note  Requested Prescriptions     Pending Prescriptions Disp Refills   • Diclofenac Sodium (VOLTAREN) 1 % gel gel [Pharmacy Med Name: diclofenac 1 % topical gel] 350 g 3     Sig: APPLY 4 GRAMS TO THE APPROPRIATE AREA FOUR TIMES DAILY AS DIRECTED     Signed Prescriptions Disp Refills   • losartan (COZAAR) 50 MG tablet 90 tablet 0     Sig: TAKE ONE TABLET BY MOUTH DAILY     Authorizing Provider: BOB MARTINEZ     Ordering User: DEVIKA MARTINS      Last office visit with prescribing clinician: 11/22/2021      Next office visit with prescribing clinician: 2/22/2022            Devika Martins LPN  12/29/21, 14:01 EST

## 2022-01-13 ENCOUNTER — CLINICAL SUPPORT (OUTPATIENT)
Dept: FAMILY MEDICINE CLINIC | Facility: CLINIC | Age: 77
End: 2022-01-13

## 2022-01-13 DIAGNOSIS — Z23 FLU VACCINE NEED: Primary | ICD-10-CM

## 2022-01-13 PROCEDURE — 90662 IIV NO PRSV INCREASED AG IM: CPT | Performed by: NURSE PRACTITIONER

## 2022-01-13 PROCEDURE — 90471 IMMUNIZATION ADMIN: CPT | Performed by: NURSE PRACTITIONER

## 2022-01-25 RX ORDER — DAPAGLIFLOZIN 10 MG/1
TABLET, FILM COATED ORAL
Qty: 30 TABLET | Refills: 5 | Status: SHIPPED | OUTPATIENT
Start: 2022-01-25 | End: 2022-07-06 | Stop reason: HOSPADM

## 2022-02-22 ENCOUNTER — OFFICE VISIT (OUTPATIENT)
Dept: FAMILY MEDICINE CLINIC | Facility: CLINIC | Age: 77
End: 2022-02-22

## 2022-02-22 VITALS
TEMPERATURE: 97.7 F | HEART RATE: 60 BPM | DIASTOLIC BLOOD PRESSURE: 61 MMHG | RESPIRATION RATE: 18 BRPM | BODY MASS INDEX: 25.91 KG/M2 | WEIGHT: 132 LBS | OXYGEN SATURATION: 99 % | SYSTOLIC BLOOD PRESSURE: 149 MMHG | HEIGHT: 60 IN

## 2022-02-22 DIAGNOSIS — E11.65 TYPE 2 DIABETES MELLITUS WITH HYPERGLYCEMIA, WITHOUT LONG-TERM CURRENT USE OF INSULIN: Primary | ICD-10-CM

## 2022-02-22 DIAGNOSIS — E78.2 MIXED HYPERLIPIDEMIA: ICD-10-CM

## 2022-02-22 DIAGNOSIS — E55.9 VITAMIN D DEFICIENCY: ICD-10-CM

## 2022-02-22 DIAGNOSIS — R53.83 OTHER FATIGUE: ICD-10-CM

## 2022-02-22 DIAGNOSIS — E53.8 VITAMIN B12 DEFICIENCY: ICD-10-CM

## 2022-02-22 PROCEDURE — 99214 OFFICE O/P EST MOD 30 MIN: CPT | Performed by: NURSE PRACTITIONER

## 2022-03-31 ENCOUNTER — TELEPHONE (OUTPATIENT)
Dept: ONCOLOGY | Facility: CLINIC | Age: 77
End: 2022-03-31

## 2022-03-31 NOTE — TELEPHONE ENCOUNTER
LVM ON PATIENT'S DAUGHTER'S CELL TO RETURN MY CALL WE ARE NEEDING TO R/S THE 04/07 APPT IN [place].

## 2022-04-07 ENCOUNTER — APPOINTMENT (OUTPATIENT)
Dept: ONCOLOGY | Facility: CLINIC | Age: 77
End: 2022-04-07

## 2022-04-13 RX ORDER — LOSARTAN POTASSIUM 50 MG/1
TABLET ORAL
Qty: 90 TABLET | Refills: 0 | Status: SHIPPED | OUTPATIENT
Start: 2022-04-13 | End: 2022-06-14 | Stop reason: SDUPTHER

## 2022-04-13 RX ORDER — ATORVASTATIN CALCIUM 40 MG/1
TABLET, FILM COATED ORAL
Qty: 90 TABLET | Refills: 0 | Status: SHIPPED | OUTPATIENT
Start: 2022-04-13 | End: 2022-07-27

## 2022-04-26 ENCOUNTER — TELEPHONE (OUTPATIENT)
Dept: ONCOLOGY | Facility: CLINIC | Age: 77
End: 2022-04-26

## 2022-04-26 NOTE — TELEPHONE ENCOUNTER
Caller: monster mccarthy    Relationship to patient: Emergency Contact    Best call back number: [phone]    Chief complaint: R/S FOLLOW UP [place] LOCATION HAS ANOTHER APPT THAT DAY     Type of visit: FOLLOW UP    Requested date: ANYTIME IN NEXT COUPLE WEEKS   AT THE [place] OFFICE PREFER AFTER NOON AFTER 1PM APPOINTMENT TIME     If rescheduling, when is the original appointment: 04/28    Additional notes:

## 2022-04-28 ENCOUNTER — APPOINTMENT (OUTPATIENT)
Dept: ONCOLOGY | Facility: CLINIC | Age: 77
End: 2022-04-28

## 2022-05-16 RX ORDER — CARVEDILOL 25 MG/1
TABLET ORAL
Qty: 180 TABLET | Refills: 1 | Status: SHIPPED | OUTPATIENT
Start: 2022-05-16 | End: 2022-12-02

## 2022-05-24 ENCOUNTER — OFFICE VISIT (OUTPATIENT)
Dept: FAMILY MEDICINE CLINIC | Facility: CLINIC | Age: 77
End: 2022-05-24

## 2022-05-24 VITALS
HEIGHT: 60 IN | SYSTOLIC BLOOD PRESSURE: 136 MMHG | RESPIRATION RATE: 18 BRPM | WEIGHT: 130 LBS | DIASTOLIC BLOOD PRESSURE: 65 MMHG | BODY MASS INDEX: 25.52 KG/M2 | OXYGEN SATURATION: 98 % | TEMPERATURE: 97.4 F | HEART RATE: 64 BPM

## 2022-05-24 DIAGNOSIS — Z13.1 SCREENING FOR DIABETES MELLITUS: ICD-10-CM

## 2022-05-24 DIAGNOSIS — E53.8 VITAMIN B12 DEFICIENCY: Primary | ICD-10-CM

## 2022-05-24 DIAGNOSIS — R30.0 DYSURIA: ICD-10-CM

## 2022-05-24 DIAGNOSIS — E78.2 MIXED HYPERLIPIDEMIA: ICD-10-CM

## 2022-05-24 DIAGNOSIS — Z12.5 SCREENING PSA (PROSTATE SPECIFIC ANTIGEN): ICD-10-CM

## 2022-05-24 DIAGNOSIS — E55.9 VITAMIN D DEFICIENCY: ICD-10-CM

## 2022-05-24 DIAGNOSIS — R53.83 OTHER FATIGUE: ICD-10-CM

## 2022-05-24 LAB
BILIRUB BLD-MCNC: NEGATIVE MG/DL
CLARITY, POC: CLEAR
COLOR UR: YELLOW
EXPIRATION DATE: ABNORMAL
GLUCOSE UR STRIP-MCNC: ABNORMAL MG/DL
KETONES UR QL: NEGATIVE
LEUKOCYTE EST, POC: NEGATIVE
Lab: ABNORMAL
NITRITE UR-MCNC: NEGATIVE MG/ML
PH UR: 6 [PH] (ref 5–8)
PROT UR STRIP-MCNC: NEGATIVE MG/DL
RBC # UR STRIP: NEGATIVE /UL
SP GR UR: 1.01 (ref 1–1.03)
UROBILINOGEN UR QL: NORMAL

## 2022-05-24 PROCEDURE — 81003 URINALYSIS AUTO W/O SCOPE: CPT | Performed by: NURSE PRACTITIONER

## 2022-05-24 PROCEDURE — 87186 SC STD MICRODIL/AGAR DIL: CPT | Performed by: NURSE PRACTITIONER

## 2022-05-24 PROCEDURE — 87077 CULTURE AEROBIC IDENTIFY: CPT | Performed by: NURSE PRACTITIONER

## 2022-05-24 PROCEDURE — 87086 URINE CULTURE/COLONY COUNT: CPT | Performed by: NURSE PRACTITIONER

## 2022-05-24 PROCEDURE — 99213 OFFICE O/P EST LOW 20 MIN: CPT | Performed by: NURSE PRACTITIONER

## 2022-05-25 ENCOUNTER — CLINICAL SUPPORT (OUTPATIENT)
Dept: FAMILY MEDICINE CLINIC | Facility: CLINIC | Age: 77
End: 2022-05-25

## 2022-05-25 ENCOUNTER — TELEPHONE (OUTPATIENT)
Dept: FAMILY MEDICINE CLINIC | Facility: CLINIC | Age: 77
End: 2022-05-25

## 2022-05-25 DIAGNOSIS — E78.2 MIXED HYPERLIPIDEMIA: ICD-10-CM

## 2022-05-25 DIAGNOSIS — Z12.5 SCREENING PSA (PROSTATE SPECIFIC ANTIGEN): ICD-10-CM

## 2022-05-25 DIAGNOSIS — R53.83 OTHER FATIGUE: ICD-10-CM

## 2022-05-25 DIAGNOSIS — E53.8 VITAMIN B12 DEFICIENCY: ICD-10-CM

## 2022-05-25 DIAGNOSIS — E55.9 VITAMIN D DEFICIENCY: ICD-10-CM

## 2022-05-25 DIAGNOSIS — Z13.1 SCREENING FOR DIABETES MELLITUS: ICD-10-CM

## 2022-05-25 LAB
25(OH)D3 SERPL-MCNC: 49 NG/ML (ref 30–100)
ALBUMIN SERPL-MCNC: 4.6 G/DL (ref 3.5–5.2)
ALBUMIN/GLOB SERPL: 2 G/DL
ALP SERPL-CCNC: 113 U/L (ref 39–117)
ALT SERPL W P-5'-P-CCNC: 13 U/L (ref 1–41)
ANION GAP SERPL CALCULATED.3IONS-SCNC: 15.5 MMOL/L (ref 5–15)
AST SERPL-CCNC: 20 U/L (ref 1–40)
BASOPHILS # BLD AUTO: 0.03 10*3/MM3 (ref 0–0.2)
BASOPHILS NFR BLD AUTO: 0.5 % (ref 0–1.5)
BILIRUB SERPL-MCNC: 0.3 MG/DL (ref 0–1.2)
BUN SERPL-MCNC: 22 MG/DL (ref 8–23)
BUN/CREAT SERPL: 21 (ref 7–25)
CALCIUM SPEC-SCNC: 9.3 MG/DL (ref 8.6–10.5)
CHLORIDE SERPL-SCNC: 103 MMOL/L (ref 98–107)
CHOLEST SERPL-MCNC: 136 MG/DL (ref 0–200)
CO2 SERPL-SCNC: 21.5 MMOL/L (ref 22–29)
CREAT SERPL-MCNC: 1.05 MG/DL (ref 0.76–1.27)
DEPRECATED RDW RBC AUTO: 40 FL (ref 37–54)
EGFRCR SERPLBLD CKD-EPI 2021: 73.1 ML/MIN/1.73
EOSINOPHIL # BLD AUTO: 0.25 10*3/MM3 (ref 0–0.4)
EOSINOPHIL NFR BLD AUTO: 4 % (ref 0.3–6.2)
ERYTHROCYTE [DISTWIDTH] IN BLOOD BY AUTOMATED COUNT: 12.2 % (ref 12.3–15.4)
GLOBULIN UR ELPH-MCNC: 2.3 GM/DL
GLUCOSE SERPL-MCNC: 101 MG/DL (ref 65–99)
HBA1C MFR BLD: 8.5 % (ref 3.5–5.6)
HCT VFR BLD AUTO: 34.6 % (ref 37.5–51)
HDLC SERPL-MCNC: 37 MG/DL (ref 40–60)
HGB BLD-MCNC: 11.7 G/DL (ref 13–17.7)
IMM GRANULOCYTES # BLD AUTO: 0.01 10*3/MM3 (ref 0–0.05)
IMM GRANULOCYTES NFR BLD AUTO: 0.2 % (ref 0–0.5)
LDLC SERPL CALC-MCNC: 68 MG/DL (ref 0–100)
LDLC/HDLC SERPL: 1.68 {RATIO}
LYMPHOCYTES # BLD AUTO: 1.91 10*3/MM3 (ref 0.7–3.1)
LYMPHOCYTES NFR BLD AUTO: 30.4 % (ref 19.6–45.3)
MCH RBC QN AUTO: 30.8 PG (ref 26.6–33)
MCHC RBC AUTO-ENTMCNC: 33.8 G/DL (ref 31.5–35.7)
MCV RBC AUTO: 91.1 FL (ref 79–97)
MONOCYTES # BLD AUTO: 0.52 10*3/MM3 (ref 0.1–0.9)
MONOCYTES NFR BLD AUTO: 8.3 % (ref 5–12)
NEUTROPHILS NFR BLD AUTO: 3.57 10*3/MM3 (ref 1.7–7)
NEUTROPHILS NFR BLD AUTO: 56.6 % (ref 42.7–76)
NRBC BLD AUTO-RTO: 0 /100 WBC (ref 0–0.2)
PLATELET # BLD AUTO: 184 10*3/MM3 (ref 140–450)
PMV BLD AUTO: 11.8 FL (ref 6–12)
POTASSIUM SERPL-SCNC: 4.1 MMOL/L (ref 3.5–5.2)
PROT SERPL-MCNC: 6.9 G/DL (ref 6–8.5)
PSA SERPL-MCNC: 1.52 NG/ML (ref 0–4)
RBC # BLD AUTO: 3.8 10*6/MM3 (ref 4.14–5.8)
SODIUM SERPL-SCNC: 140 MMOL/L (ref 136–145)
T4 FREE SERPL-MCNC: 1.39 NG/DL (ref 0.93–1.7)
TRIGL SERPL-MCNC: 184 MG/DL (ref 0–150)
TSH SERPL DL<=0.05 MIU/L-ACNC: 0.87 UIU/ML (ref 0.27–4.2)
VIT B12 BLD-MCNC: 228 PG/ML (ref 211–946)
VLDLC SERPL-MCNC: 31 MG/DL (ref 5–40)
WBC NRBC COR # BLD: 6.29 10*3/MM3 (ref 3.4–10.8)

## 2022-05-25 PROCEDURE — 82306 VITAMIN D 25 HYDROXY: CPT | Performed by: NURSE PRACTITIONER

## 2022-05-25 PROCEDURE — 82607 VITAMIN B-12: CPT | Performed by: NURSE PRACTITIONER

## 2022-05-25 PROCEDURE — 80061 LIPID PANEL: CPT | Performed by: NURSE PRACTITIONER

## 2022-05-25 PROCEDURE — 80053 COMPREHEN METABOLIC PANEL: CPT | Performed by: NURSE PRACTITIONER

## 2022-05-25 PROCEDURE — 83036 HEMOGLOBIN GLYCOSYLATED A1C: CPT | Performed by: NURSE PRACTITIONER

## 2022-05-25 PROCEDURE — 84443 ASSAY THYROID STIM HORMONE: CPT | Performed by: NURSE PRACTITIONER

## 2022-05-25 PROCEDURE — G0103 PSA SCREENING: HCPCS | Performed by: NURSE PRACTITIONER

## 2022-05-25 PROCEDURE — 84439 ASSAY OF FREE THYROXINE: CPT | Performed by: NURSE PRACTITIONER

## 2022-05-25 PROCEDURE — 36415 COLL VENOUS BLD VENIPUNCTURE: CPT | Performed by: NURSE PRACTITIONER

## 2022-05-25 PROCEDURE — 85025 COMPLETE CBC W/AUTO DIFF WBC: CPT | Performed by: NURSE PRACTITIONER

## 2022-05-25 NOTE — TELEPHONE ENCOUNTER
Pharmacy Name: [facility] - [place], [place] - [address] [facility] - [phone] [facility] [phone]      Pharmacy representative name: ADITYA    Pharmacy representative phone number: [phone]*    What medication are you calling in regards to: Diclofenac Sodium (VOLTAREN) 1 % gel gel    What question does the pharmacy have: ADITYA CALLING STATING THAT THE DIRECTIONS STATE TO APPLY 3 TIMES A DAY, BUT THIS MEDICATION IS DOSED 2 GRAMS FOR UPPER EXTREMITY AND 4 GRAMS FOR LOWER EXTREMITY, AND NEED TO KNOW CORRECT DOSING.    Who is the provider that prescribed the medication: CON TUCKER    Additional notes: PHARMACY REQUEST A CALL BACK.

## 2022-05-25 NOTE — PROGRESS NOTES
Venipuncture Blood Specimen Collection  Venipuncture performed in (R) ARM by Devika Clay LPN with good hemostasis. Patient tolerated the procedure well without complications.   05/25/22   Devika Clay LPN

## 2022-05-27 ENCOUNTER — TELEPHONE (OUTPATIENT)
Dept: FAMILY MEDICINE CLINIC | Facility: CLINIC | Age: 77
End: 2022-05-27

## 2022-05-27 DIAGNOSIS — R30.0 DYSURIA: Primary | ICD-10-CM

## 2022-05-27 DIAGNOSIS — R89.5 POSITIVE CULTURE FINDING: ICD-10-CM

## 2022-05-27 LAB — BACTERIA SPEC AEROBE CULT: ABNORMAL

## 2022-05-27 RX ORDER — CEFUROXIME AXETIL 250 MG/1
250 TABLET ORAL 2 TIMES DAILY
Qty: 20 TABLET | Refills: 0 | Status: SHIPPED | OUTPATIENT
Start: 2022-05-27 | End: 2022-06-06

## 2022-05-27 NOTE — TELEPHONE ENCOUNTER
Hub is instructed to read the documentation below to patient----- Message from CON Strickland sent at 5/26/2022  8:24 AM EDT -----  Repeat CBC in two weeks  Fax to oncologists that he sees  Other labs are stable     Did also have infection shown on U/A. Antibiotic sent in for that. Repeat U/A and CBC in 2 weeks       Thanks

## 2022-06-01 ENCOUNTER — TELEPHONE (OUTPATIENT)
Dept: FAMILY MEDICINE CLINIC | Facility: CLINIC | Age: 77
End: 2022-06-01

## 2022-06-01 NOTE — TELEPHONE ENCOUNTER
Hub staff attempted to follow warm transfer process and was unsuccessful     Caller: monster mccarthy    Relationship to patient: Emergency Contact    Best call back number: [phone]    Patient is needing: PATIENT WAS TOLD TO CALL AND SCHEDULE A URINALYSIS, PLEASE REACH OUT TO SCHEDULE FOR PATIENT  ANYDAY AFTER 1 OR 1:30

## 2022-06-09 NOTE — PROGRESS NOTES
Chief Complaint   Patient presents with   • Follow-up     3 month follow up, diabetes         Subjective   Ramon Silva is a 77 y.o.  male who presents today for diabetes, HTN  HPI   Ramon is here today to f/u on Diabetes and HTN  He reports that his blood sugars have been stabel   Ramon Silva  has a past medical history of Anemia, Arthralgia, Diabetes (HCC), Fatigue, GERD (gastroesophageal reflux disease), Hyperlipidemia, Hypertension, Liver lesion, Non Hodgkin's lymphoma (HCC), Osteoporosis, Personal history of testicular cancer, Vitamin D deficiency, and Weight loss.    No Known Allergies    Current Outpatient Medications:   •  aspirin 81 MG tablet, Take 81 mg by mouth Daily., Disp: , Rfl:   •  atorvastatin (LIPITOR) 40 MG tablet, TAKE ONE TABLET BY MOUTH AT BEDTIME, Disp: 90 tablet, Rfl: 0  •  Blood Glucose Monitoring Suppl (BLOOD GLUCOSE MONITOR SYSTEM) w/Device kit, BLOOD GLUCOSE MONITOR SYSTEM w/Device KIT, Disp: , Rfl:   •  carvedilol (COREG) 25 MG tablet, TAKE ONE TABLET BY MOUTH TWICE DAILY WITH MEALS, Disp: 180 tablet, Rfl: 1  •  Cetirizine HCl (ZyrTEC Allergy) 10 MG capsule, Take 10 mg by mouth Daily., Disp: , Rfl:   •  famotidine (PEPCID) 40 MG tablet, Take 1 tablet by mouth Daily., Disp: , Rfl:   •  Farxiga 10 MG tablet, TAKE ONE TABLET BY MOUTH EVERY DAY, Disp: 30 tablet, Rfl: 5  •  ferrous sulfate 324 (65 Fe) MG tablet delayed-release EC tablet, Take 1 tablet by mouth Daily With Breakfast., Disp: 90 tablet, Rfl: 1  •  finasteride (Proscar) 5 MG tablet, Take 1 tablet by mouth Daily., Disp: 90 tablet, Rfl: 0  •  ibandronate (BONIVA) 150 MG tablet, Take 1 tablet by mouth Every 30 (Thirty) Days., Disp: 1 tablet, Rfl: 5  •  losartan (COZAAR) 50 MG tablet, TAKE ONE TABLET BY MOUTH DAILY, Disp: 90 tablet, Rfl: 0  •  metFORMIN ER (GLUCOPHAGE-XR) 500 MG 24 hr tablet, TAKE TWO TABLETS IN THE MORNING AND 2 TABLETS IN THE EVENING, Disp: 360 tablet, Rfl: 1  •  Omega-3 Fatty Acids (OMEGA-3 FISH OIL PO),  Take 1 capsule by mouth Daily., Disp: , Rfl:   •  pantoprazole (PROTONIX) 40 MG EC tablet, Take 1 tablet by mouth Daily., Disp: 90 tablet, Rfl: 1  •  predniSONE (DELTASONE) 50 MG tablet, , Disp: , Rfl:   •  Diclofenac Sodium (VOLTAREN) 1 % gel gel, Apply  topically to the appropriate area as directed 4 (Four) Times a Day for 30 days., Disp: 100 g, Rfl: 0  Past Medical History:   Diagnosis Date   • Anemia    • Arthralgia    • Diabetes (HCC)    • Fatigue    • GERD (gastroesophageal reflux disease)    • Hyperlipidemia    • Hypertension    • Liver lesion    • Non Hodgkin's lymphoma (HCC)    • Osteoporosis    • Personal history of testicular cancer    • Vitamin D deficiency    • Weight loss      Past Surgical History:   Procedure Laterality Date   • HIP SURGERY  2005   • PORTACATH PLACEMENT     • TESTICLE SURGERY Right 07/17/2000    right testicular excision     Social History     Socioeconomic History   • Marital status:    Tobacco Use   • Smoking status: Never Smoker   • Smokeless tobacco: Never Used   Substance and Sexual Activity   • Alcohol use: No   • Drug use: No   • Sexual activity: Defer     Family History   Family history unknown: Yes     PHQ-2 Depression Screening  Little interest or pleasure in doing things?     Feeling down, depressed, or hopeless?     PHQ-2 Total Score       PHQ-9 Depression Screening  Little interest or pleasure in doing things?     Feeling down, depressed, or hopeless?     Trouble falling or staying asleep, or sleeping too much?     Feeling tired or having little energy?     Poor appetite or overeating?     Feeling bad about yourself - or that you are a failure or have let yourself or your family down?     Trouble concentrating on things, such as reading the newspaper or watching television?     Moving or speaking so slowly that other people could have noticed? Or the opposite - being so fidgety or restless that you have been moving around a lot more than usual?     Thoughts that  "you would be better off dead, or of hurting yourself in some way?     PHQ-9 Total Score     If you checked off any problems, how difficult have these problems made it for you to do your work, take care of things at home, or get along with other people?         Family history, surgical history, past medical history, Allergies and med's reviewed with patient today and updated in [facility] EMR.     ROS:  Review of Systems    OBJECTIVE:  Vitals:    05/24/22 1551   BP: 136/65   Pulse: 64   Resp: 18   Temp: 97.4 °F (36.3 °C)   SpO2: 98%   Weight: 59 kg (130 lb)   Height: 152.4 cm (60\")     Body mass index is 25.39 kg/m².  Physical Exam    ASSESSMENT/ PLAN:    Diagnoses and all orders for this visit:    1. Vitamin B12 deficiency (Primary)  -     Vitamin B12; Future    2. Vitamin D deficiency  -     Vitamin D 25 Hydroxy; Future    3. Mixed hyperlipidemia  -     Comprehensive Metabolic Panel; Future  -     Lipid Panel; Future    4. Other fatigue  -     TSH; Future  -     T4, Free; Future  -     CBC & Differential; Future    5. Screening for diabetes mellitus  -     Hemoglobin A1c; Future    6. Screening PSA (prostate specific antigen)  -     PSA SCREENING; Future    7. Dysuria  -     Cancel: Urinalysis With Culture If Indicated -; Future  -     POCT urinalysis dipstick, automated  -     Urine Culture - Urine, Urine, Clean Catch; Future  -     Urine Culture - Urine, Urine, Clean Catch    Other orders  -     Discontinue: Diclofenac Sodium (VOLTAREN) 1 % gel gel; Apply  topically to the appropriate area as directed 3 (Three) Times a Day for 30 days.  Dispense: 100 g; Refill: 0        Orders Placed Today:     No orders of the defined types were placed in this encounter.       Management Plan:     An After Visit Summary was printed and given to the patient at discharge.    Follow-up: Return in about 3 months (around 8/24/2022) for Recheck.    CON Delgado 6/13/2022 08:11 EDT  This note was electronically signed.        "

## 2022-06-10 ENCOUNTER — CLINICAL SUPPORT (OUTPATIENT)
Dept: FAMILY MEDICINE CLINIC | Facility: CLINIC | Age: 77
End: 2022-06-10

## 2022-06-10 DIAGNOSIS — R89.5 POSITIVE CULTURE FINDING: Primary | ICD-10-CM

## 2022-06-10 PROCEDURE — 87086 URINE CULTURE/COLONY COUNT: CPT | Performed by: NURSE PRACTITIONER

## 2022-06-11 LAB — BACTERIA SPEC AEROBE CULT: NORMAL

## 2022-06-13 ENCOUNTER — TELEPHONE (OUTPATIENT)
Dept: FAMILY MEDICINE CLINIC | Facility: CLINIC | Age: 77
End: 2022-06-13

## 2022-06-13 NOTE — TELEPHONE ENCOUNTER
Augustine is instructed to read the documentation below to patient    Please tell Ramon to schedule an appointment for a repeat UA. Thank you     ----- Message from CON Strickland sent at 6/13/2022  8:20 AM EDT -----  Needs urine repeated

## 2022-06-14 DIAGNOSIS — E11.65 TYPE 2 DIABETES MELLITUS WITH HYPERGLYCEMIA, WITHOUT LONG-TERM CURRENT USE OF INSULIN: ICD-10-CM

## 2022-06-14 RX ORDER — LOSARTAN POTASSIUM 50 MG/1
50 TABLET ORAL DAILY
Qty: 90 TABLET | Refills: 0 | Status: SHIPPED | OUTPATIENT
Start: 2022-06-14 | End: 2022-10-03

## 2022-06-14 RX ORDER — GABAPENTIN 100 MG/1
100 CAPSULE ORAL 3 TIMES DAILY
COMMUNITY
End: 2022-06-14

## 2022-06-14 RX ORDER — METFORMIN HYDROCHLORIDE 500 MG/1
TABLET, EXTENDED RELEASE ORAL
Qty: 360 TABLET | Refills: 1 | Status: SHIPPED | OUTPATIENT
Start: 2022-06-14 | End: 2022-07-06 | Stop reason: HOSPADM

## 2022-06-14 RX ORDER — GABAPENTIN 100 MG/1
100 CAPSULE ORAL NIGHTLY
Qty: 90 CAPSULE | Refills: 0 | Status: SHIPPED | OUTPATIENT
Start: 2022-06-14 | End: 2022-09-22 | Stop reason: ALTCHOICE

## 2022-06-14 RX ORDER — GABAPENTIN 100 MG/1
100 CAPSULE ORAL
COMMUNITY
End: 2022-06-14 | Stop reason: SDUPTHER

## 2022-06-14 NOTE — TELEPHONE ENCOUNTER
Rx Refill Note  Requested Prescriptions     Pending Prescriptions Disp Refills   • metFORMIN ER (GLUCOPHAGE-XR) 500 MG 24 hr tablet 360 tablet 1     Sig: TAKE TWO TABLETS IN THE MORNING AND 2 TABLETS IN THE EVENING   • losartan (COZAAR) 50 MG tablet 90 tablet 0     Sig: Take 1 tablet by mouth Daily.   • gabapentin (NEURONTIN) 100 MG capsule       Sig: Take 1 capsule by mouth.      Last office visit with prescribing clinician: 5/24/2022      Next office visit with prescribing clinician: 8/25/2022            Aj Ray Rep  06/14/22, 08:20 EDT

## 2022-06-21 ENCOUNTER — OFFICE VISIT (OUTPATIENT)
Dept: FAMILY MEDICINE CLINIC | Facility: CLINIC | Age: 77
End: 2022-06-21

## 2022-06-21 VITALS
DIASTOLIC BLOOD PRESSURE: 78 MMHG | WEIGHT: 130 LBS | HEART RATE: 65 BPM | BODY MASS INDEX: 25.52 KG/M2 | RESPIRATION RATE: 18 BRPM | OXYGEN SATURATION: 97 % | HEIGHT: 60 IN | TEMPERATURE: 98.2 F | SYSTOLIC BLOOD PRESSURE: 157 MMHG

## 2022-06-21 DIAGNOSIS — L08.9 INFECTION OF RIGHT FOOT: Primary | ICD-10-CM

## 2022-06-21 DIAGNOSIS — S90.426A BLISTER OF FOURTH TOE: ICD-10-CM

## 2022-06-21 PROCEDURE — 99214 OFFICE O/P EST MOD 30 MIN: CPT | Performed by: REGISTERED NURSE

## 2022-06-21 RX ORDER — SULFAMETHOXAZOLE AND TRIMETHOPRIM 800; 160 MG/1; MG/1
1 TABLET ORAL 2 TIMES DAILY
Qty: 20 TABLET | Refills: 0 | Status: SHIPPED | OUTPATIENT
Start: 2022-06-21 | End: 2022-06-30 | Stop reason: ALTCHOICE

## 2022-06-21 NOTE — PROGRESS NOTES
Chief Complaint  Foot Injury (Sores between toes on right foot. Has been sore for 4 days. Today the pain is much worse. )    Subjective    History of Present Illness {CC  Problem List  Visit  Diagnosis   Encounters  Notes  Medications  Labs  Result Review Imaging  Media :23}     Ramon Silva presents to [facility] for Foot Injury (Sores between toes on right foot. Has been sore for 4 days. Today the pain is much worse. ).    Patient is 77-year-old male who presents to the clinic today with concerns of a wound on his fourth toe of the right foot and 3 days.  Patient denies any chest pain, shortness of breath, or any fevers.  Patient denies any known exposure to COVID, flu, or any other contagious illnesses.    Regarding wound on right foot fourth toe, patient shares that he had a blister on his toe over 3 days ago.  He then accidentally popped a blister on his toe.  He has been cleaning the blister and it has become more continually tender and sore over the last 3 days.  Last night his son cleaned it with 70% alcohol.  He is now having extensive pain in his foot and the area around the former blister looks erythematous.  Patient has been putting triple antibiotic ointment on this wound and covering with a Band-Aid.         Review of Systems   Constitutional: Negative.  Negative for activity change, chills, fatigue and fever.   HENT: Negative.  Negative for congestion, dental problem, ear pain, hearing loss, rhinorrhea, sinus pain, sore throat, tinnitus and trouble swallowing.    Eyes: Negative.  Negative for pain and visual disturbance.   Respiratory: Negative.  Negative for cough, chest tightness, shortness of breath and wheezing.    Cardiovascular: Negative.  Negative for chest pain, palpitations and leg swelling.   Gastrointestinal: Negative.  Negative for abdominal pain, diarrhea, nausea and vomiting.   Endocrine: Negative.  Negative for polydipsia, polyphagia and  "polyuria.   Genitourinary: Negative.  Negative for difficulty urinating, dysuria, frequency and urgency.   Musculoskeletal: Negative.  Negative for arthralgias, back pain and myalgias.   Skin: Positive for wound (Right 4th toe opened blister). Negative for color change, pallor and rash.   Allergic/Immunologic: Negative.  Negative for environmental allergies.   Neurological: Negative.  Negative for dizziness, speech difficulty, weakness, light-headedness, numbness and headaches.   Hematological: Negative.    Psychiatric/Behavioral: Negative.  Negative for confusion, decreased concentration, self-injury and suicidal ideas. The patient is not nervous/anxious.    All other systems reviewed and are negative.       Objective     Vital Signs:   /78 (BP Location: Right arm, Patient Position: Sitting, Cuff Size: Adult)   Pulse 65   Temp 98.2 °F (36.8 °C) (Infrared)   Resp 18   Ht 152.4 cm (60\")   Wt 59 kg (130 lb)   SpO2 97%   BMI 25.39 kg/m²     Past Medical History:   Diagnosis Date   • Anemia    • Arthralgia    • Diabetes (HCC)    • Fatigue    • GERD (gastroesophageal reflux disease)    • Hyperlipidemia    • Hypertension    • Liver lesion    • Non Hodgkin's lymphoma (HCC)    • Osteoporosis    • Personal history of testicular cancer    • Vitamin D deficiency    • Weight loss       Past Surgical History:   Procedure Laterality Date   • HIP SURGERY  2005   • PORTACATH PLACEMENT     • TESTICLE SURGERY Right 07/17/2000    right testicular excision      Family History   Family history unknown: Yes      Social History     Socioeconomic History   • Marital status:    Tobacco Use   • Smoking status: Never Smoker   • Smokeless tobacco: Never Used   Substance and Sexual Activity   • Alcohol use: No   • Drug use: No   • Sexual activity: Defer         Clinical Support on 06/10/2022   Component Date Value Ref Range Status   • Urine Culture 06/10/2022 25,000 CFU/mL Mixed Aziza Isolated   Final   Clinical Support on " 05/25/2022   Component Date Value Ref Range Status   • Glucose 05/25/2022 101 (A) 65 - 99 mg/dL Final   • BUN 05/25/2022 22  8 - 23 mg/dL Final   • Creatinine 05/25/2022 1.05  0.76 - 1.27 mg/dL Final   • Sodium 05/25/2022 140  136 - 145 mmol/L Final   • Potassium 05/25/2022 4.1  3.5 - 5.2 mmol/L Final   • Chloride 05/25/2022 103  98 - 107 mmol/L Final   • CO2 05/25/2022 21.5 (A) 22.0 - 29.0 mmol/L Final   • Calcium 05/25/2022 9.3  8.6 - 10.5 mg/dL Final   • Total Protein 05/25/2022 6.9  6.0 - 8.5 g/dL Final   • Albumin 05/25/2022 4.60  3.50 - 5.20 g/dL Final   • ALT (SGPT) 05/25/2022 13  1 - 41 U/L Final   • AST (SGOT) 05/25/2022 20  1 - 40 U/L Final   • Alkaline Phosphatase 05/25/2022 113  39 - 117 U/L Final   • Total Bilirubin 05/25/2022 0.3  0.0 - 1.2 mg/dL Final   • Globulin 05/25/2022 2.3  gm/dL Final   • A/G Ratio 05/25/2022 2.0  g/dL Final   • BUN/Creatinine Ratio 05/25/2022 21.0  7.0 - 25.0 Final   • Anion Gap 05/25/2022 15.5 (A) 5.0 - 15.0 mmol/L Final   • eGFR 05/25/2022 73.1  >60.0 mL/min/1.73 Final    National Kidney Foundation and American Society of Nephrology (ASN) Task Force recommended calculation based on the Chronic Kidney Disease Epidemiology Collaboration (CKD-EPI) equation refit without adjustment for race.   • Total Cholesterol 05/25/2022 136  0 - 200 mg/dL Final   • Triglycerides 05/25/2022 184 (A) 0 - 150 mg/dL Final   • HDL Cholesterol 05/25/2022 37 (A) 40 - 60 mg/dL Final   • LDL Cholesterol  05/25/2022 68  0 - 100 mg/dL Final   • VLDL Cholesterol 05/25/2022 31  5 - 40 mg/dL Final   • LDL/HDL Ratio 05/25/2022 1.68   Final   • TSH 05/25/2022 0.872  0.270 - 4.200 uIU/mL Final   • Free T4 05/25/2022 1.39  0.93 - 1.70 ng/dL Final   • Vitamin B-12 05/25/2022 228  211 - 946 pg/mL Final   • 25 Hydroxy, Vitamin D 05/25/2022 49.0  30.0 - 100.0 ng/ml Final   • Hemoglobin A1C 05/25/2022 8.5 (A) 3.5 - 5.6 % Final   • PSA 05/25/2022 1.520  0.000 - 4.000 ng/mL Final   • WBC 05/25/2022 6.29  3.40 - 10.80  10*3/mm3 Final   • RBC 05/25/2022 3.80 (A) 4.14 - 5.80 10*6/mm3 Final   • Hemoglobin 05/25/2022 11.7 (A) 13.0 - 17.7 g/dL Final   • Hematocrit 05/25/2022 34.6 (A) 37.5 - 51.0 % Final   • MCV 05/25/2022 91.1  79.0 - 97.0 fL Final   • MCH 05/25/2022 30.8  26.6 - 33.0 pg Final   • MCHC 05/25/2022 33.8  31.5 - 35.7 g/dL Final   • RDW 05/25/2022 12.2 (A) 12.3 - 15.4 % Final   • RDW-SD 05/25/2022 40.0  37.0 - 54.0 fl Final   • MPV 05/25/2022 11.8  6.0 - 12.0 fL Final   • Platelets 05/25/2022 184  140 - 450 10*3/mm3 Final   • Neutrophil % 05/25/2022 56.6  42.7 - 76.0 % Final   • Lymphocyte % 05/25/2022 30.4  19.6 - 45.3 % Final   • Monocyte % 05/25/2022 8.3  5.0 - 12.0 % Final   • Eosinophil % 05/25/2022 4.0  0.3 - 6.2 % Final   • Basophil % 05/25/2022 0.5  0.0 - 1.5 % Final   • Immature Grans % 05/25/2022 0.2  0.0 - 0.5 % Final   • Neutrophils, Absolute 05/25/2022 3.57  1.70 - 7.00 10*3/mm3 Final   • Lymphocytes, Absolute 05/25/2022 1.91  0.70 - 3.10 10*3/mm3 Final   • Monocytes, Absolute 05/25/2022 0.52  0.10 - 0.90 10*3/mm3 Final   • Eosinophils, Absolute 05/25/2022 0.25  0.00 - 0.40 10*3/mm3 Final   • Basophils, Absolute 05/25/2022 0.03  0.00 - 0.20 10*3/mm3 Final   • Immature Grans, Absolute 05/25/2022 0.01  0.00 - 0.05 10*3/mm3 Final   • nRBC 05/25/2022 0.0  0.0 - 0.2 /100 WBC Final   Office Visit on 05/24/2022   Component Date Value Ref Range Status   • Color 05/24/2022 Yellow  Yellow, Straw, Dark Yellow, Alissa Final   • Clarity, UA 05/24/2022 Clear  Clear Final   • Specific Gravity  05/24/2022 1.015  1.005 - 1.030 Final   • pH, Urine 05/24/2022 6.0  5.0 - 8.0 Final   • Leukocytes 05/24/2022 Negative  Negative Final   • Nitrite, UA 05/24/2022 Negative  Negative Final   • Protein, POC 05/24/2022 Negative  Negative mg/dL Final   • Glucose, UA 05/24/2022 2+ (A) Negative, 1000 mg/dL (3+) mg/dL Final   • Ketones, UA 05/24/2022 Negative  Negative Final   • Urobilinogen, UA 05/24/2022 Normal  Normal Final   • Bilirubin  05/24/2022 Negative  Negative Final   • Blood, UA 05/24/2022 Negative  Negative Final   • Lot Number 05/24/2022 [id number]   Final   • Expiration Date 05/24/2022 08.31.2022   Final   • Urine Culture 05/24/2022 >100,000 CFU/mL Proteus mirabilis (A)  Final         Physical Exam  Vitals and nursing note reviewed.   Constitutional:       Appearance: Normal appearance. He is normal weight.   HENT:      Head: Normocephalic and atraumatic.   Cardiovascular:      Rate and Rhythm: Normal rate and regular rhythm.      Pulses: Normal pulses.      Heart sounds: Normal heart sounds. No murmur heard.    No friction rub. No gallop.   Pulmonary:      Effort: Pulmonary effort is normal. No respiratory distress.      Breath sounds: Normal breath sounds. No stridor. No wheezing, rhonchi or rales.   Chest:      Chest wall: No tenderness.   Abdominal:      General: Abdomen is flat. Bowel sounds are normal. There is no distension.      Palpations: Abdomen is soft. There is no mass.      Tenderness: There is no abdominal tenderness. There is no right CVA tenderness, left CVA tenderness, guarding or rebound.      Hernia: No hernia is present.   Musculoskeletal:        Feet:    Feet:      Right foot:      Skin integrity: Blister present.   Skin:     General: Skin is warm and dry.      Capillary Refill: Capillary refill takes less than 2 seconds.      Coloration: Skin is not jaundiced or pale.   Neurological:      General: No focal deficit present.      Mental Status: He is alert and oriented to person, place, and time. Mental status is at baseline.      Motor: No weakness.      Coordination: Coordination normal.      Gait: Gait normal.   Psychiatric:         Mood and Affect: Mood normal.         Behavior: Behavior normal.         Thought Content: Thought content normal.         Judgment: Judgment normal.          Result Review  Data Reviewed:{ Labs  Result Review  Imaging  Med Tab  Media :23}   I have reviewed this patient's chart.  I  reviewed previous labs, previous imaging, previous medications, and previous encounters with notes.         Assessment and Plan {CC Problem List  Visit Diagnosis  ROS  Review (Popup)  [facility]  BestPractice  Medications  SmartSets  SnapShot Encounters  Media :23}   Diagnoses and all orders for this visit:    1. Infection of right foot (Primary)  -     sulfamethoxazole-trimethoprim (Bactrim DS) 800-160 MG per tablet; Take 1 tablet by mouth 2 (Two) Times a Day for 10 days.  Dispense: 20 tablet; Refill: 0    2. Blister of fourth toe  -     sulfamethoxazole-trimethoprim (Bactrim DS) 800-160 MG per tablet; Take 1 tablet by mouth 2 (Two) Times a Day for 10 days.  Dispense: 20 tablet; Refill: 0      -Antibiotic for 10 days for soft skin infection.  -Discussed washing wound with mild soap and warm water twice daily.  Discussed putting bacitracin on blister site over the next 3 days and leaving open to air after that.  -Advised patient to call back if he is not starting to heal better in the next 3 days.  Or if he has any worsening of symptoms or concerns.  -ER red flags discussed with patient.  -Follow-up with PCP Clara Molina if patient is not improving or at the next scheduled routine visit.    I spent 30 minutes caring for Ramon on this date of service. This time includes time spent by me in the following activities:preparing for the visit, reviewing tests, obtaining and/or reviewing a separately obtained history, performing a medically appropriate examination and/or evaluation , counseling and educating the patient/family/caregiver, ordering medications, tests, or procedures, documenting information in the medical record, independently interpreting results and communicating that information with the patient/family/caregiver and care coordination.    Follow Up {Instructions Charge Capture  Follow-up Communications :23}     Patient was given instructions and counseling regarding his  condition or for health maintenance advice. Please see specific information pulled into the AVS (placed there by myself) if appropriate.    Return for Next scheduled follow up or sooner if no improvement.    MDM  Number of Diagnoses or Management Options  Blister of fourth toe: new, needed workup  Infection of right foot: new, needed workup     Amount and/or Complexity of Data Reviewed  Clinical lab tests: reviewed  Tests in the radiology section of CPT®: reviewed  Tests in the medicine section of CPT®: reviewed  Discussion of test results with the performing providers: no  Decide to obtain previous medical records or to obtain history from someone other than the patient: no  Obtain history from someone other than the patient: no  Review and summarize past medical records: yes  Discuss the patient with other providers: no  Independent visualization of images, tracings, or specimens: no    Risk of Complications, Morbidity, and/or Mortality  Presenting problems: moderate  Diagnostic procedures: minimal  Management options: moderate    Critical Care  Total time providing critical care: < 30 minutes    Patient Progress  Patient progress: stable       CON Monahan, FNP-BC

## 2022-06-24 ENCOUNTER — OFFICE VISIT (OUTPATIENT)
Dept: FAMILY MEDICINE CLINIC | Facility: CLINIC | Age: 77
End: 2022-06-24

## 2022-06-24 ENCOUNTER — HOSPITAL ENCOUNTER (OUTPATIENT)
Dept: CARDIOLOGY | Facility: HOSPITAL | Age: 77
Discharge: HOME | End: 2022-06-24
Admitting: NURSE PRACTITIONER

## 2022-06-24 VITALS
DIASTOLIC BLOOD PRESSURE: 59 MMHG | HEART RATE: 63 BPM | OXYGEN SATURATION: 98 % | SYSTOLIC BLOOD PRESSURE: 122 MMHG | BODY MASS INDEX: 25.29 KG/M2 | WEIGHT: 128.8 LBS | HEIGHT: 60 IN | TEMPERATURE: 98.2 F

## 2022-06-24 DIAGNOSIS — I73.9 INTERMITTENT CLAUDICATION: Primary | ICD-10-CM

## 2022-06-24 DIAGNOSIS — R20.9 PAINFUL AND COLD LOWER EXTREMITY: ICD-10-CM

## 2022-06-24 DIAGNOSIS — M79.89 SWELLING OF RIGHT FOOT: ICD-10-CM

## 2022-06-24 DIAGNOSIS — D64.9 ANEMIA, UNSPECIFIED TYPE: ICD-10-CM

## 2022-06-24 DIAGNOSIS — S90.426A BLISTER OF FOURTH TOE: ICD-10-CM

## 2022-06-24 DIAGNOSIS — M79.606 PAINFUL AND COLD LOWER EXTREMITY: ICD-10-CM

## 2022-06-24 DIAGNOSIS — L08.9 INFECTION OF RIGHT FOOT: ICD-10-CM

## 2022-06-24 DIAGNOSIS — L02.619 CELLULITIS AND ABSCESS OF FOOT: ICD-10-CM

## 2022-06-24 DIAGNOSIS — L03.119 CELLULITIS AND ABSCESS OF FOOT: ICD-10-CM

## 2022-06-24 LAB
BASOPHILS # BLD AUTO: 0.1 10*3/MM3 (ref 0–0.2)
BASOPHILS NFR BLD AUTO: 0.5 % (ref 0–1.5)
BH CV LOWER VASCULAR LEFT COMMON FEMORAL AUGMENT: NORMAL
BH CV LOWER VASCULAR LEFT COMMON FEMORAL COMPETENT: NORMAL
BH CV LOWER VASCULAR LEFT COMMON FEMORAL COMPRESS: NORMAL
BH CV LOWER VASCULAR LEFT COMMON FEMORAL PHASIC: NORMAL
BH CV LOWER VASCULAR LEFT COMMON FEMORAL SPONT: NORMAL
BH CV LOWER VASCULAR RIGHT COMMON FEMORAL AUGMENT: NORMAL
BH CV LOWER VASCULAR RIGHT COMMON FEMORAL COMPETENT: NORMAL
BH CV LOWER VASCULAR RIGHT COMMON FEMORAL COMPRESS: NORMAL
BH CV LOWER VASCULAR RIGHT COMMON FEMORAL PHASIC: NORMAL
BH CV LOWER VASCULAR RIGHT COMMON FEMORAL SPONT: NORMAL
BH CV LOWER VASCULAR RIGHT DISTAL FEMORAL COMPRESS: NORMAL
BH CV LOWER VASCULAR RIGHT GASTRONEMIUS COMPRESS: NORMAL
BH CV LOWER VASCULAR RIGHT GREATER SAPH AK COMPRESS: NORMAL
BH CV LOWER VASCULAR RIGHT GREATER SAPH BK COMPRESS: NORMAL
BH CV LOWER VASCULAR RIGHT LESSER SAPH COMPRESS: NORMAL
BH CV LOWER VASCULAR RIGHT MID FEMORAL AUGMENT: NORMAL
BH CV LOWER VASCULAR RIGHT MID FEMORAL COMPETENT: NORMAL
BH CV LOWER VASCULAR RIGHT MID FEMORAL COMPRESS: NORMAL
BH CV LOWER VASCULAR RIGHT MID FEMORAL PHASIC: NORMAL
BH CV LOWER VASCULAR RIGHT MID FEMORAL SPONT: NORMAL
BH CV LOWER VASCULAR RIGHT PERONEAL COMPRESS: NORMAL
BH CV LOWER VASCULAR RIGHT POPLITEAL AUGMENT: NORMAL
BH CV LOWER VASCULAR RIGHT POPLITEAL COMPETENT: NORMAL
BH CV LOWER VASCULAR RIGHT POPLITEAL COMPRESS: NORMAL
BH CV LOWER VASCULAR RIGHT POPLITEAL PHASIC: NORMAL
BH CV LOWER VASCULAR RIGHT POPLITEAL SPONT: NORMAL
BH CV LOWER VASCULAR RIGHT POSTERIOR TIBIAL COMPRESS: NORMAL
BH CV LOWER VASCULAR RIGHT PROXIMAL FEMORAL COMPRESS: NORMAL
BH CV LOWER VASCULAR RIGHT SAPHENOFEMORAL JUNCTION COMPRESS: NORMAL
DEPRECATED RDW RBC AUTO: 48.1 FL (ref 37–54)
EOSINOPHIL # BLD AUTO: 0.1 10*3/MM3 (ref 0–0.4)
EOSINOPHIL NFR BLD AUTO: 1.1 % (ref 0.3–6.2)
ERYTHROCYTE [DISTWIDTH] IN BLOOD BY AUTOMATED COUNT: 14.2 % (ref 12.3–15.4)
HCT VFR BLD AUTO: 37 % (ref 37.5–51)
HGB BLD-MCNC: 12 G/DL (ref 13–17.7)
LYMPHOCYTES # BLD AUTO: 2.5 10*3/MM3 (ref 0.7–3.1)
LYMPHOCYTES NFR BLD AUTO: 25.3 % (ref 19.6–45.3)
MAXIMAL PREDICTED HEART RATE: 143 BPM
MCH RBC QN AUTO: 31.3 PG (ref 26.6–33)
MCHC RBC AUTO-ENTMCNC: 32.5 G/DL (ref 31.5–35.7)
MCV RBC AUTO: 96.5 FL (ref 79–97)
MONOCYTES # BLD AUTO: 0.6 10*3/MM3 (ref 0.1–0.9)
MONOCYTES NFR BLD AUTO: 5.8 % (ref 5–12)
NEUTROPHILS NFR BLD AUTO: 6.6 10*3/MM3 (ref 1.7–7)
NEUTROPHILS NFR BLD AUTO: 67.3 % (ref 42.7–76)
NRBC BLD AUTO-RTO: 0.1 /100 WBC (ref 0–0.2)
PLATELET # BLD AUTO: 162 10*3/MM3 (ref 140–450)
PMV BLD AUTO: 8.9 FL (ref 6–12)
RBC # BLD AUTO: 3.83 10*6/MM3 (ref 4.14–5.8)
STRESS TARGET HR: 122 BPM
WBC NRBC COR # BLD: 9.8 10*3/MM3 (ref 3.4–10.8)

## 2022-06-24 PROCEDURE — 87070 CULTURE OTHR SPECIMN AEROBIC: CPT | Performed by: NURSE PRACTITIONER

## 2022-06-24 PROCEDURE — 87147 CULTURE TYPE IMMUNOLOGIC: CPT | Performed by: NURSE PRACTITIONER

## 2022-06-24 PROCEDURE — 93971 EXTREMITY STUDY: CPT

## 2022-06-24 PROCEDURE — 85025 COMPLETE CBC W/AUTO DIFF WBC: CPT | Performed by: INTERNAL MEDICINE

## 2022-06-24 PROCEDURE — 80053 COMPREHEN METABOLIC PANEL: CPT | Performed by: NURSE PRACTITIONER

## 2022-06-24 PROCEDURE — 99214 OFFICE O/P EST MOD 30 MIN: CPT | Performed by: NURSE PRACTITIONER

## 2022-06-24 PROCEDURE — 87205 SMEAR GRAM STAIN: CPT | Performed by: NURSE PRACTITIONER

## 2022-06-24 PROCEDURE — 36415 COLL VENOUS BLD VENIPUNCTURE: CPT | Performed by: NURSE PRACTITIONER

## 2022-06-24 PROCEDURE — 96372 THER/PROPH/DIAG INJ SC/IM: CPT | Performed by: NURSE PRACTITIONER

## 2022-06-24 RX ORDER — CEFTRIAXONE 1 G/1
1 INJECTION, POWDER, FOR SOLUTION INTRAMUSCULAR; INTRAVENOUS EVERY 24 HOURS
Status: COMPLETED | OUTPATIENT
Start: 2022-06-24 | End: 2022-06-24

## 2022-06-24 RX ADMIN — CEFTRIAXONE 1 G: 1 INJECTION, POWDER, FOR SOLUTION INTRAMUSCULAR; INTRAVENOUS at 15:34

## 2022-06-25 ENCOUNTER — TELEPHONE (OUTPATIENT)
Dept: FAMILY MEDICINE CLINIC | Facility: CLINIC | Age: 77
End: 2022-06-25

## 2022-06-25 DIAGNOSIS — R09.89 WEAK PULSE: ICD-10-CM

## 2022-06-25 DIAGNOSIS — L08.9 INFECTION OF RIGHT FOOT: Primary | ICD-10-CM

## 2022-06-25 LAB
ALBUMIN SERPL-MCNC: 4.2 G/DL (ref 3.5–5.2)
ALBUMIN/GLOB SERPL: 1.8 G/DL
ALP SERPL-CCNC: 107 U/L (ref 39–117)
ALT SERPL W P-5'-P-CCNC: 17 U/L (ref 1–41)
ANION GAP SERPL CALCULATED.3IONS-SCNC: 12 MMOL/L (ref 5–15)
AST SERPL-CCNC: 16 U/L (ref 1–40)
BILIRUB SERPL-MCNC: 0.3 MG/DL (ref 0–1.2)
BUN SERPL-MCNC: 29 MG/DL (ref 8–23)
BUN/CREAT SERPL: 17.5 (ref 7–25)
CALCIUM SPEC-SCNC: 9.6 MG/DL (ref 8.6–10.5)
CHLORIDE SERPL-SCNC: 101 MMOL/L (ref 98–107)
CO2 SERPL-SCNC: 23 MMOL/L (ref 22–29)
CREAT SERPL-MCNC: 1.66 MG/DL (ref 0.76–1.27)
EGFRCR SERPLBLD CKD-EPI 2021: 42.2 ML/MIN/1.73
GLOBULIN UR ELPH-MCNC: 2.4 GM/DL
GLUCOSE SERPL-MCNC: 176 MG/DL (ref 65–99)
POTASSIUM SERPL-SCNC: 6.6 MMOL/L (ref 3.5–5.2)
PROT SERPL-MCNC: 6.6 G/DL (ref 6–8.5)
SODIUM SERPL-SCNC: 136 MMOL/L (ref 136–145)

## 2022-06-25 RX ORDER — SODIUM POLYSTYRENE SULFONATE 15 G/60ML
15 SUSPENSION ORAL; RECTAL ONCE
Qty: 60 ML | Refills: 0 | Status: SHIPPED | OUTPATIENT
Start: 2022-06-25 | End: 2022-06-25

## 2022-06-25 NOTE — TELEPHONE ENCOUNTER
Ok for hub to share    Attempted to call patient with critical potassium level, no answer. Pt has high potassium level of 6.6 and decline in kidney function. Should increase water intake to 2 liters/day, recommend no NSAIDS-ibuprofen/aleve etc. Needs to take one dose of kayexalate, sent to pharmacy. Follow up on Monday for recheck of potassium and renal function. Will try again to contact pt in am.     Was able to contact pt's daughter this am, they will  kayexalate today.

## 2022-06-27 ENCOUNTER — TELEPHONE (OUTPATIENT)
Dept: FAMILY MEDICINE CLINIC | Facility: CLINIC | Age: 77
End: 2022-06-27

## 2022-06-27 LAB
BACTERIA SPEC AEROBE CULT: ABNORMAL
BACTERIA SPEC AEROBE CULT: ABNORMAL
FERRITIN SERPL-MCNC: 838.8 NG/ML (ref 30–400)
GRAM STN SPEC: ABNORMAL
IRON 24H UR-MRATE: 41 MCG/DL (ref 59–158)
IRON SATN MFR SERPL: 16 % (ref 20–50)
TIBC SERPL-MCNC: 253 MCG/DL (ref 298–536)
TRANSFERRIN SERPL-MCNC: 170 MG/DL (ref 200–360)

## 2022-06-27 PROCEDURE — 82728 ASSAY OF FERRITIN: CPT | Performed by: INTERNAL MEDICINE

## 2022-06-27 PROCEDURE — 84466 ASSAY OF TRANSFERRIN: CPT | Performed by: INTERNAL MEDICINE

## 2022-06-27 PROCEDURE — 83540 ASSAY OF IRON: CPT | Performed by: INTERNAL MEDICINE

## 2022-06-27 NOTE — TELEPHONE ENCOUNTER
UNABLE TO WARM TRANSFER.     Caller: monster mccarthy    Relationship to patient: Emergency Contact    Best call back number: [phone]     Patient is needing: PATIENTS DAUGHTER JUST WANTS TO KNOW IF SHE NEEDS AN APPOINTMENT FOR LABS FOR HER DAD, AND IF SO, WILL NEED TO SCHEDULE THOSE.

## 2022-06-28 ENCOUNTER — TELEPHONE (OUTPATIENT)
Dept: ONCOLOGY | Facility: CLINIC | Age: 77
End: 2022-06-28

## 2022-06-28 NOTE — TELEPHONE ENCOUNTER
Attempted to call the pt to reschedule his follow up apt after he no showed his last follow up. No answer. V/m was left with call back information.

## 2022-06-29 ENCOUNTER — TELEPHONE (OUTPATIENT)
Dept: FAMILY MEDICINE CLINIC | Facility: CLINIC | Age: 77
End: 2022-06-29

## 2022-06-29 NOTE — TELEPHONE ENCOUNTER
Caller: monster mccarthy    Relationship: Emergency Contact    Best call back number: [phone]    Who are you requesting to speak with (clinical staff, provider,  specific staff member): CLINICAL STAFF     What was the call regarding:CALLING BECAUSE NO ONE HAS CALLED TO SET UP APPOINTMENT FOR REFERRAL FOR DUPLEX VENOUS LOWER EXTREMITY PLEASE CALL AND ADVISE     Do you require a callback: YES

## 2022-06-29 NOTE — TELEPHONE ENCOUNTER
S/W PATIENT'S DTR. ADVISED HER OF THE FOLLOWIN.) VENOUS DUPLEX DOPPLER WAS PERFORMED ON 22 WITH RESULTS BEING WNL.   2.) DTR INQUIRED IF PATIENT IS TO HAVE FURTHER TESTING SINCE THIS WAS NEGATIVE AND HE IS STILL HAVING COLD FEET.   3.) PATIENT WAS SUPPOSED TO HAVE POTASSIUM RECHECKED PER PHONE FROM ON CALL PROVIDER FROM 22. PATIENT WILL BE IN OFFICE TOMORROW  () TO SEE YOU AT 3. PLEASE ADVISE.

## 2022-06-30 ENCOUNTER — OFFICE VISIT (OUTPATIENT)
Dept: FAMILY MEDICINE CLINIC | Facility: CLINIC | Age: 77
End: 2022-06-30

## 2022-06-30 VITALS
OXYGEN SATURATION: 98 % | BODY MASS INDEX: 25.17 KG/M2 | HEIGHT: 60 IN | DIASTOLIC BLOOD PRESSURE: 64 MMHG | HEART RATE: 67 BPM | TEMPERATURE: 98.4 F | WEIGHT: 128.2 LBS | SYSTOLIC BLOOD PRESSURE: 131 MMHG

## 2022-06-30 DIAGNOSIS — Z78.9 LANGUAGE BARRIER AFFECTING HEALTH CARE: ICD-10-CM

## 2022-06-30 DIAGNOSIS — R09.89 WEAK PULSE: ICD-10-CM

## 2022-06-30 DIAGNOSIS — L08.9 INFECTION OF RIGHT FOOT: Primary | ICD-10-CM

## 2022-06-30 DIAGNOSIS — R20.9 PAINFUL AND COLD LOWER EXTREMITY: ICD-10-CM

## 2022-06-30 DIAGNOSIS — M79.606 PAINFUL AND COLD LOWER EXTREMITY: ICD-10-CM

## 2022-06-30 DIAGNOSIS — E87.5 SERUM POTASSIUM ELEVATED: ICD-10-CM

## 2022-06-30 DIAGNOSIS — I73.9 INTERMITTENT CLAUDICATION: ICD-10-CM

## 2022-06-30 DIAGNOSIS — M79.89 SWELLING OF RIGHT FOOT: ICD-10-CM

## 2022-06-30 PROCEDURE — 99213 OFFICE O/P EST LOW 20 MIN: CPT | Performed by: NURSE PRACTITIONER

## 2022-06-30 RX ORDER — DOXYCYCLINE HYCLATE 100 MG/1
100 CAPSULE ORAL 2 TIMES DAILY
Qty: 14 CAPSULE | Refills: 0 | Status: SHIPPED | OUTPATIENT
Start: 2022-06-30 | End: 2022-07-06 | Stop reason: HOSPADM

## 2022-06-30 NOTE — TELEPHONE ENCOUNTER
I placed the order for a CAILIN at [place] this should be being scheduled urgent  They were supposed to come in Monday for labs I spoke with Daughter on Saturday about scan and labs

## 2022-07-01 ENCOUNTER — HOSPITAL ENCOUNTER (INPATIENT)
Facility: HOSPITAL | Age: 77
LOS: 5 days | Discharge: HOME | End: 2022-07-06
Attending: EMERGENCY MEDICINE | Admitting: INTERNAL MEDICINE

## 2022-07-01 ENCOUNTER — APPOINTMENT (OUTPATIENT)
Dept: CARDIOLOGY | Facility: HOSPITAL | Age: 77
End: 2022-07-01

## 2022-07-01 ENCOUNTER — APPOINTMENT (OUTPATIENT)
Dept: GENERAL RADIOLOGY | Facility: HOSPITAL | Age: 77
End: 2022-07-01

## 2022-07-01 ENCOUNTER — CLINICAL SUPPORT (OUTPATIENT)
Dept: FAMILY MEDICINE CLINIC | Facility: CLINIC | Age: 77
End: 2022-07-01

## 2022-07-01 DIAGNOSIS — E87.5 SERUM POTASSIUM ELEVATED: ICD-10-CM

## 2022-07-01 DIAGNOSIS — N28.9 ACUTE RENAL INSUFFICIENCY: ICD-10-CM

## 2022-07-01 DIAGNOSIS — E87.5 HYPERKALEMIA: ICD-10-CM

## 2022-07-01 DIAGNOSIS — L08.9 INFECTION OF RIGHT FOOT: ICD-10-CM

## 2022-07-01 DIAGNOSIS — L97.519 ULCER OF TOE OF RIGHT FOOT, UNSPECIFIED ULCER STAGE: Primary | ICD-10-CM

## 2022-07-01 DIAGNOSIS — E11.65 TYPE 2 DIABETES MELLITUS WITH HYPERGLYCEMIA, WITHOUT LONG-TERM CURRENT USE OF INSULIN: ICD-10-CM

## 2022-07-01 PROBLEM — I73.9 INTERMITTENT CLAUDICATION: Status: ACTIVE | Noted: 2022-07-01

## 2022-07-01 PROBLEM — N18.2 CKD (CHRONIC KIDNEY DISEASE) STAGE 2, GFR 60-89 ML/MIN: Status: ACTIVE | Noted: 2022-07-01

## 2022-07-01 LAB
ALBUMIN SERPL-MCNC: 4.4 G/DL (ref 3.5–5.2)
ALBUMIN/GLOB SERPL: 1.6 G/DL
ALP SERPL-CCNC: 118 U/L (ref 39–117)
ALT SERPL W P-5'-P-CCNC: 12 U/L (ref 1–41)
ANION GAP SERPL CALCULATED.3IONS-SCNC: 10 MMOL/L (ref 5–15)
AST SERPL-CCNC: 13 U/L (ref 1–40)
BASOPHILS # BLD AUTO: 0 10*3/MM3 (ref 0–0.2)
BASOPHILS # BLD AUTO: 0.02 10*3/MM3 (ref 0–0.2)
BASOPHILS NFR BLD AUTO: 0.4 % (ref 0–1.5)
BASOPHILS NFR BLD AUTO: 0.5 % (ref 0–1.5)
BILIRUB SERPL-MCNC: 0.2 MG/DL (ref 0–1.2)
BUN SERPL-MCNC: 48 MG/DL (ref 8–23)
BUN/CREAT SERPL: 30.6 (ref 7–25)
CALCIUM SPEC-SCNC: 9.6 MG/DL (ref 8.6–10.5)
CHLORIDE SERPL-SCNC: 107 MMOL/L (ref 98–107)
CK SERPL-CCNC: 58 U/L (ref 20–200)
CO2 SERPL-SCNC: 19 MMOL/L (ref 22–29)
CREAT SERPL-MCNC: 1.57 MG/DL (ref 0.76–1.27)
CRP SERPL-MCNC: 0.96 MG/DL (ref 0–0.5)
DEPRECATED RDW RBC AUTO: 42.3 FL (ref 37–54)
DEPRECATED RDW RBC AUTO: 45.9 FL (ref 37–54)
EGFRCR SERPLBLD CKD-EPI 2021: 45.1 ML/MIN/1.73
EOSINOPHIL # BLD AUTO: 0.08 10*3/MM3 (ref 0–0.4)
EOSINOPHIL # BLD AUTO: 0.1 10*3/MM3 (ref 0–0.4)
EOSINOPHIL NFR BLD AUTO: 1.4 % (ref 0.3–6.2)
EOSINOPHIL NFR BLD AUTO: 1.4 % (ref 0.3–6.2)
ERYTHROCYTE [DISTWIDTH] IN BLOOD BY AUTOMATED COUNT: 12.6 % (ref 12.3–15.4)
ERYTHROCYTE [DISTWIDTH] IN BLOOD BY AUTOMATED COUNT: 13.8 % (ref 12.3–15.4)
ERYTHROCYTE [SEDIMENTATION RATE] IN BLOOD: 54 MM/HR (ref 0–20)
GLOBULIN UR ELPH-MCNC: 2.8 GM/DL
GLUCOSE SERPL-MCNC: 266 MG/DL (ref 65–99)
HCT VFR BLD AUTO: 32.6 % (ref 37.5–51)
HCT VFR BLD AUTO: 36.5 % (ref 37.5–51)
HGB BLD-MCNC: 11 G/DL (ref 13–17.7)
HGB BLD-MCNC: 11.9 G/DL (ref 13–17.7)
IMM GRANULOCYTES # BLD AUTO: 0.05 10*3/MM3 (ref 0–0.05)
IMM GRANULOCYTES NFR BLD AUTO: 0.9 % (ref 0–0.5)
LYMPHOCYTES # BLD AUTO: 1.4 10*3/MM3 (ref 0.7–3.1)
LYMPHOCYTES # BLD AUTO: 1.49 10*3/MM3 (ref 0.7–3.1)
LYMPHOCYTES NFR BLD AUTO: 23.8 % (ref 19.6–45.3)
LYMPHOCYTES NFR BLD AUTO: 26.6 % (ref 19.6–45.3)
MAGNESIUM SERPL-MCNC: 2.1 MG/DL (ref 1.6–2.4)
MCH RBC QN AUTO: 30.7 PG (ref 26.6–33)
MCH RBC QN AUTO: 31.5 PG (ref 26.6–33)
MCHC RBC AUTO-ENTMCNC: 32.6 G/DL (ref 31.5–35.7)
MCHC RBC AUTO-ENTMCNC: 33.7 G/DL (ref 31.5–35.7)
MCV RBC AUTO: 93.4 FL (ref 79–97)
MCV RBC AUTO: 94.2 FL (ref 79–97)
MONOCYTES # BLD AUTO: 0.48 10*3/MM3 (ref 0.1–0.9)
MONOCYTES # BLD AUTO: 0.5 10*3/MM3 (ref 0.1–0.9)
MONOCYTES NFR BLD AUTO: 8.6 % (ref 5–12)
MONOCYTES NFR BLD AUTO: 9 % (ref 5–12)
NEUTROPHILS NFR BLD AUTO: 3.48 10*3/MM3 (ref 1.7–7)
NEUTROPHILS NFR BLD AUTO: 3.7 10*3/MM3 (ref 1.7–7)
NEUTROPHILS NFR BLD AUTO: 62.1 % (ref 42.7–76)
NEUTROPHILS NFR BLD AUTO: 65.3 % (ref 42.7–76)
NRBC BLD AUTO-RTO: 0 /100 WBC (ref 0–0.2)
NRBC BLD AUTO-RTO: 0 /100 WBC (ref 0–0.2)
PLATELET # BLD AUTO: 197 10*3/MM3 (ref 140–450)
PLATELET # BLD AUTO: 197 10*3/MM3 (ref 140–450)
PMV BLD AUTO: 10.6 FL (ref 6–12)
PMV BLD AUTO: 7.9 FL (ref 6–12)
POTASSIUM SERPL-SCNC: 5.4 MMOL/L (ref 3.5–5.2)
PROCALCITONIN SERPL-MCNC: 0.05 NG/ML (ref 0–0.25)
PROT SERPL-MCNC: 7.2 G/DL (ref 6–8.5)
RBC # BLD AUTO: 3.49 10*6/MM3 (ref 4.14–5.8)
RBC # BLD AUTO: 3.88 10*6/MM3 (ref 4.14–5.8)
SARS-COV-2 RNA PNL SPEC NAA+PROBE: NOT DETECTED
SODIUM SERPL-SCNC: 136 MMOL/L (ref 136–145)
WBC NRBC COR # BLD: 5.6 10*3/MM3 (ref 3.4–10.8)
WBC NRBC COR # BLD: 5.7 10*3/MM3 (ref 3.4–10.8)

## 2022-07-01 PROCEDURE — 85025 COMPLETE CBC W/AUTO DIFF WBC: CPT | Performed by: NURSE PRACTITIONER

## 2022-07-01 PROCEDURE — G0378 HOSPITAL OBSERVATION PER HR: HCPCS

## 2022-07-01 PROCEDURE — 87635 SARS-COV-2 COVID-19 AMP PRB: CPT | Performed by: EMERGENCY MEDICINE

## 2022-07-01 PROCEDURE — 87040 BLOOD CULTURE FOR BACTERIA: CPT | Performed by: PHYSICIAN ASSISTANT

## 2022-07-01 PROCEDURE — 85025 COMPLETE CBC W/AUTO DIFF WBC: CPT | Performed by: PHYSICIAN ASSISTANT

## 2022-07-01 PROCEDURE — 80053 COMPREHEN METABOLIC PANEL: CPT | Performed by: NURSE PRACTITIONER

## 2022-07-01 PROCEDURE — 36415 COLL VENOUS BLD VENIPUNCTURE: CPT

## 2022-07-01 PROCEDURE — 80053 COMPREHEN METABOLIC PANEL: CPT | Performed by: PHYSICIAN ASSISTANT

## 2022-07-01 PROCEDURE — 73630 X-RAY EXAM OF FOOT: CPT

## 2022-07-01 PROCEDURE — 36415 COLL VENOUS BLD VENIPUNCTURE: CPT | Performed by: NURSE PRACTITIONER

## 2022-07-01 PROCEDURE — 96365 THER/PROPH/DIAG IV INF INIT: CPT

## 2022-07-01 PROCEDURE — 87205 SMEAR GRAM STAIN: CPT | Performed by: PHYSICIAN ASSISTANT

## 2022-07-01 PROCEDURE — 83735 ASSAY OF MAGNESIUM: CPT | Performed by: PHYSICIAN ASSISTANT

## 2022-07-01 PROCEDURE — 87070 CULTURE OTHR SPECIMN AEROBIC: CPT | Performed by: PHYSICIAN ASSISTANT

## 2022-07-01 PROCEDURE — 25010000002 VANCOMYCIN HCL 1.25 G RECONSTITUTED SOLUTION 1 EACH VIAL: Performed by: PHYSICIAN ASSISTANT

## 2022-07-01 PROCEDURE — 85652 RBC SED RATE AUTOMATED: CPT | Performed by: PHYSICIAN ASSISTANT

## 2022-07-01 PROCEDURE — 93005 ELECTROCARDIOGRAM TRACING: CPT | Performed by: PHYSICIAN ASSISTANT

## 2022-07-01 PROCEDURE — 99222 1ST HOSP IP/OBS MODERATE 55: CPT | Performed by: NURSE PRACTITIONER

## 2022-07-01 PROCEDURE — 84145 PROCALCITONIN (PCT): CPT | Performed by: NURSE PRACTITIONER

## 2022-07-01 PROCEDURE — 82550 ASSAY OF CK (CPK): CPT | Performed by: NURSE PRACTITIONER

## 2022-07-01 PROCEDURE — 99284 EMERGENCY DEPT VISIT MOD MDM: CPT

## 2022-07-01 PROCEDURE — 25010000002 CEFEPIME PER 500 MG: Performed by: PHYSICIAN ASSISTANT

## 2022-07-01 PROCEDURE — 86140 C-REACTIVE PROTEIN: CPT | Performed by: PHYSICIAN ASSISTANT

## 2022-07-01 RX ORDER — ACETAMINOPHEN 160 MG/5ML
650 SOLUTION ORAL EVERY 4 HOURS PRN
Status: DISCONTINUED | OUTPATIENT
Start: 2022-07-01 | End: 2022-07-06 | Stop reason: HOSPADM

## 2022-07-01 RX ORDER — ACETAMINOPHEN 650 MG/1
650 SUPPOSITORY RECTAL EVERY 4 HOURS PRN
Status: DISCONTINUED | OUTPATIENT
Start: 2022-07-01 | End: 2022-07-06 | Stop reason: HOSPADM

## 2022-07-01 RX ORDER — OLANZAPINE 10 MG/2ML
1 INJECTION, POWDER, LYOPHILIZED, FOR SOLUTION INTRAMUSCULAR AS NEEDED
Status: DISCONTINUED | OUTPATIENT
Start: 2022-07-01 | End: 2022-07-06 | Stop reason: HOSPADM

## 2022-07-01 RX ORDER — INSULIN LISPRO 100 [IU]/ML
0-9 INJECTION, SOLUTION INTRAVENOUS; SUBCUTANEOUS AS NEEDED
Status: DISCONTINUED | OUTPATIENT
Start: 2022-07-01 | End: 2022-07-06 | Stop reason: HOSPADM

## 2022-07-01 RX ORDER — INSULIN LISPRO 100 [IU]/ML
0-9 INJECTION, SOLUTION INTRAVENOUS; SUBCUTANEOUS
Status: DISCONTINUED | OUTPATIENT
Start: 2022-07-02 | End: 2022-07-06 | Stop reason: HOSPADM

## 2022-07-01 RX ORDER — NICOTINE POLACRILEX 4 MG
15 LOZENGE BUCCAL
Status: DISCONTINUED | OUTPATIENT
Start: 2022-07-01 | End: 2022-07-06 | Stop reason: HOSPADM

## 2022-07-01 RX ORDER — MAGNESIUM SULFATE HEPTAHYDRATE 40 MG/ML
2 INJECTION, SOLUTION INTRAVENOUS AS NEEDED
Status: DISCONTINUED | OUTPATIENT
Start: 2022-07-01 | End: 2022-07-06 | Stop reason: HOSPADM

## 2022-07-01 RX ORDER — HEPARIN SODIUM 5000 [USP'U]/ML
5000 INJECTION, SOLUTION INTRAVENOUS; SUBCUTANEOUS EVERY 12 HOURS SCHEDULED
Status: DISCONTINUED | OUTPATIENT
Start: 2022-07-01 | End: 2022-07-06 | Stop reason: HOSPADM

## 2022-07-01 RX ORDER — CHOLECALCIFEROL (VITAMIN D3) 125 MCG
5 CAPSULE ORAL NIGHTLY PRN
Status: DISCONTINUED | OUTPATIENT
Start: 2022-07-01 | End: 2022-07-06 | Stop reason: HOSPADM

## 2022-07-01 RX ORDER — SODIUM CHLORIDE 0.9 % (FLUSH) 0.9 %
10 SYRINGE (ML) INJECTION AS NEEDED
Status: DISCONTINUED | OUTPATIENT
Start: 2022-07-01 | End: 2022-07-06 | Stop reason: HOSPADM

## 2022-07-01 RX ORDER — ONDANSETRON 2 MG/ML
4 INJECTION INTRAMUSCULAR; INTRAVENOUS EVERY 6 HOURS PRN
Status: DISCONTINUED | OUTPATIENT
Start: 2022-07-01 | End: 2022-07-06 | Stop reason: HOSPADM

## 2022-07-01 RX ORDER — MAGNESIUM SULFATE 1 G/100ML
1 INJECTION INTRAVENOUS AS NEEDED
Status: DISCONTINUED | OUTPATIENT
Start: 2022-07-01 | End: 2022-07-06 | Stop reason: HOSPADM

## 2022-07-01 RX ORDER — ACETAMINOPHEN 325 MG/1
650 TABLET ORAL EVERY 4 HOURS PRN
Status: DISCONTINUED | OUTPATIENT
Start: 2022-07-01 | End: 2022-07-06 | Stop reason: HOSPADM

## 2022-07-01 RX ORDER — NITROGLYCERIN 0.4 MG/1
0.4 TABLET SUBLINGUAL
Status: DISCONTINUED | OUTPATIENT
Start: 2022-07-01 | End: 2022-07-06 | Stop reason: HOSPADM

## 2022-07-01 RX ORDER — CALCIUM CARBONATE 200(500)MG
2 TABLET,CHEWABLE ORAL 2 TIMES DAILY PRN
Status: DISCONTINUED | OUTPATIENT
Start: 2022-07-01 | End: 2022-07-06 | Stop reason: HOSPADM

## 2022-07-01 RX ORDER — ALUMINA, MAGNESIA, AND SIMETHICONE 2400; 2400; 240 MG/30ML; MG/30ML; MG/30ML
15 SUSPENSION ORAL EVERY 6 HOURS PRN
Status: DISCONTINUED | OUTPATIENT
Start: 2022-07-01 | End: 2022-07-06 | Stop reason: HOSPADM

## 2022-07-01 RX ORDER — SODIUM CHLORIDE 0.9 % (FLUSH) 0.9 %
10 SYRINGE (ML) INJECTION EVERY 12 HOURS SCHEDULED
Status: DISCONTINUED | OUTPATIENT
Start: 2022-07-01 | End: 2022-07-06 | Stop reason: HOSPADM

## 2022-07-01 RX ORDER — DEXTROSE MONOHYDRATE 25 G/50ML
25 INJECTION, SOLUTION INTRAVENOUS
Status: DISCONTINUED | OUTPATIENT
Start: 2022-07-01 | End: 2022-07-06 | Stop reason: HOSPADM

## 2022-07-01 RX ORDER — ONDANSETRON 4 MG/1
4 TABLET, FILM COATED ORAL EVERY 6 HOURS PRN
Status: DISCONTINUED | OUTPATIENT
Start: 2022-07-01 | End: 2022-07-06 | Stop reason: HOSPADM

## 2022-07-01 RX ORDER — FLUCONAZOLE 200 MG/1
200 TABLET ORAL DAILY
Qty: 1 TABLET | Refills: 0 | Status: ON HOLD | OUTPATIENT
Start: 2022-07-01 | End: 2022-07-05 | Stop reason: SDUPTHER

## 2022-07-01 RX ORDER — FLUCONAZOLE 100 MG/1
100 TABLET ORAL DAILY
Qty: 6 TABLET | Refills: 0 | Status: SHIPPED | OUTPATIENT
Start: 2022-07-01 | End: 2022-07-06 | Stop reason: HOSPADM

## 2022-07-01 RX ADMIN — VANCOMYCIN HYDROCHLORIDE 1250 MG: 1.25 INJECTION, POWDER, LYOPHILIZED, FOR SOLUTION INTRAVENOUS at 21:52

## 2022-07-01 RX ADMIN — CEFEPIME HYDROCHLORIDE 2 G: 2 INJECTION, POWDER, FOR SOLUTION INTRAVENOUS at 20:30

## 2022-07-01 RX ADMIN — SODIUM CHLORIDE 1000 ML: 9 INJECTION, SOLUTION INTRAVENOUS at 20:30

## 2022-07-01 NOTE — PROGRESS NOTES
Venipuncture Blood Specimen Collection  Venipuncture performed in (R) ARM VIA BUTTERFLY by Devika Clay LPN with good hemostasis. Patient tolerated the procedure well without complications.   07/01/22   Devika Clay LPN

## 2022-07-02 ENCOUNTER — APPOINTMENT (OUTPATIENT)
Dept: CARDIOLOGY | Facility: HOSPITAL | Age: 77
End: 2022-07-02

## 2022-07-02 LAB
ALBUMIN SERPL-MCNC: 4 G/DL (ref 3.5–5.2)
ALBUMIN/GLOB SERPL: 1.3 G/DL
ALP SERPL-CCNC: 113 U/L (ref 39–117)
ALT SERPL W P-5'-P-CCNC: 13 U/L (ref 1–41)
ANION GAP SERPL CALCULATED.3IONS-SCNC: 12 MMOL/L (ref 5–15)
ANION GAP SERPL CALCULATED.3IONS-SCNC: 12.6 MMOL/L (ref 5–15)
APTT PPP: 27.6 SECONDS (ref 24–31)
AST SERPL-CCNC: 13 U/L (ref 1–40)
BASOPHILS # BLD AUTO: 0 10*3/MM3 (ref 0–0.2)
BASOPHILS NFR BLD AUTO: 0.5 % (ref 0–1.5)
BH CV LOWER ARTERIAL LEFT ABI RATIO: NORMAL
BH CV LOWER ARTERIAL LEFT DORSALIS PEDIS SYS MAX: NORMAL
BH CV LOWER ARTERIAL LEFT GREAT TOE SYS MAX: 98
BH CV LOWER ARTERIAL LEFT POST TIBIAL SYS MAX: NORMAL
BH CV LOWER ARTERIAL LEFT TBI RATIO: 0.68
BH CV LOWER ARTERIAL RIGHT ABI RATIO: NORMAL
BH CV LOWER ARTERIAL RIGHT DORSALIS PEDIS SYS MAX: NORMAL
BH CV LOWER ARTERIAL RIGHT GREAT TOE SYS MAX: 24
BH CV LOWER ARTERIAL RIGHT POST TIBIAL SYS MAX: NORMAL
BH CV LOWER ARTERIAL RIGHT TBI RATIO: 0.17
BILIRUB SERPL-MCNC: 0.2 MG/DL (ref 0–1.2)
BUN SERPL-MCNC: 36 MG/DL (ref 8–23)
BUN SERPL-MCNC: 50 MG/DL (ref 8–23)
BUN/CREAT SERPL: 28.6 (ref 7–25)
BUN/CREAT SERPL: 37.1 (ref 7–25)
CALCIUM SPEC-SCNC: 9.1 MG/DL (ref 8.6–10.5)
CALCIUM SPEC-SCNC: 9.4 MG/DL (ref 8.6–10.5)
CHLORIDE SERPL-SCNC: 104 MMOL/L (ref 98–107)
CHLORIDE SERPL-SCNC: 109 MMOL/L (ref 98–107)
CO2 SERPL-SCNC: 17.4 MMOL/L (ref 22–29)
CO2 SERPL-SCNC: 18 MMOL/L (ref 22–29)
CREAT SERPL-MCNC: 0.97 MG/DL (ref 0.76–1.27)
CREAT SERPL-MCNC: 1.75 MG/DL (ref 0.76–1.27)
D-LACTATE SERPL-SCNC: 0.9 MMOL/L (ref 0.5–2)
DEPRECATED RDW RBC AUTO: 46.4 FL (ref 37–54)
EGFRCR SERPLBLD CKD-EPI 2021: 39.6 ML/MIN/1.73
EGFRCR SERPLBLD CKD-EPI 2021: 80.4 ML/MIN/1.73
EOSINOPHIL # BLD AUTO: 0.1 10*3/MM3 (ref 0–0.4)
EOSINOPHIL NFR BLD AUTO: 1.3 % (ref 0.3–6.2)
ERYTHROCYTE [DISTWIDTH] IN BLOOD BY AUTOMATED COUNT: 14 % (ref 12.3–15.4)
GLOBULIN UR ELPH-MCNC: 3.2 GM/DL
GLUCOSE BLDC GLUCOMTR-MCNC: 124 MG/DL (ref 70–105)
GLUCOSE BLDC GLUCOMTR-MCNC: 140 MG/DL (ref 70–105)
GLUCOSE BLDC GLUCOMTR-MCNC: 155 MG/DL (ref 70–105)
GLUCOSE BLDC GLUCOMTR-MCNC: 177 MG/DL (ref 70–105)
GLUCOSE SERPL-MCNC: 139 MG/DL (ref 65–99)
GLUCOSE SERPL-MCNC: 316 MG/DL (ref 65–99)
HCT VFR BLD AUTO: 34.3 % (ref 37.5–51)
HGB BLD-MCNC: 11.3 G/DL (ref 13–17.7)
INR PPP: 0.95 (ref 0.93–1.1)
LYMPHOCYTES # BLD AUTO: 1.7 10*3/MM3 (ref 0.7–3.1)
LYMPHOCYTES NFR BLD AUTO: 26.9 % (ref 19.6–45.3)
MAGNESIUM SERPL-MCNC: 2 MG/DL (ref 1.6–2.4)
MAXIMAL PREDICTED HEART RATE: 143 BPM
MCH RBC QN AUTO: 31.1 PG (ref 26.6–33)
MCHC RBC AUTO-ENTMCNC: 32.9 G/DL (ref 31.5–35.7)
MCV RBC AUTO: 94.5 FL (ref 79–97)
MONOCYTES # BLD AUTO: 0.5 10*3/MM3 (ref 0.1–0.9)
MONOCYTES NFR BLD AUTO: 8.8 % (ref 5–12)
NEUTROPHILS NFR BLD AUTO: 3.8 10*3/MM3 (ref 1.7–7)
NEUTROPHILS NFR BLD AUTO: 62.5 % (ref 42.7–76)
NRBC BLD AUTO-RTO: 0 /100 WBC (ref 0–0.2)
PLATELET # BLD AUTO: 187 10*3/MM3 (ref 140–450)
PMV BLD AUTO: 7.9 FL (ref 6–12)
POTASSIUM SERPL-SCNC: 5.1 MMOL/L (ref 3.5–5.2)
POTASSIUM SERPL-SCNC: 5.7 MMOL/L (ref 3.5–5.2)
PROT SERPL-MCNC: 7.2 G/DL (ref 6–8.5)
PROTHROMBIN TIME: 9.8 SECONDS (ref 9.6–11.7)
RBC # BLD AUTO: 3.63 10*6/MM3 (ref 4.14–5.8)
SODIUM SERPL-SCNC: 134 MMOL/L (ref 136–145)
SODIUM SERPL-SCNC: 139 MMOL/L (ref 136–145)
STRESS TARGET HR: 122 BPM
UPPER ARTERIAL LEFT ARM BRACHIAL SYS MAX: 138 MMHG
UPPER ARTERIAL RIGHT ARM BRACHIAL SYS MAX: 144 MMHG
WBC NRBC COR # BLD: 6.1 10*3/MM3 (ref 3.4–10.8)

## 2022-07-02 PROCEDURE — 96372 THER/PROPH/DIAG INJ SC/IM: CPT

## 2022-07-02 PROCEDURE — 36415 COLL VENOUS BLD VENIPUNCTURE: CPT | Performed by: NURSE PRACTITIONER

## 2022-07-02 PROCEDURE — 93922 UPR/L XTREMITY ART 2 LEVELS: CPT

## 2022-07-02 PROCEDURE — 82962 GLUCOSE BLOOD TEST: CPT

## 2022-07-02 PROCEDURE — G0378 HOSPITAL OBSERVATION PER HR: HCPCS

## 2022-07-02 PROCEDURE — 25010000002 HEPARIN (PORCINE) PER 1000 UNITS: Performed by: SURGERY

## 2022-07-02 PROCEDURE — 85025 COMPLETE CBC W/AUTO DIFF WBC: CPT | Performed by: NURSE PRACTITIONER

## 2022-07-02 PROCEDURE — 25010000002 HEPARIN (PORCINE) PER 1000 UNITS: Performed by: NURSE PRACTITIONER

## 2022-07-02 PROCEDURE — 99232 SBSQ HOSP IP/OBS MODERATE 35: CPT | Performed by: INTERNAL MEDICINE

## 2022-07-02 PROCEDURE — 63710000001 INSULIN LISPRO (HUMAN) PER 5 UNITS: Performed by: SURGERY

## 2022-07-02 PROCEDURE — 83605 ASSAY OF LACTIC ACID: CPT | Performed by: NURSE PRACTITIONER

## 2022-07-02 PROCEDURE — 25010000002 CEFEPIME PER 500 MG: Performed by: NURSE PRACTITIONER

## 2022-07-02 PROCEDURE — 99221 1ST HOSP IP/OBS SF/LOW 40: CPT | Performed by: PODIATRIST

## 2022-07-02 PROCEDURE — 85730 THROMBOPLASTIN TIME PARTIAL: CPT | Performed by: NURSE PRACTITIONER

## 2022-07-02 PROCEDURE — 63710000001 INSULIN LISPRO (HUMAN) PER 5 UNITS: Performed by: NURSE PRACTITIONER

## 2022-07-02 PROCEDURE — 80048 BASIC METABOLIC PNL TOTAL CA: CPT | Performed by: NURSE PRACTITIONER

## 2022-07-02 PROCEDURE — 83735 ASSAY OF MAGNESIUM: CPT | Performed by: NURSE PRACTITIONER

## 2022-07-02 PROCEDURE — 85610 PROTHROMBIN TIME: CPT | Performed by: NURSE PRACTITIONER

## 2022-07-02 PROCEDURE — 96366 THER/PROPH/DIAG IV INF ADDON: CPT

## 2022-07-02 RX ADMIN — INSULIN LISPRO 2 UNITS: 100 INJECTION, SOLUTION INTRAVENOUS; SUBCUTANEOUS at 13:40

## 2022-07-02 RX ADMIN — HEPARIN SODIUM 5000 UNITS: 5000 INJECTION INTRAVENOUS; SUBCUTANEOUS at 21:57

## 2022-07-02 RX ADMIN — SODIUM ZIRCONIUM CYCLOSILICATE 10 G: 10 POWDER, FOR SUSPENSION ORAL at 04:54

## 2022-07-02 RX ADMIN — CEFEPIME HYDROCHLORIDE 1 G: 1 INJECTION, POWDER, FOR SOLUTION INTRAMUSCULAR; INTRAVENOUS at 21:57

## 2022-07-02 RX ADMIN — INSULIN LISPRO 2 UNITS: 100 INJECTION, SOLUTION INTRAVENOUS; SUBCUTANEOUS at 18:22

## 2022-07-02 RX ADMIN — Medication 10 ML: at 09:42

## 2022-07-02 RX ADMIN — Medication 10 ML: at 21:57

## 2022-07-02 RX ADMIN — HEPARIN SODIUM 5000 UNITS: 5000 INJECTION INTRAVENOUS; SUBCUTANEOUS at 09:42

## 2022-07-02 RX ADMIN — CEFEPIME HYDROCHLORIDE 1 G: 1 INJECTION, POWDER, FOR SOLUTION INTRAMUSCULAR; INTRAVENOUS at 09:42

## 2022-07-03 ENCOUNTER — APPOINTMENT (OUTPATIENT)
Dept: MRI IMAGING | Facility: HOSPITAL | Age: 77
End: 2022-07-03

## 2022-07-03 ENCOUNTER — APPOINTMENT (OUTPATIENT)
Dept: CT IMAGING | Facility: HOSPITAL | Age: 77
End: 2022-07-03

## 2022-07-03 LAB
ALBUMIN SERPL-MCNC: 3.8 G/DL (ref 3.5–5.2)
ALBUMIN/GLOB SERPL: 1.6 G/DL
ALP SERPL-CCNC: 84 U/L (ref 39–117)
ALT SERPL W P-5'-P-CCNC: 18 U/L (ref 1–41)
ANION GAP SERPL CALCULATED.3IONS-SCNC: 13 MMOL/L (ref 5–15)
AST SERPL-CCNC: 19 U/L (ref 1–40)
BASOPHILS # BLD AUTO: 0 10*3/MM3 (ref 0–0.2)
BASOPHILS # BLD AUTO: 0 10*3/MM3 (ref 0–0.2)
BASOPHILS NFR BLD AUTO: 0.6 % (ref 0–1.5)
BASOPHILS NFR BLD AUTO: 0.7 % (ref 0–1.5)
BILIRUB SERPL-MCNC: 0.2 MG/DL (ref 0–1.2)
BUN SERPL-MCNC: 35 MG/DL (ref 8–23)
BUN/CREAT SERPL: 35.7 (ref 7–25)
CALCIUM SPEC-SCNC: 9 MG/DL (ref 8.6–10.5)
CHLORIDE SERPL-SCNC: 107 MMOL/L (ref 98–107)
CO2 SERPL-SCNC: 17 MMOL/L (ref 22–29)
CREAT SERPL-MCNC: 0.98 MG/DL (ref 0.76–1.27)
DEPRECATED RDW RBC AUTO: 45.1 FL (ref 37–54)
DEPRECATED RDW RBC AUTO: 45.9 FL (ref 37–54)
EGFRCR SERPLBLD CKD-EPI 2021: 79.4 ML/MIN/1.73
EOSINOPHIL # BLD AUTO: 0.1 10*3/MM3 (ref 0–0.4)
EOSINOPHIL # BLD AUTO: 0.1 10*3/MM3 (ref 0–0.4)
EOSINOPHIL NFR BLD AUTO: 1 % (ref 0.3–6.2)
EOSINOPHIL NFR BLD AUTO: 1.3 % (ref 0.3–6.2)
ERYTHROCYTE [DISTWIDTH] IN BLOOD BY AUTOMATED COUNT: 13.7 % (ref 12.3–15.4)
ERYTHROCYTE [DISTWIDTH] IN BLOOD BY AUTOMATED COUNT: 13.9 % (ref 12.3–15.4)
GLOBULIN UR ELPH-MCNC: 2.4 GM/DL
GLUCOSE BLDC GLUCOMTR-MCNC: 124 MG/DL (ref 70–105)
GLUCOSE BLDC GLUCOMTR-MCNC: 223 MG/DL (ref 70–105)
GLUCOSE BLDC GLUCOMTR-MCNC: 233 MG/DL (ref 70–105)
GLUCOSE BLDC GLUCOMTR-MCNC: 295 MG/DL (ref 70–105)
GLUCOSE SERPL-MCNC: 138 MG/DL (ref 65–99)
HCT VFR BLD AUTO: 34.5 % (ref 37.5–51)
HCT VFR BLD AUTO: 35.5 % (ref 37.5–51)
HGB BLD-MCNC: 11.5 G/DL (ref 13–17.7)
HGB BLD-MCNC: 11.8 G/DL (ref 13–17.7)
LYMPHOCYTES # BLD AUTO: 2 10*3/MM3 (ref 0.7–3.1)
LYMPHOCYTES # BLD AUTO: 2.1 10*3/MM3 (ref 0.7–3.1)
LYMPHOCYTES NFR BLD AUTO: 32.9 % (ref 19.6–45.3)
LYMPHOCYTES NFR BLD AUTO: 33.6 % (ref 19.6–45.3)
MAGNESIUM SERPL-MCNC: 1.9 MG/DL (ref 1.6–2.4)
MCH RBC QN AUTO: 31.1 PG (ref 26.6–33)
MCH RBC QN AUTO: 31.4 PG (ref 26.6–33)
MCHC RBC AUTO-ENTMCNC: 33.3 G/DL (ref 31.5–35.7)
MCHC RBC AUTO-ENTMCNC: 33.3 G/DL (ref 31.5–35.7)
MCV RBC AUTO: 93.4 FL (ref 79–97)
MCV RBC AUTO: 94.3 FL (ref 79–97)
MONOCYTES # BLD AUTO: 0.5 10*3/MM3 (ref 0.1–0.9)
MONOCYTES # BLD AUTO: 0.7 10*3/MM3 (ref 0.1–0.9)
MONOCYTES NFR BLD AUTO: 10.9 % (ref 5–12)
MONOCYTES NFR BLD AUTO: 8.5 % (ref 5–12)
NEUTROPHILS NFR BLD AUTO: 3.3 10*3/MM3 (ref 1.7–7)
NEUTROPHILS NFR BLD AUTO: 3.4 10*3/MM3 (ref 1.7–7)
NEUTROPHILS NFR BLD AUTO: 53.5 % (ref 42.7–76)
NEUTROPHILS NFR BLD AUTO: 57 % (ref 42.7–76)
NRBC BLD AUTO-RTO: 0.1 /100 WBC (ref 0–0.2)
NRBC BLD AUTO-RTO: 0.1 /100 WBC (ref 0–0.2)
PLATELET # BLD AUTO: 184 10*3/MM3 (ref 140–450)
PLATELET # BLD AUTO: 217 10*3/MM3 (ref 140–450)
PMV BLD AUTO: 7.7 FL (ref 6–12)
PMV BLD AUTO: 7.9 FL (ref 6–12)
POTASSIUM SERPL-SCNC: 4.8 MMOL/L (ref 3.5–5.2)
PROT SERPL-MCNC: 6.2 G/DL (ref 6–8.5)
QT INTERVAL: 436 MS
RBC # BLD AUTO: 3.7 10*6/MM3 (ref 4.14–5.8)
RBC # BLD AUTO: 3.76 10*6/MM3 (ref 4.14–5.8)
SODIUM SERPL-SCNC: 137 MMOL/L (ref 136–145)
WBC NRBC COR # BLD: 6 10*3/MM3 (ref 3.4–10.8)
WBC NRBC COR # BLD: 6.2 10*3/MM3 (ref 3.4–10.8)

## 2022-07-03 PROCEDURE — 83036 HEMOGLOBIN GLYCOSYLATED A1C: CPT | Performed by: STUDENT IN AN ORGANIZED HEALTH CARE EDUCATION/TRAINING PROGRAM

## 2022-07-03 PROCEDURE — 85025 COMPLETE CBC W/AUTO DIFF WBC: CPT | Performed by: NURSE PRACTITIONER

## 2022-07-03 PROCEDURE — 83735 ASSAY OF MAGNESIUM: CPT | Performed by: NURSE PRACTITIONER

## 2022-07-03 PROCEDURE — 96372 THER/PROPH/DIAG INJ SC/IM: CPT

## 2022-07-03 PROCEDURE — 96366 THER/PROPH/DIAG IV INF ADDON: CPT

## 2022-07-03 PROCEDURE — 73718 MRI LOWER EXTREMITY W/O DYE: CPT

## 2022-07-03 PROCEDURE — 82962 GLUCOSE BLOOD TEST: CPT

## 2022-07-03 PROCEDURE — 0 IOPAMIDOL PER 1 ML: Performed by: INTERNAL MEDICINE

## 2022-07-03 PROCEDURE — 25010000002 VANCOMYCIN 1 G RECONSTITUTED SOLUTION 1 EACH VIAL: Performed by: INTERNAL MEDICINE

## 2022-07-03 PROCEDURE — 36415 COLL VENOUS BLD VENIPUNCTURE: CPT | Performed by: INTERNAL MEDICINE

## 2022-07-03 PROCEDURE — 99232 SBSQ HOSP IP/OBS MODERATE 35: CPT | Performed by: INTERNAL MEDICINE

## 2022-07-03 PROCEDURE — 96367 TX/PROPH/DG ADDL SEQ IV INF: CPT

## 2022-07-03 PROCEDURE — 80053 COMPREHEN METABOLIC PANEL: CPT | Performed by: INTERNAL MEDICINE

## 2022-07-03 PROCEDURE — 75635 CT ANGIO ABDOMINAL ARTERIES: CPT

## 2022-07-03 PROCEDURE — 63710000001 INSULIN LISPRO (HUMAN) PER 5 UNITS: Performed by: NURSE PRACTITIONER

## 2022-07-03 PROCEDURE — 63710000001 INSULIN LISPRO (HUMAN) PER 5 UNITS: Performed by: SURGERY

## 2022-07-03 PROCEDURE — 25010000002 HEPARIN (PORCINE) PER 1000 UNITS: Performed by: SURGERY

## 2022-07-03 PROCEDURE — G0378 HOSPITAL OBSERVATION PER HR: HCPCS

## 2022-07-03 PROCEDURE — 25010000002 CEFEPIME PER 500 MG: Performed by: NURSE PRACTITIONER

## 2022-07-03 PROCEDURE — 25010000002 HEPARIN (PORCINE) PER 1000 UNITS: Performed by: NURSE PRACTITIONER

## 2022-07-03 RX ORDER — FLUCONAZOLE 100 MG/1
100 TABLET ORAL EVERY 24 HOURS
Status: DISCONTINUED | OUTPATIENT
Start: 2022-07-03 | End: 2022-07-06 | Stop reason: HOSPADM

## 2022-07-03 RX ORDER — DOXYCYCLINE 100 MG/1
100 TABLET ORAL EVERY 12 HOURS SCHEDULED
Status: DISCONTINUED | OUTPATIENT
Start: 2022-07-03 | End: 2022-07-06 | Stop reason: HOSPADM

## 2022-07-03 RX ADMIN — HEPARIN SODIUM 5000 UNITS: 5000 INJECTION INTRAVENOUS; SUBCUTANEOUS at 21:20

## 2022-07-03 RX ADMIN — IOPAMIDOL 100 ML: 755 INJECTION, SOLUTION INTRAVENOUS at 20:55

## 2022-07-03 RX ADMIN — FLUCONAZOLE 100 MG: 100 TABLET ORAL at 18:55

## 2022-07-03 RX ADMIN — VANCOMYCIN HYDROCHLORIDE 1000 MG: 1 INJECTION, POWDER, LYOPHILIZED, FOR SOLUTION INTRAVENOUS at 00:00

## 2022-07-03 RX ADMIN — DOXYCYCLINE 100 MG: 100 TABLET ORAL at 21:20

## 2022-07-03 RX ADMIN — INSULIN LISPRO 6 UNITS: 100 INJECTION, SOLUTION INTRAVENOUS; SUBCUTANEOUS at 18:55

## 2022-07-03 RX ADMIN — Medication 10 ML: at 21:20

## 2022-07-03 RX ADMIN — Medication 10 ML: at 09:43

## 2022-07-03 RX ADMIN — HEPARIN SODIUM 5000 UNITS: 5000 INJECTION INTRAVENOUS; SUBCUTANEOUS at 09:43

## 2022-07-03 RX ADMIN — CEFEPIME HYDROCHLORIDE 1 G: 1 INJECTION, POWDER, FOR SOLUTION INTRAMUSCULAR; INTRAVENOUS at 09:43

## 2022-07-04 LAB
ANION GAP SERPL CALCULATED.3IONS-SCNC: 13 MMOL/L (ref 5–15)
BACTERIA SPEC AEROBE CULT: ABNORMAL
BACTERIA SPEC AEROBE CULT: ABNORMAL
BUN SERPL-MCNC: 35 MG/DL (ref 8–23)
BUN/CREAT SERPL: 27.1 (ref 7–25)
CALCIUM SPEC-SCNC: 9.6 MG/DL (ref 8.6–10.5)
CHLORIDE SERPL-SCNC: 104 MMOL/L (ref 98–107)
CO2 SERPL-SCNC: 18 MMOL/L (ref 22–29)
CREAT SERPL-MCNC: 1.29 MG/DL (ref 0.76–1.27)
EGFRCR SERPLBLD CKD-EPI 2021: 57.1 ML/MIN/1.73
GLUCOSE BLDC GLUCOMTR-MCNC: 141 MG/DL (ref 70–105)
GLUCOSE BLDC GLUCOMTR-MCNC: 211 MG/DL (ref 70–105)
GLUCOSE BLDC GLUCOMTR-MCNC: 257 MG/DL (ref 70–105)
GLUCOSE BLDC GLUCOMTR-MCNC: 264 MG/DL (ref 70–105)
GLUCOSE SERPL-MCNC: 176 MG/DL (ref 65–99)
GRAM STN SPEC: ABNORMAL
GRAM STN SPEC: ABNORMAL
MAGNESIUM SERPL-MCNC: 2 MG/DL (ref 1.6–2.4)
POTASSIUM SERPL-SCNC: 4.5 MMOL/L (ref 3.5–5.2)
SODIUM SERPL-SCNC: 135 MMOL/L (ref 136–145)

## 2022-07-04 PROCEDURE — 99232 SBSQ HOSP IP/OBS MODERATE 35: CPT | Performed by: PODIATRIST

## 2022-07-04 PROCEDURE — 82962 GLUCOSE BLOOD TEST: CPT

## 2022-07-04 PROCEDURE — 63710000001 INSULIN LISPRO (HUMAN) PER 5 UNITS: Performed by: SURGERY

## 2022-07-04 PROCEDURE — 25010000002 HEPARIN (PORCINE) PER 1000 UNITS: Performed by: SURGERY

## 2022-07-04 PROCEDURE — 99232 SBSQ HOSP IP/OBS MODERATE 35: CPT | Performed by: INTERNAL MEDICINE

## 2022-07-04 PROCEDURE — 96361 HYDRATE IV INFUSION ADD-ON: CPT

## 2022-07-04 PROCEDURE — 63710000001 INSULIN LISPRO (HUMAN) PER 5 UNITS: Performed by: NURSE PRACTITIONER

## 2022-07-04 PROCEDURE — G0378 HOSPITAL OBSERVATION PER HR: HCPCS

## 2022-07-04 PROCEDURE — 25010000002 HEPARIN (PORCINE) PER 1000 UNITS: Performed by: NURSE PRACTITIONER

## 2022-07-04 PROCEDURE — 96372 THER/PROPH/DIAG INJ SC/IM: CPT

## 2022-07-04 RX ORDER — SODIUM CHLORIDE 9 MG/ML
100 INJECTION, SOLUTION INTRAVENOUS CONTINUOUS
Status: DISCONTINUED | OUTPATIENT
Start: 2022-07-04 | End: 2022-07-06 | Stop reason: HOSPADM

## 2022-07-04 RX ADMIN — SODIUM CHLORIDE 100 ML/HR: 9 INJECTION, SOLUTION INTRAVENOUS at 20:40

## 2022-07-04 RX ADMIN — HEPARIN SODIUM 5000 UNITS: 5000 INJECTION INTRAVENOUS; SUBCUTANEOUS at 08:44

## 2022-07-04 RX ADMIN — HEPARIN SODIUM 5000 UNITS: 5000 INJECTION INTRAVENOUS; SUBCUTANEOUS at 20:39

## 2022-07-04 RX ADMIN — DOXYCYCLINE 100 MG: 100 TABLET ORAL at 08:44

## 2022-07-04 RX ADMIN — Medication 10 ML: at 20:39

## 2022-07-04 RX ADMIN — DOXYCYCLINE 100 MG: 100 TABLET ORAL at 20:39

## 2022-07-04 RX ADMIN — ACETAMINOPHEN 650 MG: 325 TABLET, FILM COATED ORAL at 08:48

## 2022-07-04 RX ADMIN — SODIUM CHLORIDE 100 ML/HR: 9 INJECTION, SOLUTION INTRAVENOUS at 11:11

## 2022-07-04 RX ADMIN — INSULIN LISPRO 4 UNITS: 100 INJECTION, SOLUTION INTRAVENOUS; SUBCUTANEOUS at 12:11

## 2022-07-04 RX ADMIN — Medication 10 ML: at 08:51

## 2022-07-04 RX ADMIN — INSULIN LISPRO 6 UNITS: 100 INJECTION, SOLUTION INTRAVENOUS; SUBCUTANEOUS at 17:47

## 2022-07-04 RX ADMIN — FLUCONAZOLE 100 MG: 100 TABLET ORAL at 17:47

## 2022-07-05 ENCOUNTER — REFERRAL TRIAGE (OUTPATIENT)
Dept: CASE MANAGEMENT | Facility: OTHER | Age: 77
End: 2022-07-05

## 2022-07-05 ENCOUNTER — PATIENT OUTREACH (OUTPATIENT)
Dept: CASE MANAGEMENT | Facility: OTHER | Age: 77
End: 2022-07-05

## 2022-07-05 LAB
ALBUMIN SERPL-MCNC: 3.7 G/DL (ref 3.5–5.2)
ALBUMIN/GLOB SERPL: 1.5 G/DL
ALP SERPL-CCNC: 93 U/L (ref 39–117)
ALT SERPL W P-5'-P-CCNC: 26 U/L (ref 1–41)
ANION GAP SERPL CALCULATED.3IONS-SCNC: 12 MMOL/L (ref 5–15)
AST SERPL-CCNC: 19 U/L (ref 1–40)
BILIRUB SERPL-MCNC: 0.2 MG/DL (ref 0–1.2)
BUN SERPL-MCNC: 35 MG/DL (ref 8–23)
BUN/CREAT SERPL: 28.9 (ref 7–25)
CALCIUM SPEC-SCNC: 9.1 MG/DL (ref 8.6–10.5)
CHLORIDE SERPL-SCNC: 108 MMOL/L (ref 98–107)
CO2 SERPL-SCNC: 18 MMOL/L (ref 22–29)
CREAT SERPL-MCNC: 1.21 MG/DL (ref 0.76–1.27)
CRP SERPL-MCNC: 0.3 MG/DL (ref 0–0.5)
DEPRECATED RDW RBC AUTO: 46.4 FL (ref 37–54)
EGFRCR SERPLBLD CKD-EPI 2021: 61.7 ML/MIN/1.73
EOSINOPHIL # BLD MANUAL: 0.05 10*3/MM3 (ref 0–0.4)
EOSINOPHIL NFR BLD MANUAL: 1 % (ref 0.3–6.2)
ERYTHROCYTE [DISTWIDTH] IN BLOOD BY AUTOMATED COUNT: 13.7 % (ref 12.3–15.4)
ERYTHROCYTE [SEDIMENTATION RATE] IN BLOOD: 23 MM/HR (ref 0–20)
GLOBULIN UR ELPH-MCNC: 2.5 GM/DL
GLUCOSE BLDC GLUCOMTR-MCNC: 144 MG/DL (ref 70–105)
GLUCOSE BLDC GLUCOMTR-MCNC: 148 MG/DL (ref 70–105)
GLUCOSE BLDC GLUCOMTR-MCNC: 153 MG/DL (ref 70–105)
GLUCOSE BLDC GLUCOMTR-MCNC: 161 MG/DL (ref 70–105)
GLUCOSE SERPL-MCNC: 186 MG/DL (ref 65–99)
HBA1C MFR BLD: 9.3 % (ref 3.5–5.6)
HCT VFR BLD AUTO: 34.7 % (ref 37.5–51)
HGB BLD-MCNC: 11.2 G/DL (ref 13–17.7)
LYMPHOCYTES # BLD MANUAL: 2.27 10*3/MM3 (ref 0.7–3.1)
LYMPHOCYTES NFR BLD MANUAL: 2 % (ref 5–12)
MAGNESIUM SERPL-MCNC: 1.9 MG/DL (ref 1.6–2.4)
MCH RBC QN AUTO: 31.2 PG (ref 26.6–33)
MCHC RBC AUTO-ENTMCNC: 32.3 G/DL (ref 31.5–35.7)
MCV RBC AUTO: 96.6 FL (ref 79–97)
MONOCYTES # BLD: 0.11 10*3/MM3 (ref 0.1–0.9)
MYELOCYTES NFR BLD MANUAL: 1 % (ref 0–0)
NEUTROPHILS # BLD AUTO: 2.92 10*3/MM3 (ref 1.7–7)
NEUTROPHILS NFR BLD MANUAL: 50 % (ref 42.7–76)
NEUTS BAND NFR BLD MANUAL: 4 % (ref 0–5)
PHOSPHATE SERPL-MCNC: 3.3 MG/DL (ref 2.5–4.5)
PLAT MORPH BLD: NORMAL
PLATELET # BLD AUTO: 215 10*3/MM3 (ref 140–450)
PMV BLD AUTO: 7.9 FL (ref 6–12)
POTASSIUM SERPL-SCNC: 4.5 MMOL/L (ref 3.5–5.2)
PROT SERPL-MCNC: 6.2 G/DL (ref 6–8.5)
RBC # BLD AUTO: 3.6 10*6/MM3 (ref 4.14–5.8)
RBC MORPH BLD: NORMAL
SARS-COV-2 RNA RESP QL NAA+PROBE: NOT DETECTED
SCAN SLIDE: NORMAL
SODIUM SERPL-SCNC: 138 MMOL/L (ref 136–145)
VARIANT LYMPHS NFR BLD MANUAL: 42 % (ref 19.6–45.3)
WBC MORPH BLD: NORMAL
WBC NRBC COR # BLD: 5.4 10*3/MM3 (ref 3.4–10.8)

## 2022-07-05 PROCEDURE — C1887 CATHETER, GUIDING: HCPCS | Performed by: SURGERY

## 2022-07-05 PROCEDURE — 25010000002 MIDAZOLAM PER 1 MG: Performed by: SURGERY

## 2022-07-05 PROCEDURE — 99232 SBSQ HOSP IP/OBS MODERATE 35: CPT | Performed by: INTERNAL MEDICINE

## 2022-07-05 PROCEDURE — 83735 ASSAY OF MAGNESIUM: CPT | Performed by: NURSE PRACTITIONER

## 2022-07-05 PROCEDURE — 85025 COMPLETE CBC W/AUTO DIFF WBC: CPT | Performed by: INTERNAL MEDICINE

## 2022-07-05 PROCEDURE — 75710 ARTERY X-RAYS ARM/LEG: CPT | Performed by: SURGERY

## 2022-07-05 PROCEDURE — U0003 INFECTIOUS AGENT DETECTION BY NUCLEIC ACID (DNA OR RNA); SEVERE ACUTE RESPIRATORY SYNDROME CORONAVIRUS 2 (SARS-COV-2) (CORONAVIRUS DISEASE [COVID-19]), AMPLIFIED PROBE TECHNIQUE, MAKING USE OF HIGH THROUGHPUT TECHNOLOGIES AS DESCRIBED BY CMS-2020-01-R: HCPCS | Performed by: SURGERY

## 2022-07-05 PROCEDURE — 25010000002 FENTANYL CITRATE (PF) 100 MCG/2ML SOLUTION: Performed by: SURGERY

## 2022-07-05 PROCEDURE — G0108 DIAB MANAGE TRN  PER INDIV: HCPCS

## 2022-07-05 PROCEDURE — 0 IOPAMIDOL PER 1 ML: Performed by: SURGERY

## 2022-07-05 PROCEDURE — C1769 GUIDE WIRE: HCPCS | Performed by: SURGERY

## 2022-07-05 PROCEDURE — 63710000001 INSULIN LISPRO (HUMAN) PER 5 UNITS: Performed by: NURSE PRACTITIONER

## 2022-07-05 PROCEDURE — 96361 HYDRATE IV INFUSION ADD-ON: CPT

## 2022-07-05 PROCEDURE — 99153 MOD SED SAME PHYS/QHP EA: CPT | Performed by: SURGERY

## 2022-07-05 PROCEDURE — 63710000001 INSULIN LISPRO (HUMAN) PER 5 UNITS: Performed by: SURGERY

## 2022-07-05 PROCEDURE — 36415 COLL VENOUS BLD VENIPUNCTURE: CPT | Performed by: INTERNAL MEDICINE

## 2022-07-05 PROCEDURE — B41F1ZZ FLUOROSCOPY OF RIGHT LOWER EXTREMITY ARTERIES USING LOW OSMOLAR CONTRAST: ICD-10-PCS | Performed by: SURGERY

## 2022-07-05 PROCEDURE — C1894 INTRO/SHEATH, NON-LASER: HCPCS | Performed by: SURGERY

## 2022-07-05 PROCEDURE — 86140 C-REACTIVE PROTEIN: CPT | Performed by: INTERNAL MEDICINE

## 2022-07-05 PROCEDURE — 85007 BL SMEAR W/DIFF WBC COUNT: CPT | Performed by: INTERNAL MEDICINE

## 2022-07-05 PROCEDURE — 84100 ASSAY OF PHOSPHORUS: CPT | Performed by: INTERNAL MEDICINE

## 2022-07-05 PROCEDURE — 99152 MOD SED SAME PHYS/QHP 5/>YRS: CPT | Performed by: SURGERY

## 2022-07-05 PROCEDURE — 85652 RBC SED RATE AUTOMATED: CPT | Performed by: INTERNAL MEDICINE

## 2022-07-05 PROCEDURE — 82962 GLUCOSE BLOOD TEST: CPT

## 2022-07-05 PROCEDURE — 25010000002 CEFAZOLIN PER 500 MG: Performed by: STUDENT IN AN ORGANIZED HEALTH CARE EDUCATION/TRAINING PROGRAM

## 2022-07-05 PROCEDURE — 80053 COMPREHEN METABOLIC PANEL: CPT | Performed by: INTERNAL MEDICINE

## 2022-07-05 PROCEDURE — 25010000002 HEPARIN (PORCINE) PER 1000 UNITS: Performed by: SURGERY

## 2022-07-05 PROCEDURE — B41G1ZZ FLUOROSCOPY OF LEFT LOWER EXTREMITY ARTERIES USING LOW OSMOLAR CONTRAST: ICD-10-PCS | Performed by: SURGERY

## 2022-07-05 RX ORDER — CILOSTAZOL 100 MG/1
100 TABLET ORAL 2 TIMES DAILY
Status: DISCONTINUED | OUTPATIENT
Start: 2022-07-05 | End: 2022-07-06 | Stop reason: HOSPADM

## 2022-07-05 RX ORDER — FINASTERIDE 5 MG/1
5 TABLET, FILM COATED ORAL DAILY
Status: DISCONTINUED | OUTPATIENT
Start: 2022-07-06 | End: 2022-07-06 | Stop reason: HOSPADM

## 2022-07-05 RX ORDER — PANTOPRAZOLE SODIUM 40 MG/1
40 TABLET, DELAYED RELEASE ORAL DAILY
Status: DISCONTINUED | OUTPATIENT
Start: 2022-07-06 | End: 2022-07-06 | Stop reason: HOSPADM

## 2022-07-05 RX ORDER — LOSARTAN POTASSIUM 50 MG/1
50 TABLET ORAL DAILY
Status: DISCONTINUED | OUTPATIENT
Start: 2022-07-05 | End: 2022-07-06 | Stop reason: HOSPADM

## 2022-07-05 RX ORDER — LIDOCAINE HYDROCHLORIDE 20 MG/ML
INJECTION, SOLUTION INFILTRATION; PERINEURAL AS NEEDED
Status: DISCONTINUED | OUTPATIENT
Start: 2022-07-05 | End: 2022-07-05 | Stop reason: HOSPADM

## 2022-07-05 RX ORDER — ASPIRIN 81 MG/1
81 TABLET ORAL DAILY
Status: DISCONTINUED | OUTPATIENT
Start: 2022-07-05 | End: 2022-07-06 | Stop reason: HOSPADM

## 2022-07-05 RX ORDER — CARVEDILOL 25 MG/1
25 TABLET ORAL 2 TIMES DAILY WITH MEALS
Status: DISCONTINUED | OUTPATIENT
Start: 2022-07-05 | End: 2022-07-06 | Stop reason: HOSPADM

## 2022-07-05 RX ORDER — HYDROCODONE BITARTRATE AND ACETAMINOPHEN 5; 325 MG/1; MG/1
1 TABLET ORAL EVERY 4 HOURS PRN
Status: DISCONTINUED | OUTPATIENT
Start: 2022-07-05 | End: 2022-07-06 | Stop reason: HOSPADM

## 2022-07-05 RX ORDER — FENTANYL CITRATE 50 UG/ML
INJECTION, SOLUTION INTRAMUSCULAR; INTRAVENOUS AS NEEDED
Status: DISCONTINUED | OUTPATIENT
Start: 2022-07-05 | End: 2022-07-05 | Stop reason: HOSPADM

## 2022-07-05 RX ORDER — ATORVASTATIN CALCIUM 40 MG/1
40 TABLET, FILM COATED ORAL DAILY
Status: DISCONTINUED | OUTPATIENT
Start: 2022-07-05 | End: 2022-07-06 | Stop reason: HOSPADM

## 2022-07-05 RX ORDER — MIDAZOLAM HYDROCHLORIDE 1 MG/ML
INJECTION INTRAMUSCULAR; INTRAVENOUS AS NEEDED
Status: DISCONTINUED | OUTPATIENT
Start: 2022-07-05 | End: 2022-07-05 | Stop reason: HOSPADM

## 2022-07-05 RX ADMIN — ACETAMINOPHEN 650 MG: 325 TABLET, FILM COATED ORAL at 01:39

## 2022-07-05 RX ADMIN — Medication 10 ML: at 08:05

## 2022-07-05 RX ADMIN — SODIUM CHLORIDE 100 ML/HR: 9 INJECTION, SOLUTION INTRAVENOUS at 06:32

## 2022-07-05 RX ADMIN — FLUCONAZOLE 100 MG: 100 TABLET ORAL at 17:51

## 2022-07-05 RX ADMIN — DOXYCYCLINE 100 MG: 100 TABLET ORAL at 08:05

## 2022-07-05 RX ADMIN — ACETAMINOPHEN 650 MG: 325 TABLET, FILM COATED ORAL at 16:19

## 2022-07-05 RX ADMIN — SODIUM CHLORIDE 100 ML/HR: 9 INJECTION, SOLUTION INTRAVENOUS at 20:59

## 2022-07-05 RX ADMIN — ATORVASTATIN CALCIUM 40 MG: 40 TABLET, FILM COATED ORAL at 21:00

## 2022-07-05 RX ADMIN — LOSARTAN POTASSIUM 50 MG: 50 TABLET, FILM COATED ORAL at 21:00

## 2022-07-05 RX ADMIN — ASPIRIN 81 MG: 81 TABLET, COATED ORAL at 21:00

## 2022-07-05 RX ADMIN — Medication 10 ML: at 20:59

## 2022-07-05 RX ADMIN — CARVEDILOL 25 MG: 25 TABLET, FILM COATED ORAL at 21:00

## 2022-07-05 RX ADMIN — CILOSTAZOL 100 MG: 100 TABLET ORAL at 21:00

## 2022-07-05 RX ADMIN — HEPARIN SODIUM 5000 UNITS: 5000 INJECTION INTRAVENOUS; SUBCUTANEOUS at 20:59

## 2022-07-05 RX ADMIN — INSULIN LISPRO 2 UNITS: 100 INJECTION, SOLUTION INTRAVENOUS; SUBCUTANEOUS at 12:14

## 2022-07-05 RX ADMIN — DOXYCYCLINE 100 MG: 100 TABLET ORAL at 21:00

## 2022-07-05 RX ADMIN — Medication 600 MG: at 21:02

## 2022-07-05 RX ADMIN — CEFAZOLIN 2 G: 2 INJECTION, POWDER, FOR SOLUTION INTRAMUSCULAR; INTRAVENOUS at 08:05

## 2022-07-05 RX ADMIN — Medication 600 MG: at 16:19

## 2022-07-05 NOTE — OUTREACH NOTE
AMBULATORY CASE MANAGEMENT NOTE    Care Coordination  [facility] acute CM Maria Del Carmen De La Rosa, FERNANDO states she will arrange in-patient diabetes education for patient & family while he is admitted.  Call center to f/up post discharge and RN-ACM to monitor.    PARDEEP NATARAJAN  Ambulatory Case Management  7/5/2022, 12:05 EDT

## 2022-07-06 ENCOUNTER — TELEPHONE (OUTPATIENT)
Dept: ONCOLOGY | Facility: CLINIC | Age: 77
End: 2022-07-06

## 2022-07-06 ENCOUNTER — READMISSION MANAGEMENT (OUTPATIENT)
Dept: CALL CENTER | Facility: HOSPITAL | Age: 77
End: 2022-07-06

## 2022-07-06 VITALS
RESPIRATION RATE: 18 BRPM | BODY MASS INDEX: 22.23 KG/M2 | SYSTOLIC BLOOD PRESSURE: 160 MMHG | HEIGHT: 62 IN | HEART RATE: 65 BPM | OXYGEN SATURATION: 99 % | DIASTOLIC BLOOD PRESSURE: 61 MMHG | TEMPERATURE: 97.2 F | WEIGHT: 120.81 LBS

## 2022-07-06 LAB
ANION GAP SERPL CALCULATED.3IONS-SCNC: 13 MMOL/L (ref 5–15)
BACTERIA SPEC AEROBE CULT: NORMAL
BACTERIA SPEC AEROBE CULT: NORMAL
BUN SERPL-MCNC: 27 MG/DL (ref 8–23)
BUN/CREAT SERPL: 25 (ref 7–25)
CALCIUM SPEC-SCNC: 9 MG/DL (ref 8.6–10.5)
CHLORIDE SERPL-SCNC: 106 MMOL/L (ref 98–107)
CO2 SERPL-SCNC: 17 MMOL/L (ref 22–29)
CREAT SERPL-MCNC: 1.08 MG/DL (ref 0.76–1.27)
CRP SERPL-MCNC: <0.3 MG/DL (ref 0–0.5)
DEPRECATED RDW RBC AUTO: 45.5 FL (ref 37–54)
EGFRCR SERPLBLD CKD-EPI 2021: 70.7 ML/MIN/1.73
ERYTHROCYTE [DISTWIDTH] IN BLOOD BY AUTOMATED COUNT: 13.9 % (ref 12.3–15.4)
GLUCOSE BLDC GLUCOMTR-MCNC: 143 MG/DL (ref 70–105)
GLUCOSE BLDC GLUCOMTR-MCNC: 229 MG/DL (ref 70–105)
GLUCOSE SERPL-MCNC: 136 MG/DL (ref 65–99)
HCT VFR BLD AUTO: 33.4 % (ref 37.5–51)
HGB BLD-MCNC: 11 G/DL (ref 13–17.7)
LYMPHOCYTES # BLD MANUAL: 1.51 10*3/MM3 (ref 0.7–3.1)
LYMPHOCYTES NFR BLD MANUAL: 2 % (ref 5–12)
MAGNESIUM SERPL-MCNC: 1.7 MG/DL (ref 1.6–2.4)
MCH RBC QN AUTO: 30.8 PG (ref 26.6–33)
MCHC RBC AUTO-ENTMCNC: 32.9 G/DL (ref 31.5–35.7)
MCV RBC AUTO: 93.5 FL (ref 79–97)
METAMYELOCYTES NFR BLD MANUAL: 1 % (ref 0–0)
MONOCYTES # BLD: 0.11 10*3/MM3 (ref 0.1–0.9)
NEUTROPHILS # BLD AUTO: 3.73 10*3/MM3 (ref 1.7–7)
NEUTROPHILS NFR BLD MANUAL: 62 % (ref 42.7–76)
NEUTS BAND NFR BLD MANUAL: 7 % (ref 0–5)
PHOSPHATE SERPL-MCNC: 3.2 MG/DL (ref 2.5–4.5)
PLATELET # BLD AUTO: 203 10*3/MM3 (ref 140–450)
PMV BLD AUTO: 8.2 FL (ref 6–12)
POTASSIUM SERPL-SCNC: 4 MMOL/L (ref 3.5–5.2)
RBC # BLD AUTO: 3.57 10*6/MM3 (ref 4.14–5.8)
RBC MORPH BLD: NORMAL
SCAN SLIDE: NORMAL
SMALL PLATELETS BLD QL SMEAR: ADEQUATE
SODIUM SERPL-SCNC: 136 MMOL/L (ref 136–145)
VARIANT LYMPHS NFR BLD MANUAL: 28 % (ref 19.6–45.3)
WBC MORPH BLD: NORMAL
WBC NRBC COR # BLD: 5.4 10*3/MM3 (ref 3.4–10.8)

## 2022-07-06 PROCEDURE — 80048 BASIC METABOLIC PNL TOTAL CA: CPT | Performed by: SURGERY

## 2022-07-06 PROCEDURE — 63710000001 INSULIN LISPRO (HUMAN) PER 5 UNITS: Performed by: SURGERY

## 2022-07-06 PROCEDURE — 25010000002 HEPARIN (PORCINE) PER 1000 UNITS: Performed by: SURGERY

## 2022-07-06 PROCEDURE — 86140 C-REACTIVE PROTEIN: CPT | Performed by: SURGERY

## 2022-07-06 PROCEDURE — 82962 GLUCOSE BLOOD TEST: CPT

## 2022-07-06 PROCEDURE — 93005 ELECTROCARDIOGRAM TRACING: CPT | Performed by: INTERNAL MEDICINE

## 2022-07-06 PROCEDURE — 83735 ASSAY OF MAGNESIUM: CPT | Performed by: SURGERY

## 2022-07-06 PROCEDURE — 85025 COMPLETE CBC W/AUTO DIFF WBC: CPT | Performed by: SURGERY

## 2022-07-06 PROCEDURE — 99239 HOSP IP/OBS DSCHRG MGMT >30: CPT | Performed by: INTERNAL MEDICINE

## 2022-07-06 PROCEDURE — 36415 COLL VENOUS BLD VENIPUNCTURE: CPT | Performed by: SURGERY

## 2022-07-06 PROCEDURE — 93010 ELECTROCARDIOGRAM REPORT: CPT | Performed by: INTERNAL MEDICINE

## 2022-07-06 PROCEDURE — 99232 SBSQ HOSP IP/OBS MODERATE 35: CPT | Performed by: PODIATRIST

## 2022-07-06 PROCEDURE — 85007 BL SMEAR W/DIFF WBC COUNT: CPT | Performed by: SURGERY

## 2022-07-06 PROCEDURE — 84100 ASSAY OF PHOSPHORUS: CPT | Performed by: SURGERY

## 2022-07-06 PROCEDURE — 25010000002 MAGNESIUM SULFATE IN D5W 1G/100ML (PREMIX) 1-5 GM/100ML-% SOLUTION: Performed by: SURGERY

## 2022-07-06 RX ORDER — BLOOD SUGAR DIAGNOSTIC
1 STRIP MISCELLANEOUS DAILY
Qty: 50 EACH | Refills: 2 | Status: SHIPPED | OUTPATIENT
Start: 2022-07-06 | End: 2022-08-01 | Stop reason: SDUPTHER

## 2022-07-06 RX ORDER — CILOSTAZOL 100 MG/1
100 TABLET ORAL 2 TIMES DAILY
Qty: 60 TABLET | Refills: 1 | Status: SHIPPED | OUTPATIENT
Start: 2022-07-06 | End: 2022-08-01 | Stop reason: SDUPTHER

## 2022-07-06 RX ORDER — FLUCONAZOLE 100 MG/1
100 TABLET ORAL EVERY 24 HOURS
Qty: 4 TABLET | Refills: 0 | Status: SHIPPED | OUTPATIENT
Start: 2022-07-06 | End: 2022-07-10

## 2022-07-06 RX ORDER — LANCETS
1 EACH MISCELLANEOUS DAILY
Qty: 100 EACH | Refills: 2 | Status: SHIPPED | OUTPATIENT
Start: 2022-07-06

## 2022-07-06 RX ORDER — SODIUM BICARBONATE 650 MG/1
650 TABLET ORAL DAILY
Status: DISCONTINUED | OUTPATIENT
Start: 2022-07-06 | End: 2022-07-06 | Stop reason: HOSPADM

## 2022-07-06 RX ORDER — DOXYCYCLINE 100 MG/1
100 TABLET ORAL EVERY 12 HOURS SCHEDULED
Qty: 8 TABLET | Refills: 0 | Status: SHIPPED | OUTPATIENT
Start: 2022-07-06 | End: 2022-07-10

## 2022-07-06 RX ADMIN — SODIUM CHLORIDE 100 ML/HR: 9 INJECTION, SOLUTION INTRAVENOUS at 09:00

## 2022-07-06 RX ADMIN — Medication 10 ML: at 09:22

## 2022-07-06 RX ADMIN — SODIUM BICARBONATE 650 MG TABLET 650 MG: at 10:12

## 2022-07-06 RX ADMIN — MAGNESIUM SULFATE 1 G: 1 INJECTION INTRAVENOUS at 10:12

## 2022-07-06 RX ADMIN — CARVEDILOL 25 MG: 25 TABLET, FILM COATED ORAL at 09:00

## 2022-07-06 RX ADMIN — FINASTERIDE 5 MG: 5 TABLET, FILM COATED ORAL at 09:23

## 2022-07-06 RX ADMIN — HEPARIN SODIUM 5000 UNITS: 5000 INJECTION INTRAVENOUS; SUBCUTANEOUS at 09:23

## 2022-07-06 RX ADMIN — PANTOPRAZOLE SODIUM 40 MG: 40 TABLET, DELAYED RELEASE ORAL at 09:23

## 2022-07-06 RX ADMIN — CILOSTAZOL 100 MG: 100 TABLET ORAL at 09:23

## 2022-07-06 RX ADMIN — INSULIN LISPRO 4 UNITS: 100 INJECTION, SOLUTION INTRAVENOUS; SUBCUTANEOUS at 13:00

## 2022-07-06 RX ADMIN — DOXYCYCLINE 100 MG: 100 TABLET ORAL at 09:23

## 2022-07-06 RX ADMIN — LOSARTAN POTASSIUM 50 MG: 50 TABLET, FILM COATED ORAL at 09:23

## 2022-07-06 RX ADMIN — ASPIRIN 81 MG: 81 TABLET, COATED ORAL at 09:23

## 2022-07-06 RX ADMIN — Medication 600 MG: at 09:23

## 2022-07-06 NOTE — TELEPHONE ENCOUNTER
----- Message from Jessica Kwon MD sent at 7/5/2022  4:34 PM EDT -----  Yes    ----- Message -----  From: Dave Durham RN  Sent: 6/28/2022  10:40 AM EDT  To: Jessica Kwon MD    He no showed to his 5/12 f/u with you. We will reach out and reschedule.   ----- Message -----  From: Jessica Kwon MD  Sent: 6/28/2022  10:27 AM EDT  To: Dave Durham RN    I dont see follow up

## 2022-07-06 NOTE — OUTREACH NOTE
Prep Survey    Flowsheet Row Responses   [facility] patient discharged from? [facility]   Is LACE score < 7 ? No   Emergency Room discharge w/ pulse ox? No   Eligibility [facility]   Date of Admission 07/01/22   Date of Discharge 07/06/22   Discharge Disposition Home or Self Care   Discharge diagnosis Ulcer of toe of right foot, CKD   Does the patient have one of the following disease processes/diagnoses(primary or secondary)? Other   Does the patient have Home health ordered? No   Is there a DME ordered? Yes   What DME was ordered? [employer] for walker   Prep survey completed? Yes          MONICA CONTRERAS - Registered Nurse

## 2022-07-06 NOTE — TELEPHONE ENCOUNTER
The daughter returned my phone call and was able to make his follow up apt in [place]. She v/u of apt and had no other questions.

## 2022-07-06 NOTE — TELEPHONE ENCOUNTER
Attempted to call the pts daughter to get a follow up rescheduled. No answer. V/m was left with call back information.

## 2022-07-07 ENCOUNTER — TRANSITIONAL CARE MANAGEMENT TELEPHONE ENCOUNTER (OUTPATIENT)
Dept: CALL CENTER | Facility: HOSPITAL | Age: 77
End: 2022-07-07

## 2022-07-07 NOTE — OUTREACH NOTE
Call Center TCM Note    Flowsheet Row Responses   [facility] facility patient discharged from? [facility]   Does the patient have one of the following disease processes/diagnoses(primary or secondary)? Other   TCM attempt successful? No   Unsuccessful attempts Attempt 1          Olive Irving LPN    7/7/2022, 10:30 EDT

## 2022-07-07 NOTE — OUTREACH NOTE
Call Center TCM Note    Flowsheet Row Responses   [facility] facility patient discharged from? [facility]   Does the patient have one of the following disease processes/diagnoses(primary or secondary)? Other   TCM attempt successful? No   Unsuccessful attempts Attempt 2          Olive Irving LPN    7/7/2022, 16:58 EDT

## 2022-07-08 ENCOUNTER — APPOINTMENT (OUTPATIENT)
Dept: CARDIOLOGY | Facility: HOSPITAL | Age: 77
End: 2022-07-08

## 2022-07-08 ENCOUNTER — TRANSITIONAL CARE MANAGEMENT TELEPHONE ENCOUNTER (OUTPATIENT)
Dept: CALL CENTER | Facility: HOSPITAL | Age: 77
End: 2022-07-08

## 2022-07-08 LAB — QT INTERVAL: 433 MS

## 2022-07-08 NOTE — OUTREACH NOTE
Call Center TCM Note    Flowsheet Row Responses   [facility] facility patient discharged from? [facility]   Does the patient have one of the following disease processes/diagnoses(primary or secondary)? Other   TCM attempt successful? No   Unsuccessful attempts Attempt 3  [[facility]  #[id number]]          Gely Ribeiro LPN    7/8/2022, 08:28 EDT

## 2022-07-11 ENCOUNTER — OFFICE VISIT (OUTPATIENT)
Dept: PODIATRY | Facility: CLINIC | Age: 77
End: 2022-07-11

## 2022-07-11 ENCOUNTER — TELEPHONE (OUTPATIENT)
Dept: PODIATRY | Facility: CLINIC | Age: 77
End: 2022-07-11

## 2022-07-11 VITALS
SYSTOLIC BLOOD PRESSURE: 105 MMHG | DIASTOLIC BLOOD PRESSURE: 58 MMHG | BODY MASS INDEX: 22.26 KG/M2 | HEART RATE: 95 BPM | HEIGHT: 62 IN | OXYGEN SATURATION: 97 % | WEIGHT: 121 LBS

## 2022-07-11 DIAGNOSIS — L97.512 DIABETIC ULCER OF TOE OF RIGHT FOOT ASSOCIATED WITH DIABETES MELLITUS DUE TO UNDERLYING CONDITION, WITH FAT LAYER EXPOSED: ICD-10-CM

## 2022-07-11 DIAGNOSIS — E11.65 TYPE 2 DIABETES MELLITUS WITH HYPERGLYCEMIA, UNSPECIFIED WHETHER LONG TERM INSULIN USE: Primary | ICD-10-CM

## 2022-07-11 DIAGNOSIS — E08.621 DIABETIC ULCER OF TOE OF RIGHT FOOT ASSOCIATED WITH DIABETES MELLITUS DUE TO UNDERLYING CONDITION, WITH FAT LAYER EXPOSED: ICD-10-CM

## 2022-07-11 DIAGNOSIS — I73.9 PAD (PERIPHERAL ARTERY DISEASE): ICD-10-CM

## 2022-07-11 PROCEDURE — 99212 OFFICE O/P EST SF 10 MIN: CPT

## 2022-07-11 NOTE — PROGRESS NOTES
"07/11/2022  Foot and Ankle Surgery - New Patient   Provider: CON Abraham   Location: [facility]    Subjective:  Ramon Silva is a 77 y.o. male.     Chief Complaint   Patient presents with   • Right Foot - Foot Ulcer     DIANE Molina 6/30/2022       HPI: Patient presents to office today for follow-up after inpatient hospitalization for right great toe diabetic ulcer.  Patient is here today with his daughter.  Patient is significantly hard of hearing and is not answering questions today.  Patient's daughter reports patient developed wound little over a week ago to his right fourth toe.  Patient was seen and admitted to the hospital and seen by podiatry, vascular, and infectious disease.  Patient was found to have severe digital ischemia to the right lower extremity with no options for revascularization.  MRI was obtained and did not show signs of osteomyelitis.  Patient has finished course of antibiotics prescribed in the hospital per patient's daughter.  They have been applying Betadine wet-to-dry dressing to the wound twice daily.  Patient's daughter states that wound is looking better.  Patient's daughter reports blood sugars have been in the 150s.  Chart review reveals patient's last A1c was 9.3 last week.  Patient's daughter reports that he \"does not walk much\" except to the bathroom, and has been wearing a surgical shoe when walking.  Patient's daughter reports that he is having some pain to his foot at nighttime.    No Known Allergies    Past Medical History:   Diagnosis Date   • Anemia    • Arthralgia    • Diabetes (HCC)    • Fatigue    • GERD (gastroesophageal reflux disease)    • Gout    • Hyperlipidemia    • Hypertension    • Liver lesion    • Non Hodgkin's lymphoma (HCC)    • Osteoporosis    • Personal history of testicular cancer    • Vitamin D deficiency    • Weight loss        Past Surgical History:   Procedure Laterality Date   • ANGIOGRAM - CONVERTED  07/05/2022    aif by Dr" Florence   • CARDIAC CATHETERIZATION N/A 7/5/2022    Procedure: PERIPHERAL ANGIOGRAPHY Right leg;  Surgeon: Theron Henriquez MD;  Location: [facility] CATH INVASIVE LOCATION;  Service: Cardiovascular;  Laterality: N/A;   • HIP SURGERY  2005   • PORTACATH PLACEMENT     • TESTICLE SURGERY Right 07/17/2000    right testicular excision       Family History   Family history unknown: Yes       Social History     Socioeconomic History   • Marital status:    Tobacco Use   • Smoking status: Never Smoker   • Smokeless tobacco: Never Used   Substance and Sexual Activity   • Alcohol use: No   • Drug use: Never   • Sexual activity: Defer        Current Outpatient Medications on File Prior to Visit   Medication Sig Dispense Refill   • Accu-Chek Guide test strip test daily as directed 50 each 2   • Accu-Chek Softclix Lancets lancets test daily as directed 100 each 2   • aspirin 81 MG tablet Take 81 mg by mouth Daily.     • atorvastatin (LIPITOR) 40 MG tablet TAKE ONE TABLET BY MOUTH AT BEDTIME 90 tablet 0   • carvedilol (COREG) 25 MG tablet TAKE ONE TABLET BY MOUTH TWICE DAILY WITH MEALS 180 tablet 1   • Cetirizine HCl (ZyrTEC Allergy) 10 MG capsule Take 10 mg by mouth Daily.     • cilostazol (PLETAL) 100 MG tablet Take 1 tablet by mouth 2 (Two) Times a Day. 60 tablet 1   • Diclofenac Sodium (VOLTAREN) 1 % gel gel Apply 4 g topically to the appropriate area as directed 4 (Four) Times a Day As Needed.     • famotidine (PEPCID) 40 MG tablet Take 1 tablet by mouth Daily.     • ferrous sulfate 324 (65 Fe) MG tablet delayed-release EC tablet Take 1 tablet by mouth Daily With Breakfast. 90 tablet 1   • finasteride (Proscar) 5 MG tablet Take 1 tablet by mouth Daily. 90 tablet 0   • gabapentin (NEURONTIN) 100 MG capsule Take 1 capsule by mouth Every Night for 90 days. 90 capsule 0   • ibandronate (BONIVA) 150 MG tablet Take 1 tablet by mouth Every 30 (Thirty) Days. 1 tablet 5   • losartan (COZAAR) 50 MG tablet Take 1 tablet by  "mouth Daily. 90 tablet 0   • Omega-3 Fatty Acids (OMEGA-3 FISH OIL PO) Take 1 capsule by mouth Daily.     • pantoprazole (PROTONIX) 40 MG EC tablet Take 1 tablet by mouth Daily. 90 tablet 1   • [] doxycycline (ADOXA) 100 MG tablet Take 1 tablet by mouth Every 12 (Twelve) Hours for 8 doses. Indications: Infection of the Skin and/or Soft Tissue 8 tablet 0   • [] fluconazole (DIFLUCAN) 100 MG tablet Take 1 tablet by mouth Daily for 4 doses. Indications: Infection of the Skin and/or Soft Tissue 4 tablet 0     No current facility-administered medications on file prior to visit.       Review of Systems:  General: Denies fever, chills, fatigue, and weakness.  Eyes: Denies vision loss, blurry vision, and excessive redness.  ENT: Denies hearing issues and difficulty swallowing.  Cardiovascular: Denies palpitations, chest pain, or syncopal episodes.  Respiratory: Denies shortness of breath, wheezing, and coughing.  GI: Denies abdominal pain, nausea, and vomiting.   : Denies frequency, hematuria, and urgency.  Musculoskeletal: Denies muscle cramps, joint pains, and stiffness.  Derm: Denies rash, open wounds, or suspicious lesions.  Neuro: Denies headaches, numbness, loss of coordination, and tremors.  Psych: Denies anxiety and depression.  Endocrine: Denies temperature intolerance and changes in appetite.  Heme: Denies bleeding disorders or abnormal bruising.     Objective   /58   Pulse 95   Ht 157.5 cm (62\")   Wt 54.9 kg (121 lb)   SpO2 97%   BMI 22.13 kg/m²     Foot/Ankle Exam:       General:   Appearance: elderly    Orientation: unable to assess    Affect comment:  Flat  Gait: antalgic    Gait comment:  Patient's daughter reports that he needs to have knee replacement of the right knee  Assistance: independent    Shoe Gear:  Surgical shoe    VASCULAR      Right Foot Vascularity   Dorsalis pedis:  1+  Skin Temperature: warm    Edema Grading:  None  CFT:  < 3 seconds      NEUROLOGIC     Right Foot " Neurologic   Light touch sensation:  Normal     MUSCULOSKELETAL      Right Foot Musculoskeletal   Ecchymosis:  None  Tenderness: none       DERMATOLOGIC     Right Foot Dermatologic   Skin: skin changes and ulcer       Image:       Assessment & Plan   Diagnoses and all orders for this visit:    1. Type 2 diabetes mellitus with hyperglycemia, unspecified whether long term insulin use ([facility]) (Primary)    2. Diabetic ulcer of toe of right foot associated with diabetes mellitus due to underlying condition, with fat layer exposed (HCC)    3. PAD (peripheral artery disease) ([facility])      On exam patient has diabetic ulcer to interdigit space of fourth and fifth toe over the lateral aspect of the fourth toe.  Wound measures approximately 1 x 1 x 0.2 cm.  Wound is full-thickness but does not seem to palpate to deep structures.  Periwound appears to have some resolving erythema and induration but is otherwise clear, dry, intact with no signs of significant infection noted today.  Wound bed is pink, moist, clean.  Patient and patient's daughter instructed to continue to strive for appropriate glycemic control to help with wound healing.  Encourage patient to continue to decrease overall activity and wear surgical shoe when ambulating to prevent toes from rubbing.  Recommend patient paint wound with Betadine and apply dry fluffed gauze between toes space and secure with tape.  Can continue changing twice daily.  Did discuss that this wound is at risk for nonhealing due to patient's peripheral artery disease.    Discussed with patient and daughter that pain to his foot may be related to neuropathic pain, and may be exacerbated by decreased blood flow.  Did review appropriate over-the-counter Tylenol or ibuprofen use.  Patient has also started taking gabapentin which I discussed may help with the symptoms.  Would like for patient to follow-up in 1 week and call with any questions or concerns should they arise before scheduled  appointment.  Asked for patient and daughter to continue to monitor wound closely for worsening signs or new signs of infection which were reviewed today.  Patient's daughter verbalized understanding and is agreeable to plan at this time.  Patient did not communicate in appointment today.    After patient left today patient's daughter did call to inquire about returning to work.  Discussed with medical assistant that it is in place in patient's best interest to continue with decreased activity and do recommend patient refrain from returning to work at this time.            No orders of the defined types were placed in this encounter.       Note is dictated utilizing voice recognition software. Unfortunately this leads to occasional typographical errors. I apologize in advance if the situation occurs. If questions occur please do not hesitate to call our office.

## 2022-07-18 ENCOUNTER — OFFICE VISIT (OUTPATIENT)
Dept: PODIATRY | Facility: CLINIC | Age: 77
End: 2022-07-18

## 2022-07-18 VITALS
WEIGHT: 121 LBS | DIASTOLIC BLOOD PRESSURE: 57 MMHG | SYSTOLIC BLOOD PRESSURE: 136 MMHG | HEIGHT: 62 IN | HEART RATE: 104 BPM | BODY MASS INDEX: 22.26 KG/M2

## 2022-07-18 DIAGNOSIS — E11.65 TYPE 2 DIABETES MELLITUS WITH HYPERGLYCEMIA, UNSPECIFIED WHETHER LONG TERM INSULIN USE: ICD-10-CM

## 2022-07-18 DIAGNOSIS — E08.621 DIABETIC ULCER OF TOE OF RIGHT FOOT ASSOCIATED WITH DIABETES MELLITUS DUE TO UNDERLYING CONDITION, UNSPECIFIED ULCER STAGE: ICD-10-CM

## 2022-07-18 DIAGNOSIS — I73.9 PAD (PERIPHERAL ARTERY DISEASE): Primary | ICD-10-CM

## 2022-07-18 DIAGNOSIS — L97.519 DIABETIC ULCER OF TOE OF RIGHT FOOT ASSOCIATED WITH DIABETES MELLITUS DUE TO UNDERLYING CONDITION, UNSPECIFIED ULCER STAGE: ICD-10-CM

## 2022-07-18 PROCEDURE — 99213 OFFICE O/P EST LOW 20 MIN: CPT

## 2022-07-18 RX ORDER — CLINDAMYCIN HYDROCHLORIDE 300 MG/1
300 CAPSULE ORAL 4 TIMES DAILY
Qty: 40 CAPSULE | Refills: 0 | Status: SHIPPED | OUTPATIENT
Start: 2022-07-18 | End: 2022-07-28

## 2022-07-19 NOTE — PROGRESS NOTES
07/18/2022  Foot and Ankle Surgery - Established Patient/Follow-up  Provider: CON Abraham   Location: [facility]    Subjective:  Ramon Silva is a 77 y.o. male.     Chief Complaint   Patient presents with   • Right Foot - Foot Ulcer   • Follow-up     WALDO Molina con  6/30/2022       HPI: Patient presents today for follow-up of right fourth toe diabetic ulcer.  Patient is here today with his grandson.  Patient reports that he is feeling okay.  They state that family has been changing dressing to right fourth toe and applying Betadine paint and dry fluffed gauze around the toe as directed twice daily.  Patient has been wearing surgical shoe and decreasing overall activity.  Patient denies any fever, chills or signs of infection.  Patient further states he is still on antibiotic but then later reports that he has been off antibiotic for about a week.  Grandson does feel wound looks a little worse.    No Known Allergies    Current Outpatient Medications on File Prior to Visit   Medication Sig Dispense Refill   • Accu-Chek Guide test strip test daily as directed 50 each 2   • Accu-Chek Softclix Lancets lancets test daily as directed 100 each 2   • aspirin 81 MG tablet Take 81 mg by mouth Daily.     • atorvastatin (LIPITOR) 40 MG tablet TAKE ONE TABLET BY MOUTH AT BEDTIME 90 tablet 0   • carvedilol (COREG) 25 MG tablet TAKE ONE TABLET BY MOUTH TWICE DAILY WITH MEALS 180 tablet 1   • Cetirizine HCl (ZyrTEC Allergy) 10 MG capsule Take 10 mg by mouth Daily.     • cilostazol (PLETAL) 100 MG tablet Take 1 tablet by mouth 2 (Two) Times a Day. 60 tablet 1   • Diclofenac Sodium (VOLTAREN) 1 % gel gel Apply 4 g topically to the appropriate area as directed 4 (Four) Times a Day As Needed.     • famotidine (PEPCID) 40 MG tablet Take 1 tablet by mouth Daily.     • ferrous sulfate 324 (65 Fe) MG tablet delayed-release EC tablet Take 1 tablet by mouth Daily With Breakfast. 90 tablet 1   • finasteride (Proscar) 5  "MG tablet Take 1 tablet by mouth Daily. 90 tablet 0   • gabapentin (NEURONTIN) 100 MG capsule Take 1 capsule by mouth Every Night for 90 days. 90 capsule 0   • ibandronate (BONIVA) 150 MG tablet Take 1 tablet by mouth Every 30 (Thirty) Days. 1 tablet 5   • losartan (COZAAR) 50 MG tablet Take 1 tablet by mouth Daily. 90 tablet 0   • Omega-3 Fatty Acids (OMEGA-3 FISH OIL PO) Take 1 capsule by mouth Daily.     • pantoprazole (PROTONIX) 40 MG EC tablet Take 1 tablet by mouth Daily. 90 tablet 1     No current facility-administered medications on file prior to visit.       Objective   /57   Pulse 104   Ht 157.5 cm (62\")   Wt 54.9 kg (121 lb)   BMI 22.13 kg/m²     Foot/Ankle Exam:      DERMATOLOGIC     Right Foot Dermatologic   Skin: drainage, erythema, skin changes and ulcer        Right Foot Additional Comments Purulent drainage palpation to the       General:   Appearance: elderly    Orientation: unable to assess    Affect comment:  Flat  Gait: antalgic    Gait comment:  Patient's daughter reports that he needs to have knee replacement of the right knee  Assistance: independent    Shoe Gear:  Surgical shoe     VASCULAR       Right Foot Vascularity   Dorsalis pedis:  1+  Skin Temperature: warm    Edema Grading:  None  CFT:  < 3 seconds      NEUROLOGIC      Right Foot Neurologic   Light touch sensation:  Normal      MUSCULOSKELETAL       Right Foot Musculoskeletal   Ecchymosis:  None  Tenderness: none        DERMATOLOGIC      Right Foot Dermatologic   Skin: skin changes and ulcer        Image:     Assessment & Plan   Diagnoses and all orders for this visit:    1. PAD (peripheral artery disease) ([facility]) (Primary)    2. Type 2 diabetes mellitus with hyperglycemia, unspecified whether long term insulin use ([facility])    3. Diabetic ulcer of toe of right foot associated with diabetes mellitus due to underlying condition, unspecified ulcer stage ([facility])    Other orders  -     clindamycin (CLEOCIN) 300 MG capsule; Take 1 " capsule by mouth 4 (Four) Times a Day for 10 days.  Dispense: 40 capsule; Refill: 0      On exam patient's diabetic wound to the right fourth lateral toe appears worse.  Wound is measuring larger approximately 1 x 2 x 0.3 cm.  Wound bed is 95% covered with moist yellow slough.  Small open area of wound difficult to see wound bed but does seem to palpate to bone.  Periwound with more erythema and slight induration.  There is increased warmth to the toe and small to moderate purulent drainage with palpation of the periwound.    Did discuss with patient and family that wound appears worse.  Discussed with patient that he is at risk for amputation of the toe and would like for him to start oral clindamycin 300 mg 4 times daily for 10 days to help suppress infection and follow-up with Dr. Oneal for reevaluation in the next few days.  Instructed patient to continue to continue to monitor and call with any questions and concerns or go to emergency room if toe appears to be worsening or if he develops any signs of infection such as fever or chills.  Patient and grandson verbalized understanding and agreeable to plan at this time.  Recommend patient continue with Betadine paint and dry fluffed gauze twice daily and as needed for any drainage saturation.  Patient and grandson verbalized understanding and agreeable to plan at this time.    No orders of the defined types were placed in this encounter.         Note is dictated utilizing voice recognition software. Unfortunately this leads to occasional typographical errors. I apologize in advance if the situation occurs. If questions occur please do not hesitate to call our office.

## 2022-07-20 ENCOUNTER — TELEPHONE (OUTPATIENT)
Dept: FAMILY MEDICINE CLINIC | Facility: CLINIC | Age: 77
End: 2022-07-20

## 2022-07-20 ENCOUNTER — OFFICE VISIT (OUTPATIENT)
Dept: PODIATRY | Facility: CLINIC | Age: 77
End: 2022-07-20

## 2022-07-20 VITALS
HEIGHT: 62 IN | HEART RATE: 80 BPM | BODY MASS INDEX: 22.26 KG/M2 | WEIGHT: 121 LBS | SYSTOLIC BLOOD PRESSURE: 126 MMHG | OXYGEN SATURATION: 100 % | DIASTOLIC BLOOD PRESSURE: 58 MMHG

## 2022-07-20 DIAGNOSIS — E11.65 TYPE 2 DIABETES MELLITUS WITH HYPERGLYCEMIA, UNSPECIFIED WHETHER LONG TERM INSULIN USE: ICD-10-CM

## 2022-07-20 DIAGNOSIS — L97.513 CHRONIC ULCER OF RIGHT FOOT WITH NECROSIS OF MUSCLE: Primary | ICD-10-CM

## 2022-07-20 DIAGNOSIS — I73.9 PAD (PERIPHERAL ARTERY DISEASE): ICD-10-CM

## 2022-07-20 PROCEDURE — 99213 OFFICE O/P EST LOW 20 MIN: CPT | Performed by: PODIATRIST

## 2022-07-20 NOTE — PROGRESS NOTES
07/20/2022  Foot and Ankle Surgery - Established Patient/Follow-up  Provider: Dr. Balaji Oneal DPM  Location: [facility]    Subjective:  Ramon Silva is a 77 y.o. male.     Chief Complaint   Patient presents with   • Right Foot - Wound Check, Follow-up     Diabetic wound check on Right Foot. Last PCP 7/1/2022.       HPI:     The patient is a 77-year-old male who returns to clinic for evaluation of his right foot. He is accompanied by a female adult who is providing information and responding to questions.     He was last seen by [facility] on 07/18/2022 because he was concerned about the appearance of his toe. The adult female states that the discoloration of his right 4th toe has worsened within 4 days. She reports that the vascular doctors prescribed Pletal, which he has been taking twice daily, since he was released from the hospital. She adds that he is taking aspirin daily. She reports that his blood glucose levels have been between  mg/dL. The adult female states that vascular service performed a procedure on his leg and they were told that the patient did not have any blood clots; however, he has 3 arteries in his feet. She adds that 1 artery is flowing well; whereas there is minimal blood flow to the remaining 2 arteries.      She notes that the patient typically has his feet elevated while in bed and he is experiencing significant pain to the plantar aspect of the fourth metatarsal head while he is asleep. The adult female states that when he places his feet down, he notices significant symptom relief. She adds that when he ambulates on his feet, he experiences edema.     No Known Allergies    Current Outpatient Medications on File Prior to Visit   Medication Sig Dispense Refill   • Accu-Chek Guide test strip test daily as directed 50 each 2   • Accu-Chek Softclix Lancets lancets test daily as directed 100 each 2   • aspirin 81 MG tablet Take 81 mg by mouth Daily.     •  "atorvastatin (LIPITOR) 40 MG tablet TAKE ONE TABLET BY MOUTH AT BEDTIME 90 tablet 0   • carvedilol (COREG) 25 MG tablet TAKE ONE TABLET BY MOUTH TWICE DAILY WITH MEALS 180 tablet 1   • Cetirizine HCl (ZyrTEC Allergy) 10 MG capsule Take 10 mg by mouth Daily.     • cilostazol (PLETAL) 100 MG tablet Take 1 tablet by mouth 2 (Two) Times a Day. 60 tablet 1   • clindamycin (CLEOCIN) 300 MG capsule Take 1 capsule by mouth 4 (Four) Times a Day for 10 days. 40 capsule 0   • Diclofenac Sodium (VOLTAREN) 1 % gel gel Apply 4 g topically to the appropriate area as directed 4 (Four) Times a Day As Needed.     • famotidine (PEPCID) 40 MG tablet Take 1 tablet by mouth Daily.     • ferrous sulfate 324 (65 Fe) MG tablet delayed-release EC tablet Take 1 tablet by mouth Daily With Breakfast. 90 tablet 1   • finasteride (Proscar) 5 MG tablet Take 1 tablet by mouth Daily. 90 tablet 0   • gabapentin (NEURONTIN) 100 MG capsule Take 1 capsule by mouth Every Night for 90 days. 90 capsule 0   • ibandronate (BONIVA) 150 MG tablet Take 1 tablet by mouth Every 30 (Thirty) Days. 1 tablet 5   • losartan (COZAAR) 50 MG tablet Take 1 tablet by mouth Daily. 90 tablet 0   • Omega-3 Fatty Acids (OMEGA-3 FISH OIL PO) Take 1 capsule by mouth Daily.     • pantoprazole (PROTONIX) 40 MG EC tablet Take 1 tablet by mouth Daily. 90 tablet 1     No current facility-administered medications on file prior to visit.       Objective   /58   Pulse 80   Ht 157.5 cm (62\")   Wt 54.9 kg (121 lb)   SpO2 100%   BMI 22.13 kg/m²     Foot/Ankle Exam:       General:   Appearance: elderly    Orientation: unable to assess    Affect comment:  Flat  Gait: antalgic    Gait comment:  Patient's daughter reports that he needs to have knee replacement of the right knee  Assistance: independent    Shoe Gear:  Surgical shoe    VASCULAR      Right Foot Vascularity   Dorsalis pedis:  1+  Skin Temperature: warm    Edema Grading:  None  CFT:  < 3 seconds      NEUROLOGIC     Right " Foot Neurologic   Light touch sensation:  Normal     MUSCULOSKELETAL      Right Foot Musculoskeletal   Ecchymosis:  None  Tenderness: none       DERMATOLOGIC     Right Foot Dermatologic   Skin: skin changes and ulcer       Image:       Right Foot Additional Comments 07/20/2022 Wound is stable with overlying eschar. Mild hyperkeratotic and hyperpigmented tissue around the ulcer to the lateral aspect of the 4th digit. Wound does not have any active drainage or proximal lymphangitis.       Assessment & Plan   Diagnoses and all orders for this visit:    1. 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 PAD (peripheral artery disease) ([facility])    3. Type 2 diabetes mellitus with hyperglycemia, unspecified whether long term insulin use ([facility])      Patient returns to clinic with his daughters for evaluation of his right fourth toe wound.  Patient was recently evaluated by nurse practitioner for this issue and placed on clindamycin.  Patient's daughter feels that the symptoms are improving but he continues to have a deep toe wound with discoloration involving the digit.  He has undergone endovascular procedure involving his right lower extremity with vascular service.  Today, no overt signs of infection are present but there is concerns long-term given depth of the wound and vascular disease.  I have explained this with patient and daughters at length.  I have recommended that he continue with Betadine dressing changes on a daily basis and finish his oral antibiotic.  They are to monitor for any progressive discoloration or signs of infection.  I would like to see him next week for reevaluation.  If he continues to have persistent symptoms or worsening wound, will need to consider amputation.  Greater than 20 minutes spent before, during, and after evaluation for patient care.    No orders of the defined types were placed in this encounter.         Note is dictated utilizing voice recognition  software. Unfortunately this leads to occasional typographical errors. I apologize in advance if the situation occurs. If questions occur please do not hesitate to call our office.    Transcribed from ambient dictation for DIANE Oneal DPM by Dianna Colunga.  07/20/22   11:42 EDT    Patient verbalized consent to the visit recording.  I have personally performed the services described in this document as transcribed by the above individual, and it is both accurate and complete.  DIANE Oneal DPM  7/21/2022  07:01 EDT

## 2022-07-20 NOTE — TELEPHONE ENCOUNTER
UNABLE TO WARM TRANSFER     Caller: REECE HOANG    Relationship to patient: Emergency Contact    Best call back number: [phone]    Patient is needing: PATIENT IS NEEDING HOSPITAL FU. DISCHARGED FROM [facility] 7/6 FOR RT FOOT 4TH TOE ULCER. PLEASE CALL.

## 2022-07-22 ENCOUNTER — TELEPHONE (OUTPATIENT)
Dept: PODIATRY | Facility: CLINIC | Age: 77
End: 2022-07-22

## 2022-07-22 NOTE — TELEPHONE ENCOUNTER
Caller: REECE    Relationship: DAUGHTER    Best call back number: [phone]    What form or medical record are you requesting: FMLA    Who is requesting this form or medical record from you: EMPLOYER    How would you like to receive the form or medical records (pick-up, mail, fax): EMAIL , IF POSSIBLE [email or web address] / FAX #[phone]    Timeframe paperwork needed: ASAP    Additional notes:REECE STATES THE [facility] PAPERWORK STATES EFFECTIVE DATE 7/11 BUT PT WAS ADMITTED AND SEEN 7/1 AND THIS NEEDS TO BE ADJUSTED , AND STATES THE END DATE FOR PAPERWORK TO BE SUBMITTED IS 7/23. PLEASE ADVISE

## 2022-07-27 ENCOUNTER — OFFICE VISIT (OUTPATIENT)
Dept: PODIATRY | Facility: CLINIC | Age: 77
End: 2022-07-27

## 2022-07-27 VITALS
WEIGHT: 127 LBS | OXYGEN SATURATION: 97 % | DIASTOLIC BLOOD PRESSURE: 49 MMHG | BODY MASS INDEX: 23.37 KG/M2 | HEIGHT: 62 IN | SYSTOLIC BLOOD PRESSURE: 102 MMHG | HEART RATE: 93 BPM

## 2022-07-27 DIAGNOSIS — L97.519 DIABETIC ULCER OF TOE OF RIGHT FOOT ASSOCIATED WITH DIABETES MELLITUS DUE TO UNDERLYING CONDITION, UNSPECIFIED ULCER STAGE: ICD-10-CM

## 2022-07-27 DIAGNOSIS — E11.65 TYPE 2 DIABETES MELLITUS WITH HYPERGLYCEMIA, UNSPECIFIED WHETHER LONG TERM INSULIN USE: ICD-10-CM

## 2022-07-27 DIAGNOSIS — I73.9 PAD (PERIPHERAL ARTERY DISEASE): Primary | ICD-10-CM

## 2022-07-27 DIAGNOSIS — E08.621 DIABETIC ULCER OF TOE OF RIGHT FOOT ASSOCIATED WITH DIABETES MELLITUS DUE TO UNDERLYING CONDITION, UNSPECIFIED ULCER STAGE: ICD-10-CM

## 2022-07-27 PROCEDURE — 99212 OFFICE O/P EST SF 10 MIN: CPT

## 2022-07-27 RX ORDER — ATORVASTATIN CALCIUM 40 MG/1
TABLET, FILM COATED ORAL
Qty: 90 TABLET | Refills: 0 | Status: SHIPPED | OUTPATIENT
Start: 2022-07-27 | End: 2022-10-03

## 2022-07-27 NOTE — PROGRESS NOTES
07/27/2022  Foot and Ankle Surgery - Established Patient/Follow-up  Provider: CON Abraham   Location: [facility]    Subjective:  Ramon Silva is a 77 y.o. male.     Chief Complaint   Patient presents with   • Right Foot - Foot Ulcer     6/30/2022 last  pcp visit with Acacia Molina       HPI: Patient presents office today for reevaluation of right fourth toe ulcer.  Patient is present today with his daughter.  Patient was scheduled today with Dr. Oneal but was running late for appointment so needed to be seen by me today.  Patient has 1 more day of oral antibiotics to complete.  Patient's daughter feels wound is looking okay today and denies any signs of infection.  Patient has been decreasing overall activity and wearing postop shoe when ambulating.  Patient has flat affect and does not respond to questions during appointment today.    No Known Allergies    Current Outpatient Medications on File Prior to Visit   Medication Sig Dispense Refill   • Accu-Chek Guide test strip test daily as directed 50 each 2   • Accu-Chek Softclix Lancets lancets test daily as directed 100 each 2   • aspirin 81 MG tablet Take 81 mg by mouth Daily.     • carvedilol (COREG) 25 MG tablet TAKE ONE TABLET BY MOUTH TWICE DAILY WITH MEALS 180 tablet 1   • Cetirizine HCl (ZyrTEC Allergy) 10 MG capsule Take 10 mg by mouth Daily.     • cilostazol (PLETAL) 100 MG tablet Take 1 tablet by mouth 2 (Two) Times a Day. 60 tablet 1   • clindamycin (CLEOCIN) 300 MG capsule Take 1 capsule by mouth 4 (Four) Times a Day for 10 days. 40 capsule 0   • Diclofenac Sodium (VOLTAREN) 1 % gel gel Apply 4 g topically to the appropriate area as directed 4 (Four) Times a Day As Needed.     • famotidine (PEPCID) 40 MG tablet Take 1 tablet by mouth Daily.     • ferrous sulfate 324 (65 Fe) MG tablet delayed-release EC tablet Take 1 tablet by mouth Daily With Breakfast. 90 tablet 1   • finasteride (Proscar) 5 MG tablet Take 1 tablet by mouth Daily.  "90 tablet 0   • gabapentin (NEURONTIN) 100 MG capsule Take 1 capsule by mouth Every Night for 90 days. 90 capsule 0   • ibandronate (BONIVA) 150 MG tablet Take 1 tablet by mouth Every 30 (Thirty) Days. 1 tablet 5   • losartan (COZAAR) 50 MG tablet Take 1 tablet by mouth Daily. 90 tablet 0   • Omega-3 Fatty Acids (OMEGA-3 FISH OIL PO) Take 1 capsule by mouth Daily.     • pantoprazole (PROTONIX) 40 MG EC tablet Take 1 tablet by mouth Daily. 90 tablet 1   • [DISCONTINUED] atorvastatin (LIPITOR) 40 MG tablet TAKE ONE TABLET BY MOUTH AT BEDTIME 90 tablet 0     No current facility-administered medications on file prior to visit.       Objective   /49 (BP Location: Left arm, Patient Position: Sitting, Cuff Size: Adult)   Pulse 93   Ht 157.5 cm (62\")   Wt 57.6 kg (127 lb)   SpO2 97%   BMI 23.23 kg/m²     Foot/Ankle Exam     General:   Appearance: elderly    Orientation: unable to assess    Affect comment:  Flat  Gait: antalgic    Gait comment:  Patient's daughter reports that he needs to have knee replacement of the right knee  Assistance: independent    Shoe Gear:  Surgical shoe     VASCULAR       Right Foot Vascularity   Dorsalis pedis:  1+  Skin Temperature: warm    Edema Grading:  None  CFT:  < 3 seconds      NEUROLOGIC      Right Foot Neurologic   Light touch sensation:  Normal      MUSCULOSKELETAL       Right Foot Musculoskeletal   Ecchymosis:  None  Tenderness: none        DERMATOLOGIC      Right Foot Dermatologic   Skin: skin changes and ulcer        Image:           Assessment & Plan   Diagnoses and all orders for this visit:    1. PAD (peripheral artery disease) ([facility]) (Primary)    2. Type 2 diabetes mellitus with hyperglycemia, unspecified whether long term insulin use ([facility])    3. Diabetic ulcer of toe of right foot associated with diabetes mellitus due to underlying condition, unspecified ulcer stage ([facility])      On exam patient continues to have right fourth toe diabetic ulcer.  Right fourth toe " diabetic ulcer measures approximately 0.9 x 0.9 x 0.3 cm.  Wound bed is covered with approximately 85% dry yellow adherent slough.  Scant serosanguineous drainage noted.  Periwound skin and right fourth toe do appear darkened and dry but no signs of infection noted no induration or erythema at this time.  Wound appears somewhat stable at this time.  Did instruct patient and daughter to continue with current treatment of painting with Betadine and applying dry fluffed gauze between fourth and fifth toes and securing with dry gauze with tape.  Recommend change twice daily.  Asked patient to continue with decreased activity and to wear postop shoe when ambulating to decrease toe movement.  Did again review with patient and daughter that patient is at risk for amputation of the toe due to poor blood flow.  Patients daughter verbalized understanding and agreeable with treatment plan at this time.  Would like for patient to follow-up in 1 week for reevaluation with Dr. Oneal and call with any questions or concerns should they arise before scheduled appointment.  Asked for them to continue to monitor for signs of infection which were reviewed today and instructed to call office or proceed to emergency room for evaluation if office is closed.  No orders of the defined types were placed in this encounter.         Note is dictated utilizing voice recognition software. Unfortunately this leads to occasional typographical errors. I apologize in advance if the situation occurs. If questions occur please do not hesitate to call our office.

## 2022-07-30 ENCOUNTER — TELEPHONE (OUTPATIENT)
Dept: DIABETES SERVICES | Facility: HOSPITAL | Age: 77
End: 2022-07-30

## 2022-08-01 ENCOUNTER — TELEPHONE (OUTPATIENT)
Dept: DIABETES SERVICES | Facility: HOSPITAL | Age: 77
End: 2022-08-01

## 2022-08-01 ENCOUNTER — OFFICE VISIT (OUTPATIENT)
Dept: FAMILY MEDICINE CLINIC | Facility: CLINIC | Age: 77
End: 2022-08-01

## 2022-08-01 VITALS
SYSTOLIC BLOOD PRESSURE: 115 MMHG | BODY MASS INDEX: 23.19 KG/M2 | HEART RATE: 80 BPM | HEIGHT: 62 IN | WEIGHT: 126 LBS | TEMPERATURE: 98.4 F | DIASTOLIC BLOOD PRESSURE: 52 MMHG | OXYGEN SATURATION: 96 %

## 2022-08-01 DIAGNOSIS — E78.00 PURE HYPERCHOLESTEROLEMIA: ICD-10-CM

## 2022-08-01 DIAGNOSIS — E53.8 VITAMIN B12 DEFICIENCY: Primary | ICD-10-CM

## 2022-08-01 DIAGNOSIS — E55.9 VITAMIN D DEFICIENCY: ICD-10-CM

## 2022-08-01 DIAGNOSIS — E11.65 TYPE 2 DIABETES MELLITUS WITH HYPERGLYCEMIA, WITHOUT LONG-TERM CURRENT USE OF INSULIN: ICD-10-CM

## 2022-08-01 DIAGNOSIS — Z13.29 SCREENING FOR HYPOTHYROIDISM: ICD-10-CM

## 2022-08-01 LAB — HBA1C MFR BLD: 7.6 % (ref 3.5–5.6)

## 2022-08-01 PROCEDURE — 85025 COMPLETE CBC W/AUTO DIFF WBC: CPT | Performed by: NURSE PRACTITIONER

## 2022-08-01 PROCEDURE — 82306 VITAMIN D 25 HYDROXY: CPT | Performed by: NURSE PRACTITIONER

## 2022-08-01 PROCEDURE — 84439 ASSAY OF FREE THYROXINE: CPT | Performed by: NURSE PRACTITIONER

## 2022-08-01 PROCEDURE — 80061 LIPID PANEL: CPT | Performed by: NURSE PRACTITIONER

## 2022-08-01 PROCEDURE — 99214 OFFICE O/P EST MOD 30 MIN: CPT | Performed by: NURSE PRACTITIONER

## 2022-08-01 PROCEDURE — 83036 HEMOGLOBIN GLYCOSYLATED A1C: CPT | Performed by: NURSE PRACTITIONER

## 2022-08-01 PROCEDURE — 84443 ASSAY THYROID STIM HORMONE: CPT | Performed by: NURSE PRACTITIONER

## 2022-08-01 PROCEDURE — 82043 UR ALBUMIN QUANTITATIVE: CPT | Performed by: NURSE PRACTITIONER

## 2022-08-01 PROCEDURE — 82607 VITAMIN B-12: CPT | Performed by: NURSE PRACTITIONER

## 2022-08-01 PROCEDURE — 80053 COMPREHEN METABOLIC PANEL: CPT | Performed by: NURSE PRACTITIONER

## 2022-08-01 RX ORDER — CILOSTAZOL 100 MG/1
100 TABLET ORAL 2 TIMES DAILY
Qty: 60 TABLET | Refills: 1 | Status: SHIPPED | OUTPATIENT
Start: 2022-08-01 | End: 2022-10-03

## 2022-08-01 RX ORDER — BLOOD SUGAR DIAGNOSTIC
1 STRIP MISCELLANEOUS DAILY
Qty: 50 EACH | Refills: 2 | Status: SHIPPED | OUTPATIENT
Start: 2022-08-01 | End: 2022-08-11 | Stop reason: SDUPTHER

## 2022-08-01 NOTE — TELEPHONE ENCOUNTER
Daughter returned educator call. Pt did receive test strips and lancets for Accuchek Guide meter from [facility] at discharge. She states pt having bs checked 3 times/day. BS is averaging in the  range. Pt to see MD on 8/1/2022. Discussed PCP will need to send rxs for test strips and lancets to local pharmacy due to pt receiving 1 month supply from [facility]. Daughter verbalized understanding and states will discuss with pt's PCP.

## 2022-08-02 ENCOUNTER — OFFICE VISIT (OUTPATIENT)
Dept: PODIATRY | Facility: CLINIC | Age: 77
End: 2022-08-02

## 2022-08-02 VITALS — WEIGHT: 126 LBS | HEIGHT: 62 IN | BODY MASS INDEX: 23.19 KG/M2 | HEART RATE: 86 BPM

## 2022-08-02 DIAGNOSIS — I73.9 PAD (PERIPHERAL ARTERY DISEASE): ICD-10-CM

## 2022-08-02 DIAGNOSIS — E11.65 TYPE 2 DIABETES MELLITUS WITH HYPERGLYCEMIA, UNSPECIFIED WHETHER LONG TERM INSULIN USE: ICD-10-CM

## 2022-08-02 DIAGNOSIS — L97.514 CHRONIC ULCER OF RIGHT FOOT WITH NECROSIS OF BONE: Primary | ICD-10-CM

## 2022-08-02 LAB
25(OH)D3 SERPL-MCNC: 67.5 NG/ML (ref 30–100)
ALBUMIN SERPL-MCNC: 4 G/DL (ref 3.5–5.2)
ALBUMIN UR-MCNC: <1.2 MG/DL
ALBUMIN/GLOB SERPL: 2.1 G/DL
ALP SERPL-CCNC: 67 U/L (ref 39–117)
ALT SERPL W P-5'-P-CCNC: 9 U/L (ref 1–41)
ANION GAP SERPL CALCULATED.3IONS-SCNC: 11.9 MMOL/L (ref 5–15)
AST SERPL-CCNC: 12 U/L (ref 1–40)
BASOPHILS # BLD AUTO: 0.03 10*3/MM3 (ref 0–0.2)
BASOPHILS NFR BLD AUTO: 0.7 % (ref 0–1.5)
BILIRUB SERPL-MCNC: 0.4 MG/DL (ref 0–1.2)
BUN SERPL-MCNC: 18 MG/DL (ref 8–23)
BUN/CREAT SERPL: 19.4 (ref 7–25)
CALCIUM SPEC-SCNC: 9.3 MG/DL (ref 8.6–10.5)
CHLORIDE SERPL-SCNC: 102 MMOL/L (ref 98–107)
CHOLEST SERPL-MCNC: 114 MG/DL (ref 0–200)
CO2 SERPL-SCNC: 24.1 MMOL/L (ref 22–29)
CREAT SERPL-MCNC: 0.93 MG/DL (ref 0.76–1.27)
DEPRECATED RDW RBC AUTO: 46.9 FL (ref 37–54)
EGFRCR SERPLBLD CKD-EPI 2021: 84.6 ML/MIN/1.73
EOSINOPHIL # BLD AUTO: 0.11 10*3/MM3 (ref 0–0.4)
EOSINOPHIL NFR BLD AUTO: 2.4 % (ref 0.3–6.2)
ERYTHROCYTE [DISTWIDTH] IN BLOOD BY AUTOMATED COUNT: 13.4 % (ref 12.3–15.4)
GLOBULIN UR ELPH-MCNC: 1.9 GM/DL
GLUCOSE SERPL-MCNC: 96 MG/DL (ref 65–99)
HCT VFR BLD AUTO: 32.4 % (ref 37.5–51)
HDLC SERPL-MCNC: 48 MG/DL (ref 40–60)
HGB BLD-MCNC: 10.5 G/DL (ref 13–17.7)
IMM GRANULOCYTES # BLD AUTO: 0.01 10*3/MM3 (ref 0–0.05)
IMM GRANULOCYTES NFR BLD AUTO: 0.2 % (ref 0–0.5)
LDLC SERPL CALC-MCNC: 48 MG/DL (ref 0–100)
LDLC/HDLC SERPL: 1 {RATIO}
LYMPHOCYTES # BLD AUTO: 1.63 10*3/MM3 (ref 0.7–3.1)
LYMPHOCYTES NFR BLD AUTO: 36.1 % (ref 19.6–45.3)
MCH RBC QN AUTO: 31.1 PG (ref 26.6–33)
MCHC RBC AUTO-ENTMCNC: 32.4 G/DL (ref 31.5–35.7)
MCV RBC AUTO: 95.9 FL (ref 79–97)
MONOCYTES # BLD AUTO: 0.37 10*3/MM3 (ref 0.1–0.9)
MONOCYTES NFR BLD AUTO: 8.2 % (ref 5–12)
NEUTROPHILS NFR BLD AUTO: 2.36 10*3/MM3 (ref 1.7–7)
NEUTROPHILS NFR BLD AUTO: 52.4 % (ref 42.7–76)
NRBC BLD AUTO-RTO: 0 /100 WBC (ref 0–0.2)
PLATELET # BLD AUTO: 179 10*3/MM3 (ref 140–450)
PMV BLD AUTO: 10.1 FL (ref 6–12)
POTASSIUM SERPL-SCNC: 4.2 MMOL/L (ref 3.5–5.2)
PROT SERPL-MCNC: 5.9 G/DL (ref 6–8.5)
RBC # BLD AUTO: 3.38 10*6/MM3 (ref 4.14–5.8)
SODIUM SERPL-SCNC: 138 MMOL/L (ref 136–145)
T4 FREE SERPL-MCNC: 1.42 NG/DL (ref 0.93–1.7)
TRIGL SERPL-MCNC: 91 MG/DL (ref 0–150)
TSH SERPL DL<=0.05 MIU/L-ACNC: 1.53 UIU/ML (ref 0.27–4.2)
VIT B12 BLD-MCNC: 210 PG/ML (ref 211–946)
VLDLC SERPL-MCNC: 18 MG/DL (ref 5–40)
WBC NRBC COR # BLD: 4.51 10*3/MM3 (ref 3.4–10.8)

## 2022-08-02 PROCEDURE — 99214 OFFICE O/P EST MOD 30 MIN: CPT | Performed by: PODIATRIST

## 2022-08-02 RX ORDER — COLLAGENASE SANTYL 250 [ARB'U]/G
1 OINTMENT TOPICAL DAILY
Qty: 90 G | Refills: 1 | Status: SHIPPED | OUTPATIENT
Start: 2022-08-02

## 2022-08-02 NOTE — PROGRESS NOTES
08/02/2022  Foot and Ankle Surgery - Established Patient/Follow-up  Provider: Dr. Balaji Oneal DPM  Location: [facility]    Subjective:  Ramon Silva is a 77 y.o. male.     Chief Complaint   Patient presents with   • Right Foot - Follow-up, Foot Ulcer     Ulcer on right 4th Toe.   • Follow-up     PCP with DIANE Molina 8/1/2022.        HPI: Patient is a 77-year-old male returns for follow-up regarding right foot ulcer. He is accompanied by an adult female who contributes in his medical history.     The patient was last seen with CON Hinojosa. He had an arteriogram of his right leg on 07/05/2022 with Dr. Henriquez. The adult female reports he was seen by his primary care physician, who has not seen him in over 1 month and she felt his ulcer worsen and he had inflammation in his right lower extremity. She notes his PCP was concerned, and referred him back to our clinic. He has intermittent pain in his foot.     No Known Allergies    Current Outpatient Medications on File Prior to Visit   Medication Sig Dispense Refill   • Accu-Chek Guide test strip 1 each by Other route Daily. E 11.9 50 each 2   • Accu-Chek Softclix Lancets lancets test daily as directed 100 each 2   • aspirin 81 MG tablet Take 81 mg by mouth Daily.     • atorvastatin (LIPITOR) 40 MG tablet TAKE ONE TABLET BY MOUTH AT BEDTIME 90 tablet 0   • carvedilol (COREG) 25 MG tablet TAKE ONE TABLET BY MOUTH TWICE DAILY WITH MEALS 180 tablet 1   • Cetirizine HCl (ZyrTEC Allergy) 10 MG capsule Take 10 mg by mouth Daily.     • cilostazol (PLETAL) 100 MG tablet Take 1 tablet by mouth 2 (Two) Times a Day. 60 tablet 1   • Diclofenac Sodium (VOLTAREN) 1 % gel gel Apply 4 g topically to the appropriate area as directed 4 (Four) Times a Day As Needed.     • famotidine (PEPCID) 40 MG tablet Take 1 tablet by mouth Daily.     • ferrous sulfate 324 (65 Fe) MG tablet delayed-release EC tablet Take 1 tablet by mouth Daily With Breakfast. 90 tablet 1  "  • finasteride (Proscar) 5 MG tablet Take 1 tablet by mouth Daily. 90 tablet 0   • gabapentin (NEURONTIN) 100 MG capsule Take 1 capsule by mouth Every Night for 90 days. 90 capsule 0   • ibandronate (BONIVA) 150 MG tablet Take 1 tablet by mouth Every 30 (Thirty) Days. 1 tablet 5   • losartan (COZAAR) 50 MG tablet Take 1 tablet by mouth Daily. 90 tablet 0   • Omega-3 Fatty Acids (OMEGA-3 FISH OIL PO) Take 1 capsule by mouth Daily.     • pantoprazole (PROTONIX) 40 MG EC tablet Take 1 tablet by mouth Daily. 90 tablet 1     No current facility-administered medications on file prior to visit.       Objective   Pulse 86   Ht 157.5 cm (62\")   Wt 57.2 kg (126 lb)   BMI 23.05 kg/m²     General:   Appearance: elderly    Orientation: unable to assess    Affect comment:  Flat  Gait: antalgic    Gait comment:  Patient's daughter reports that he needs to have knee replacement of the right knee  Assistance: independent    Shoe Gear:  Surgical shoe     VASCULAR       Right Foot Vascularity   Dorsalis pedis:  1+  Skin Temperature: warm    Edema Grading:  None  CFT:  < 3 seconds      NEUROLOGIC      Right Foot Neurologic   Light touch sensation:  Normal      MUSCULOSKELETAL       Right Foot Musculoskeletal   Ecchymosis:  None  Tenderness: none        DERMATOLOGIC      Right Foot Dermatologic   Skin: skin changes and ulcer        Image:       Right Foot Additional Comments: Continued ulceration to the lateral aspect of the fourth digit with extension to bone.  No erythema, skin changes, or active signs of drainage    Assessment & Plan   Diagnoses and all orders for this visit:    1. 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 PAD (peripheral artery disease) ([facility])    3. Type 2 diabetes mellitus with hyperglycemia, unspecified whether long term insulin use ([facility])    Other orders  -     collagenase (Santyl) 250 UNIT/GM ointment; Apply 1 application topically to the appropriate " area as directed Daily. Apply once daily to wound bed.  Dispense: 90 g; Refill: 1      Patient returns for follow-up regarding right foot ulcer. There have been no changes in wound appearance since I last seen the patient in the hospital. We discussed treatments options at length with patient today to include continue observation and local wound care vs amputation of the fourth toe. Risks and benefits discussed extensity. I strongly encourage amputation given his underlying small vessel disease and no change or signs of improvement in the ulcer. I will order a repeat x-ray for further evaluation today. He can discontinue the use of his antibiotic. I will provide the patient with Santyl to be applied daily with dressing changes. Follow-up in 2 weeks.       Greater than 20 minutes spent before coming during coming after evaluation for patient care.     Orders Placed This Encounter   Procedures   • XR Foot 3+ View Right     Order Specific Question:   Reason for Exam:     Answer:   pa 4th tow wound  r/p osteomyelitis   room 15  wb   may leave after     Order Specific Question:   Does this patient have a diabetic monitoring/medication delivering device on?     Answer:   No     Order Specific Question:   Release to patient     Answer:   Immediate          Note is dictated utilizing voice recognition software. Unfortunately this leads to occasional typographical errors. I apologize in advance if the situation occurs. If questions occur please do not hesitate to call our office.    Transcribed from ambient dictation for DIANE Oneal DPM by Marcos Estevez.  08/02/22   11:41 EDT    Patient verbalized consent to the visit recording.  I have personally performed the services described in this document as transcribed by the above individual, and it is both accurate and complete.  DIANE Oneal DPM  8/3/2022  07:47 EDT

## 2022-08-11 NOTE — TELEPHONE ENCOUNTER
Caller: REECE     Relationship: Other    BRequested Prescriptions:   Requested Prescriptions     Pending Prescriptions Disp Refills   • Accu-Chek Guide test strip 50 each 2     Si each by Other route Daily. E 11.9        Pharmacy where request should be sent: [facility] - [place], [place] - [address] [phone] [place] [phone] FX     Additional details provided by patient: PATIENT DOES NOT CURRENTLY HAVE ANY OF THESE TEST STRIPS.       Does the patient have less than a 3 day supply:  [x] Yes  [] No    Aj JOHNSON Rep   22 15:57 EDT

## 2022-08-12 RX ORDER — BLOOD SUGAR DIAGNOSTIC
1 STRIP MISCELLANEOUS DAILY
Qty: 50 EACH | Refills: 2 | Status: SHIPPED | OUTPATIENT
Start: 2022-08-12

## 2022-08-18 ENCOUNTER — OFFICE VISIT (OUTPATIENT)
Dept: PODIATRY | Facility: CLINIC | Age: 77
End: 2022-08-18

## 2022-08-18 VITALS — HEART RATE: 81 BPM | HEIGHT: 62 IN | WEIGHT: 126 LBS | OXYGEN SATURATION: 97 % | BODY MASS INDEX: 23.19 KG/M2

## 2022-08-18 DIAGNOSIS — I73.9 PAD (PERIPHERAL ARTERY DISEASE): ICD-10-CM

## 2022-08-18 DIAGNOSIS — E11.65 TYPE 2 DIABETES MELLITUS WITH HYPERGLYCEMIA, UNSPECIFIED WHETHER LONG TERM INSULIN USE: ICD-10-CM

## 2022-08-18 DIAGNOSIS — L97.514 CHRONIC ULCER OF RIGHT FOOT WITH NECROSIS OF BONE: Primary | ICD-10-CM

## 2022-08-18 PROCEDURE — 99213 OFFICE O/P EST LOW 20 MIN: CPT | Performed by: PODIATRIST

## 2022-08-18 RX ORDER — DAPAGLIFLOZIN 10 MG/1
1 TABLET, FILM COATED ORAL DAILY
COMMUNITY
Start: 2022-07-27 | End: 2022-08-22

## 2022-08-18 NOTE — PROGRESS NOTES
08/18/2022  Foot and Ankle Surgery - Established Patient/Follow-up  Provider: Dr. Balaji Oneal DPM  Location: [facility]    Subjective:  Ramon Silva is a 77 y.o. male.     Chief Complaint   Patient presents with   • Right Foot - Foot Ulcer   • Follow-up     WALDO Molina  aprn 8/1/2022       HPI: The patient is a 77-year-old male who presents to the clinic for a follow-up regarding his right foot. He is accompanied by an adult female.    The patient was last seen on 08/02/2022, the adult female states that the patient feels slightly better. The adult female states that the patient would like to monitor his toe more before performing an amputation unless it is necessary. He uses Santyl ointment for his right foot. He was able to retrieve Santyl ointment without paying out of pocket due to insurance.       No Known Allergies    Current Outpatient Medications on File Prior to Visit   Medication Sig Dispense Refill   • Accu-Chek Guide test strip 1 each by Other route Daily. E 11.9 50 each 2   • Accu-Chek Softclix Lancets lancets test daily as directed 100 each 2   • aspirin 81 MG tablet Take 81 mg by mouth Daily.     • atorvastatin (LIPITOR) 40 MG tablet TAKE ONE TABLET BY MOUTH AT BEDTIME 90 tablet 0   • carvedilol (COREG) 25 MG tablet TAKE ONE TABLET BY MOUTH TWICE DAILY WITH MEALS 180 tablet 1   • Cetirizine HCl (ZyrTEC Allergy) 10 MG capsule Take 10 mg by mouth Daily.     • cilostazol (PLETAL) 100 MG tablet Take 1 tablet by mouth 2 (Two) Times a Day. 60 tablet 1   • collagenase (Santyl) 250 UNIT/GM ointment Apply 1 application topically to the appropriate area as directed Daily. Apply once daily to wound bed. 90 g 1   • Diclofenac Sodium (VOLTAREN) 1 % gel gel Apply 4 g topically to the appropriate area as directed 4 (Four) Times a Day As Needed.     • famotidine (PEPCID) 40 MG tablet Take 1 tablet by mouth Daily.     • Farxiga 10 MG tablet Take 1 tablet by mouth Daily.     • ferrous sulfate 324 (65 Fe)  "MG tablet delayed-release EC tablet Take 1 tablet by mouth Daily With Breakfast. 90 tablet 1   • finasteride (Proscar) 5 MG tablet Take 1 tablet by mouth Daily. 90 tablet 0   • gabapentin (NEURONTIN) 100 MG capsule Take 1 capsule by mouth Every Night for 90 days. 90 capsule 0   • ibandronate (BONIVA) 150 MG tablet Take 1 tablet by mouth Every 30 (Thirty) Days. 1 tablet 5   • losartan (COZAAR) 50 MG tablet Take 1 tablet by mouth Daily. 90 tablet 0   • Omega-3 Fatty Acids (OMEGA-3 FISH OIL PO) Take 1 capsule by mouth Daily.     • pantoprazole (PROTONIX) 40 MG EC tablet Take 1 tablet by mouth Daily. 90 tablet 1     No current facility-administered medications on file prior to visit.       Objective   Pulse 81   Ht 157.5 cm (62\")   Wt 57.2 kg (126 lb)   SpO2 97%   BMI 23.05 kg/m²     Foot/Ankle Exam:       General:   Appearance: elderly    Orientation: unable to assess    Affect comment:  Flat  Gait: antalgic    Gait comment:  Patient's daughter reports that he needs to have knee replacement of the right knee  Assistance: independent    Shoe Gear:  Surgical shoe    VASCULAR      Right Foot Vascularity   Dorsalis pedis:  1+  Skin Temperature: warm    Edema Grading:  None  CFT:  < 3 seconds      NEUROLOGIC     Right Foot Neurologic   Light touch sensation:  Normal     MUSCULOSKELETAL      Right Foot Musculoskeletal   Ecchymosis:  None  Tenderness: none       DERMATOLOGIC     Right Foot Dermatologic   Skin: skin changes and ulcer       Image:       Right Foot Additional Comments     08/18/2022  Less discoloration involving the fourth ray and fourth digit. Wound is relatively unchanged with mild eschar formation.      Assessment & Plan   Diagnoses and all orders for this visit:    1. 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 PAD (peripheral artery disease) (HCC)    3. Type 2 diabetes mellitus with hyperglycemia, unspecified whether long term insulin use (HCC)      The patient is advised to " decrease activity so his fifth toe on his right foot is not compressed. Continue to Santyl dressing changes and keep the fourth and fifth toe . He can begin sleeping on a recliner to assist with blood flow. Return to clinic in 2 to 3 weeks to follow-up. Greater than 20 minutes spent before, during, after evaluation for patient care.    No orders of the defined types were placed in this encounter.         Note is dictated utilizing voice recognition software. Unfortunately this leads to occasional typographical errors. I apologize in advance if the situation occurs. If questions occur please do not hesitate to call our office.    Transcribed from ambient dictation for DIANE Oneal DPM by John Littlejohn.  08/18/22   13:47 EDT    Patient verbalized consent to the visit recording.

## 2022-08-22 RX ORDER — PANTOPRAZOLE SODIUM 40 MG/1
TABLET, DELAYED RELEASE ORAL
Qty: 90 TABLET | Refills: 1 | Status: SHIPPED | OUTPATIENT
Start: 2022-08-22

## 2022-08-22 RX ORDER — DAPAGLIFLOZIN 10 MG/1
TABLET, FILM COATED ORAL
Qty: 30 TABLET | Refills: 5 | Status: SHIPPED | OUTPATIENT
Start: 2022-08-22

## 2022-08-23 ENCOUNTER — TELEPHONE (OUTPATIENT)
Dept: FAMILY MEDICINE CLINIC | Facility: CLINIC | Age: 77
End: 2022-08-23

## 2022-08-23 NOTE — TELEPHONE ENCOUNTER
LEFT PATIENT A VOICEMAIL ASKING HIM TO CALL THE OFFICE AND RESCHEDULE HIS UPCOMING APPT WITH BOB MARTINEZ, SHE WILL BE OUT OF THE OFFICE FOR THE NEXT COUPLE OF WEEKS.

## 2022-09-01 ENCOUNTER — PATIENT OUTREACH (OUTPATIENT)
Dept: CASE MANAGEMENT | Facility: OTHER | Age: 77
End: 2022-09-01

## 2022-09-01 ENCOUNTER — OFFICE VISIT (OUTPATIENT)
Dept: PODIATRY | Facility: CLINIC | Age: 77
End: 2022-09-01

## 2022-09-01 ENCOUNTER — OFFICE VISIT (OUTPATIENT)
Dept: ONCOLOGY | Facility: CLINIC | Age: 77
End: 2022-09-01

## 2022-09-01 VITALS
HEIGHT: 62 IN | WEIGHT: 126 LBS | BODY MASS INDEX: 23.19 KG/M2 | HEART RATE: 97 BPM | DIASTOLIC BLOOD PRESSURE: 51 MMHG | TEMPERATURE: 98.6 F | RESPIRATION RATE: 18 BRPM | OXYGEN SATURATION: 98 % | SYSTOLIC BLOOD PRESSURE: 112 MMHG

## 2022-09-01 VITALS — WEIGHT: 126 LBS | BODY MASS INDEX: 23.19 KG/M2 | OXYGEN SATURATION: 100 % | HEIGHT: 62 IN | RESPIRATION RATE: 22 BRPM

## 2022-09-01 DIAGNOSIS — E11.65 TYPE 2 DIABETES MELLITUS WITH HYPERGLYCEMIA, UNSPECIFIED WHETHER LONG TERM INSULIN USE: ICD-10-CM

## 2022-09-01 DIAGNOSIS — C85.90 NON-HODGKIN'S LYMPHOMA, UNSPECIFIED BODY REGION, UNSPECIFIED NON-HODGKIN LYMPHOMA TYPE: Primary | ICD-10-CM

## 2022-09-01 DIAGNOSIS — L97.514 CHRONIC ULCER OF RIGHT FOOT WITH NECROSIS OF BONE: Primary | ICD-10-CM

## 2022-09-01 DIAGNOSIS — I73.9 PAD (PERIPHERAL ARTERY DISEASE): ICD-10-CM

## 2022-09-01 PROCEDURE — 99213 OFFICE O/P EST LOW 20 MIN: CPT | Performed by: PODIATRIST

## 2022-09-01 PROCEDURE — 99213 OFFICE O/P EST LOW 20 MIN: CPT | Performed by: INTERNAL MEDICINE

## 2022-09-01 NOTE — PROGRESS NOTES
09/01/2022  Foot and Ankle Surgery - Established Patient/Follow-up  Provider: Dr. Balaji Oneal DPM  Location: [facility]    Subjective:  Ramon Silva is a 77 y.o. male.     Chief Complaint   Patient presents with   • Right Foot - Foot Ulcer, Wound Check   • Follow-up     WALDO MARTINEZ  APRN  8/1/2022       HPI:   The patient is a 77-year-old male who returns to the clinic for follow-up regarding his right foot. He is accompanied by an adult female.    The adult female states the patient's walking has improved and the pain has decreased. She adds the patient transitioned from ambulating with a walker now to a cane. The adult female notes she placed a standard pedicure toe separator in an attempt to separate his 4th and 5th digits, but she was afraid of excessive pressure on the wound. The patient inquires if he is able to return to work.     No Known Allergies    Current Outpatient Medications on File Prior to Visit   Medication Sig Dispense Refill   • Accu-Chek Guide test strip 1 each by Other route Daily. E 11.9 50 each 2   • Accu-Chek Softclix Lancets lancets test daily as directed 100 each 2   • aspirin 81 MG tablet Take 81 mg by mouth Daily.     • atorvastatin (LIPITOR) 40 MG tablet TAKE ONE TABLET BY MOUTH AT BEDTIME 90 tablet 0   • carvedilol (COREG) 25 MG tablet TAKE ONE TABLET BY MOUTH TWICE DAILY WITH MEALS 180 tablet 1   • Cetirizine HCl (ZyrTEC Allergy) 10 MG capsule Take 10 mg by mouth Daily.     • cilostazol (PLETAL) 100 MG tablet Take 1 tablet by mouth 2 (Two) Times a Day. 60 tablet 1   • collagenase (Santyl) 250 UNIT/GM ointment Apply 1 application topically to the appropriate area as directed Daily. Apply once daily to wound bed. 90 g 1   • Diclofenac Sodium (VOLTAREN) 1 % gel gel Apply 4 g topically to the appropriate area as directed 4 (Four) Times a Day As Needed.     • famotidine (PEPCID) 40 MG tablet Take 1 tablet by mouth Daily.     • Farxiga 10 MG tablet TAKE ONE TABLET BY MOUTH  "EVERY DAY 30 tablet 5   • ferrous sulfate 324 (65 Fe) MG tablet delayed-release EC tablet Take 1 tablet by mouth Daily With Breakfast. 90 tablet 1   • finasteride (Proscar) 5 MG tablet Take 1 tablet by mouth Daily. 90 tablet 0   • gabapentin (NEURONTIN) 100 MG capsule Take 1 capsule by mouth Every Night for 90 days. 90 capsule 0   • ibandronate (BONIVA) 150 MG tablet Take 1 tablet by mouth Every 30 (Thirty) Days. 1 tablet 5   • losartan (COZAAR) 50 MG tablet Take 1 tablet by mouth Daily. 90 tablet 0   • Omega-3 Fatty Acids (OMEGA-3 FISH OIL PO) Take 1 capsule by mouth Daily.     • pantoprazole (PROTONIX) 40 MG EC tablet TAKE ONE TABLET BY MOUTH EVERY DAY 90 tablet 1     No current facility-administered medications on file prior to visit.       Objective   Resp 22   Ht 157.5 cm (62\")   Wt 57.2 kg (126 lb)   SpO2 100%   BMI 23.05 kg/m²     Foot/Ankle Exam:       General:   Appearance: elderly    Orientation: unable to assess    Affect comment:  Flat  Gait: antalgic    Gait comment:  Patient's daughter reports that he needs to have knee replacement of the right knee  Assistance: independent    Shoe Gear:  Surgical shoe    VASCULAR      Right Foot Vascularity   Dorsalis pedis:  1+  Skin Temperature: warm    Edema Grading:  None  CFT:  < 3 seconds      NEUROLOGIC     Right Foot Neurologic   Light touch sensation:  Normal     MUSCULOSKELETAL      Right Foot Musculoskeletal   Ecchymosis:  None  Tenderness: none       DERMATOLOGIC     Right Foot Dermatologic   Skin: skin changes and ulcer        Right Foot Additional Comments 08/18/2022  Less discoloration involving the fourth ray and fourth digit. Wound is relatively unchanged with mild eschar formation.  09/01/2022 Right fourth toe has improved with improved coloration. Wound remains open with fibrotic base. No other complicating features.      Assessment & Plan   Diagnoses and all orders for this visit:    1. 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 PAD (peripheral artery disease) (HCC)    3. Type 2 diabetes mellitus with hyperglycemia, unspecified whether long term insulin use (HCC)        Patient returns to the clinic for follow-up regarding his right foot. I discussed with the patient and adult female that the wound appears stable and has slightly improved. I explained to them that the longer the wound remains open, it will be unable to heal. I recommended a toe spacer to offload the 4th digit from the 5th digit. I explained the risks with him returning to work at this time. He will return to the clinic in 4 weeks, and we will obtain an x-ray and discuss work at that time. He is to notify me with any progressive issues. Greater than 20 minutes spent before, during, and after evaluation for patient care.    No orders of the defined types were placed in this encounter.         Note is dictated utilizing voice recognition software. Unfortunately this leads to occasional typographical errors. I apologize in advance if the situation occurs. If questions occur please do not hesitate to call our office.    Transcribed from ambient dictation for DIANE Oneal DPM by Dianna Colunga.  09/01/22   12:02 EDT    Patient verbalized consent to the visit recording.  I have personally performed the services described in this document as transcribed by the above individual, and it is both accurate and complete.  DIANE Oneal DPM  9/2/2022  07:36 EDT

## 2022-09-01 NOTE — OUTREACH NOTE
AMBULATORY CASE MANAGEMENT NOTE    Patient Outreach  Name and Relationship of Patient/Support Person: REECE HOANG - Emergency Contact    RN-ACM UTRx4 and LVMs x4 welcoming a call back re HRCM services & reminder re 9/12/22 PCP appt.    Note PCP referral originally received by HRCM on 7/5/22. RN-ACM contacted the patient's [facility] acute CM and arranged in-patient diabetes education during his 7/1-6/22 hospital admission.    After [facility] discharge, the transitional care team was also UTRx3.  Today RN-ACM chart review indicates patient has been compliant w/ PCP, ortho & podiatry appts & care instructions, has shown some improvement in LE wound and has lowered his HBA1c from 9.3 to 7.6 in one month.  Patient also attended hem/onc f/up appt today (hx NHL), with hem/onc monitoring f/up appt scheduled in 6 mo.  No additional RN-ACM outreach is scheduled at this time, but patient remains eligible for HRCM as needed.    PARDEEP NATARAJAN  Ambulatory Case Management  9/1/2022, 16:03 EDT

## 2022-09-08 RX ORDER — FINASTERIDE 5 MG/1
TABLET, FILM COATED ORAL
Qty: 90 TABLET | Refills: 0 | Status: SHIPPED | OUTPATIENT
Start: 2022-09-08 | End: 2022-12-19

## 2022-09-12 ENCOUNTER — OFFICE VISIT (OUTPATIENT)
Dept: FAMILY MEDICINE CLINIC | Facility: CLINIC | Age: 77
End: 2022-09-12

## 2022-09-12 ENCOUNTER — TELEPHONE (OUTPATIENT)
Dept: PODIATRY | Facility: CLINIC | Age: 77
End: 2022-09-12

## 2022-09-12 VITALS
RESPIRATION RATE: 16 BRPM | WEIGHT: 120.2 LBS | OXYGEN SATURATION: 97 % | BODY MASS INDEX: 23.6 KG/M2 | SYSTOLIC BLOOD PRESSURE: 110 MMHG | TEMPERATURE: 97.5 F | HEART RATE: 88 BPM | HEIGHT: 60 IN | DIASTOLIC BLOOD PRESSURE: 56 MMHG

## 2022-09-12 DIAGNOSIS — R63.4 WEIGHT LOSS: ICD-10-CM

## 2022-09-12 DIAGNOSIS — L08.9 INFECTION OF RIGHT FOOT: Primary | ICD-10-CM

## 2022-09-12 PROCEDURE — 99213 OFFICE O/P EST LOW 20 MIN: CPT | Performed by: NURSE PRACTITIONER

## 2022-09-12 RX ORDER — GASTROSTOMY TUBE 18 FR
1 KIT MISCELLANEOUS 2 TIMES DAILY PRN
Qty: 60 EACH | Refills: 1 | Status: SHIPPED | OUTPATIENT
Start: 2022-09-12 | End: 2022-10-12

## 2022-09-12 RX ORDER — GINSENG 100 MG
1 CAPSULE ORAL 2 TIMES DAILY
Qty: 28 G | Refills: 0 | Status: SHIPPED | OUTPATIENT
Start: 2022-09-12

## 2022-09-12 NOTE — TELEPHONE ENCOUNTER
Caller: REECE HOANG    Relationship: Emergency Contact    Best call back number: [phone]    What form or medical record are you requesting: [place] PAPERWORK    How would you like to receive the form or medical records (pick-up, mail, fax): FAX - FAX # ON FORM    Timeframe paperwork needed: ASAP    Additional notes: FORM DOES NOT HAVE AN END DATE. AN END DATE NEEDS TO BE ADDED AND THE FORM NEEDS TO BE RESUBMITTED. STATES THAT MARLEN SHOULD HAVE FAX #.    Grandmother at bedside

## 2022-09-22 RX ORDER — GABAPENTIN 300 MG/1
300 CAPSULE ORAL NIGHTLY
Qty: 30 CAPSULE | Refills: 0 | Status: SHIPPED | OUTPATIENT
Start: 2022-09-22 | End: 2022-10-18

## 2022-09-27 RX ORDER — GABAPENTIN 100 MG/1
CAPSULE ORAL
Qty: 90 CAPSULE | Refills: 0 | Status: SHIPPED | OUTPATIENT
Start: 2022-09-27 | End: 2022-10-18

## 2022-09-29 ENCOUNTER — OFFICE VISIT (OUTPATIENT)
Dept: PODIATRY | Facility: CLINIC | Age: 77
End: 2022-09-29

## 2022-09-29 VITALS — OXYGEN SATURATION: 97 % | HEART RATE: 83 BPM | HEIGHT: 60 IN | BODY MASS INDEX: 23.56 KG/M2 | WEIGHT: 120 LBS

## 2022-09-29 DIAGNOSIS — I73.9 PAD (PERIPHERAL ARTERY DISEASE): ICD-10-CM

## 2022-09-29 DIAGNOSIS — E11.65 TYPE 2 DIABETES MELLITUS WITH HYPERGLYCEMIA, UNSPECIFIED WHETHER LONG TERM INSULIN USE: ICD-10-CM

## 2022-09-29 DIAGNOSIS — L97.514 CHRONIC ULCER OF RIGHT FOOT WITH NECROSIS OF BONE: Primary | ICD-10-CM

## 2022-09-29 PROCEDURE — 99213 OFFICE O/P EST LOW 20 MIN: CPT | Performed by: PODIATRIST

## 2022-09-29 NOTE — PROGRESS NOTES
09/29/2022  Foot and Ankle Surgery - Established Patient/Follow-up  Provider: Dr. Balaji Oneal DPM  Location: [facility]    Subjective:  Ramon Silva is a 77 y.o. male.     Chief Complaint   Patient presents with   • Right Foot - Pain, Foot Ulcer   • Follow-up     DIANE Molina CON 09/12/2022       HPI:   The patient is a 77-year-old male who presents to the office today for a follow-up regarding his right foot wound. He is accompanied by an adult female who contributes to his care.    The adult female states that the patient still has pain on planta pedis of his foot that radiates up. She reports the patient takes Neurontin 100 mg at night. She notes the patient is still off work right now and will need a note for work.      No Known Allergies    Current Outpatient Medications on File Prior to Visit   Medication Sig Dispense Refill   • Accu-Chek Guide test strip 1 each by Other route Daily. E 11.9 50 each 2   • Accu-Chek Softclix Lancets lancets test daily as directed 100 each 2   • aspirin 81 MG tablet Take 81 mg by mouth Daily.     • atorvastatin (LIPITOR) 40 MG tablet TAKE ONE TABLET BY MOUTH AT BEDTIME 90 tablet 0   • bacitracin 500 UNIT/GM ointment Apply 1 application topically to the appropriate area as directed 2 (Two) Times a Day. 28 g 0   • carvedilol (COREG) 25 MG tablet TAKE ONE TABLET BY MOUTH TWICE DAILY WITH MEALS 180 tablet 1   • Cetirizine HCl (ZyrTEC Allergy) 10 MG capsule Take 10 mg by mouth Daily.     • cilostazol (PLETAL) 100 MG tablet Take 1 tablet by mouth 2 (Two) Times a Day. 60 tablet 1   • collagenase (Santyl) 250 UNIT/GM ointment Apply 1 application topically to the appropriate area as directed Daily. Apply once daily to wound bed. 90 g 1   • Diclofenac Sodium (VOLTAREN) 1 % gel gel Apply 4 g topically to the appropriate area as directed 4 (Four) Times a Day As Needed.     • famotidine (PEPCID) 40 MG tablet Take 1 tablet by mouth Daily.     • Farxiga 10 MG tablet TAKE ONE  "TABLET BY MOUTH EVERY DAY 30 tablet 5   • ferrous sulfate 324 (65 Fe) MG tablet delayed-release EC tablet Take 1 tablet by mouth Daily With Breakfast. 90 tablet 1   • finasteride (PROSCAR) 5 MG tablet TAKE ONE TABLET BY MOUTH DAILY 90 tablet 0   • gabapentin (NEURONTIN) 100 MG capsule TAKE ONE CAPSULE BY MOUTH EVERY EVENING 90 capsule 0   • gabapentin (Neurontin) 300 MG capsule Take 1 capsule by mouth Every Night for 30 days. 30 capsule 0   • ibandronate (BONIVA) 150 MG tablet Take 1 tablet by mouth Every 30 (Thirty) Days. 1 tablet 5   • losartan (COZAAR) 50 MG tablet Take 1 tablet by mouth Daily. 90 tablet 0   • Nutritional Supplements (Ensure Nutra Shake Hi-Ayaan) liquid Take 1 can by mouth 2 (Two) Times a Day As Needed (weight loss) for up to 30 days. 60 each 1   • Omega-3 Fatty Acids (OMEGA-3 FISH OIL PO) Take 1 capsule by mouth Daily.     • pantoprazole (PROTONIX) 40 MG EC tablet TAKE ONE TABLET BY MOUTH EVERY DAY 90 tablet 1     No current facility-administered medications on file prior to visit.       Objective   Pulse 83   Ht 152.4 cm (60\")   Wt 54.4 kg (120 lb)   SpO2 97%   BMI 23.44 kg/m²     Foot/Ankle Exam:       General:   Appearance: elderly    Orientation: unable to assess    Affect comment:  Flat  Gait: antalgic    Gait comment:  Patient's daughter reports that he needs to have knee replacement of the right knee  Assistance: independent    Shoe Gear:  Surgical shoe    VASCULAR      Right Foot Vascularity   Dorsalis pedis:  1+  Skin Temperature: warm    Edema Grading:  None  CFT:  < 3 seconds      NEUROLOGIC     Right Foot Neurologic   Light touch sensation:  Normal     MUSCULOSKELETAL      Right Foot Musculoskeletal   Ecchymosis:  None  Tenderness: none       DERMATOLOGIC     Right Foot Dermatologic   Skin: skin changes and ulcer        Right Foot Additional Comments Continued improvement to the plantar lateral wound on the right fourth toe. No signs of infection. Mild edema involving the right " lower extremity today. Mild pitting edema.      Assessment & Plan   Diagnoses and all orders for this visit:    1. 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 PAD (peripheral artery disease) (HCC)    3. Type 2 diabetes mellitus with hyperglycemia, unspecified whether long term insulin use (HCC)      - The coloration of the toe has improved substantially from where he was in the hospital. He has done well and the wound is slowly healing. There is no evidence of infection. The pain could be due to the swelling, lack of support, or nerve problems. I am going to give him a topical compound pain cream that is going to come from a pharmacy that is over in [place], [place]. They will call and say because insurance may or may not cover it and if they need anything that is out of pocket they will ask if they want to do it and they will send in the mail. I have also recommended a mild compression stocking to help keep the swelling out of the leg. I have given him a note for work to be off work for another 4 weeks. Greater than 20 minutes spent before, during, and after evaluation for patient care.      Orders Placed This Encounter   Procedures   • XR Foot 3+ View Right     Order Specific Question:   Reason for Exam:     Answer:   RIGHT FOOT PAIN WOUND 4-5TH TOE EDEMA  R/O OSTEOMYELITIS   RM 10 WB     Order Specific Question:   Does this patient have a diabetic monitoring/medication delivering device on?     Answer:   No     Order Specific Question:   Release to patient     Answer:   Routine Release          Note is dictated utilizing voice recognition software. Unfortunately this leads to occasional typographical errors. I apologize in advance if the situation occurs. If questions occur please do not hesitate to call our office.    Transcribed from ambient dictation for DIANE Oneal DPM by Marcio Yee.  09/29/22   11:21 EDT    Patient verbalized consent to the  visit recording.  I have personally performed the services described in this document as transcribed by the above individual, and it is both accurate and complete.

## 2022-10-03 RX ORDER — LOSARTAN POTASSIUM 50 MG/1
TABLET ORAL
Qty: 90 TABLET | Refills: 1 | Status: SHIPPED | OUTPATIENT
Start: 2022-10-03 | End: 2023-03-24

## 2022-10-03 RX ORDER — ATORVASTATIN CALCIUM 40 MG/1
TABLET, FILM COATED ORAL
Qty: 90 TABLET | Refills: 1 | Status: SHIPPED | OUTPATIENT
Start: 2022-10-03 | End: 2023-02-20

## 2022-10-03 RX ORDER — CILOSTAZOL 100 MG/1
TABLET ORAL
Qty: 60 TABLET | Refills: 1 | Status: SHIPPED | OUTPATIENT
Start: 2022-10-03 | End: 2022-12-09

## 2022-10-10 ENCOUNTER — TELEPHONE (OUTPATIENT)
Dept: PODIATRY | Facility: CLINIC | Age: 77
End: 2022-10-10

## 2022-10-10 NOTE — TELEPHONE ENCOUNTER
Caller: REECE HOANG    Relationship: Emergency Contact / DAUGHTER     Best call back number: [phone]    What form or medical record are you requesting: A NEW WORK EXCUSE TO STATE PATIENT SHOULD STAY OFF WORK TILL NEXT FOLLOW UP APPT 10-27-22     Who is requesting this form or medical record from you: PATIENT'S EMPLOYER LETTY WALLACE - TO  DEPARTMENT - ATTN: CLAU (LAST NAME UNKNOWN)    How would you like to receive the form or medical records (pick-up, mail, fax):    If pick-up, provide patient with address and location details: PATIENT / DAUGHTER AWARE OF NEW [facility] ADDRESS     Timeframe paperwork needed: ASAP - PATIENT / DAUGHTER WOULD LIKE TO  WORK EXCUSE TODAY 10-10-22 BY / BEFORE 1630 IF POSSIBLE     Additional notes: PRIOR 09-29-22 WORK NOTE STATED PATIENT WAS TO BE OFF WORK FOR TWO WEEKS (TILL 10-13-22) BUT PATIENT ASKING TO BE OFF TILL NEXT APPT 10-27-22     PLEASE CALL / LEAVE VMAIL TO NOTIFY DAUGHTER REECE WHEN WORK EXCUSE READY TO BE PICKED UP     THANKS

## 2022-10-17 ENCOUNTER — OFFICE VISIT (OUTPATIENT)
Dept: FAMILY MEDICINE CLINIC | Facility: CLINIC | Age: 77
End: 2022-10-17

## 2022-10-17 VITALS
SYSTOLIC BLOOD PRESSURE: 129 MMHG | WEIGHT: 127.6 LBS | TEMPERATURE: 97.7 F | OXYGEN SATURATION: 99 % | RESPIRATION RATE: 18 BRPM | HEIGHT: 62 IN | DIASTOLIC BLOOD PRESSURE: 58 MMHG | HEART RATE: 79 BPM | BODY MASS INDEX: 23.48 KG/M2

## 2022-10-17 DIAGNOSIS — L08.9 INFECTION OF RIGHT FOOT: ICD-10-CM

## 2022-10-17 DIAGNOSIS — E11.65 TYPE 2 DIABETES MELLITUS WITH HYPERGLYCEMIA, WITHOUT LONG-TERM CURRENT USE OF INSULIN: Primary | ICD-10-CM

## 2022-10-17 PROCEDURE — 99213 OFFICE O/P EST LOW 20 MIN: CPT | Performed by: NURSE PRACTITIONER

## 2022-10-17 NOTE — PROGRESS NOTES
Chief Complaint   Patient presents with   • Diabetes     1 month f/u        Subjective   Ramon Silva is a 77 y.o.  male who presents today for a follow up regarding diabetes. He is accompanied by an adult male who contributes to his care.     He has gained 7 pounds since he was last seen. His blood glucose A1c has decreased.     The adult male reports the patient continues to experience mild pain in his right foot. He denies the patient wearing a compression stocking. The patient states he is scheduled to follow up with podiatrist, Balaji Oneal DPM on 10/27/2022.     Ramon Silva  has a past medical history of Anemia, Arthralgia, Diabetes (HCC), Fatigue, GERD (gastroesophageal reflux disease), Gout, Hyperlipidemia, Hypertension, Liver lesion, Non Hodgkin's lymphoma (HCC), Osteoporosis, Personal history of testicular cancer, Vitamin D deficiency, and Weight loss.    No Known Allergies    Current Outpatient Medications:   •  Accu-Chek Guide test strip, 1 each by Other route Daily. E 11.9, Disp: 50 each, Rfl: 2  •  Accu-Chek Softclix Lancets lancets, test daily as directed, Disp: 100 each, Rfl: 2  •  aspirin 81 MG tablet, Take 81 mg by mouth Daily., Disp: , Rfl:   •  atorvastatin (LIPITOR) 40 MG tablet, TAKE ONE TABLET BY MOUTH AT BEDTIME, Disp: 90 tablet, Rfl: 1  •  bacitracin 500 UNIT/GM ointment, Apply 1 application topically to the appropriate area as directed 2 (Two) Times a Day., Disp: 28 g, Rfl: 0  •  carvedilol (COREG) 25 MG tablet, TAKE ONE TABLET BY MOUTH TWICE DAILY WITH MEALS, Disp: 180 tablet, Rfl: 1  •  Cetirizine HCl (ZyrTEC Allergy) 10 MG capsule, Take 10 mg by mouth Daily., Disp: , Rfl:   •  cilostazol (PLETAL) 100 MG tablet, TAKE ONE TABLET BY MOUTH TWICE DAILY, Disp: 60 tablet, Rfl: 1  •  collagenase (Santyl) 250 UNIT/GM ointment, Apply 1 application topically to the appropriate area as directed Daily. Apply once daily to wound bed., Disp: 90 g, Rfl: 1  •  famotidine (PEPCID) 40 MG  tablet, Take 1 tablet by mouth Daily., Disp: , Rfl:   •  Farxiga 10 MG tablet, TAKE ONE TABLET BY MOUTH EVERY DAY, Disp: 30 tablet, Rfl: 5  •  ferrous sulfate 324 (65 Fe) MG tablet delayed-release EC tablet, Take 1 tablet by mouth Daily With Breakfast., Disp: 90 tablet, Rfl: 1  •  finasteride (PROSCAR) 5 MG tablet, TAKE ONE TABLET BY MOUTH DAILY, Disp: 90 tablet, Rfl: 0  •  ibandronate (BONIVA) 150 MG tablet, Take 1 tablet by mouth Every 30 (Thirty) Days., Disp: 1 tablet, Rfl: 5  •  losartan (COZAAR) 50 MG tablet, TAKE ONE TABLET BY MOUTH DAILY, Disp: 90 tablet, Rfl: 1  •  Omega-3 Fatty Acids (OMEGA-3 FISH OIL PO), Take 1 capsule by mouth Daily., Disp: , Rfl:   •  pantoprazole (PROTONIX) 40 MG EC tablet, TAKE ONE TABLET BY MOUTH EVERY DAY, Disp: 90 tablet, Rfl: 1  •  Diclofenac Sodium (VOLTAREN) 1 % gel gel, Apply 4 g topically to the appropriate area as directed 4 (Four) Times a Day As Needed., Disp: , Rfl:   •  gabapentin (NEURONTIN) 100 MG capsule, TAKE ONE CAPSULE BY MOUTH EVERY EVENING, Disp: 90 capsule, Rfl: 0  •  gabapentin (Neurontin) 300 MG capsule, Take 1 capsule by mouth Every Night for 30 days., Disp: 30 capsule, Rfl: 0  Past Medical History:   Diagnosis Date   • Anemia    • Arthralgia    • Diabetes (HCC)    • Fatigue    • GERD (gastroesophageal reflux disease)    • Gout    • Hyperlipidemia    • Hypertension    • Liver lesion    • Non Hodgkin's lymphoma (HCC)    • Osteoporosis    • Personal history of testicular cancer    • Vitamin D deficiency    • Weight loss      Past Surgical History:   Procedure Laterality Date   • ANGIOGRAM - CONVERTED  07/05/2022    aif by Dr Henriquez   • CARDIAC CATHETERIZATION N/A 7/5/2022    Procedure: PERIPHERAL ANGIOGRAPHY Right leg;  Surgeon: Theron Henriquez MD;  Location: [facility] INVASIVE LOCATION;  Service: Cardiovascular;  Laterality: N/A;   • HIP SURGERY  2005   • PORTACATH PLACEMENT     • TESTICLE SURGERY Right 07/17/2000    right testicular excision  "    Social History     Socioeconomic History   • Marital status:    Tobacco Use   • Smoking status: Never   • Smokeless tobacco: Never   Vaping Use   • Vaping Use: Never used   Substance and Sexual Activity   • Alcohol use: No   • Drug use: Never   • Sexual activity: Defer     Family History   Family history unknown: Yes     PHQ-2 Depression Screening  Little interest or pleasure in doing things?     Feeling down, depressed, or hopeless?     PHQ-2 Total Score       PHQ-9 Depression Screening  Little interest or pleasure in doing things?     Feeling down, depressed, or hopeless?     Trouble falling or staying asleep, or sleeping too much?     Feeling tired or having little energy?     Poor appetite or overeating?     Feeling bad about yourself - or that you are a failure or have let yourself or your family down?     Trouble concentrating on things, such as reading the newspaper or watching television?     Moving or speaking so slowly that other people could have noticed? Or the opposite - being so fidgety or restless that you have been moving around a lot more than usual?     Thoughts that you would be better off dead, or of hurting yourself in some way?     PHQ-9 Total Score     If you checked off any problems, how difficult have these problems made it for you to do your work, take care of things at home, or get along with other people?         Family history, surgical history, past medical history, Allergies and med's reviewed with patient today and updated in [employer] EMR.     ROS:  Review of Systems   All other systems reviewed and are negative.      OBJECTIVE:  Vitals:    10/17/22 1520   BP: 129/58   BP Location: Left arm   Patient Position: Sitting   Cuff Size: Small Adult   Pulse: 79   Resp: 18   Temp: 97.7 °F (36.5 °C)   TempSrc: Temporal   SpO2: 99%   Weight: 57.9 kg (127 lb 9.6 oz)   Height: 157.5 cm (62\")     Body mass index is 23.34 kg/m².  Physical Exam  Vitals and nursing note reviewed. "   Constitutional:       Appearance: Normal appearance.   HENT:      Head: Normocephalic and atraumatic.      Nose: Nose normal.      Mouth/Throat:      Mouth: Mucous membranes are dry.   Eyes:      Pupils: Pupils are equal, round, and reactive to light.   Abdominal:      General: Abdomen is flat. Bowel sounds are normal.   Musculoskeletal:         General: Swelling and tenderness present. Normal range of motion.      Right lower leg: Edema present.   Neurological:      Mental Status: He is alert.         ASSESSMENT/ PLAN:    Diagnoses and all orders for this visit:    1. Type 2 diabetes mellitus with hyperglycemia, without long-term current use of insulin (HCC) (Primary)    2. Infection of right foot        Recommended increasing the Neurontin dosage and a note will be sent to Dr. Oneal in podiatry for approval. Advised the patient to keep his right foot elevated daily. The patient will return to the clinic in 6 weeks for re-evaluation.     Orders Placed Today:     No orders of the defined types were placed in this encounter.       Management Plan:     An After Visit Summary was printed and given to the patient at discharge.    Follow-up: Return in about 6 weeks (around 11/28/2022) for Recheck.    CON Delgado 10/18/2022 13:44 EDT  This note was electronically signed.    Transcribed from ambient dictation for CON Delgado by Dianna Colunga.  10/17/22   16:52 EDT

## 2022-10-18 RX ORDER — GABAPENTIN 300 MG/1
300 CAPSULE ORAL NIGHTLY
Qty: 90 CAPSULE | Refills: 0 | Status: SHIPPED | OUTPATIENT
Start: 2022-10-18 | End: 2023-02-20

## 2022-10-27 ENCOUNTER — TELEPHONE (OUTPATIENT)
Dept: ORTHOPEDIC SURGERY | Facility: CLINIC | Age: 77
End: 2022-10-27

## 2022-10-27 NOTE — TELEPHONE ENCOUNTER
Relationship to patient: DAUGHTER ANGELA Du call back number: [phone]    Chief complaint:     Type of visit: 4 WEEK FOLLOW UP    Requested date:IN THE NEXT WEEK OR TWO     If rescheduling, when is the original appointment: TODAY    Additional notes: PT. NEEDS TO CANCEL APPT. TODAY WITH DR. BARBER.   CAR BROKE DOWN ON 65.  THERE ARE NO OPENINGS SHOWING UNTIL 12/05/22.   PT. ASKING TO BE WORKED IN BEFORE THIS.  PLEASE CALL TO ADVISE.

## 2022-10-31 ENCOUNTER — OFFICE VISIT (OUTPATIENT)
Dept: PODIATRY | Facility: CLINIC | Age: 77
End: 2022-10-31

## 2022-10-31 VITALS — RESPIRATION RATE: 20 BRPM | WEIGHT: 127 LBS | BODY MASS INDEX: 23.37 KG/M2 | HEIGHT: 62 IN

## 2022-10-31 DIAGNOSIS — I73.9 PAD (PERIPHERAL ARTERY DISEASE): ICD-10-CM

## 2022-10-31 DIAGNOSIS — L97.514 CHRONIC ULCER OF RIGHT FOOT WITH NECROSIS OF BONE: Primary | ICD-10-CM

## 2022-10-31 DIAGNOSIS — E11.65 TYPE 2 DIABETES MELLITUS WITH HYPERGLYCEMIA, UNSPECIFIED WHETHER LONG TERM INSULIN USE: ICD-10-CM

## 2022-10-31 PROCEDURE — 99213 OFFICE O/P EST LOW 20 MIN: CPT | Performed by: PODIATRIST

## 2022-12-01 ENCOUNTER — OFFICE VISIT (OUTPATIENT)
Dept: PODIATRY | Facility: CLINIC | Age: 77
End: 2022-12-01

## 2022-12-01 VITALS — OXYGEN SATURATION: 98 % | WEIGHT: 127 LBS | HEIGHT: 62 IN | BODY MASS INDEX: 23.37 KG/M2 | HEART RATE: 74 BPM

## 2022-12-01 DIAGNOSIS — L97.514 CHRONIC ULCER OF RIGHT FOOT WITH NECROSIS OF BONE: Primary | ICD-10-CM

## 2022-12-01 DIAGNOSIS — E11.65 TYPE 2 DIABETES MELLITUS WITH HYPERGLYCEMIA, UNSPECIFIED WHETHER LONG TERM INSULIN USE: ICD-10-CM

## 2022-12-01 DIAGNOSIS — I73.9 PAD (PERIPHERAL ARTERY DISEASE): ICD-10-CM

## 2022-12-01 PROCEDURE — 99214 OFFICE O/P EST MOD 30 MIN: CPT | Performed by: PODIATRIST

## 2022-12-01 RX ORDER — METFORMIN HYDROCHLORIDE 500 MG/1
TABLET, EXTENDED RELEASE ORAL
COMMUNITY
Start: 2022-11-09 | End: 2023-02-20

## 2022-12-01 NOTE — PROGRESS NOTES
12/01/2022  Foot and Ankle Surgery - Established Patient/Follow-up  Provider: Dr. Balaji Oneal DPM  Location: [facility]    Subjective:  Ramon Silva is a 77 y.o. male.     Chief Complaint   Patient presents with   • Right Foot - Foot Ulcer, Wound Check, Diabetes   • Follow-up     DIANE WilsonkmanCON, 10/17/2022       HPI:   The patient is a 77-year-old male who returns for follow up regarding his right fourth digit. He is accompanied by an adult female who contributes to his care.     The adult female reports the patient has continues to experience pain to the plantar aspect of his right foot. She states the patient has not returned to work and is considering retiring. The patient is requesting a refill of topical compound pain cream.    No Known Allergies    Current Outpatient Medications on File Prior to Visit   Medication Sig Dispense Refill   • Accu-Chek Guide test strip 1 each by Other route Daily. E 11.9 50 each 2   • Accu-Chek Softclix Lancets lancets test daily as directed 100 each 2   • aspirin 81 MG tablet Take 81 mg by mouth Daily.     • atorvastatin (LIPITOR) 40 MG tablet TAKE ONE TABLET BY MOUTH AT BEDTIME 90 tablet 1   • bacitracin 500 UNIT/GM ointment Apply 1 application topically to the appropriate area as directed 2 (Two) Times a Day. 28 g 0   • carvedilol (COREG) 25 MG tablet TAKE ONE TABLET BY MOUTH TWICE DAILY WITH MEALS 180 tablet 1   • Cetirizine HCl (ZyrTEC Allergy) 10 MG capsule Take 10 mg by mouth Daily.     • cilostazol (PLETAL) 100 MG tablet TAKE ONE TABLET BY MOUTH TWICE DAILY 60 tablet 1   • collagenase (Santyl) 250 UNIT/GM ointment Apply 1 application topically to the appropriate area as directed Daily. Apply once daily to wound bed. 90 g 1   • Diclofenac Sodium (VOLTAREN) 1 % gel gel Apply 4 g topically to the appropriate area as directed 4 (Four) Times a Day As Needed.     • famotidine (PEPCID) 40 MG tablet Take 1 tablet by mouth Daily.     • Farxiga 10 MG tablet TAKE  "ONE TABLET BY MOUTH EVERY DAY 30 tablet 5   • ferrous sulfate 324 (65 Fe) MG tablet delayed-release EC tablet Take 1 tablet by mouth Daily With Breakfast. 90 tablet 1   • finasteride (PROSCAR) 5 MG tablet TAKE ONE TABLET BY MOUTH DAILY 90 tablet 0   • gabapentin (NEURONTIN) 300 MG capsule Take 1 capsule by mouth Every Night. 90 capsule 0   • ibandronate (BONIVA) 150 MG tablet Take 1 tablet by mouth Every 30 (Thirty) Days. 1 tablet 5   • losartan (COZAAR) 50 MG tablet TAKE ONE TABLET BY MOUTH DAILY 90 tablet 1   • metFORMIN ER (GLUCOPHAGE-XR) 500 MG 24 hr tablet TAKE TWO TABLETS BY MOUTH EVERY MORNING AND 2 TABLETS IN THE EVENING     • Omega-3 Fatty Acids (OMEGA-3 FISH OIL PO) Take 1 capsule by mouth Daily.     • pantoprazole (PROTONIX) 40 MG EC tablet TAKE ONE TABLET BY MOUTH EVERY DAY 90 tablet 1     No current facility-administered medications on file prior to visit.       Objective   Pulse 74   Ht 157.5 cm (62\")   Wt 57.6 kg (127 lb)   SpO2 98%   BMI 23.23 kg/m²     Foot/Ankle Exam:       General:   Appearance: elderly    Orientation: unable to assess    Affect comment:  Flat  Gait: antalgic    Gait comment:  Patient's daughter reports that he needs to have knee replacement of the right knee  Assistance: independent    Shoe Gear:  Surgical shoe    VASCULAR      Right Foot Vascularity   Dorsalis pedis:  1+  Skin Temperature: warm    Edema Grading:  None  CFT:  < 3 seconds      NEUROLOGIC     Right Foot Neurologic   Light touch sensation:  Normal     MUSCULOSKELETAL      Right Foot Musculoskeletal   Ecchymosis:  None  Tenderness: none       DERMATOLOGIC     Right Foot Dermatologic   Skin: skin changes and ulcer        Right Foot Additional Comments Wound has continued to improve and remains granular. No signs of infection to the lateral aspect of the fourth digit. No discoloration or concerns of ischemia.    12/01/2022 Wound continues to improve and is nearly healed with small granular wound base. No signs of " infection.      Assessment & Plan   Diagnoses and all orders for this visit:    1. 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 PAD (peripheral artery disease) (HCC)    3. Type 2 diabetes mellitus with hyperglycemia, unspecified whether long term insulin use (HCC)        The patient returns for follow up regarding his right fourth digit. The wound continues to improve and is nearly healed with a small granular wound base. Advised continuing to offload the area and dressing changes daily. Advised him to massage the area as much as possible and attempt transitioning into a regular shoe. Advised patient to consider obtaining over-the-counter arch supports. Provided him with a note to remain off work at this time. Sent in a refill for topical compound pain cream. The patient will return to the clinic in 6 weeks for re-evaluation. Greater than 20 minutes spent before, during, and after evaluation for patient care.    No orders of the defined types were placed in this encounter.         Note is dictated utilizing voice recognition software. Unfortunately this leads to occasional typographical errors. I apologize in advance if the situation occurs. If questions occur please do not hesitate to call our office.    Transcribed from ambient dictation for DIANE Oneal DPM by Dianna Colunga.  12/01/22   14:46 EST    Patient or patient representative verbalized consent to the visit recording.  I have personally performed the services described in this document as transcribed by the above individual, and it is both accurate and complete.

## 2022-12-02 RX ORDER — ATORVASTATIN CALCIUM 40 MG/1
TABLET, FILM COATED ORAL
Qty: 90 TABLET | Refills: 1 | OUTPATIENT
Start: 2022-12-02

## 2022-12-02 RX ORDER — CARVEDILOL 25 MG/1
TABLET ORAL
Qty: 180 TABLET | Refills: 1 | Status: SHIPPED | OUTPATIENT
Start: 2022-12-02

## 2022-12-02 NOTE — TELEPHONE ENCOUNTER
Hub is instructed to read the documentation below to patient    Rx for atorvastation (LIPITOR) 40 mg was sent to [facility] pharmacy in [place] on 10/3/22 for 90 tablets with 1 refill. Pt still has refills left and needs to contact pharmacy to refill Rx.     Carvedilol (COREG) 25 mg will be sent to [facility]. Pt will need to contact pharmacy for  status.

## 2022-12-09 RX ORDER — CILOSTAZOL 100 MG/1
TABLET ORAL
Qty: 60 TABLET | Refills: 1 | Status: SHIPPED | OUTPATIENT
Start: 2022-12-09 | End: 2023-02-20

## 2022-12-19 RX ORDER — FINASTERIDE 5 MG/1
TABLET, FILM COATED ORAL
Qty: 90 TABLET | Refills: 0 | Status: SHIPPED | OUTPATIENT
Start: 2022-12-19

## 2022-12-27 ENCOUNTER — TELEPHONE (OUTPATIENT)
Dept: FAMILY MEDICINE CLINIC | Facility: CLINIC | Age: 77
End: 2022-12-27

## 2022-12-27 NOTE — TELEPHONE ENCOUNTER
HUB to read description below.    Called patient in regards to appointment for Clara Molina today. We sent out [facility] health cancellations yesterday since provider is out. Called patient to see if we can get this appointment rescheduled however, no answer. Left voicemail for patient to call back and their convenience for that reschedule. Please schedule for next available with Patricia Higgins or Moy Black. These appointments should be listed for 30 minutes instead of 15 minute window.     Thank you!

## 2023-01-02 DIAGNOSIS — C85.90 NON-HODGKIN'S LYMPHOMA, UNSPECIFIED BODY REGION, UNSPECIFIED NON-HODGKIN LYMPHOMA TYPE: ICD-10-CM

## 2023-01-02 DIAGNOSIS — M81.8 OTHER OSTEOPOROSIS WITHOUT CURRENT PATHOLOGICAL FRACTURE: ICD-10-CM

## 2023-01-02 DIAGNOSIS — R11.0 NAUSEA: ICD-10-CM

## 2023-01-03 RX ORDER — IBANDRONATE SODIUM 150 MG/1
TABLET, FILM COATED ORAL
Qty: 1 TABLET | Refills: 5 | Status: SHIPPED | OUTPATIENT
Start: 2023-01-03 | End: 2023-01-06 | Stop reason: SDUPTHER

## 2023-01-06 ENCOUNTER — OFFICE VISIT (OUTPATIENT)
Dept: FAMILY MEDICINE CLINIC | Facility: CLINIC | Age: 78
End: 2023-01-06
Payer: MEDICARE

## 2023-01-06 VITALS
OXYGEN SATURATION: 98 % | SYSTOLIC BLOOD PRESSURE: 138 MMHG | DIASTOLIC BLOOD PRESSURE: 71 MMHG | TEMPERATURE: 97.6 F | WEIGHT: 126.7 LBS | HEART RATE: 77 BPM | HEIGHT: 62 IN | BODY MASS INDEX: 23.32 KG/M2

## 2023-01-06 DIAGNOSIS — E11.65 TYPE 2 DIABETES MELLITUS WITH HYPERGLYCEMIA, WITHOUT LONG-TERM CURRENT USE OF INSULIN: ICD-10-CM

## 2023-01-06 DIAGNOSIS — M81.8 OTHER OSTEOPOROSIS WITHOUT CURRENT PATHOLOGICAL FRACTURE: ICD-10-CM

## 2023-01-06 DIAGNOSIS — R19.7 DIARRHEA, UNSPECIFIED TYPE: Primary | ICD-10-CM

## 2023-01-06 LAB — HBA1C MFR BLD: 6.9 % (ref 3.5–5.6)

## 2023-01-06 PROCEDURE — 83036 HEMOGLOBIN GLYCOSYLATED A1C: CPT | Performed by: FAMILY MEDICINE

## 2023-01-06 PROCEDURE — 80053 COMPREHEN METABOLIC PANEL: CPT | Performed by: FAMILY MEDICINE

## 2023-01-06 PROCEDURE — 84443 ASSAY THYROID STIM HORMONE: CPT | Performed by: FAMILY MEDICINE

## 2023-01-06 PROCEDURE — 99214 OFFICE O/P EST MOD 30 MIN: CPT | Performed by: FAMILY MEDICINE

## 2023-01-06 PROCEDURE — 36415 COLL VENOUS BLD VENIPUNCTURE: CPT | Performed by: FAMILY MEDICINE

## 2023-01-06 PROCEDURE — 85025 COMPLETE CBC W/AUTO DIFF WBC: CPT | Performed by: FAMILY MEDICINE

## 2023-01-06 RX ORDER — IBANDRONATE SODIUM 150 MG/1
150 TABLET, FILM COATED ORAL
Qty: 1 TABLET | Refills: 5 | Status: SHIPPED | OUTPATIENT
Start: 2023-01-06

## 2023-01-06 RX ORDER — MONTELUKAST SODIUM 4 MG/1
1 TABLET, CHEWABLE ORAL 2 TIMES DAILY
Qty: 60 TABLET | Refills: 0 | Status: SHIPPED | OUTPATIENT
Start: 2023-01-06

## 2023-01-06 NOTE — PROGRESS NOTES
"Chief Complaint   Patient presents with   • Diarrhea     Happens every time he eats. Does not matter what he eats. Has been ongoing for \"several months\" and is not improving. Getting worse.   • Med Refill       Subjective   Ramon Silva is a 77 y.o. male.     Diarrhea   This is a chronic problem. The current episode started more than 1 month ago. The problem has been gradually worsening. Diarrhea characteristics: nonbloody. Pertinent negatives include no chills, fever, vomiting or weight loss. Exacerbated by: eating. Risk factors: patient chronically on metformin. There is no history of a recent abdominal surgery. GERD, iron deficiency   Diabetes  He presents for his follow-up diabetic visit. He has type 2 diabetes mellitus. His disease course has been improving. Associated symptoms include foot ulcerations. Pertinent negatives for diabetes include no weight loss. (Diabetic foot ulcer) Risk factors for coronary artery disease include dyslipidemia, male sex, hypertension and diabetes mellitus. Current diabetic treatment includes oral agent (dual therapy). His weight is stable. He is following a generally healthy diet. An ACE inhibitor/angiotensin II receptor blocker is being taken. He sees a podiatrist.    I have reviewed relevant past medical, family, social and surgical history for this patient.  Medications review is done by myself, with patient.       Past Medical History :  Active Ambulatory Problems     Diagnosis Date Noted   • Arthralgia 09/05/2018   • Benign prostatic hyperplasia with urinary obstruction 03/17/2017   • Diabetes mellitus, type II (HCC) 03/17/2017   • Diabetic foot ulcer (HCC) 12/14/2015   • Fatigue 09/05/2018   • Gastroesophageal reflux disease 09/10/2019   • Hyperlipidemia 09/10/2019   • Hypertension 09/10/2019   • Knee pain, right 03/17/2017   • Non Hodgkin's lymphoma (HCC) 03/17/2017   • Mass of nasal sinus 12/01/2015   • Microalbuminuria 05/07/2019   • Onychomycosis of toenail 05/07/2019 "   • Osteoarthritis of knee 09/10/2019   • Other dorsalgia 03/23/2015   • Type 2 diabetes mellitus with hyperglycemia (HCC) 09/29/2017   • Vitamin D deficiency 05/07/2019   • Weight loss 09/05/2018   • Anemia 09/10/2019   • Liver lesion 09/10/2019   • Osteoporosis 09/10/2019   • Prostatic hypertrophy 09/10/2019   • YARELI (iron deficiency anemia) 03/17/2020   • 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 E 11.9, Disp: 50 each, Rfl: 2  •  Accu-Chek Softclix Lancets lancets, test daily as directed, Disp: 100 each, Rfl: 2  •  aspirin 81 MG tablet, Take 81 mg by mouth Daily., Disp: , Rfl:   •  atorvastatin (LIPITOR) 40 MG tablet, TAKE ONE TABLET BY MOUTH AT BEDTIME, Disp: 90 tablet, Rfl: 1  •  bacitracin 500 UNIT/GM ointment, Apply 1 application topically to the appropriate area as directed 2 (Two) Times a Day., Disp: 28 g, Rfl: 0  •  carvedilol (COREG) 25 MG tablet, TAKE ONE TABLET BY MOUTH TWICE DAILY WITH MEALS, Disp: 180 tablet, Rfl: 1  •  Cetirizine HCl (ZyrTEC Allergy) 10 MG capsule, Take 10 mg by mouth Daily., Disp: , Rfl:   •  cilostazol (PLETAL) 100 MG tablet, TAKE ONE TABLET BY MOUTH TWICE DAILY, Disp: 60 tablet, Rfl: 1  •  collagenase (Santyl) 250 UNIT/GM ointment, Apply 1 application topically to the appropriate area as directed Daily. Apply once daily to wound bed., Disp: 90 g, Rfl: 1  •  Diclofenac Sodium (VOLTAREN) 1 % gel gel, Apply  "4 g topically to the appropriate area as directed 4 (Four) Times a Day As Needed., Disp: , Rfl:   •  famotidine (PEPCID) 40 MG tablet, Take 1 tablet by mouth Daily., Disp: , Rfl:   •  Farxiga 10 MG tablet, TAKE ONE TABLET BY MOUTH EVERY DAY, Disp: 30 tablet, Rfl: 5  •  ferrous sulfate 324 (65 Fe) MG tablet delayed-release EC tablet, Take 1 tablet by mouth Daily With Breakfast., Disp: 90 tablet, Rfl: 1  •  finasteride (PROSCAR) 5 MG tablet, TAKE ONE TABLET BY MOUTH DAILY, Disp: 90 tablet, Rfl: 0  •  gabapentin (NEURONTIN) 300 MG capsule, Take 1 capsule by mouth Every Night., Disp: 90 capsule, Rfl: 0  •  ibandronate (BONIVA) 150 MG tablet, TAKE ONE TABLET BY MOUTH EVERY 30 DAYS, Disp: 1 tablet, Rfl: 5  •  losartan (COZAAR) 50 MG tablet, TAKE ONE TABLET BY MOUTH DAILY, Disp: 90 tablet, Rfl: 1  •  metFORMIN ER (GLUCOPHAGE-XR) 500 MG 24 hr tablet, TAKE TWO TABLETS BY MOUTH EVERY MORNING AND 2 TABLETS IN THE EVENING, Disp: , Rfl:   •  Omega-3 Fatty Acids (OMEGA-3 FISH OIL PO), Take 1 capsule by mouth Daily., Disp: , Rfl:   •  pantoprazole (PROTONIX) 40 MG EC tablet, TAKE ONE TABLET BY MOUTH EVERY DAY, Disp: 90 tablet, Rfl: 1    No Known Allergies    Social History     Tobacco Use   • Smoking status: Never   • Smokeless tobacco: Never   Substance Use Topics   • Alcohol use: No     Review of Systems   Constitutional: Negative for chills, fever and unexpected weight loss.   Gastrointestinal: Positive for diarrhea. Negative for vomiting.     Objective   Vitals:    01/06/23 1351   BP: 138/71   BP Location: Left arm   Patient Position: Sitting   Cuff Size: Adult   Pulse: 77   Temp: 97.6 °F (36.4 °C)   TempSrc: Oral   SpO2: 98%   Weight: 57.5 kg (126 lb 11.2 oz)   Height: 157.5 cm (62\")     Body mass index is 23.17 kg/m².    Physical Exam  Constitutional:       General: He is not in acute distress.     Appearance: Normal appearance. He is well-developed.   HENT:      Head: Normocephalic and atraumatic.   Eyes:      General: No " scleral icterus.        Right eye: No discharge.         Left eye: No discharge.      Extraocular Movements: Extraocular movements intact.      Conjunctiva/sclera: Conjunctivae normal.   Cardiovascular:      Rate and Rhythm: Normal rate and regular rhythm.      Heart sounds: Normal heart sounds. No murmur heard.  Pulmonary:      Effort: Pulmonary effort is normal.      Breath sounds: Normal breath sounds.   Abdominal:      General: Bowel sounds are increased.      Palpations: Abdomen is soft.      Tenderness: There is no abdominal tenderness. There is no guarding or rebound.   Musculoskeletal:         General: No swelling.      Cervical back: Normal range of motion and neck supple.      Right lower leg: No edema.      Left lower leg: No edema.   Skin:     General: Skin is warm.      Capillary Refill: Capillary refill takes less than 2 seconds.      Findings: No rash.   Neurological:      General: No focal deficit present.      Mental Status: He is alert.      Cranial Nerves: No cranial nerve deficit.         Lab Results   Component Value Date    HGBA1C 6.9 (H) 01/06/2023    HGBA1C 7.6 (H) 08/01/2022    HGBA1C 9.3 (H) 07/03/2022    HGBA1C 8.5 (H) 05/25/2022    HGBA1C 7.8 11/22/2021    HGBA1C 8.1 08/17/2021    HGBA1C 8.8 05/25/2021    HGBA1C 7.9 02/23/2021       Assessment & Plan     Diagnoses and all orders for this visit:    1. Diarrhea, unspecified type (Primary)  -     colestipol (COLESTID) 1 g tablet; Take 1 tablet by mouth 2 (Two) Times a Day. Take other medications either more than one hour before this medication of 4 hours after this medication.  Dispense: 60 tablet; Refill: 0  -     Comprehensive Metabolic Panel  -     CBC & Differential  -     Gastrointestinal Panel, PCR - Stool, Per Rectum    2. Other osteoporosis without current pathological fracture  -     ibandronate (BONIVA) 150 MG tablet; Take 1 tablet by mouth Every 30 (Thirty) Days.  Dispense: 1 tablet; Refill: 5    3. Type 2 diabetes mellitus with  hyperglycemia, without long-term current use of insulin (HCC)  -     Hemoglobin A1c  -     Comprehensive Metabolic Panel  -     TSH  -     CBC & Differential      Proceed with stool studies.  Evaluate for C diff.  Trial of Colestipol.  Recommend holding metformin ER until diarrhea improves.  Watch diet.  No follow-ups on file.       Patient was given instructions and counseling regarding his/her condition or for health maintenance advice. Please see specific information pulled into the AVS if appropriate.     I wore protective equipment throughout this patient encounter to include mask. Hand hygiene was performed before donning protective equipment and after removal when leaving the room.

## 2023-01-06 NOTE — PROGRESS NOTES
Venipuncture Blood Specimen Collection  Venipuncture performed in right arm by Cary Lowery MA with good hemostasis. Patient tolerated the procedure well without complications.   01/06/23   Cary Lowery MA

## 2023-01-06 NOTE — PATIENT INSTRUCTIONS
Hold Metformin until diarrhea improves.    Start a probiotic supplement once daily.  Some brands to consider:  Culturelle, [employer], Align (just pick one, take as directed on the bottle).      Take colestipol twice daily (about 30 minutes before food) as needed to help reduce diarrhea.  May stop when diarrhea improves.

## 2023-01-07 LAB
ALBUMIN SERPL-MCNC: 4.4 G/DL (ref 3.5–5.2)
ALBUMIN/GLOB SERPL: 2 G/DL
ALP SERPL-CCNC: 85 U/L (ref 39–117)
ALT SERPL W P-5'-P-CCNC: 24 U/L (ref 1–41)
ANION GAP SERPL CALCULATED.3IONS-SCNC: 9 MMOL/L (ref 5–15)
AST SERPL-CCNC: 19 U/L (ref 1–40)
BASOPHILS # BLD AUTO: 0.02 10*3/MM3 (ref 0–0.2)
BASOPHILS NFR BLD AUTO: 0.3 % (ref 0–1.5)
BILIRUB SERPL-MCNC: 0.3 MG/DL (ref 0–1.2)
BUN SERPL-MCNC: 28 MG/DL (ref 8–23)
BUN/CREAT SERPL: 21.5 (ref 7–25)
CALCIUM SPEC-SCNC: 9.6 MG/DL (ref 8.6–10.5)
CHLORIDE SERPL-SCNC: 105 MMOL/L (ref 98–107)
CO2 SERPL-SCNC: 27 MMOL/L (ref 22–29)
CREAT SERPL-MCNC: 1.3 MG/DL (ref 0.76–1.27)
DEPRECATED RDW RBC AUTO: 43.3 FL (ref 37–54)
EGFRCR SERPLBLD CKD-EPI 2021: 56.6 ML/MIN/1.73
EOSINOPHIL # BLD AUTO: 0.19 10*3/MM3 (ref 0–0.4)
EOSINOPHIL NFR BLD AUTO: 3 % (ref 0.3–6.2)
ERYTHROCYTE [DISTWIDTH] IN BLOOD BY AUTOMATED COUNT: 13.3 % (ref 12.3–15.4)
GLOBULIN UR ELPH-MCNC: 2.2 GM/DL
GLUCOSE SERPL-MCNC: 199 MG/DL (ref 65–99)
HCT VFR BLD AUTO: 31.9 % (ref 37.5–51)
HGB BLD-MCNC: 10.7 G/DL (ref 13–17.7)
IMM GRANULOCYTES # BLD AUTO: 0.02 10*3/MM3 (ref 0–0.05)
IMM GRANULOCYTES NFR BLD AUTO: 0.3 % (ref 0–0.5)
LYMPHOCYTES # BLD AUTO: 1.91 10*3/MM3 (ref 0.7–3.1)
LYMPHOCYTES NFR BLD AUTO: 30.5 % (ref 19.6–45.3)
MCH RBC QN AUTO: 29.8 PG (ref 26.6–33)
MCHC RBC AUTO-ENTMCNC: 33.5 G/DL (ref 31.5–35.7)
MCV RBC AUTO: 88.9 FL (ref 79–97)
MONOCYTES # BLD AUTO: 0.52 10*3/MM3 (ref 0.1–0.9)
MONOCYTES NFR BLD AUTO: 8.3 % (ref 5–12)
NEUTROPHILS NFR BLD AUTO: 3.61 10*3/MM3 (ref 1.7–7)
NEUTROPHILS NFR BLD AUTO: 57.6 % (ref 42.7–76)
NRBC BLD AUTO-RTO: 0 /100 WBC (ref 0–0.2)
PLATELET # BLD AUTO: 98 10*3/MM3 (ref 140–450)
PMV BLD AUTO: 11 FL (ref 6–12)
POTASSIUM SERPL-SCNC: 4.9 MMOL/L (ref 3.5–5.2)
PROT SERPL-MCNC: 6.6 G/DL (ref 6–8.5)
RBC # BLD AUTO: 3.59 10*6/MM3 (ref 4.14–5.8)
SODIUM SERPL-SCNC: 141 MMOL/L (ref 136–145)
TSH SERPL DL<=0.05 MIU/L-ACNC: 1.18 UIU/ML (ref 0.27–4.2)
WBC NRBC COR # BLD: 6.27 10*3/MM3 (ref 3.4–10.8)

## 2023-01-11 ENCOUNTER — TELEPHONE (OUTPATIENT)
Dept: FAMILY MEDICINE CLINIC | Facility: CLINIC | Age: 78
End: 2023-01-11

## 2023-01-11 NOTE — TELEPHONE ENCOUNTER
Caller: ANGELA RYAN    Relationship to patient: Emergency Contact    Best call back number: [phone]    Chief complaint:     Type of visit: LAB    Requested date:     If rescheduling, when is the original appointment:     Additional notes:RECEIVED MESSAGE TO CALL AND SCHEDULE LABS

## 2023-01-12 ENCOUNTER — OFFICE VISIT (OUTPATIENT)
Dept: PODIATRY | Facility: CLINIC | Age: 78
End: 2023-01-12
Payer: MEDICARE

## 2023-01-12 VITALS — RESPIRATION RATE: 20 BRPM | WEIGHT: 126 LBS | BODY MASS INDEX: 23.19 KG/M2 | HEIGHT: 62 IN

## 2023-01-12 DIAGNOSIS — E11.65 TYPE 2 DIABETES MELLITUS WITH HYPERGLYCEMIA, UNSPECIFIED WHETHER LONG TERM INSULIN USE: ICD-10-CM

## 2023-01-12 DIAGNOSIS — I73.9 PAD (PERIPHERAL ARTERY DISEASE): ICD-10-CM

## 2023-01-12 DIAGNOSIS — L97.514 CHRONIC ULCER OF RIGHT FOOT WITH NECROSIS OF BONE: Primary | ICD-10-CM

## 2023-01-12 PROCEDURE — 99213 OFFICE O/P EST LOW 20 MIN: CPT | Performed by: PODIATRIST

## 2023-01-12 NOTE — PROGRESS NOTES
01/12/2023  Foot and Ankle Surgery - Established Patient/Follow-up  Provider: Dr. Balaji Oneal DPM  Location: [facility]    Subjective:  Ramon Silva is a 77 y.o. male.     Chief Complaint   Patient presents with   • Right Foot - Follow-up, Wound Check   • Follow-up     WALDO Molina aprn  10/17/2022       HPI:   The patient is a 77-year-old male who presents to the clinic for a follow-up regarding his right foot. He is accompanied by an adult female.    The adult female reports his right foot and toe is slowly improving. She denies he has any issues with his left foot. She states he has been ambulating with a cane for a while. The adult female states the patient is retired. The adult female inquires if the patient can obtain an x-ray of his right foot.    No Known Allergies    Current Outpatient Medications on File Prior to Visit   Medication Sig Dispense Refill   • Accu-Chek Guide test strip 1 each by Other route Daily. E 11.9 50 each 2   • Accu-Chek Softclix Lancets lancets test daily as directed 100 each 2   • aspirin 81 MG tablet Take 81 mg by mouth Daily.     • atorvastatin (LIPITOR) 40 MG tablet TAKE ONE TABLET BY MOUTH AT BEDTIME 90 tablet 1   • bacitracin 500 UNIT/GM ointment Apply 1 application topically to the appropriate area as directed 2 (Two) Times a Day. 28 g 0   • carvedilol (COREG) 25 MG tablet TAKE ONE TABLET BY MOUTH TWICE DAILY WITH MEALS 180 tablet 1   • Cetirizine HCl (ZyrTEC Allergy) 10 MG capsule Take 10 mg by mouth Daily.     • cilostazol (PLETAL) 100 MG tablet TAKE ONE TABLET BY MOUTH TWICE DAILY 60 tablet 1   • colestipol (COLESTID) 1 g tablet Take 1 tablet by mouth 2 (Two) Times a Day. Take other medications either more than one hour before this medication of 4 hours after this medication. 60 tablet 0   • collagenase (Santyl) 250 UNIT/GM ointment Apply 1 application topically to the appropriate area as directed Daily. Apply once daily to wound bed. 90 g 1   • Diclofenac  "Sodium (VOLTAREN) 1 % gel gel Apply 4 g topically to the appropriate area as directed 4 (Four) Times a Day As Needed.     • famotidine (PEPCID) 40 MG tablet Take 1 tablet by mouth Daily.     • Farxiga 10 MG tablet TAKE ONE TABLET BY MOUTH EVERY DAY 30 tablet 5   • ferrous sulfate 324 (65 Fe) MG tablet delayed-release EC tablet Take 1 tablet by mouth Daily With Breakfast. 90 tablet 1   • finasteride (PROSCAR) 5 MG tablet TAKE ONE TABLET BY MOUTH DAILY 90 tablet 0   • gabapentin (NEURONTIN) 300 MG capsule Take 1 capsule by mouth Every Night. 90 capsule 0   • ibandronate (BONIVA) 150 MG tablet Take 1 tablet by mouth Every 30 (Thirty) Days. 1 tablet 5   • losartan (COZAAR) 50 MG tablet TAKE ONE TABLET BY MOUTH DAILY 90 tablet 1   • metFORMIN ER (GLUCOPHAGE-XR) 500 MG 24 hr tablet TAKE TWO TABLETS BY MOUTH EVERY MORNING AND 2 TABLETS IN THE EVENING     • Omega-3 Fatty Acids (OMEGA-3 FISH OIL PO) Take 1 capsule by mouth Daily.     • pantoprazole (PROTONIX) 40 MG EC tablet TAKE ONE TABLET BY MOUTH EVERY DAY 90 tablet 1     No current facility-administered medications on file prior to visit.       Objective   Resp 20   Ht 157.5 cm (62\")   Wt 57.2 kg (126 lb)   BMI 23.05 kg/m²     Foot/Ankle Exam:       General:   Appearance: elderly    Orientation: unable to assess    Affect comment:  Flat  Gait: antalgic    Gait comment:  Patient's daughter reports that he needs to have knee replacement of the right knee  Assistance: independent    Shoe Gear:  Surgical shoe    VASCULAR      Right Foot Vascularity   Dorsalis pedis:  1+  Skin Temperature: warm    Edema Grading:  None  CFT:  < 3 seconds      NEUROLOGIC     Right Foot Neurologic   Light touch sensation:  Normal     MUSCULOSKELETAL      Right Foot Musculoskeletal   Ecchymosis:  None  Tenderness: none       DERMATOLOGIC     Right Foot Dermatologic   Skin: skin changes and ulcer        Right Foot Additional Comments Wound has continued to improve and remains granular. No signs " of infection to the lateral aspect of the fourth digit. No discoloration or concerns of ischemia.    12/01/2022 Wound continues to improve and is nearly healed with small granular wound base. No signs of infection.    01/12/2023  No wound to the lateral aspect of the fourth digit is mostly healed at this time. No complicating features. No other issues today.      Assessment & Plan   Diagnoses and all orders for this visit:    1. 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 PAD (peripheral artery disease) ([facility])    3. Type 2 diabetes mellitus with hyperglycemia, unspecified whether long term insulin use ([facility])      Patient returns for follow-up regarding his right fourth toe wound.  The wound is mostly healed at this time.  No signs of infection, drainage, or other concerning features are noted.  Mild xerosis to the skin.  Otherwise, no complicating features.  I have discussed this with the patient and daughter at length.  Imaging was obtained today showing subtle erosive changes involving the lateral aspect of the proximal phalanx of the fourth digit.  We have discussed these issues in the past and given that the toe is quite stable, I do feel that we should proceed with observation.  Patient is aware that if symptoms arise that we may need to consider further treatment including possible amputation.  Patient has currently retired and able to perform daily activities as needed.  I have asked that he continue to observe the feet closely and call with any new issues or concerns.  Patient is to follow-up in 2 months with ADOLFO Simms for routine foot check.  Greater than 20 minutes spent before, during, and after evaluation for patient care.    Orders Placed This Encounter   Procedures   • XR Foot 3+ View Right     Order Specific Question:   Reason for Exam:     Answer:   RIGHT FOOT WOUND ON 4TH DIGIT  R/O OSTEOMYELITIS   ROOM 13  WB  MAY LEAVE AFTER XRAY     Order  Specific Question:   Does this patient have a diabetic monitoring/medication delivering device on?     Answer:   No     Order Specific Question:   Release to patient     Answer:   Routine Release          Note is dictated utilizing voice recognition software. Unfortunately this leads to occasional typographical errors. I apologize in advance if the situation occurs. If questions occur please do not hesitate to call our office.    Transcribed from ambient dictation for DIANE Oneal DPM by Dorothy Amaro.  01/12/23   13:50 EST    Patient or patient representative verbalized consent to the visit recording.  I have personally performed the services described in this document as transcribed by the above individual, and it is both accurate and complete.

## 2023-01-23 ENCOUNTER — TELEPHONE (OUTPATIENT)
Dept: FAMILY MEDICINE CLINIC | Facility: CLINIC | Age: 78
End: 2023-01-23
Payer: MEDICARE

## 2023-01-23 NOTE — TELEPHONE ENCOUNTER
Hub is instructed to read the documentation below to patient    Tried to call patient, no answer. Left voicemail asking if they ever dropped off the stool sample and how the new colestipol medication has been working for him. If patient calls back, please ask what hospital the stool sample was taken to and get an update on the colestipol medication. Thank you.

## 2023-01-23 NOTE — TELEPHONE ENCOUNTER
Can you please f/u with patient or daughter to see if they turned in a stool sample for testing.      Has he noticed any improvement with the new medication?  Colestipol.

## 2023-02-18 DIAGNOSIS — E11.65 TYPE 2 DIABETES MELLITUS WITH HYPERGLYCEMIA: ICD-10-CM

## 2023-02-20 RX ORDER — CILOSTAZOL 100 MG/1
TABLET ORAL
Qty: 60 TABLET | Refills: 1 | Status: SHIPPED | OUTPATIENT
Start: 2023-02-20 | End: 2023-03-14 | Stop reason: SDUPTHER

## 2023-02-20 RX ORDER — ATORVASTATIN CALCIUM 40 MG/1
TABLET, FILM COATED ORAL
Qty: 90 TABLET | Refills: 1 | Status: SHIPPED | OUTPATIENT
Start: 2023-02-20 | End: 2023-03-14 | Stop reason: SDUPTHER

## 2023-02-20 RX ORDER — METFORMIN HYDROCHLORIDE 500 MG/1
TABLET, EXTENDED RELEASE ORAL
Qty: 360 TABLET | Refills: 1 | Status: SHIPPED | OUTPATIENT
Start: 2023-02-20

## 2023-02-20 RX ORDER — GABAPENTIN 300 MG/1
CAPSULE ORAL
Qty: 90 CAPSULE | Refills: 1 | Status: SHIPPED | OUTPATIENT
Start: 2023-02-20

## 2023-02-24 NOTE — TELEPHONE ENCOUNTER
I called [facility] and they are faxing the results over now (as of 2/24 at 3:30pm). Patient's daughter stated that Ramon has been doing good on the medication but only takes it as needed.

## 2023-02-24 NOTE — TELEPHONE ENCOUNTER
HUB RELAYED MESSAGE TO PATIENT'S DAUGHTER REECE. SHE STATES IT WAS DONE AT [facility] IN [place]. SHE CALLED TO CHECK AND SEE IF WE RECEIVED THE RESULTS.    ALSO, PATIENT HAS BEEN DOING GOOD ON THE MEDICATION BUT HE IS ONLY TAKING IT AS NEEDED.    PLEASE ADVISE    CALLBACK: [phone]

## 2023-02-28 NOTE — PROGRESS NOTES
Will you please let patient and/or daughter know that the lab was not able to run a key part of the stool test that checks for active C diff infection.  If he is better (no longer having any diarrhea), okay to monitor for now and take medication PRN.  Do they feel that stopping the metformin helped?    If he is still having diarrhea, recommend trying to recheck the stool test at [place] (if able due to supplies).

## 2023-03-07 ENCOUNTER — TELEPHONE (OUTPATIENT)
Dept: ONCOLOGY | Facility: CLINIC | Age: 78
End: 2023-03-07
Payer: MEDICARE

## 2023-03-14 RX ORDER — CILOSTAZOL 100 MG/1
100 TABLET ORAL 2 TIMES DAILY
Qty: 60 TABLET | Refills: 1 | Status: SHIPPED | OUTPATIENT
Start: 2023-03-14

## 2023-03-14 RX ORDER — ATORVASTATIN CALCIUM 40 MG/1
40 TABLET, FILM COATED ORAL
Qty: 90 TABLET | Refills: 1 | Status: SHIPPED | OUTPATIENT
Start: 2023-03-14

## 2023-03-14 NOTE — TELEPHONE ENCOUNTER
Rx Refill Note  Requested Prescriptions     Pending Prescriptions Disp Refills   • atorvastatin (LIPITOR) 40 MG tablet 90 tablet 1     Sig: Take 1 tablet by mouth every night at bedtime.   • cilostazol (PLETAL) 100 MG tablet 60 tablet 1     Sig: Take 1 tablet by mouth 2 (Two) Times a Day.      Last office visit with prescribing clinician: 10/17/2022   Last telemedicine visit with prescribing clinician: Visit date not found   Next office visit with prescribing clinician: Visit date not found                         Would you like a call back once the refill request has been completed: [] Yes [] No    If the office needs to give you a call back, can they leave a voicemail: [] Yes [] No    Aj Pedraza Rep  03/14/23, 08:24 EDT

## 2023-03-17 ENCOUNTER — OFFICE VISIT (OUTPATIENT)
Dept: PODIATRY | Facility: CLINIC | Age: 78
End: 2023-03-17
Payer: MEDICARE

## 2023-03-17 VITALS — HEIGHT: 62 IN | WEIGHT: 126 LBS | BODY MASS INDEX: 23.19 KG/M2 | RESPIRATION RATE: 20 BRPM

## 2023-03-17 DIAGNOSIS — E08.621 DIABETIC ULCER OF TOE OF RIGHT FOOT ASSOCIATED WITH DIABETES MELLITUS DUE TO UNDERLYING CONDITION, WITH FAT LAYER EXPOSED: ICD-10-CM

## 2023-03-17 DIAGNOSIS — I73.9 PAD (PERIPHERAL ARTERY DISEASE): Primary | ICD-10-CM

## 2023-03-17 DIAGNOSIS — E11.65 TYPE 2 DIABETES MELLITUS WITH HYPERGLYCEMIA, WITHOUT LONG-TERM CURRENT USE OF INSULIN: ICD-10-CM

## 2023-03-17 DIAGNOSIS — L97.512 DIABETIC ULCER OF TOE OF RIGHT FOOT ASSOCIATED WITH DIABETES MELLITUS DUE TO UNDERLYING CONDITION, WITH FAT LAYER EXPOSED: ICD-10-CM

## 2023-03-17 PROCEDURE — 99212 OFFICE O/P EST SF 10 MIN: CPT

## 2023-03-17 PROCEDURE — 1159F MED LIST DOCD IN RCRD: CPT

## 2023-03-17 PROCEDURE — 1160F RVW MEDS BY RX/DR IN RCRD: CPT

## 2023-03-17 NOTE — PROGRESS NOTES
"03/17/2023  Foot and Ankle Surgery - Established Patient/Follow-up  Provider: CON Abraham   Location: [facility]    Subjective:  Ramon Silva is a 77 y.o. male.     Chief Complaint   Patient presents with   • Right Foot - Follow-up, Wound Check, Diabetes     Dm foot care   • Follow-up     WALDO Molina con  2/2023  date unknown     The patient reports today for follow up of a wound on his right fourth toe. He is accompanied by his daughter.    The patient states his toe is \"105 percent better than it was before\". He reports his toe has healed over time. He denies any drainage. The daughter reports his blood glucose levels are controlled, but does not know his most recent hemoglobin A1c. She mentions the patient's toe pain has improved with the insoles he was given, but has not diminished. She inquires if an antibiotic still needs to be applied to his toe. The daughter states the patient has been using gauze to wrap his toe everyday.    No Known Allergies    Current Outpatient Medications on File Prior to Visit   Medication Sig Dispense Refill   • Accu-Chek Guide test strip 1 each by Other route Daily. E 11.9 50 each 2   • Accu-Chek Softclix Lancets lancets test daily as directed 100 each 2   • aspirin 81 MG tablet Take 1 tablet by mouth Daily.     • atorvastatin (LIPITOR) 40 MG tablet Take 1 tablet by mouth every night at bedtime. 90 tablet 1   • bacitracin 500 UNIT/GM ointment Apply 1 application topically to the appropriate area as directed 2 (Two) Times a Day. 28 g 0   • carvedilol (COREG) 25 MG tablet TAKE ONE TABLET BY MOUTH TWICE DAILY WITH MEALS 180 tablet 1   • Cetirizine HCl (ZyrTEC Allergy) 10 MG capsule Take 10 mg by mouth Daily.     • cilostazol (PLETAL) 100 MG tablet Take 1 tablet by mouth 2 (Two) Times a Day. 60 tablet 1   • colestipol (COLESTID) 1 g tablet Take 1 tablet by mouth 2 (Two) Times a Day. Take other medications either more than one hour before this medication of 4 hours " "after this medication. 60 tablet 0   • collagenase (Santyl) 250 UNIT/GM ointment Apply 1 application topically to the appropriate area as directed Daily. Apply once daily to wound bed. 90 g 1   • Diclofenac Sodium (VOLTAREN) 1 % gel gel Apply 4 g topically to the appropriate area as directed 4 (Four) Times a Day As Needed.     • famotidine (PEPCID) 40 MG tablet Take 1 tablet by mouth Daily.     • Farxiga 10 MG tablet TAKE ONE TABLET BY MOUTH EVERY DAY 30 tablet 5   • ferrous sulfate 324 (65 Fe) MG tablet delayed-release EC tablet Take 1 tablet by mouth Daily With Breakfast. 90 tablet 1   • finasteride (PROSCAR) 5 MG tablet TAKE ONE TABLET BY MOUTH DAILY 90 tablet 0   • gabapentin (NEURONTIN) 300 MG capsule TAKE ONE CAPSULE BY MOUTH EVERY EVENING 90 capsule 1   • ibandronate (BONIVA) 150 MG tablet Take 1 tablet by mouth Every 30 (Thirty) Days. 1 tablet 5   • losartan (COZAAR) 50 MG tablet TAKE ONE TABLET BY MOUTH DAILY 90 tablet 1   • metFORMIN ER (GLUCOPHAGE-XR) 500 MG 24 hr tablet TAKE TWO TABLETS BY MOUTH EVERY MORNING AND 2 TABLETS IN THE EVENING 360 tablet 1   • Omega-3 Fatty Acids (OMEGA-3 FISH OIL PO) Take 1 capsule by mouth Daily.     • pantoprazole (PROTONIX) 40 MG EC tablet TAKE ONE TABLET BY MOUTH EVERY DAY 90 tablet 1     No current facility-administered medications on file prior to visit.       Objective   Resp 20   Ht 157.5 cm (62\")   Wt 57.2 kg (126 lb)   BMI 23.05 kg/m²     Foot/Ankle Exam:       General:   Appearance: appears stated age and healthy and elderly    Orientation: unable to assess    Affect: appropriate    Gait: unimpaired    Assistance: independent    Shoe Gear:  Casual shoes and socks    VASCULAR      Right Foot Vascularity   Dorsalis pedis:  1+ (Faint)  Skin Temperature: warm    Edema Grading:  None  CFT:  < 3 seconds  Pedal Hair Growth:  Absent  Varicosities: none       Left Foot Vascularity   Dorsalis pedis:  1+  Skin Temperature: warm    Edema Grading:  None  CFT:  < 3 " seconds  Pedal Hair Growth:  Absent  Varicosities: none        NEUROLOGIC     Right Foot Neurologic   Light touch sensation:  Normal  Protective Sensation using [place]rea Monofilament:  Diminished     Left Foot Neurologic   Light touch sensation:  Normal  Protective Sensation using [place]rea Monofilament:  Diminished     MUSCULOSKELETAL      Right Foot Musculoskeletal   Ecchymosis:  None  Tenderness: metatarsals       Left Foot Musculoskeletal   Ecchymosis:  None  Tenderness: none       DERMATOLOGIC     Right Foot Dermatologic   Skin: dry skin and skin changes    Nails: onychomycosis, abnormally thick and dystrophic nails       Left Foot Dermatologic   Skin: dry skin and skin changes    Nails: onychomycosis, abnormally thick and dystrophic nails        Left Foot Additional Comments: Wound to the right fourth toe has healed.    Assessment & Plan   Diagnoses and all orders for this visit:    1. PAD (peripheral artery disease) ([facility]) (Primary)    2. Type 2 diabetes mellitus with hyperglycemia, without long-term current use of insulin ([facility])    3. 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 I advised him to avoid being barefoot and to check his shoes before putting them on. I advised him to check his feet daily for any wounds or anything concerning. I advised him to wash his feet daily with soap and water, dry them off well, and add moisturizer.  I informed the patient he no longer has to apply an antibiotic to his wound. I advised him to start using a toe spacer everyday when he is ambulating to prevent rubbing.    Do feel patient is at moderate to high risk for pedal complications related to diabetes.  Would like for patient to follow-up in 2 to 3 months for routine diabetic foot check and call with any questions or concerns should they arise  before scheduled appointment.    Explained importance of diabetic foot care, daily foot checks, and glycemic control. Patient should check both feet on a daily basis, monitor and control blood sugars, make sure that both feet and in between toes are towel dried after baths or showers. Avoid barefoot walking at all times. Check shoes before putting them on.   Patient was given information on proper foot care. Call the office at the first signs of a wound or with signs of infection.    Patient denied nail debridement today because of lack of insurance coverage.    No orders of the defined types were placed in this encounter.        Transcribed from ambient dictation for CON Romo by Tracy Villar.  03/17/23   18:19 EDT    Patient or patient representative verbalized consent to the visit recording.  I have personally performed the services described in this document as transcribed by the above individual, and it is both accurate and complete.  CON Romo  3/19/2023  12:32 EDT

## 2023-03-21 ENCOUNTER — TELEPHONE (OUTPATIENT)
Dept: ONCOLOGY | Facility: CLINIC | Age: 78
End: 2023-03-21
Payer: MEDICARE

## 2023-03-21 NOTE — TELEPHONE ENCOUNTER
CALLED PATIENT'S DAUGHTER TO GET HIM SCHEDULED FOR FOLLOW UP LEFT MESSAGE THAT I WOULD MAKE IT FOR [place] OFFICE AND SHE CAN CHECK MYCHART AND IF IT WILL NOT WORK TO RETURN MY CALL.  LEFT DIRECT NUMBER.

## 2023-03-24 RX ORDER — LOSARTAN POTASSIUM 50 MG/1
TABLET ORAL
Qty: 90 TABLET | Refills: 0 | Status: SHIPPED | OUTPATIENT
Start: 2023-03-24

## 2023-04-04 RX ORDER — FAMOTIDINE 40 MG/1
40 TABLET, FILM COATED ORAL DAILY
Qty: 30 TABLET | Refills: 1 | Status: SHIPPED | OUTPATIENT
Start: 2023-04-04

## 2023-04-04 NOTE — TELEPHONE ENCOUNTER
Rx Refill Note  Requested Prescriptions     Pending Prescriptions Disp Refills   • famotidine (PEPCID) 40 MG tablet       Sig: Take 1 tablet by mouth Daily.      Last office visit with prescribing clinician: 10/17/2022   Last telemedicine visit with prescribing clinician: Visit date not found   Next office visit with prescribing clinician: Visit date not found                         Would you like a call back once the refill request has been completed: [] Yes [] No    If the office needs to give you a call back, can they leave a voicemail: [] Yes [] No    Aj Pedraza Rep  04/04/23, 08:54 EDT  
(V5) oriented, appropriate

## 2023-04-10 RX ORDER — PANTOPRAZOLE SODIUM 40 MG/1
TABLET, DELAYED RELEASE ORAL
Qty: 90 TABLET | Refills: 1 | Status: SHIPPED | OUTPATIENT
Start: 2023-04-10

## 2023-04-10 RX ORDER — FINASTERIDE 5 MG/1
TABLET, FILM COATED ORAL
Qty: 90 TABLET | Refills: 0 | Status: SHIPPED | OUTPATIENT
Start: 2023-04-10

## 2023-04-12 NOTE — PROGRESS NOTES
HEMATOLOGY ONCOLOGY FOLLOW UP        Patient name: Ramon Silva  : 1945  MRN: [id number]  Primary Care Physician: Clara Molina APRN  Referring Physician: Clara Molina AP*  Diagnosis:   Sinus Non-Hodgkin's lymphoma  Anemia  Chief Complaint   Patient presents with   • Follow-up     Non-Hodgkin's lymphoma, unspecified body region, unspecified non-Hodgkin lymphoma type       History of Present Illness:  Mr. Silva is a 78 y.o.  male with a history of testicular lymphoma diagnosed in , status post chemotherapy with R-CHOP, who has been in complete remission for several years.  He presented to his primary care provider, Mariam Price, with symptoms of left-sided facial pain radiating to the eye, left-sided headache, and he was referred to ENT, Dr. Harry, for further evaluation.  CT scan of the paranasal sinuses was performed without contrast on 11/30/15 and it showed marked bony destruction and an expansile soft tissue mass involving the left frontal, left ethmoid, left sphenoid and left maxillary sinus.  There was a soft tissue mass encroaching in the left orbit and left side of the face towards the muscles of mastication as well as through the superior wall of the left sphenoid sinus.  After evaluation, Dr. Harry ordered MRI of the brain with and without contrast and that showed a multi-lobulated mass filling and expanding the left maxillary sinus as well as the left ethmoid air cells in the left locule of the sphenoid sinus, with extension into the left orbit and left medial cranial fossa.  Dr. Harry performed fiberoptic nasal endoscopy and biopsy of the left nasal mass on 12/11/15.  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  Cyclin D1 was negative.  All of these stains were done by immunohistochemistry.  Sections of the nasal  mass showed diffuse infiltrate by predominantly intermediate-to-large sized lymphoid cells.  The cells have a vesicular nuclei and inconspicuous cytoplasm.  FISH was negative for rearrangements involving BCL6, MYC, and IGH/BCL-2 [t(14;18)].  FISH for MYC/IGH was negative.  The patient denied any B symptoms, such as fevers, chills, night sweats, weight loss or fatigue.  He did not report any lymphadenopathy.  Dr. Harry referred the patient to us for further evaluation of his lymphoma.    Today, 12/30/15, the patient presents for initial evaluation.  He denies any fever, chills, night sweats, weight loss or lymph node swelling.  His only symptom is left facial pain.  The patient also reports left eye blurry vision.  • 12/30/15 - WBC 8.3, hemoglobin 13.3, platelet count 233, MCV 87.5.  • 1/7/16 - Echocardiogram:  Ejection fraction 50-55%.  Normal LVEF.  There is slight impaired LV relaxation.  • 1/8/16 - PET/CT scan:  Soft tissue mass originating in the left maxillary sinus erodes through the medial sinus wall spilling out into the nasopharynx.  It abates the nasal septum.  Lesion measures about 4 x 4 cm in AP and transverse dimension.  It measures about 8 cm in the cephalocaudal dimension.  SUV is 10.8  In the abdomen, there is a focus of activity in the left lobe of the liver which measures about 2 cm and has SUV 9.4.    • 1/11/16 - Bone marrow biopsy:  Normal cellular bone marrow 25-35%.  Adequate iron stores.  No malignancy.  Flow cytometry is negative.  Normal karyotype.  • 1/22/16 - Patient underwent left subclavian Port-A-Cath placement.  • 1/25/16 - WBC 8.1, hemoglobin 12.2, platelet count 216, MCV 87.1.  • 1/26/16 - Patient was seen at [facility] by Dr. Jamal Pisano.  He has recommended R-GCVP combination chemo treatment.  If the patient does not achieve complete response or if he has liver involvement, he will consider autologous stem cell transplantation after  high-dose chemotherapy.  If lymphoma is present in the liver, this tumor will likely represent a recurrent lymphoma rather than a new primary lymphoma of the sinuses.  • 2/2/16 - Hepatitis panel negative.  • 2/4/16 - Patient started chemotherapy with Rituximab, Gemcitabine, Cytoxan, Vincristine, and Prednisolone (R-GCVP q. 21 days regimen).  • 2/24/16 - Patient received cycle 2 of R-GCVP.  • 3/1/16 - Patient received intrathecal chemotherapy cycle 1 with Cytarabine plus Hydrocortisone plus Methotrexate.  • 3/2/16 - Patient received cycle 2, day 8 of Gemcitabine.  WBC 3.9, platelet count 145,000.  • 3/9/16 - WBC 9.1, hemoglobin 9.2, platelet count 33,000.  • 3/10/16 - Platelet count 32,000.  • 3/16/16 - Patient received cycle 3 of R-GCVP day 1.  • 3/23/16 - Creatinine 0.85, BUN 19.  Retic count 19,950.  Ferritin 369.  Erythropoietin 26.9.  Iron saturation 11%, TIBC 22.  CBC showed WBC 7.3, hemoglobin 8.1, platelet count 395,000, MCV 87.  • 3/23/16 - Patient received cycle 3, day 8 of Gemcitabine.  Patient received Neulasta 6 mg.   • 3/26/16 - Brain MRI with and without contrast:  There is residual soft tissue mass located in the left ethmoid and sphenoid sinus which is much smaller compared to the previous exam, now measuring 2.8 x 1.8 cm.  There is better aeration in the left maxillary sinus.  • 3/26/16 - MRI abdomen with and without contrast:   A small 1.3 x 1.1 cm rim enhancing lesion is seen near the dome of the medial segment of the left lobe of liver.  This area corresponds to the hypermetabolic activity seen on the previous PET scan.  • 3/30/16 - Neutrophils 53,000, hemoglobin 9.5, platelet count 328,000.  • 4/6/16 - Patient received cycle 4, day 1 of R-GCVP.  WBC 17.9, hemoglobin 9.6, platelet count 256,000.    • 4/7/16 - Patient received intrathecal chemotherapy with Cytarabine plus Hydrocortisone plus Methotrexate.    • 4/13/16 - Patient received cycle 4, day 8 of Gemcitabine.  WBC 4.8, hemoglobin 8.1,  platelet count 147,000.  The patient had hypotension and received IV fluids.    • 4/14/16 - Ultrasound of the liver:  There is a small nodule measuring 15 mm in the left lobe of the liver.    • 4/18/16 - WBC 3.6, hemoglobin 8.1, platelet count 73,000.    • 4/27/16 - Patient received cycle 5, day 1 R-GCVP.  • 4/28/16 - Patient received intrathecal chemotherapy.    • 5/4/16 - Patient received cycle 5, day 8 of Gemcitabine.  • 5/18/16 - Patient received cycle 6, day 1 of R-GCVP.  CBC shows WBC 10.7, hemoglobin 9.7, platelet count 417,000.  • 5/19/16 - Patient received cycle 5 of intrathecal chemotherapy with Methotrexate, Cytarabine and Hydrocortisone.  • 6/10/16 - Brain MRI with and without contrast:  Residual abnormal signal involving the region of the left sphenoid sinus.  There appears to be mild improvement compared to prior.  Findings may be due to chronic inflammation of sinusitis.  Residual tumor is possible but less likely.  Overall, there is improvement.  No evidence of acute focal ischemia.  Nonspecific white matter changes.    • 6/16/16 - Patient received the last, sixth cycle of intrathecal chemotherapy.  • 7/7/16 - PET/CT scan:  Small focal area of increased nuclear activity in the lateral segment of the left lobe of the liver is no longer visualized.    • 8/1/16 to 8/19/16 - Patient received 15 fractions of radiation therapy, total of 30 Gy.  • 11/3/16 - PET/CT scan:  Stable exam with no hypermetabolic activity seen within the neck, chest, abdomen and pelvis.  New sub-centimeter non-calcified pulmonary nodules within the posterior lateral aspect of the right upper lobe.  There may be inflammatory or infectious.  Stable bilateral sub-centimeter non-calcified pulmonary nodules which are not hypermetabolic.    • 1/28/17 - MRI of brain with and without contrast:  Abnormal signal in the left sphenoid sinus is redemonstrated.  It is similar in size to the previous MRI from June 2016 and measures about 23 x 14  mm.  No evidence of progression.  No evidence of metastases.  • 2/17/17 - CT scan:  No abnormalities in the chest.  No lymphadenopathy.  • 5/8/17 - Patient underwent nasal endoscopy which showed maxillary sinusitis.  No tumor reported.  • 9/8/17 - PET/CT scan:  No evidence of metastasis or disease recurrence.  No hypermetabolic activity anywhere.  Stable partial opacification of the left sphenoid sinus likely due to chronic sinusitis.    • 3/3/18 - WBC 7.2, hemoglobin 11.4, platelet count 181,000, MCV 91.8.  Creatinine 0.77.  LFTs normal.  Albumin 4.    • 3/30/18 - Brain MRI with and without contrast:  Decreasing size of the abnormal soft tissue within the left pterygomaxillary fissure.  This area demonstrates mild heterogeneous contrast enhancement which is similar to prior studies.  No new enlarging soft tissue mass.  Mucosal thickening with the left maxillary and sphenoid sinuses.  No evidence of malignancy.  No acute intracranial abnormality.    • 8/24/18 - CT scan of chest, abdomen and pelvis:  No evidence of lymphadenopathy.  A 1 cm pulmonary nodule in the right middle lobe is not significantly changed.  No evidence of lymphadenopathy.  CT abdomen is also unremarkable.  Enlarged heterogenous prostate gland, hyperplasia is noted.  Small hiatal hernia.  Moderate osteopenia and degenerative changes in hips and spine.  Left renal cyst.    • 8/28/18 - Vitamin D 31.  PSA 1.6.    • 10/5/18 - DEXAscan:  Osteoporosis with T-score of -2.5 in the distal forearm.    • 3/4/19 - WBC 6.9, hemoglobin 12.8, platelet count 187,000, MCV 91.  Creatinine 1.03.  Total bilirubin 0.5.  .  • 5/8/2019 25 hydroxy vitamin D 32  • 8/28/2019: Brain MRI:1. The abnormal material in the left sphenoid sinus does not appear significantly changed.The brain appears stable and within normal for age. 3. Minor chronic maxillary sinus mucosal thickening \  • 2/19/2020: WBC 8.8, hemoglobin 9.2, MCV 92.7, platelets 280, creatinine 0.88, alk phos  122, 25-hydroxy vitamin D 31.3, B12 640, TSH 0.886  • 3/7/2020: CT abdomen and pelvis- No evidence of lymphadenopathy in the abdomen pelvis or chest.  Stable right middle lobe noncalcified pulmonary nodule unchanged since 2017.  Degenerative changes of the lumbar spine and hips.  Chest with contrast:.  Stable 1 cm right middle lobe lung nodule.  No evidence of malignancy.  • 3/10/2020: Iron 35 low, ferritin 92, iron saturation 14% low, TIBC 258, folate greater than 20, WBC 5.8, hemoglobin 9.4, MCV 91, platelets 228, ESR 25  • 6/30/2020: Creatinine 0.9, LFTs normal, , WBC 6.48, hemoglobin 10.6, platelets 202, ferritin 71, iron saturation 25%, TIBC 242,  • 9/1/2020: Creatinine 1.33, BUN 26, LFTs normal, WBC 6.7, hemoglobin 10.9, platelets 190,, ESR 15, CRP 0.1,  • 3/23/2021: Stool Hemoccult is positive  • 4/28/2021: WBC 7.13, hemoglobin 11.6, platelets 163, MCV 94.1, ferritin 77.3, iron 59, iron saturation 41%, TIBC 286, LFTs normal, , creatinine 1.06,  • 10/2/2021 : Stable 9 mm nodule in the medial segment of the right middle lobe.  • 8/1/2022 WBC 4.5, hemoglobin 10.5, hematocrit 32.4, platelet 179  • 1/6/2023 - WBC 6.27, Hb 10.7, hct 31.9, plt 98    Subjective:  No new symptoms, no constitutional symptoms.       Past Medical History:   Diagnosis Date   • Anemia    • Arthralgia    • Diabetes    • Fatigue    • GERD (gastroesophageal reflux disease)    • Gout    • Hyperlipidemia    • Hypertension    • Liver lesion    • Non Hodgkin's lymphoma    • Osteoporosis    • Personal history of testicular cancer    • Vitamin D deficiency    • Weight loss        Past Surgical History:   Procedure Laterality Date   • ANGIOGRAM - CONVERTED  07/05/2022    aif by Dr Henriquez   • CARDIAC CATHETERIZATION N/A 7/5/2022    Procedure: PERIPHERAL ANGIOGRAPHY Right leg;  Surgeon: Theron Henriquez MD;  Location: [facility] INVASIVE LOCATION;  Service: Cardiovascular;  Laterality: N/A;   • HIP SURGERY  2005   • PORTACATH  PLACEMENT     • TESTICLE SURGERY Right 07/17/2000    right testicular excision       Current Outpatient Medications:   •  Accu-Chek Guide test strip, 1 each by Other route Daily. E 11.9, Disp: 50 each, Rfl: 2  •  Accu-Chek Softclix Lancets lancets, test daily as directed, Disp: 100 each, Rfl: 2  •  aspirin 81 MG tablet, Take 1 tablet by mouth Daily., Disp: , Rfl:   •  atorvastatin (LIPITOR) 40 MG tablet, Take 1 tablet by mouth every night at bedtime., Disp: 90 tablet, Rfl: 1  •  bacitracin 500 UNIT/GM ointment, Apply 1 application topically to the appropriate area as directed 2 (Two) Times a Day., Disp: 28 g, Rfl: 0  •  carvedilol (COREG) 25 MG tablet, TAKE ONE TABLET BY MOUTH TWICE DAILY WITH MEALS, Disp: 180 tablet, Rfl: 1  •  Cetirizine HCl (ZyrTEC Allergy) 10 MG capsule, Take 10 mg by mouth Daily., Disp: , Rfl:   •  cilostazol (PLETAL) 100 MG tablet, Take 1 tablet by mouth 2 (Two) Times a Day., Disp: 60 tablet, Rfl: 1  •  colestipol (COLESTID) 1 g tablet, Take 1 tablet by mouth 2 (Two) Times a Day. Take other medications either more than one hour before this medication of 4 hours after this medication., Disp: 60 tablet, Rfl: 0  •  collagenase (Santyl) 250 UNIT/GM ointment, Apply 1 application topically to the appropriate area as directed Daily. Apply once daily to wound bed., Disp: 90 g, Rfl: 1  •  Diclofenac Sodium (VOLTAREN) 1 % gel gel, Apply 4 g topically to the appropriate area as directed 4 (Four) Times a Day As Needed., Disp: , Rfl:   •  famotidine (PEPCID) 40 MG tablet, Take 1 tablet by mouth Daily., Disp: 30 tablet, Rfl: 1  •  Farxiga 10 MG tablet, TAKE ONE TABLET BY MOUTH EVERY DAY, Disp: 30 tablet, Rfl: 5  •  ferrous sulfate 324 (65 Fe) MG tablet delayed-release EC tablet, Take 1 tablet by mouth Daily With Breakfast., Disp: 90 tablet, Rfl: 1  •  finasteride (PROSCAR) 5 MG tablet, TAKE ONE TABLET BY MOUTH DAILY, Disp: 90 tablet, Rfl: 0  •  gabapentin (NEURONTIN) 300 MG capsule, TAKE ONE CAPSULE BY  "MOUTH EVERY EVENING, Disp: 90 capsule, Rfl: 1  •  ibandronate (BONIVA) 150 MG tablet, Take 1 tablet by mouth Every 30 (Thirty) Days., Disp: 1 tablet, Rfl: 5  •  losartan (COZAAR) 50 MG tablet, TAKE ONE TABLET BY MOUTH DAILY, Disp: 90 tablet, Rfl: 0  •  metFORMIN ER (GLUCOPHAGE-XR) 500 MG 24 hr tablet, TAKE TWO TABLETS BY MOUTH EVERY MORNING AND 2 TABLETS IN THE EVENING, Disp: 360 tablet, Rfl: 1  •  Omega-3 Fatty Acids (OMEGA-3 FISH OIL PO), Take 1 capsule by mouth Daily., Disp: , Rfl:   •  pantoprazole (PROTONIX) 40 MG EC tablet, TAKE ONE TABLET BY MOUTH EVERY DAY, Disp: 90 tablet, Rfl: 1    No Known Allergies    Family History   Family history unknown: Yes       Family history is unknown by patient.    Social History     Tobacco Use   • Smoking status: Never   • Smokeless tobacco: Never   Vaping Use   • Vaping Use: Never used   Substance Use Topics   • Alcohol use: No   • Drug use: Never     ROS:     12 point review system negative.  No significant fatigue.    Objective:    Vitals:    04/13/23 1342   BP: 108/49   Pulse: 80   Resp: 18   Temp: 98.4 °F (36.9 °C)   SpO2: 94%   Weight: 58.1 kg (128 lb)   Height: 157.5 cm (62\")   PainSc: 0-No pain     ECOG  (1) Restricted in physically strenuous activity, ambulatory and able to do work of light nature    Physical Exam:     Physical Exam  Constitutional:       Appearance: Normal appearance.   HENT:      Head: Normocephalic and atraumatic.      Nose: Nose normal.   Eyes:      Extraocular Movements: Extraocular movements intact.      Pupils: Pupils are equal, round, and reactive to light.   Cardiovascular:      Rate and Rhythm: Normal rate and regular rhythm.      Pulses: Normal pulses.      Heart sounds: No murmur heard.  Pulmonary:      Effort: Pulmonary effort is normal.      Breath sounds: Normal breath sounds.   Abdominal:      General: There is no distension.      Palpations: Abdomen is soft. There is no mass.      Tenderness: There is no abdominal tenderness. "   Musculoskeletal:         General: Normal range of motion.      Cervical back: Normal range of motion and neck supple.      Comments: Right foot wrapped with bandage   Skin:     General: Skin is warm.   Neurological:      General: No focal deficit present.      Mental Status: He is alert.   Psychiatric:         Mood and Affect: Mood normal.       Lab Results - Last 18 Months   Lab Units 01/06/23  1434 08/01/22  1336 07/06/22  0219   WBC 10*3/mm3 6.27 4.51 5.40   HEMOGLOBIN g/dL 10.7* 10.5* 11.0*   HEMATOCRIT % 31.9* 32.4* 33.4*   PLATELETS 10*3/mm3 98* 179 203   MCV fL 88.9 95.9 93.5     Lab Results - Last 18 Months   Lab Units 01/06/23  1434 08/01/22  1336 07/06/22  0219 07/05/22  0133   SODIUM mmol/L 141 138 136 138   POTASSIUM mmol/L 4.9 4.2 4.0 4.5   CHLORIDE mmol/L 105 102 106 108*   CO2 mmol/L 27.0 24.1 17.0* 18.0*   BUN mg/dL 28* 18 27* 35*   CREATININE mg/dL 1.30* 0.93 1.08 1.21   CALCIUM mg/dL 9.6 9.3 9.0 9.1   BILIRUBIN mg/dL 0.3 0.4  --  0.2   ALK PHOS U/L 85 67  --  93   ALT (SGPT) U/L 24 9  --  26   AST (SGOT) U/L 19 12  --  19   GLUCOSE mg/dL 199* 96 136* 186*       Lab Results   Component Value Date    GLUCOSE 199 (H) 01/06/2023    BUN 28 (H) 01/06/2023    CREATININE 1.30 (H) 01/06/2023    EGFRIFNONA 59 (L) 08/17/2021    EGFRIFAFRI 91 02/23/2021    BCR 21.5 01/06/2023    K 4.9 01/06/2023    CO2 27.0 01/06/2023    CALCIUM 9.6 01/06/2023    ALBUMIN 4.4 01/06/2023    LABIL2 1.6 05/08/2019    AST 19 01/06/2023    ALT 24 01/06/2023       Lab Results - Last 18 Months   Lab Units 07/02/22  0220   INR  0.95   APTT seconds 27.6       Lab Results   Component Value Date    IRON 41 (L) 06/27/2022    TIBC 253 (L) 06/27/2022    FERRITIN 838.80 (H) 06/27/2022       Lab Results   Component Value Date    FOLATE >20.0 03/10/2020     No results found for: OCCULTBLD    No results found for: RETICCTPCT    Lab Results   Component Value Date    JMXORLEQ71 210 (L) 08/01/2022     No results found for: SPEP, UPEP  LDH   Date  Value Ref Range Status   10/07/2021 178 135 - 225 U/L Final     Lab Results   Component Value Date    SEDRATE 23 (H) 07/05/2022     No results found for: FIBRINOGEN, HAPTOGLOBIN  Lab Results   Component Value Date    PTT 27.6 07/02/2022    INR 0.95 07/02/2022     No results found for:   No results found for: CEA  No components found for: CA-19-9  Lab Results   Component Value Date    PSA 1.520 05/25/2022     Assessment & Plan     Assessment:  1. 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  It is unclear whether the patient has recurrent versus new de zulma lymphoma.  His PET scan also showed an indeterminate lesion in the left lobe of the liver, which could not be biopsied.  He was treated with Rituximab-GCVP chemotherapy x6 cycles.  The patient also received six cycles of intrathecal chemotherapy with Methotrexate, Cytarabine, and Hydrocortisone.  PET/CT scan showed a complete response.  His restaging PET scan on 11/3/16 continues to show remission.  He also received consolidation radiation therapy for 30 Gy finished on 8/19/16.    PET/CT scan in September 2017 does not show any evidence of disease recurrence.  Patient was reporting left-sided headaches.  repeat MRI on 3/30/18 and it fails to show any evidence of disease progression or recurrence.  Restaging CT scan on 3/7/2020 does not show any evidence of disease recurrence.  Now again had a repeat CT scan in October 2021 with no concerning findings for recurrent disease.  There is a stable 9 mm nodule in the right middle lobe which has been stable for the last several years.  No routine CT imaging needed.  I would repeat CBC today with recent drop in platelet count. If normal recheck labs in 6 months. Imaging can be done if symptomatic.   2. Mild persistent  anemia: Appears to be from chronic disease hemoglobin 10.5 repeat in 6 months.Positive stool Hemoccult test: Patient had a colonoscopy which was unremarkable this was in June 2021  3. History of testicular lymphoma: In 2000  4. History of right knee osteoarthritis:   5. Benign prostatic hypertrophy followed by urology no worsening of symptoms.  6. History of liver lesion:  This was noted initially on PET scan.  Repeat MRI showed a 1.3 x 1.7 cm, rim-enhancing lesion which could not be biopsied.  His restaging PET scan on 9/8/16 does not show this lesion anymore.  No findings October 2021 CT.  No further imaging needed.  7. Osteoporosis:  This was seen on DEXAscan 10/5/18.  Calcium vitamin D.    PLAN:      1. Patient is more than 5 years out from treatment.  routine CT imaging not needed.    2. No s/s of recurrence  3. Continue Boniva.  4. Cbc today to follow up on thrombocytopenia  5. Continue surveillance with ENT and fiberoptic endoscopies.  6. Follow-up with me in 6 months.  CBC CMP LDH week prior      Non-Hodgkin's lymphoma, unspecified body region, unspecified non-Hodgkin lymphoma type  - CBC & Differential  - Comprehensive Metabolic Panel  - Lactate Dehydrogenase  - CBC & Differential  - Comprehensive Metabolic Panel  - Lactate Dehydrogenase    Orders Placed This Encounter   Procedures   • Comprehensive Metabolic Panel     Order Specific Question:   Release to patient     Answer:   Immediate   • Lactate Dehydrogenase   • Comprehensive Metabolic Panel     Standing Status:   Future     Standing Expiration Date:   4/13/2024     Order Specific Question:   Release to patient     Answer:   Immediate   • Lactate Dehydrogenase     Standing Status:   Future     Standing Expiration Date:   4/13/2024   • CBC & Differential   • CBC & Differential     Standing Status:   Future     Standing Expiration Date:   4/13/2024       Jessica Kwon MD 04/13/23

## 2023-04-13 ENCOUNTER — OFFICE VISIT (OUTPATIENT)
Dept: ONCOLOGY | Facility: CLINIC | Age: 78
End: 2023-04-13
Payer: MEDICARE

## 2023-04-13 VITALS
RESPIRATION RATE: 18 BRPM | HEART RATE: 80 BPM | SYSTOLIC BLOOD PRESSURE: 108 MMHG | HEIGHT: 62 IN | WEIGHT: 128 LBS | TEMPERATURE: 98.4 F | DIASTOLIC BLOOD PRESSURE: 49 MMHG | BODY MASS INDEX: 23.55 KG/M2 | OXYGEN SATURATION: 94 %

## 2023-04-13 DIAGNOSIS — C85.90 NON-HODGKIN'S LYMPHOMA, UNSPECIFIED BODY REGION, UNSPECIFIED NON-HODGKIN LYMPHOMA TYPE: Primary | ICD-10-CM

## 2023-04-13 PROCEDURE — 99213 OFFICE O/P EST LOW 20 MIN: CPT | Performed by: INTERNAL MEDICINE

## 2023-04-13 PROCEDURE — 1126F AMNT PAIN NOTED NONE PRSNT: CPT | Performed by: INTERNAL MEDICINE

## 2023-04-13 PROCEDURE — 3078F DIAST BP <80 MM HG: CPT | Performed by: INTERNAL MEDICINE

## 2023-04-13 PROCEDURE — 3074F SYST BP LT 130 MM HG: CPT | Performed by: INTERNAL MEDICINE

## 2023-04-24 DIAGNOSIS — R19.7 DIARRHEA, UNSPECIFIED TYPE: ICD-10-CM

## 2023-04-24 RX ORDER — MONTELUKAST SODIUM 4 MG/1
TABLET, CHEWABLE ORAL
Qty: 60 TABLET | Refills: 0 | Status: SHIPPED | OUTPATIENT
Start: 2023-04-24

## 2023-07-24 RX ORDER — CILOSTAZOL 100 MG/1
100 TABLET ORAL 2 TIMES DAILY
Qty: 60 TABLET | Refills: 1 | Status: CANCELLED | OUTPATIENT
Start: 2023-07-24

## 2023-07-24 RX ORDER — CARVEDILOL 25 MG/1
25 TABLET ORAL 2 TIMES DAILY WITH MEALS
Qty: 180 TABLET | Refills: 1 | Status: CANCELLED | OUTPATIENT
Start: 2023-07-24

## 2023-07-24 NOTE — TELEPHONE ENCOUNTER
DAUGHTER CALLED AND REQUESTED A REFILL SHE STATED THAT HE PT IS OUT HE DOES HAVE EST CARE APPOINTMENT ON THE 28TH CAN YOU FILL ENOUGH TIL APPOINTMENT    Rx Refill Note  Requested Prescriptions     Pending Prescriptions Disp Refills    carvedilol (COREG) 25 MG tablet 180 tablet 1     Sig: Take 1 tablet by mouth 2 (Two) Times a Day With Meals.    cilostazol (PLETAL) 100 MG tablet 60 tablet 1     Sig: Take 1 tablet by mouth 2 (Two) Times a Day.      Last office visit with prescribing clinician: 10/17/2022   Last telemedicine visit with prescribing clinician: Visit date not found   Next office visit with prescribing clinician: Visit date not found                         Would you like a call back once the refill request has been completed: [] Yes [] No    If the office needs to give you a call back, can they leave a voicemail: [] Yes [] No    Aj Pedraza Rep  07/24/23, 15:19 EDT

## 2023-07-28 ENCOUNTER — OFFICE VISIT (OUTPATIENT)
Dept: FAMILY MEDICINE CLINIC | Facility: CLINIC | Age: 78
End: 2023-07-28
Payer: MEDICARE

## 2023-07-28 VITALS
DIASTOLIC BLOOD PRESSURE: 64 MMHG | HEIGHT: 62 IN | OXYGEN SATURATION: 98 % | SYSTOLIC BLOOD PRESSURE: 120 MMHG | BODY MASS INDEX: 21.97 KG/M2 | TEMPERATURE: 98.4 F | HEART RATE: 73 BPM | WEIGHT: 119.4 LBS

## 2023-07-28 DIAGNOSIS — R19.7 DIARRHEA, UNSPECIFIED TYPE: ICD-10-CM

## 2023-07-28 DIAGNOSIS — N13.8 BENIGN PROSTATIC HYPERPLASIA WITH URINARY OBSTRUCTION: ICD-10-CM

## 2023-07-28 DIAGNOSIS — K21.9 GASTROESOPHAGEAL REFLUX DISEASE, UNSPECIFIED WHETHER ESOPHAGITIS PRESENT: ICD-10-CM

## 2023-07-28 DIAGNOSIS — E11.65 TYPE 2 DIABETES MELLITUS WITH HYPERGLYCEMIA, WITHOUT LONG-TERM CURRENT USE OF INSULIN: ICD-10-CM

## 2023-07-28 DIAGNOSIS — E78.2 MIXED HYPERLIPIDEMIA: ICD-10-CM

## 2023-07-28 DIAGNOSIS — I73.9 INTERMITTENT CLAUDICATION: ICD-10-CM

## 2023-07-28 DIAGNOSIS — Z78.9 LANGUAGE BARRIER AFFECTING HEALTH CARE: ICD-10-CM

## 2023-07-28 DIAGNOSIS — R35.0 URINARY FREQUENCY: Primary | ICD-10-CM

## 2023-07-28 DIAGNOSIS — M81.8 OTHER OSTEOPOROSIS WITHOUT CURRENT PATHOLOGICAL FRACTURE: ICD-10-CM

## 2023-07-28 DIAGNOSIS — N40.1 BENIGN PROSTATIC HYPERPLASIA WITH URINARY OBSTRUCTION: ICD-10-CM

## 2023-07-28 DIAGNOSIS — Z13.29 SCREENING FOR THYROID DISORDER: ICD-10-CM

## 2023-07-28 DIAGNOSIS — Z76.89 ENCOUNTER TO ESTABLISH CARE: ICD-10-CM

## 2023-07-28 DIAGNOSIS — I10 PRIMARY HYPERTENSION: ICD-10-CM

## 2023-07-28 DIAGNOSIS — M25.50 ARTHRALGIA, UNSPECIFIED JOINT: ICD-10-CM

## 2023-07-28 DIAGNOSIS — E11.65 TYPE 2 DIABETES MELLITUS WITH HYPERGLYCEMIA: ICD-10-CM

## 2023-07-28 DIAGNOSIS — Z92.29 HX OF LONG TERM USE OF BLOOD THINNERS: ICD-10-CM

## 2023-07-28 LAB
BASOPHILS # BLD AUTO: 0.02 10*3/MM3 (ref 0–0.2)
BASOPHILS NFR BLD AUTO: 0.4 % (ref 0–1.5)
BILIRUB BLD-MCNC: NEGATIVE MG/DL
CLARITY, POC: ABNORMAL
COLOR UR: YELLOW
DEPRECATED RDW RBC AUTO: 43.4 FL (ref 37–54)
EOSINOPHIL # BLD AUTO: 0.11 10*3/MM3 (ref 0–0.4)
EOSINOPHIL NFR BLD AUTO: 2.3 % (ref 0.3–6.2)
ERYTHROCYTE [DISTWIDTH] IN BLOOD BY AUTOMATED COUNT: 13.1 % (ref 12.3–15.4)
EXPIRATION DATE: ABNORMAL
GLUCOSE UR STRIP-MCNC: NEGATIVE MG/DL
HCT VFR BLD AUTO: 35 % (ref 37.5–51)
HGB BLD-MCNC: 11.7 G/DL (ref 13–17.7)
IMM GRANULOCYTES # BLD AUTO: 0.01 10*3/MM3 (ref 0–0.05)
IMM GRANULOCYTES NFR BLD AUTO: 0.2 % (ref 0–0.5)
KETONES UR QL: NEGATIVE
LDH SERPL-CCNC: 164 U/L (ref 135–225)
LEUKOCYTE EST, POC: NEGATIVE
LYMPHOCYTES # BLD AUTO: 1.86 10*3/MM3 (ref 0.7–3.1)
LYMPHOCYTES NFR BLD AUTO: 38.2 % (ref 19.6–45.3)
Lab: ABNORMAL
MCH RBC QN AUTO: 30.5 PG (ref 26.6–33)
MCHC RBC AUTO-ENTMCNC: 33.4 G/DL (ref 31.5–35.7)
MCV RBC AUTO: 91.1 FL (ref 79–97)
MONOCYTES # BLD AUTO: 0.4 10*3/MM3 (ref 0.1–0.9)
MONOCYTES NFR BLD AUTO: 8.2 % (ref 5–12)
NEUTROPHILS NFR BLD AUTO: 2.47 10*3/MM3 (ref 1.7–7)
NEUTROPHILS NFR BLD AUTO: 50.7 % (ref 42.7–76)
NITRITE UR-MCNC: NEGATIVE MG/ML
NRBC BLD AUTO-RTO: 0 /100 WBC (ref 0–0.2)
PH UR: 6 [PH] (ref 5–8)
PLATELET # BLD AUTO: 129 10*3/MM3 (ref 140–450)
PMV BLD AUTO: 13 FL (ref 6–12)
PROT UR STRIP-MCNC: ABNORMAL MG/DL
RBC # BLD AUTO: 3.84 10*6/MM3 (ref 4.14–5.8)
RBC # UR STRIP: NEGATIVE /UL
SP GR UR: 1.02 (ref 1–1.03)
UROBILINOGEN UR QL: ABNORMAL
WBC NRBC COR # BLD: 4.87 10*3/MM3 (ref 3.4–10.8)

## 2023-07-28 PROCEDURE — 80053 COMPREHEN METABOLIC PANEL: CPT | Performed by: REGISTERED NURSE

## 2023-07-28 PROCEDURE — 83036 HEMOGLOBIN GLYCOSYLATED A1C: CPT | Performed by: REGISTERED NURSE

## 2023-07-28 PROCEDURE — 3074F SYST BP LT 130 MM HG: CPT | Performed by: REGISTERED NURSE

## 2023-07-28 PROCEDURE — 1159F MED LIST DOCD IN RCRD: CPT | Performed by: REGISTERED NURSE

## 2023-07-28 PROCEDURE — 81001 URINALYSIS AUTO W/SCOPE: CPT | Performed by: REGISTERED NURSE

## 2023-07-28 PROCEDURE — 84443 ASSAY THYROID STIM HORMONE: CPT | Performed by: REGISTERED NURSE

## 2023-07-28 PROCEDURE — 99215 OFFICE O/P EST HI 40 MIN: CPT | Performed by: REGISTERED NURSE

## 2023-07-28 PROCEDURE — 83615 LACTATE (LD) (LDH) ENZYME: CPT | Performed by: INTERNAL MEDICINE

## 2023-07-28 PROCEDURE — 85025 COMPLETE CBC W/AUTO DIFF WBC: CPT | Performed by: REGISTERED NURSE

## 2023-07-28 PROCEDURE — 82043 UR ALBUMIN QUANTITATIVE: CPT | Performed by: REGISTERED NURSE

## 2023-07-28 PROCEDURE — 80061 LIPID PANEL: CPT | Performed by: REGISTERED NURSE

## 2023-07-28 PROCEDURE — 3078F DIAST BP <80 MM HG: CPT | Performed by: REGISTERED NURSE

## 2023-07-28 RX ORDER — METFORMIN HYDROCHLORIDE 500 MG/1
1000 TABLET, EXTENDED RELEASE ORAL
Qty: 360 TABLET | Refills: 1 | Status: SHIPPED | OUTPATIENT
Start: 2023-07-28

## 2023-07-28 RX ORDER — PANTOPRAZOLE SODIUM 40 MG/1
40 TABLET, DELAYED RELEASE ORAL DAILY
Qty: 90 TABLET | Refills: 1 | Status: SHIPPED | OUTPATIENT
Start: 2023-07-28

## 2023-07-28 RX ORDER — GABAPENTIN 300 MG/1
300 CAPSULE ORAL EVERY EVENING
Qty: 90 CAPSULE | Refills: 0 | Status: SHIPPED | OUTPATIENT
Start: 2023-07-28

## 2023-07-28 RX ORDER — DAPAGLIFLOZIN 10 MG/1
1 TABLET, FILM COATED ORAL DAILY
Qty: 30 TABLET | Refills: 5 | Status: SHIPPED | OUTPATIENT
Start: 2023-07-28

## 2023-07-28 RX ORDER — CILOSTAZOL 100 MG/1
100 TABLET ORAL 2 TIMES DAILY
Qty: 60 TABLET | Refills: 1 | Status: SHIPPED | OUTPATIENT
Start: 2023-07-28

## 2023-07-28 RX ORDER — CARVEDILOL 25 MG/1
25 TABLET ORAL 2 TIMES DAILY WITH MEALS
Qty: 180 TABLET | Refills: 1 | Status: SHIPPED | OUTPATIENT
Start: 2023-07-28

## 2023-07-28 RX ORDER — MONTELUKAST SODIUM 4 MG/1
1 TABLET, CHEWABLE ORAL 2 TIMES DAILY
Qty: 180 TABLET | Refills: 1 | Status: SHIPPED | OUTPATIENT
Start: 2023-07-28

## 2023-07-28 RX ORDER — IBANDRONATE SODIUM 150 MG/1
150 TABLET, FILM COATED ORAL
Qty: 1 TABLET | Refills: 5 | Status: SHIPPED | OUTPATIENT
Start: 2023-07-28

## 2023-07-28 RX ORDER — ATORVASTATIN CALCIUM 40 MG/1
40 TABLET, FILM COATED ORAL
Qty: 90 TABLET | Refills: 1 | Status: SHIPPED | OUTPATIENT
Start: 2023-07-28

## 2023-07-28 RX ORDER — LOSARTAN POTASSIUM 50 MG/1
50 TABLET ORAL DAILY
Qty: 90 TABLET | Refills: 1 | Status: SHIPPED | OUTPATIENT
Start: 2023-07-28

## 2023-07-28 RX ORDER — FINASTERIDE 5 MG/1
5 TABLET, FILM COATED ORAL DAILY
Qty: 90 TABLET | Refills: 1 | Status: SHIPPED | OUTPATIENT
Start: 2023-07-28

## 2023-07-28 NOTE — PROGRESS NOTES
Chief Complaint  Establish Care (Previously saw Clara Molina) and Med Refill (All medications. Prefers 90 days)    Subjective    History of Present Illness {CC  Problem List  Visit  Diagnosis   Encounters  Notes  Medications  Labs  Result Review Imaging  Media :23}     Ramon Silva presents to [facility] for Establish Care (Previously saw Clara Molina) and Med Refill (All medications. Prefers 90 days).      History of Present Illness  Patient is a 78 y.o. male  presents to the clinic today for 3-month follow-up for type 2 diabetes, hypertension, and other chronic needs.  Initially, connected with  Mary,  ID # [id number]. Afterward, patient declined  services. Patient denies any chest pain, shortness of breath, or any fevers.  Patient denies any known exposure to COVID, flu, or any other contagious illnesses.     In regards to type 2 diabetes, patient is currently taking Farxiga 10 mg and metformin to manage this.  Although he shares that he has been out of this medication for couple weeks due to issues with getting refills. He does not remember which medications, as his daughter Virginia manages his medications. Pt called his daughter during the visit, she states he needs refills for all his medications. She also stated that he has urinary frequency for the past week. Additionally she states that he has knee surgery August 14th.     In regards to hypertension, patient currently manages this with losartan 50 mg.  Patient's blood pressure today is 120/64.  Patient denies any hypertension related side effects or any issues with his losartan at this time.    Patient does have a history of take and Pletal.  Patient has an upcoming surgery with orthopedics and I will put in an urgent cardiology referral for him to discuss whether he can stop the Pletal for the surgery or not.  According to the sign of papers from the surgeon does recommend  "approximately 1-1/2-hour surgery so I will defer to cardiology as to whether or not this is safe.  Referral has been placed.       Review of Systems   Constitutional: Negative.  Negative for activity change, chills, fatigue and fever.   HENT: Negative.  Negative for congestion, dental problem, ear pain, hearing loss, rhinorrhea, sinus pain, sore throat, tinnitus and trouble swallowing.    Eyes: Negative.  Negative for pain and visual disturbance.   Respiratory: Negative.  Negative for cough, chest tightness, shortness of breath and wheezing.    Cardiovascular: Negative.  Negative for chest pain, palpitations and leg swelling.   Gastrointestinal: Negative.  Negative for abdominal pain, diarrhea, nausea and vomiting.   Endocrine: Negative.  Negative for polydipsia, polyphagia and polyuria.   Genitourinary: Negative.  Negative for difficulty urinating, dysuria, frequency and urgency.   Musculoskeletal: Negative.  Negative for arthralgias, back pain and myalgias.   Skin: Negative.  Negative for color change, pallor, rash and wound.   Allergic/Immunologic: Negative.  Negative for environmental allergies.   Neurological: Negative.  Negative for dizziness, speech difficulty, weakness, light-headedness, numbness and headaches.   Hematological: Negative.    Psychiatric/Behavioral: Negative.  Negative for confusion, decreased concentration, self-injury and suicidal ideas. The patient is not nervous/anxious.    All other systems reviewed and are negative.     Objective     Vital Signs:   /64 (BP Location: Right arm, Patient Position: Sitting, Cuff Size: Adult)   Pulse 73   Temp 98.4 øF (36.9 øC) (Oral)   Ht 157.5 cm (62\")   Wt 54.2 kg (119 lb 6.4 oz)   SpO2 98%   BMI 21.84 kg/mý   Current Outpatient Medications on File Prior to Visit   Medication Sig Dispense Refill    Accu-Chek Guide test strip 1 each by Other route Daily. E 11.9 50 each 2    Accu-Chek Softclix Lancets lancets test daily as directed 100 each 2    " aspirin 81 MG tablet Take 1 tablet by mouth Daily.      bacitracin 500 UNIT/GM ointment Apply 1 application topically to the appropriate area as directed 2 (Two) Times a Day. 28 g 0    Cetirizine HCl (ZyrTEC Allergy) 10 MG capsule Take 10 mg by mouth Daily.      collagenase (Santyl) 250 UNIT/GM ointment Apply 1 application topically to the appropriate area as directed Daily. Apply once daily to wound bed. 90 g 1    Diclofenac Sodium (VOLTAREN) 1 % gel gel Apply 4 g topically to the appropriate area as directed 4 (Four) Times a Day As Needed.      famotidine (PEPCID) 40 MG tablet Take 1 tablet by mouth Daily. 30 tablet 1    ferrous sulfate 324 (65 Fe) MG tablet delayed-release EC tablet Take 1 tablet by mouth Daily With Breakfast. 90 tablet 1    Omega-3 Fatty Acids (OMEGA-3 FISH OIL PO) Take 1 capsule by mouth Daily.       No current facility-administered medications on file prior to visit.        Past Medical History:   Diagnosis Date    Anemia     Arthralgia     Diabetes     Fatigue     GERD (gastroesophageal reflux disease)     Gout     Hyperlipidemia     Hypertension     Liver lesion     Non Hodgkin's lymphoma     Osteoporosis     Personal history of testicular cancer     Vitamin D deficiency     Weight loss       Past Surgical History:   Procedure Laterality Date    ANGIOGRAM - CONVERTED  07/05/2022    aif by Dr Henriquez    CARDIAC CATHETERIZATION N/A 7/5/2022    Procedure: PERIPHERAL ANGIOGRAPHY Right leg;  Surgeon: Theron Henriquez MD;  Location: [facility] INVASIVE LOCATION;  Service: Cardiovascular;  Laterality: N/A;    HIP SURGERY  2005    PORTACATH PLACEMENT      TESTICLE SURGERY Right 07/17/2000    right testicular excision      Family History   Family history unknown: Yes      Social History     Socioeconomic History    Marital status:    Tobacco Use    Smoking status: Never    Smokeless tobacco: Never   Vaping Use    Vaping Use: Never used   Substance and Sexual Activity    Alcohol use: No     Drug use: Never    Sexual activity: Defer         Office Visit on 07/28/2023   Component Date Value Ref Range Status    Glucose 07/28/2023 115 (H)  65 - 99 mg/dL Final    BUN 07/28/2023 19  8 - 23 mg/dL Final    Creatinine 07/28/2023 0.77  0.76 - 1.27 mg/dL Final    Sodium 07/28/2023 143  136 - 145 mmol/L Final    Potassium 07/28/2023 5.0  3.5 - 5.2 mmol/L Final    Chloride 07/28/2023 108 (H)  98 - 107 mmol/L Final    CO2 07/28/2023 21.6 (L)  22.0 - 29.0 mmol/L Final    Calcium 07/28/2023 9.4  8.6 - 10.5 mg/dL Final    Total Protein 07/28/2023 6.4  6.0 - 8.5 g/dL Final    Albumin 07/28/2023 4.2  3.5 - 5.2 g/dL Final    ALT (SGPT) 07/28/2023 28  1 - 41 U/L Final    AST (SGOT) 07/28/2023 25  1 - 40 U/L Final    Alkaline Phosphatase 07/28/2023 69  39 - 117 U/L Final    Total Bilirubin 07/28/2023 0.3  0.0 - 1.2 mg/dL Final    Globulin 07/28/2023 2.2  gm/dL Final    A/G Ratio 07/28/2023 1.9  g/dL Final    BUN/Creatinine Ratio 07/28/2023 24.7  7.0 - 25.0 Final    Anion Gap 07/28/2023 13.4  5.0 - 15.0 mmol/L Final    eGFR 07/28/2023 91.6  >60.0 mL/min/1.73 Final    Hemoglobin A1C 07/28/2023 7.10 (H)  4.80 - 5.60 % Final    Microalbumin, Urine 07/28/2023 4.0  mg/dL Final    Total Cholesterol 07/28/2023 105  0 - 200 mg/dL Final    Triglycerides 07/28/2023 109  0 - 150 mg/dL Final    HDL Cholesterol 07/28/2023 43  40 - 60 mg/dL Final    LDL Cholesterol  07/28/2023 42  0 - 100 mg/dL Final    VLDL Cholesterol 07/28/2023 20  5 - 40 mg/dL Final    LDL/HDL Ratio 07/28/2023 0.93   Final    TSH 07/28/2023 0.838  0.270 - 4.200 uIU/mL Final    Color, UA 07/28/2023 Yellow  Yellow, Straw Final    Appearance, UA 07/28/2023 Clear  Clear Final    pH, UA 07/28/2023 6.0  5.0 - 8.0 Final    Specific Gravity, UA 07/28/2023 1.027  1.005 - 1.030 Final    Glucose, UA 07/28/2023 100 mg/dL (Trace) (A)  Negative Final    Ketones, UA 07/28/2023 Negative  Negative Final    Bilirubin, UA 07/28/2023 Negative  Negative Final    Blood, UA 07/28/2023  Negative  Negative Final    Protein, UA 07/28/2023 Trace (A)  Negative Final    Leuk Esterase, UA 07/28/2023 Negative  Negative Final    Nitrite, UA 07/28/2023 Negative  Negative Final    Urobilinogen, UA 07/28/2023 0.2 E.U./dL  0.2 - 1.0 E.U./dL Final    RBC, UA 07/28/2023 0-2  None Seen, 0-2 /HPF Final    WBC, UA 07/28/2023 0-2  None Seen, 0-2 /HPF Final    Bacteria, UA 07/28/2023 None Seen  None Seen /HPF Final    Squamous Epithelial Cells, UA 07/28/2023 0-2  None Seen, 0-2 /HPF Final    Hyaline Casts, UA 07/28/2023 None Seen  None Seen /LPF Final    Methodology 07/28/2023 Automated Microscopy   Final    WBC 07/28/2023 4.87  3.40 - 10.80 10*3/mm3 Final    RBC 07/28/2023 3.84 (L)  4.14 - 5.80 10*6/mm3 Final    Hemoglobin 07/28/2023 11.7 (L)  13.0 - 17.7 g/dL Final    Hematocrit 07/28/2023 35.0 (L)  37.5 - 51.0 % Final    MCV 07/28/2023 91.1  79.0 - 97.0 fL Final    MCH 07/28/2023 30.5  26.6 - 33.0 pg Final    MCHC 07/28/2023 33.4  31.5 - 35.7 g/dL Final    RDW 07/28/2023 13.1  12.3 - 15.4 % Final    RDW-SD 07/28/2023 43.4  37.0 - 54.0 fl Final    MPV 07/28/2023 13.0 (H)  6.0 - 12.0 fL Final    Platelets 07/28/2023 129 (L)  140 - 450 10*3/mm3 Final    Neutrophil % 07/28/2023 50.7  42.7 - 76.0 % Final    Lymphocyte % 07/28/2023 38.2  19.6 - 45.3 % Final    Monocyte % 07/28/2023 8.2  5.0 - 12.0 % Final    Eosinophil % 07/28/2023 2.3  0.3 - 6.2 % Final    Basophil % 07/28/2023 0.4  0.0 - 1.5 % Final    Immature Grans % 07/28/2023 0.2  0.0 - 0.5 % Final    Neutrophils, Absolute 07/28/2023 2.47  1.70 - 7.00 10*3/mm3 Final    Lymphocytes, Absolute 07/28/2023 1.86  0.70 - 3.10 10*3/mm3 Final    Monocytes, Absolute 07/28/2023 0.40  0.10 - 0.90 10*3/mm3 Final    Eosinophils, Absolute 07/28/2023 0.11  0.00 - 0.40 10*3/mm3 Final    Basophils, Absolute 07/28/2023 0.02  0.00 - 0.20 10*3/mm3 Final    Immature Grans, Absolute 07/28/2023 0.01  0.00 - 0.05 10*3/mm3 Final    nRBC 07/28/2023 0.0  0.0 - 0.2 /100 WBC Final    Color  07/28/2023 Yellow  Yellow, Straw, Dark Yellow, Alissa Final    Clarity, UA 07/28/2023 Slightly Cloudy (A)  Clear Final    Specific Gravity  07/28/2023 1.025  1.005 - 1.030 Final    pH, Urine 07/28/2023 6.0  5.0 - 8.0 Final    Leukocytes 07/28/2023 Negative  Negative Final    Nitrite, UA 07/28/2023 Negative  Negative Final    Protein, POC 07/28/2023 Trace (A)  Negative mg/dL Final    Glucose, UA 07/28/2023 Negative  Negative mg/dL Final    Ketones, UA 07/28/2023 Negative  Negative Final    Urobilinogen, UA 07/28/2023 0.2 E.U./dL  Normal, 0.2 E.U./dL Final    Bilirubin 07/28/2023 Negative  Negative Final    Blood, UA 07/28/2023 Negative  Negative Final    Lot Number 07/28/2023 [id number]   Final    Expiration Date 07/28/2023 09/30/2023   Final         Physical Exam  Vitals and nursing note reviewed.   Constitutional:       Appearance: Normal appearance. He is normal weight.   HENT:      Head: Normocephalic and atraumatic.   Cardiovascular:      Rate and Rhythm: Normal rate and regular rhythm.      Pulses: Normal pulses.      Heart sounds: Normal heart sounds. No murmur heard.    No friction rub. No gallop.   Pulmonary:      Effort: Pulmonary effort is normal. No respiratory distress.      Breath sounds: Normal breath sounds. No stridor. No wheezing, rhonchi or rales.   Chest:      Chest wall: No tenderness.   Abdominal:      General: Abdomen is flat. Bowel sounds are normal. There is no distension.      Palpations: Abdomen is soft. There is no mass.      Tenderness: There is no abdominal tenderness. There is no right CVA tenderness, left CVA tenderness, guarding or rebound.      Hernia: No hernia is present.   Skin:     General: Skin is warm and dry.      Capillary Refill: Capillary refill takes less than 2 seconds.      Coloration: Skin is not jaundiced or pale.   Neurological:      General: No focal deficit present.      Mental Status: He is alert and oriented to person, place, and time. Mental status is at baseline.       Motor: No weakness.      Coordination: Coordination normal.      Gait: Gait normal.   Psychiatric:         Mood and Affect: Mood normal.         Behavior: Behavior normal.         Thought Content: Thought content normal.         Judgment: Judgment normal.        Result Review  Data Reviewed:{ Labs  Result Review  Imaging  Med Tab  Media :23}   I have reviewed this patient's chart.  I have reviewed previous labs, previous imaging, previous medications, and previous encounters with notes that were available in this patient's chart.               Assessment and Plan {CC Problem List  Visit Diagnosis  ROS  Review (Popup)  [facility]  BestPractice  Medications  SmartSets  SnapShot Encounters  Media :23}   Diagnoses and all orders for this visit:    1. Urinary frequency (Primary)  -     Urinalysis With Microscopic - Urine, Clean Catch  -     POCT urinalysis dipstick, automated    2. Diarrhea, unspecified type  -     colestipol (COLESTID) 1 g tablet; Take 1 tablet by mouth 2 (Two) Times a Day.  Dispense: 180 tablet; Refill: 1    3. Other osteoporosis without current pathological fracture  -     ibandronate (BONIVA) 150 MG tablet; Take 1 tablet by mouth Every 30 (Thirty) Days.  Dispense: 1 tablet; Refill: 5    4. Type 2 diabetes mellitus with hyperglycemia  -     dapagliflozin Propanediol (Farxiga) 10 MG tablet; Take 10 mg by mouth Daily.  Dispense: 30 tablet; Refill: 5  -     metFORMIN ER (GLUCOPHAGE-XR) 500 MG 24 hr tablet; Take 2 tablets by mouth 2 (Two) Times a Day Before Meals.  Dispense: 360 tablet; Refill: 1  -     Hemoglobin A1c  -     MicroAlbumin, Urine, Random - Urine, Clean Catch    5. Primary hypertension  -     carvedilol (COREG) 25 MG tablet; Take 1 tablet by mouth 2 (Two) Times a Day With Meals.  Dispense: 180 tablet; Refill: 1  -     losartan (COZAAR) 50 MG tablet; Take 1 tablet by mouth Daily.  Dispense: 90 tablet; Refill: 1  -     CBC & Differential  -     Comprehensive  Metabolic Panel    6. Gastroesophageal reflux disease, unspecified whether esophagitis present  -     pantoprazole (PROTONIX) 40 MG EC tablet; Take 1 tablet by mouth Daily.  Dispense: 90 tablet; Refill: 1    7. Intermittent claudication  -     cilostazol (PLETAL) 100 MG tablet; Take 1 tablet by mouth 2 (Two) Times a Day.  Dispense: 60 tablet; Refill: 1  -     gabapentin (NEURONTIN) 300 MG capsule; Take 1 capsule by mouth Every Evening.  Dispense: 90 capsule; Refill: 0  -     Ambulatory Referral to Cardiology    8. Benign prostatic hyperplasia with urinary obstruction  -     finasteride (PROSCAR) 5 MG tablet; Take 1 tablet by mouth Daily.  Dispense: 90 tablet; Refill: 1    9. Language barrier affecting health care    10. Arthralgia, unspecified joint  -     gabapentin (NEURONTIN) 300 MG capsule; Take 1 capsule by mouth Every Evening.  Dispense: 90 capsule; Refill: 0    11. Mixed hyperlipidemia  -     atorvastatin (LIPITOR) 40 MG tablet; Take 1 tablet by mouth every night at bedtime.  Dispense: 90 tablet; Refill: 1  -     Lipid Panel    12. Encounter to establish care    13. Screening for thyroid disorder  -     TSH    14. Type 2 diabetes mellitus with hyperglycemia, without long-term current use of insulin    15. Hx of long term use of blood thinners  -     Ambulatory Referral to Cardiology        -Labs today  -UA and microalbumin ordered  -Medications refilled  -Cardiology referral to discuss Pletal stopping for surgery.  -ER red flags discussed with patient including risk versus benefit and education provided.  -Follow-up with me in 3 months, sooner if needed.    I spent 40 minutes caring for Ramon on this date of service. This time includes time spent by me in the following activities:preparing for the visit, reviewing tests, obtaining and/or reviewing a separately obtained history, performing a medically appropriate examination and/or evaluation , counseling and educating the patient/family/caregiver, ordering  medications, tests, or procedures, referring and communicating with other health care professionals , documenting information in the medical record, independently interpreting results and communicating that information with the patient/family/caregiver, and care coordination.    Follow Up {Instructions Charge Capture  Follow-up Communications :23}     Patient was given instructions and counseling regarding his condition or for health maintenance advice. Please see specific information pulled into the AVS (placed there by myself) if appropriate.    Return in about 3 months (around 10/28/2023) for routine follow up.    BMI is within normal parameters. No other follow-up for BMI required.       Joey Crump APRN

## 2023-07-29 LAB
ALBUMIN SERPL-MCNC: 4.2 G/DL (ref 3.5–5.2)
ALBUMIN UR-MCNC: 4 MG/DL
ALBUMIN/GLOB SERPL: 1.9 G/DL
ALP SERPL-CCNC: 69 U/L (ref 39–117)
ALT SERPL W P-5'-P-CCNC: 28 U/L (ref 1–41)
ANION GAP SERPL CALCULATED.3IONS-SCNC: 13.4 MMOL/L (ref 5–15)
AST SERPL-CCNC: 25 U/L (ref 1–40)
BACTERIA UR QL AUTO: NORMAL /HPF
BILIRUB SERPL-MCNC: 0.3 MG/DL (ref 0–1.2)
BILIRUB UR QL STRIP: NEGATIVE
BUN SERPL-MCNC: 19 MG/DL (ref 8–23)
BUN/CREAT SERPL: 24.7 (ref 7–25)
CALCIUM SPEC-SCNC: 9.4 MG/DL (ref 8.6–10.5)
CHLORIDE SERPL-SCNC: 108 MMOL/L (ref 98–107)
CHOLEST SERPL-MCNC: 105 MG/DL (ref 0–200)
CLARITY UR: CLEAR
CO2 SERPL-SCNC: 21.6 MMOL/L (ref 22–29)
COLOR UR: YELLOW
CREAT SERPL-MCNC: 0.77 MG/DL (ref 0.76–1.27)
EGFRCR SERPLBLD CKD-EPI 2021: 91.6 ML/MIN/1.73
GLOBULIN UR ELPH-MCNC: 2.2 GM/DL
GLUCOSE SERPL-MCNC: 115 MG/DL (ref 65–99)
GLUCOSE UR STRIP-MCNC: ABNORMAL MG/DL
HBA1C MFR BLD: 7.1 % (ref 4.8–5.6)
HDLC SERPL-MCNC: 43 MG/DL (ref 40–60)
HGB UR QL STRIP.AUTO: NEGATIVE
HYALINE CASTS UR QL AUTO: NORMAL /LPF
KETONES UR QL STRIP: NEGATIVE
LDLC SERPL CALC-MCNC: 42 MG/DL (ref 0–100)
LDLC/HDLC SERPL: 0.93 {RATIO}
LEUKOCYTE ESTERASE UR QL STRIP.AUTO: NEGATIVE
NITRITE UR QL STRIP: NEGATIVE
PH UR STRIP.AUTO: 6 [PH] (ref 5–8)
POTASSIUM SERPL-SCNC: 5 MMOL/L (ref 3.5–5.2)
PROT SERPL-MCNC: 6.4 G/DL (ref 6–8.5)
PROT UR QL STRIP: ABNORMAL
RBC # UR STRIP: NORMAL /HPF
REF LAB TEST METHOD: NORMAL
SODIUM SERPL-SCNC: 143 MMOL/L (ref 136–145)
SP GR UR STRIP: 1.03 (ref 1–1.03)
SQUAMOUS #/AREA URNS HPF: NORMAL /HPF
TRIGL SERPL-MCNC: 109 MG/DL (ref 0–150)
TSH SERPL DL<=0.05 MIU/L-ACNC: 0.84 UIU/ML (ref 0.27–4.2)
UROBILINOGEN UR QL STRIP: ABNORMAL
VLDLC SERPL-MCNC: 20 MG/DL (ref 5–40)
WBC # UR STRIP: NORMAL /HPF

## 2023-08-14 ENCOUNTER — CONSULT (OUTPATIENT)
Dept: CARDIOLOGY | Facility: CLINIC | Age: 78
End: 2023-08-14
Payer: MEDICARE

## 2023-08-14 VITALS
WEIGHT: 120 LBS | HEIGHT: 62 IN | DIASTOLIC BLOOD PRESSURE: 60 MMHG | OXYGEN SATURATION: 96 % | BODY MASS INDEX: 22.08 KG/M2 | HEART RATE: 78 BPM | SYSTOLIC BLOOD PRESSURE: 114 MMHG

## 2023-08-14 DIAGNOSIS — R00.2 PALPITATIONS: ICD-10-CM

## 2023-08-14 DIAGNOSIS — E78.2 MIXED HYPERLIPIDEMIA: ICD-10-CM

## 2023-08-14 DIAGNOSIS — I15.2 HYPERTENSION DUE TO ENDOCRINE DISORDER: ICD-10-CM

## 2023-08-14 DIAGNOSIS — I73.9 INTERMITTENT CLAUDICATION: ICD-10-CM

## 2023-08-14 DIAGNOSIS — R06.09 DYSPNEA ON EXERTION: ICD-10-CM

## 2023-08-14 DIAGNOSIS — R94.31 ABNORMAL EKG: ICD-10-CM

## 2023-08-14 DIAGNOSIS — Z01.810 PREOP CARDIOVASCULAR EXAM: Primary | ICD-10-CM

## 2023-08-14 RX ORDER — TRAMADOL HYDROCHLORIDE 50 MG/1
1 TABLET ORAL
COMMUNITY

## 2023-08-14 NOTE — TELEPHONE ENCOUNTER
Rx Refill Note  Requested Prescriptions     Pending Prescriptions Disp Refills    Diclofenac Sodium (VOLTAREN) 1 % gel gel       Sig: Apply 4 g topically to the appropriate area as directed 4 (Four) Times a Day As Needed.      Last office visit with prescribing clinician: 7/28/2023   Last telemedicine visit with prescribing clinician: Visit date not found   Next office visit with prescribing clinician: Visit date not found                         Would you like a call back once the refill request has been completed: [] Yes [] No    If the office needs to give you a call back, can they leave a voicemail: [] Yes [] No    Aj Pedraza Rep  08/14/23, 14:01 EDT

## 2023-08-14 NOTE — PROGRESS NOTES
Cardiology Office Consultation      Encounter Date:  2023    PATIENT IDENTIFICATION    Name: Ramon Silva  Age: 78 y.o. Sex: male : 1945  MRN: [id number]    Reason For Consultation:  Preoperative cardiovascular risk assessment    History of Present Illness:  Dear Joey Schroeder APRN    I had the pleasure of seeing Ramon Silva today. As you are well aware, this is a 78 y.o. male with no established history of ischemic heart disease.  He does have a history of peripheral arterial disease.  He has additional history that includes diabetes, intermittent claudication, neuropathy, hypertension, hypertensive cardiovascular disease, osteoporosis, and acid reflux.  He presents today for preoperative cardiovascular risk assessment.    His history was conducted with the assistance of his daughter Virginia over the phone.  The patient speaks very little English so the bulk of the history was relayed by her.    The patient has been suffering from mobility issues predominantly related to his bad knees.  He has a significant varus deformity of both knees and currently utilizes a cane for stability when ambulating.  He has seen Dr. Gonzalez and is planning on having his right knee replaced in mid September.  The plan would be to replace the left knee thereafter provided no complications occurred.    The patient does have a history of peripheral arterial disease.  He is undergone lower extremity angiography by Dr. Henriquez.  He was placed on Pletal to help with claudication and lower extremity flow.  There had been some issues with lower extremity wound healing.  These have since cleared up since his catheterization.    He denies any chest pain pressure heaviness or tightness.  He denies any shortness of breath out of character.  He does report shortness of breath with exertion.  He denies any PND orthopnea.  He denies any syncope or near syncope.    Assessment & Plan    Impressions:  Severe degenerative  "bilateral knee osteoarthritis currently undergoing cardiovascular risk assessment  Known history of peripheral arterial disease  Intermittent claudication currently on Pletal  Hypertension  Hypertensive cardiovascular disease  Osteoporosis  Acid reflux    Recommendations:  2D echocardiogram to evaluate LV systolic function and structure  Nuclear stress test (Lexiscan due to mobility issues)  I have advised the patient and daughter to reach out to vascular surgery regarding the appropriateness of holding Pletal for surgery.  Follow-up in 6 weeks.      Diagnoses and all orders for this visit:    1. Preop cardiovascular exam (Primary)  -     Stress Test With Myocardial Perfusion One Day; Future  -     Adult Transthoracic Echo Complete W/ Cont if Necessary Per Protocol; Future  -     ECG 12 Lead    2. Dyspnea on exertion  -     Stress Test With Myocardial Perfusion One Day; Future  -     Adult Transthoracic Echo Complete W/ Cont if Necessary Per Protocol; Future  -     ECG 12 Lead    3. Palpitations  -     Stress Test With Myocardial Perfusion One Day; Future  -     Adult Transthoracic Echo Complete W/ Cont if Necessary Per Protocol; Future  -     ECG 12 Lead    4. Abnormal EKG  -     ECG 12 Lead    5. 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 Intermittent claudication  -     ECG 12 Lead         Objective:    Vitals:  Vitals:    08/14/23 1320   BP: 114/60   BP Location: Left arm   Patient Position: Sitting   Pulse: 78   SpO2: 96%   Weight: 54.4 kg (120 lb)   Height: 157.5 cm (62\")     Body mass index is 21.95 kg/mý.    Physical Exam:    General: Alert, cooperative, no distress, appears stated age  Head:  Normocephalic, atraumatic, mucous membranes moist  Eyes:  Conjunctiva/corneas clear, EOM's intact     Neck:  Supple,  no bruit    Lungs: Clear to auscultation bilaterally, no wheezes rhonchi rales are noted  Chest wall: No tenderness  Heart::  Regular " rate and rhythm, S1 and S2 normal, 1/6 holosystolic murmur.  No rub or gallop  Abdomen: Soft, non-tender, nondistended bowel sounds active  Extremities: No cyanosis, clubbing, or edema.  Significant varus deformity of both knees.  Pulses: 2+ and symmetric all extremities  Skin:  No rashes or lesions  Neuro/psych: A&O x3. CN II through XII are grossly intact with appropriate affect    History of Present Illness      Allergies:  No Known Allergies    Medication Review:     Current Outpatient Medications:     Accu-Chek Guide test strip, 1 each by Other route Daily. E 11.9, Disp: 50 each, Rfl: 2    Accu-Chek Softclix Lancets lancets, test daily as directed, Disp: 100 each, Rfl: 2    aspirin 81 MG tablet, Take 1 tablet by mouth Daily., Disp: , Rfl:     atorvastatin (LIPITOR) 40 MG tablet, Take 1 tablet by mouth every night at bedtime., Disp: 90 tablet, Rfl: 1    bacitracin 500 UNIT/GM ointment, Apply 1 application topically to the appropriate area as directed 2 (Two) Times a Day., Disp: 28 g, Rfl: 0    carvedilol (COREG) 25 MG tablet, Take 1 tablet by mouth 2 (Two) Times a Day With Meals., Disp: 180 tablet, Rfl: 1    Cetirizine HCl (ZyrTEC Allergy) 10 MG capsule, Take 10 mg by mouth Daily., Disp: , Rfl:     cilostazol (PLETAL) 100 MG tablet, Take 1 tablet by mouth 2 (Two) Times a Day., Disp: 60 tablet, Rfl: 1    colestipol (COLESTID) 1 g tablet, Take 1 tablet by mouth 2 (Two) Times a Day., Disp: 180 tablet, Rfl: 1    collagenase (Santyl) 250 UNIT/GM ointment, Apply 1 application topically to the appropriate area as directed Daily. Apply once daily to wound bed., Disp: 90 g, Rfl: 1    dapagliflozin Propanediol (Farxiga) 10 MG tablet, Take 10 mg by mouth Daily., Disp: 30 tablet, Rfl: 5    famotidine (PEPCID) 40 MG tablet, Take 1 tablet by mouth Daily., Disp: 30 tablet, Rfl: 1    ferrous sulfate 324 (65 Fe) MG tablet delayed-release EC tablet, Take 1 tablet by mouth Daily With Breakfast., Disp: 90 tablet, Rfl: 1     finasteride (PROSCAR) 5 MG tablet, Take 1 tablet by mouth Daily., Disp: 90 tablet, Rfl: 1    gabapentin (NEURONTIN) 300 MG capsule, Take 1 capsule by mouth Every Evening., Disp: 90 capsule, Rfl: 0    ibandronate (BONIVA) 150 MG tablet, Take 1 tablet by mouth Every 30 (Thirty) Days., Disp: 1 tablet, Rfl: 5    losartan (COZAAR) 50 MG tablet, Take 1 tablet by mouth Daily., Disp: 90 tablet, Rfl: 1    metFORMIN ER (GLUCOPHAGE-XR) 500 MG 24 hr tablet, Take 2 tablets by mouth 2 (Two) Times a Day Before Meals., Disp: 360 tablet, Rfl: 1    Omega-3 Fatty Acids (OMEGA-3 FISH OIL PO), Take 1 capsule by mouth Daily., Disp: , Rfl:     pantoprazole (PROTONIX) 40 MG EC tablet, Take 1 tablet by mouth Daily., Disp: 90 tablet, Rfl: 1    traMADol (ULTRAM) 50 MG tablet, Take 1 tablet by mouth every night at bedtime., Disp: , Rfl:     Diclofenac Sodium (VOLTAREN) 1 % gel gel, Apply 4 g topically to the appropriate area as directed 4 (Four) Times a Day As Needed (APPLY 4 TIMES A DAY AS NEED TO THE APPROPRIATE AREA)., Disp: 4 g, Rfl: 2    Family History:  Family History   Family history unknown: Yes       Past Medical History:  Past Medical History:   Diagnosis Date    Anemia     Arthralgia     Diabetes     Fatigue     GERD (gastroesophageal reflux disease)     Gout     Hyperlipidemia     Hypertension     Liver lesion     Non Hodgkin's lymphoma     Osteoporosis     Personal history of testicular cancer     Vitamin D deficiency     Weight loss        Past surgical History:  Past Surgical History:   Procedure Laterality Date    ANGIOGRAM - CONVERTED  07/05/2022    aif by Dr Henriquez    CARDIAC CATHETERIZATION N/A 7/5/2022    Procedure: PERIPHERAL ANGIOGRAPHY Right leg;  Surgeon: Theron Henriquez MD;  Location: [facility] CATH INVASIVE LOCATION;  Service: Cardiovascular;  Laterality: N/A;    HIP SURGERY  2005    PORTACATH PLACEMENT      TESTICLE SURGERY Right 07/17/2000    right testicular excision       Social History:  Social History      Socioeconomic History    Marital status:    Tobacco Use    Smoking status: Never    Smokeless tobacco: Never   Vaping Use    Vaping Use: Never used   Substance and Sexual Activity    Alcohol use: No    Drug use: Never    Sexual activity: Defer       Review of Systems:  The following systems were reviewed as they relate to the cardiovascular system: Constitutional, Eyes, ENT, Cardiovascular, Respiratory, Gastrointestinal, Integumentary, Neurological, Psychiatric, Hematologic, Endocrine, Musculoskeletal, and Genitourinary. The pertinent cardiovascular findings are reported above with all other cardiovascular points within those systems being negative.    Diagnostic Study Review:     Current Electrocardiogram:    ECG 12 Lead    Date/Time: 8/15/2023 7:10 PM  Performed by: Joey Kirkland DO  Authorized by: Joey Kirkland DO   Comparison: not compared with previous ECG   Previous ECG: no previous ECG available  Comments: Normal sinus rhythm with a ventricular rate of 69 bpm.  Left bundle branch block.  Prolonged QT and QTc intervals of 472 and 506 ms respectively.  Normal QRS axis.        Laboratory Data:  Lab Results   Component Value Date    GLUCOSE 115 (H) 07/28/2023    BUN 19 07/28/2023    CREATININE 0.77 07/28/2023    EGFRIFNONA 59 (L) 08/17/2021    EGFRIFAFRI 91 02/23/2021    BCR 24.7 07/28/2023    K 5.0 07/28/2023    CO2 21.6 (L) 07/28/2023    CALCIUM 9.4 07/28/2023    ALBUMIN 4.2 07/28/2023    LABIL2 1.6 05/08/2019    AST 25 07/28/2023    ALT 28 07/28/2023     Lab Results   Component Value Date    GLUCOSE 115 (H) 07/28/2023    CALCIUM 9.4 07/28/2023     07/28/2023    K 5.0 07/28/2023    CO2 21.6 (L) 07/28/2023     (H) 07/28/2023    BUN 19 07/28/2023    CREATININE 0.77 07/28/2023    EGFRIFAFRI 91 02/23/2021    EGFRIFNONA 59 (L) 08/17/2021    BCR 24.7 07/28/2023    ANIONGAP 13.4 07/28/2023     Lab Results   Component Value Date    WBC 4.87 07/28/2023    HGB 11.7 (L)  07/28/2023    HCT 35.0 (L) 07/28/2023    MCV 91.1 07/28/2023     (L) 07/28/2023     Lab Results   Component Value Date    CHOL 105 07/28/2023    TRIG 109 07/28/2023    HDL 43 07/28/2023    LDL 42 07/28/2023     Lab Results   Component Value Date    HGBA1C 7.10 (H) 07/28/2023     Lab Results   Component Value Date    INR 0.95 07/02/2022    PROTIME 9.8 07/02/2022       Most Recent Echo:       Most Recent Stress Test:       Most Recent Cardiac Catheterization:   Results for orders placed during the hospital encounter of 07/01/22    Cardiac Catheterization/Vascular Study    Narrative  Images from the original result were not included.    07/05/22  Theron Henriquez MD  [facility] cardiac catheterization lab    Preoperative Diagnosis:  Peripheral arterial disease with Ulcer right fourth and fifth toe and diabetes.    Postoperative Diagnosis:  Same    Procedure Performed:  Ultrasound-guided access left femoral artery, left iliofemoral arteriogram, right leg runoff arteriogram    CPT:    53434-94 Unilateral extremity runoff arteriogram (no diagnostic quality arterial imaging prior or significant change in vascular status)  63887 Aortic catheter placement  91489 Second order catheter placement (contralateral EIA, IIA, CFA or ipsilateral popliteal, 2nd order PFA branch)  32194 Ultrasound-guided percutaneous vascular access.      Surgeon:  Theron Henriquez MD    Assistant:  Oksana Rodrigues RN    Anesthesia:  MAC with local    Estimated Blood Loss:  Minimal    Findings:  Patent right femoral-popliteal and posterior tibial arteries to the foot without stenosis.  Stenosis right anterior tibial artery which occluded distal calf.  Patent right peroneal artery to ankle without stenosis.  Dorsalis pedis artery occluded and foot.  Severe disease plantar artery midfoot.  Posterior tibial artery occluded at ankle.    Implants:  None    Specimen:  none    Complications:  none    Access:  Left common femoral  artery    Closure:  None    Dispo:  To PACU    Indication for procedure:  77 y.o. male with ulcer right fourth toe and diabetic.  CT angiogram with patent aortoiliac and proximal femoral arteries without stenosis.  Patient with calcification of arteries and unable to determine severity of disease of the right popliteal tibial arteries.  Plan right leg runoff arteriogram with left iliofemoral approach with possible angioplasty right lower extremity and stent.  Plans and risk discussed with patient and daughter Ansley and agreed to proceed.    Description of procedure:  Groins prepped and draped in usual fashion.  Patient given 1 mg of Versed and 50 mcg of fentanyl prior to starting case.  1% Xylocaine utilized for local anesthesia left groin.  Ultrasound-guided access performed left common femoral artery without difficulty with single stick.  Left iliofemoral arteriogram in Amharic projection with puncture left common femoral artery and patent proximal left superficial femoral and deep femoral arteries.  Wire advanced to the aorta without difficulty.  Sos Omni flush catheter placed in the aorta.  With angled Glidewire and sauce catheter selective catheterization was performed of the right iliac arteries and then the right mid superficial femoral artery without difficulty.  Sanchez cross catheter placed in the mid right superficial femoral artery.  We knew based on CT arteriogram there was no stenosis of the iliac and aorta and proximal femoral arteries.  Right leg runoff arteriogram through the Sanchez cross catheter was then performed.  Right superficial femoral and popliteal arteries widely patent.  Right tibial peroneal trunk peroneal and posterior tibial arteries are widely patent.  Right anterior tibial artery with stenoses proximally but an occluded distal calf.  Right dorsalis pedis artery occluded.  Right posterior tibial artery occluded at the ankle.  Segmental patency of the plantar artery and digital vessels were  present.  There were no endovascular options with widely patent popliteal peroneal and posterior tibial arteries without stenoses present.  The wires were removed.  Dressing applied and the sheath was to be removed in the cardiac Cath Lab recovery area by the nurses.  Patient tolerated procedure well without problems.    Theron Henriquez MD  07/05/22    I discussed operative findings of arteriogram with patient and daughter Virginia by phone.  Patient with no endovascular options of treatment.  Patient has right fourth fifth toe ulcer.  This has been present for couple weeks.  Discussed with them risks of major amputation if cannot heal local wound care or toe amputation only.  Podiatry to assess and determine treatment option for this.  Patient with adequate perfusion to the ankle with 2 patent tibial vessels without stenoses but collateral vessel and segmental patency of the plantar arch in the right foot by arteriogram with no endovascular or bypass options.       NOTE: The following portions of the patient's note were reviewed, confirmed and/or updated this visit as appropriate: History of present illness/Interval history, physical examination, assessment & plan, allergies, current medications, past family history, past medical history, past social history, past surgical history and problem list.

## 2023-08-14 NOTE — PATIENT INSTRUCTIONS
2-D echo  Nuclear stress test  Follow-up 6 weeks  Call Dr Henriquez office to discuss appropriateness of coming off Pletal

## 2023-08-18 DIAGNOSIS — R19.7 DIARRHEA, UNSPECIFIED TYPE: ICD-10-CM

## 2023-08-18 DIAGNOSIS — M81.8 OTHER OSTEOPOROSIS WITHOUT CURRENT PATHOLOGICAL FRACTURE: ICD-10-CM

## 2023-08-18 RX ORDER — IBANDRONATE SODIUM 150 MG/1
150 TABLET, FILM COATED ORAL
Qty: 1 TABLET | Refills: 5 | Status: SHIPPED | OUTPATIENT
Start: 2023-08-18

## 2023-08-18 RX ORDER — MONTELUKAST SODIUM 4 MG/1
1 TABLET, CHEWABLE ORAL 2 TIMES DAILY
Qty: 180 TABLET | Refills: 1 | Status: SHIPPED | OUTPATIENT
Start: 2023-08-18

## 2023-08-18 NOTE — TELEPHONE ENCOUNTER
[facility]: 90 DAY REQUEST    Rx Refill Note  Requested Prescriptions     Pending Prescriptions Disp Refills    colestipol (COLESTID) 1 g tablet 180 tablet 1     Sig: Take 1 tablet by mouth 2 (Two) Times a Day.    ibandronate (BONIVA) 150 MG tablet 1 tablet 5     Sig: Take 1 tablet by mouth Every 30 (Thirty) Days.      Last office visit with prescribing clinician: 7/28/2023   Last telemedicine visit with prescribing clinician: Visit date not found   Next office visit with prescribing clinician: Visit date not found                         Would you like a call back once the refill request has been completed: [] Yes [] No    If the office needs to give you a call back, can they leave a voicemail: [] Yes [] No    Aj Pedraza Rep  08/18/23, 09:32 EDT

## 2023-08-24 ENCOUNTER — TELEPHONE (OUTPATIENT)
Dept: CARDIOLOGY | Facility: CLINIC | Age: 78
End: 2023-08-24
Payer: MEDICARE

## 2023-08-24 NOTE — TELEPHONE ENCOUNTER
Caller: HOANGREECE    Relationship: Emergency Contact    Best call back number: [phone]    What orders are you requesting (i.e. lab or imaging): STRESS TEST    In what timeframe would the patient need to come in: SEPTEMBER    Where will you receive your lab/imaging services: [facility] [place], [place]    Additional notes: PT'S DAUGHTER IS CALLING TO SEE IF WE CAN FAX OVER THE ORDERS FOR THE STRESS TEST TO [facility] BECAUSE IT IS IN NETWORK FOR THE PT AND CLOSER TO THE PT. THEIR PHONE NUMBER [phone]. PT WOULD LIKE A CALL BACK AND CAN LEAVE A MESSAGE IF SHE DOESN'T ANSWER

## 2023-08-24 NOTE — TELEPHONE ENCOUNTER
I have LMOM to let them know we are working on the PA through the insurance company. As soon as we get that we will fax it up there.

## 2023-08-25 NOTE — TELEPHONE ENCOUNTER
[facility] PROVIDED THE RELAY MESSAGE FROM THE OFFICE.     ADDITIONAL INFORMATION: INFORMED PATIENT THAT WE ARE WAITING ON PA BEFORE SENDING TO [facility]..

## 2023-09-12 ENCOUNTER — HOSPITAL ENCOUNTER (OUTPATIENT)
Dept: NUCLEAR MEDICINE | Facility: HOSPITAL | Age: 78
Discharge: HOME | End: 2023-09-12
Payer: MEDICARE

## 2023-09-12 ENCOUNTER — HOSPITAL ENCOUNTER (OUTPATIENT)
Dept: CARDIOLOGY | Facility: HOSPITAL | Age: 78
Discharge: HOME | End: 2023-09-12
Admitting: INTERNAL MEDICINE
Payer: MEDICARE

## 2023-09-12 VITALS
HEIGHT: 62 IN | HEART RATE: 72 BPM | SYSTOLIC BLOOD PRESSURE: 152 MMHG | WEIGHT: 120 LBS | BODY MASS INDEX: 22.08 KG/M2 | DIASTOLIC BLOOD PRESSURE: 69 MMHG

## 2023-09-12 DIAGNOSIS — Z01.810 PREOP CARDIOVASCULAR EXAM: ICD-10-CM

## 2023-09-12 DIAGNOSIS — R00.2 PALPITATIONS: ICD-10-CM

## 2023-09-12 DIAGNOSIS — R06.09 DYSPNEA ON EXERTION: ICD-10-CM

## 2023-09-12 LAB
AORTIC ARCH: 1.6 CM
AORTIC DIMENSIONLESS INDEX: 0.8 (DI)
ASCENDING AORTA: 3.2 CM
BH CV ECHO MEAS - ACS: 2.04 CM
BH CV ECHO MEAS - AI P1/2T: 682.9 MSEC
BH CV ECHO MEAS - AO MAX PG: 4.2 MMHG
BH CV ECHO MEAS - AO MEAN PG: 2 MMHG
BH CV ECHO MEAS - AO ROOT DIAM: 3.7 CM
BH CV ECHO MEAS - AO V2 MAX: 102 CM/SEC
BH CV ECHO MEAS - AO V2 VTI: 25.7 CM
BH CV ECHO MEAS - AVA(I,D): 3.3 CM2
BH CV ECHO MEAS - EDV(CUBED): 157.5 ML
BH CV ECHO MEAS - EDV(MOD-SP2): 164 ML
BH CV ECHO MEAS - EDV(MOD-SP4): 219 ML
BH CV ECHO MEAS - EF(MOD-BP): 40.1 %
BH CV ECHO MEAS - EF(MOD-SP2): 34.1 %
BH CV ECHO MEAS - EF(MOD-SP4): 45.2 %
BH CV ECHO MEAS - ESV(CUBED): 64.2 ML
BH CV ECHO MEAS - ESV(MOD-SP2): 108 ML
BH CV ECHO MEAS - ESV(MOD-SP4): 120 ML
BH CV ECHO MEAS - FS: 25.9 %
BH CV ECHO MEAS - IVS/LVPW: 1 CM
BH CV ECHO MEAS - IVSD: 0.9 CM
BH CV ECHO MEAS - LAT PEAK E' VEL: 5.7 CM/SEC
BH CV ECHO MEAS - LV DIASTOLIC VOL/BSA (35-75): 142.3 CM2
BH CV ECHO MEAS - LV MASS(C)D: 180.1 GRAMS
BH CV ECHO MEAS - LV MAX PG: 3 MMHG
BH CV ECHO MEAS - LV MEAN PG: 1 MMHG
BH CV ECHO MEAS - LV SYSTOLIC VOL/BSA (12-30): 78 CM2
BH CV ECHO MEAS - LV V1 MAX: 86.6 CM/SEC
BH CV ECHO MEAS - LV V1 VTI: 20.5 CM
BH CV ECHO MEAS - LVIDD: 5.4 CM
BH CV ECHO MEAS - LVIDS: 4 CM
BH CV ECHO MEAS - LVOT AREA: 4.2 CM2
BH CV ECHO MEAS - LVOT DIAM: 2.31 CM
BH CV ECHO MEAS - LVPWD: 0.9 CM
BH CV ECHO MEAS - MED PEAK E' VEL: 4.1 CM/SEC
BH CV ECHO MEAS - MR MAX PG: 79.3 MMHG
BH CV ECHO MEAS - MR MAX VEL: 445.2 CM/SEC
BH CV ECHO MEAS - MV A DUR: 0.16 SEC
BH CV ECHO MEAS - MV A MAX VEL: 105.4 CM/SEC
BH CV ECHO MEAS - MV DEC SLOPE: 291.1 CM/SEC2
BH CV ECHO MEAS - MV DEC TIME: 0.13 MSEC
BH CV ECHO MEAS - MV E MAX VEL: 63 CM/SEC
BH CV ECHO MEAS - MV E/A: 0.6
BH CV ECHO MEAS - MV MAX PG: 4.3 MMHG
BH CV ECHO MEAS - MV MEAN PG: 1.56 MMHG
BH CV ECHO MEAS - MV P1/2T: 72.4 MSEC
BH CV ECHO MEAS - MV V2 VTI: 27.6 CM
BH CV ECHO MEAS - MVA(P1/2T): 3 CM2
BH CV ECHO MEAS - MVA(VTI): 3.1 CM2
BH CV ECHO MEAS - PA ACC TIME: 0.09 SEC
BH CV ECHO MEAS - PA V2 MAX: 97.7 CM/SEC
BH CV ECHO MEAS - QP/QS: 0.56
BH CV ECHO MEAS - RAP SYSTOLE: 3 MMHG
BH CV ECHO MEAS - RV MAX PG: 1.98 MMHG
BH CV ECHO MEAS - RV V1 MAX: 70.3 CM/SEC
BH CV ECHO MEAS - RV V1 VTI: 13.6 CM
BH CV ECHO MEAS - RVOT DIAM: 2.12 CM
BH CV ECHO MEAS - RVSP: 26 MMHG
BH CV ECHO MEAS - SI(MOD-SP2): 36.4 ML/M2
BH CV ECHO MEAS - SI(MOD-SP4): 64.3 ML/M2
BH CV ECHO MEAS - SV(LVOT): 85.8 ML
BH CV ECHO MEAS - SV(MOD-SP2): 56 ML
BH CV ECHO MEAS - SV(MOD-SP4): 99 ML
BH CV ECHO MEAS - SV(RVOT): 48.3 ML
BH CV ECHO MEAS - TAPSE (>1.6): 2.44 CM
BH CV ECHO MEAS - TR MAX PG: 23.2 MMHG
BH CV ECHO MEAS - TR MAX VEL: 241.1 CM/SEC
BH CV ECHO MEASUREMENTS AVERAGE E/E' RATIO: 12.86
BH CV REST NUCLEAR ISOTOPE DOSE: 11.7 MCI
BH CV STRESS COMMENTS STAGE 1: NORMAL
BH CV STRESS DOSE REGADENOSON STAGE 1: 0.4
BH CV STRESS DURATION MIN STAGE 1: 0
BH CV STRESS DURATION SEC STAGE 1: 10
BH CV STRESS NUCLEAR ISOTOPE DOSE: 34.7 MCI
BH CV STRESS PROTOCOL 1: NORMAL
BH CV STRESS RECOVERY BP: NORMAL MMHG
BH CV STRESS RECOVERY HR: 90 BPM
BH CV STRESS STAGE 1: 1
BH CV XLRA - RV BASE: 2.6 CM
BH CV XLRA - RV LENGTH: 8.1 CM
BH CV XLRA - RV MID: 2.14 CM
BH CV XLRA - TDI S': 14.7 CM/SEC
LEFT ATRIUM VOLUME INDEX: 33 ML/M2
LV EF NUC BP: 32 %
MAXIMAL PREDICTED HEART RATE: 142 BPM
PERCENT MAX PREDICTED HR: 70.42 %
SINUS: 3.3 CM
STJ: 2.7 CM
STRESS BASELINE BP: NORMAL MMHG
STRESS BASELINE HR: 59 BPM
STRESS PERCENT HR: 83 %
STRESS POST PEAK BP: NORMAL MMHG
STRESS POST PEAK HR: 100 BPM
STRESS TARGET HR: 121 BPM

## 2023-09-12 PROCEDURE — 93306 TTE W/DOPPLER COMPLETE: CPT

## 2023-09-12 PROCEDURE — A9500 TC99M SESTAMIBI: HCPCS | Performed by: INTERNAL MEDICINE

## 2023-09-12 PROCEDURE — 25510000001 PERFLUTREN (DEFINITY) 8.476 MG IN SODIUM CHLORIDE (PF) 0.9 % 10 ML INJECTION: Performed by: INTERNAL MEDICINE

## 2023-09-12 PROCEDURE — 0 TECHNETIUM SESTAMIBI: Performed by: INTERNAL MEDICINE

## 2023-09-12 PROCEDURE — 25010000002 REGADENOSON 0.4 MG/5ML SOLUTION: Performed by: INTERNAL MEDICINE

## 2023-09-12 PROCEDURE — 93017 CV STRESS TEST TRACING ONLY: CPT

## 2023-09-12 PROCEDURE — 78452 HT MUSCLE IMAGE SPECT MULT: CPT

## 2023-09-12 RX ORDER — REGADENOSON 0.08 MG/ML
0.4 INJECTION, SOLUTION INTRAVENOUS
Status: COMPLETED | OUTPATIENT
Start: 2023-09-12 | End: 2023-09-12

## 2023-09-12 RX ADMIN — TECHNETIUM TC 99M SESTAMIBI 1 DOSE: 1 INJECTION INTRAVENOUS at 09:15

## 2023-09-12 RX ADMIN — REGADENOSON 0.4 MG: 0.08 INJECTION, SOLUTION INTRAVENOUS at 09:15

## 2023-09-12 RX ADMIN — SODIUM CHLORIDE 2 ML: 9 INJECTION INTRAMUSCULAR; INTRAVENOUS; SUBCUTANEOUS at 11:55

## 2023-09-12 RX ADMIN — TECHNETIUM TC 99M SESTAMIBI 1 DOSE: 1 INJECTION INTRAVENOUS at 07:36

## 2023-09-14 ENCOUNTER — TELEPHONE (OUTPATIENT)
Dept: CARDIOLOGY | Facility: CLINIC | Age: 78
End: 2023-09-14
Payer: MEDICARE

## 2023-09-14 NOTE — TELEPHONE ENCOUNTER
I have not received anything as of 1141 today. Not sure who the surgeon is to call them- will wait to see if it comes across this afternoon.

## 2023-09-14 NOTE — TELEPHONE ENCOUNTER
Caller: REECE HOANG    Relationship: Emergency Contact    Best call back number: [phone]      PATIENTS DAUGHTER CALLED IN TO REQUEST THAT THE CARDIAC CLEARANCE THAT WAS FAXED 9.14.23 BE SENT BACK BECAUSE THE PATIENTS HAS SURGERY ON 9.18.23

## 2023-09-18 RX ORDER — FAMOTIDINE 40 MG/1
TABLET, FILM COATED ORAL
Qty: 30 TABLET | Refills: 1 | Status: SHIPPED | OUTPATIENT
Start: 2023-09-18

## 2023-09-18 NOTE — TELEPHONE ENCOUNTER
Caller: REECE HOANG    Relationship: Emergency Contact    Best call back number: [phone]    What is the best time to reach you: ANY    Who are you requesting to speak with (clinical staff, provider,  specific staff member): CLINICAL    Do you know the name of the person who called: EMMANUEL    What was the call regarding: REECE STATED THAT EMMANUEL LEFT VOICE MESSAGE TO CALL BACK ABOUT HER FATHER. PLEASE CONTACT REECE.     Is it okay if the provider responds through [employer] [address] [place]: PLEASE CALL.

## 2023-09-19 DIAGNOSIS — I73.9 INTERMITTENT CLAUDICATION: ICD-10-CM

## 2023-09-19 DIAGNOSIS — R94.39 ABNORMAL NUCLEAR STRESS TEST: Primary | ICD-10-CM

## 2023-09-19 DIAGNOSIS — I42.9 CARDIOMYOPATHY, UNSPECIFIED TYPE: ICD-10-CM

## 2023-09-19 RX ORDER — HYDROCODONE BITARTRATE AND ACETAMINOPHEN 5; 325 MG/1; MG/1
1 TABLET ORAL EVERY 4 HOURS PRN
OUTPATIENT
Start: 2023-09-19 | End: 2023-09-29

## 2023-09-19 RX ORDER — CILOSTAZOL 100 MG/1
100 TABLET ORAL 2 TIMES DAILY
Qty: 60 TABLET | Refills: 1 | Status: SHIPPED | OUTPATIENT
Start: 2023-09-19

## 2023-09-19 RX ORDER — CILOSTAZOL 100 MG/1
100 TABLET ORAL 2 TIMES DAILY
Qty: 60 TABLET | Refills: 1 | Status: CANCELLED | OUTPATIENT
Start: 2023-09-19

## 2023-09-19 RX ORDER — SODIUM CHLORIDE 9 MG/ML
40 INJECTION, SOLUTION INTRAVENOUS AS NEEDED
OUTPATIENT
Start: 2023-09-19

## 2023-09-19 RX ORDER — SODIUM CHLORIDE 0.9 % (FLUSH) 0.9 %
3-10 SYRINGE (ML) INJECTION AS NEEDED
OUTPATIENT
Start: 2023-09-19

## 2023-09-19 RX ORDER — ONDANSETRON 2 MG/ML
4 INJECTION INTRAMUSCULAR; INTRAVENOUS EVERY 6 HOURS PRN
OUTPATIENT
Start: 2023-09-19

## 2023-09-19 RX ORDER — NITROGLYCERIN 0.4 MG/1
0.4 TABLET SUBLINGUAL
OUTPATIENT
Start: 2023-09-19

## 2023-09-19 RX ORDER — ASPIRIN 81 MG/1
81 TABLET ORAL DAILY
OUTPATIENT
Start: 2023-09-19

## 2023-09-19 RX ORDER — SODIUM CHLORIDE 0.9 % (FLUSH) 0.9 %
3 SYRINGE (ML) INJECTION EVERY 12 HOURS SCHEDULED
OUTPATIENT
Start: 2023-09-19

## 2023-09-19 RX ORDER — ASPIRIN 81 MG/1
324 TABLET, CHEWABLE ORAL ONCE
OUTPATIENT
Start: 2023-09-19 | End: 2023-09-19

## 2023-09-19 NOTE — TELEPHONE ENCOUNTER
Rx Refill Note  Requested Prescriptions     Pending Prescriptions Disp Refills    cilostazol (PLETAL) 100 MG tablet 60 tablet 1     Sig: Take 1 tablet by mouth 2 (Two) Times a Day.      Last office visit with prescribing clinician: 7/28/2023   Last telemedicine visit with prescribing clinician: Visit date not found   Next office visit with prescribing clinician: Visit date not found                         Would you like a call back once the refill request has been completed: [] Yes [] No    If the office needs to give you a call back, can they leave a voicemail: [] Yes [] No    Aj Pedraza Rep  09/19/23, 13:38 EDT

## 2023-09-20 PROBLEM — R94.39 ABNORMAL NUCLEAR STRESS TEST: Status: ACTIVE | Noted: 2023-09-20

## 2023-09-20 PROBLEM — I42.9 CARDIOMYOPATHY: Status: ACTIVE | Noted: 2023-09-20

## 2023-09-25 ENCOUNTER — OFFICE VISIT (OUTPATIENT)
Dept: CARDIOLOGY | Facility: CLINIC | Age: 78
End: 2023-09-25

## 2023-09-25 VITALS
WEIGHT: 124 LBS | OXYGEN SATURATION: 98 % | SYSTOLIC BLOOD PRESSURE: 122 MMHG | HEART RATE: 74 BPM | DIASTOLIC BLOOD PRESSURE: 56 MMHG | HEIGHT: 62 IN | BODY MASS INDEX: 22.82 KG/M2

## 2023-09-25 DIAGNOSIS — I15.2 HYPERTENSION DUE TO ENDOCRINE DISORDER: ICD-10-CM

## 2023-09-25 DIAGNOSIS — R94.39 ABNORMAL NUCLEAR STRESS TEST: ICD-10-CM

## 2023-09-25 DIAGNOSIS — I42.9 CARDIOMYOPATHY, UNSPECIFIED TYPE: Primary | ICD-10-CM

## 2023-09-25 DIAGNOSIS — E78.2 MIXED HYPERLIPIDEMIA: ICD-10-CM

## 2023-09-25 DIAGNOSIS — Z01.810 PREOP CARDIOVASCULAR EXAM: ICD-10-CM

## 2023-09-25 DIAGNOSIS — R94.31 ABNORMAL EKG: ICD-10-CM

## 2023-09-25 PROCEDURE — 3078F DIAST BP <80 MM HG: CPT | Performed by: INTERNAL MEDICINE

## 2023-09-25 PROCEDURE — 1160F RVW MEDS BY RX/DR IN RCRD: CPT | Performed by: INTERNAL MEDICINE

## 2023-09-25 PROCEDURE — 1159F MED LIST DOCD IN RCRD: CPT | Performed by: INTERNAL MEDICINE

## 2023-09-25 PROCEDURE — 3074F SYST BP LT 130 MM HG: CPT | Performed by: INTERNAL MEDICINE

## 2023-09-25 PROCEDURE — 99214 OFFICE O/P EST MOD 30 MIN: CPT | Performed by: INTERNAL MEDICINE

## 2023-09-25 RX ORDER — ERGOCALCIFEROL (VITAMIN D2) 10 MCG
400 TABLET ORAL DAILY
COMMUNITY

## 2023-09-25 NOTE — PROGRESS NOTES
Cardiology Office Visit      Encounter Date:  2023    PATIENT IDENTIFICATION    Name: Ramon Silva  Age: 78 y.o. Sex: male : 1945  MRN: [id number]    Reason For Followup:  Abnormal stress test  Abnormal echo    Brief Clinical History:  Dear Joey Schroeder APRN    I had the pleasure of seeing Ramon Silva today. As you are well aware, this is a 78 y.o. male with no established history of ischemic heart disease.  He does have a history of peripheral arterial disease.  He has additional history that includes diabetes, intermittent claudication, neuropathy, hypertension, hypertensive cardiovascular disease, osteoporosis, and acid reflux.  He presents today for follow-up on the above conditions.    Interval History:  He denies any chest pain pressure heaviness or tightness.  He denies any shortness of breath.  He denies any PND orthopnea.  He denies any syncope or near syncope.  He speaks broken English and is accompanied by his daughter today who provides historical context.    The patient has been suffering from mobility issues predominantly related to his bad knees.  He has a significant varus deformity of both knees and currently utilizes a cane for stability when ambulating.  He has seen Dr. Gonzalez and is planning on having his right knee replaced in mid September.  The plan would be to replace the left knee thereafter provided no complications occurred.    The patient does have a history of peripheral arterial disease.  He is undergone lower extremity angiography by Dr. Henriquez.  He was placed on Pletal to help with claudication and lower extremity flow.  There had been some issues with lower extremity wound healing.  These have since cleared up since his catheterization.    As a result of his upcoming surgery the patient underwent stress testing and echocardiogram in 2023.  His stress test was markedly abnormal and suggestive of an old inferior septal MI.  Significant wall  motion abnormalities of the inferior and septal walls were noted with a calculated ejection fraction of 32%.    His most recent echocardiogram was performed in September 2023.  Global hypokinesis was noted with the exception of the apical septal, mid inferoseptal, apex, and mid anterior septal walls which were akinetic.  Left ventricular ejection fraction was estimated at 30 to 35%.    We discussed the findings of this testing today in detail.  The patient has no known history of ischemic heart disease.  It is very unlikely that he will be cleared for surgery from an anesthesia standpoint with these above findings without a formalized ischemic evaluation with cardiac catheterization.  Risk benefits and options were discussed with the patient and daughter in detail.  They wish to proceed.  Catheterization is scheduled for 1 week from today.    Assessment & Plan    Impressions:  Severe degenerative bilateral knee osteoarthritis currently undergoing cardiovascular risk assessment  Abnormal nuclear stress test suggestive of prior MI and severe cardiomyopathy  Abnormal echocardiogram suggestive of severe cardiomyopathy and previous MI  Known history of peripheral arterial disease  Intermittent claudication currently on Pletal  Hypertension  Hypertensive cardiovascular disease  Osteoporosis  Acid reflux    Recommendations:  Risk benefits and options discussed.  Patient wishes to proceed with cardiac catheterization as scheduled next week.    Diagnoses and all orders for this visit:    1. Cardiomyopathy, unspecified type (Primary)  Overview:  Added automatically from request for surgery [id number]      2. Abnormal nuclear stress test  Overview:  Added automatically from request for surgery [id number]      3. 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 Mixed hyperlipidemia    6. Preop cardiovascular exam          Objective:    Vitals:  Vitals:    09/25/23 0854   BP: 122/56   BP Location: Left arm   Patient  "Position: Sitting   Pulse: 74   SpO2: 98%   Weight: 56.2 kg (124 lb)   Height: 157.5 cm (62\")     Body mass index is 22.68 kg/m².      Physical Exam:    General: Alert, cooperative, no distress, appears stated age  Head:  Normocephalic, atraumatic, mucous membranes moist  Eyes:  Conjunctiva/corneas clear, EOM's intact     Neck:  Supple,  no bruit    Lungs: Clear to auscultation bilaterally, no wheezes rhonchi rales are noted  Chest wall: No tenderness  Heart::  Regular rate and rhythm, S1 and S2 normal, 1/6 holosystolic murmur.  No rub or gallop  Abdomen: Soft, non-tender, nondistended bowel sounds active  Extremities: No cyanosis, clubbing, or edema.  Extreme ambulatory difficulty secondary to degenerative arthritis of his knees.  Ambulates with a cane.  Pulses: 2+ and symmetric all extremities  Skin:  No rashes or lesions  Neuro/psych: A&O x3. CN II through XII are grossly intact with appropriate affect      Allergies:  No Known Allergies    Medication Review:     Current Outpatient Medications:     aspirin 81 MG tablet, Take 1 tablet by mouth Daily., Disp: , Rfl:     atorvastatin (LIPITOR) 40 MG tablet, Take 1 tablet by mouth every night at bedtime., Disp: 90 tablet, Rfl: 1    bacitracin 500 UNIT/GM ointment, Apply 1 application topically to the appropriate area as directed 2 (Two) Times a Day., Disp: 28 g, Rfl: 0    carvedilol (COREG) 25 MG tablet, Take 1 tablet by mouth 2 (Two) Times a Day With Meals., Disp: 180 tablet, Rfl: 1    Cetirizine HCl (ZyrTEC Allergy) 10 MG capsule, Take 10 mg by mouth Daily., Disp: , Rfl:     cilostazol (PLETAL) 100 MG tablet, Take 1 tablet by mouth 2 (Two) Times a Day., Disp: 60 tablet, Rfl: 1    colestipol (COLESTID) 1 g tablet, Take 1 tablet by mouth 2 (Two) Times a Day., Disp: 180 tablet, Rfl: 1    dapagliflozin Propanediol (Farxiga) 10 MG tablet, Take 10 mg by mouth Daily., Disp: 30 tablet, Rfl: 5    Diclofenac Sodium (VOLTAREN) 1 % gel gel, Apply 4 g topically to the " appropriate area as directed 4 (Four) Times a Day As Needed (APPLY 4 TIMES A DAY AS NEED TO THE APPROPRIATE AREA)., Disp: 4 g, Rfl: 2    famotidine (PEPCID) 40 MG tablet, TAKE ONE TABLET BY MOUTH DAILY, Disp: 30 tablet, Rfl: 1    ferrous sulfate 324 (65 Fe) MG tablet delayed-release EC tablet, Take 1 tablet by mouth Daily With Breakfast., Disp: 90 tablet, Rfl: 1    finasteride (PROSCAR) 5 MG tablet, Take 1 tablet by mouth Daily., Disp: 90 tablet, Rfl: 1    gabapentin (NEURONTIN) 300 MG capsule, Take 1 capsule by mouth Every Evening., Disp: 90 capsule, Rfl: 0    ibandronate (BONIVA) 150 MG tablet, Take 1 tablet by mouth Every 30 (Thirty) Days., Disp: 1 tablet, Rfl: 5    losartan (COZAAR) 50 MG tablet, Take 1 tablet by mouth Daily., Disp: 90 tablet, Rfl: 1    Menthol (GNP HERBAL MT), Apply  to the mouth or throat. Neuroflow plus, Disp: , Rfl:     metFORMIN ER (GLUCOPHAGE-XR) 500 MG 24 hr tablet, Take 2 tablets by mouth 2 (Two) Times a Day Before Meals., Disp: 360 tablet, Rfl: 1    Omega-3 Fatty Acids (OMEGA-3 FISH OIL PO), Take 1 capsule by mouth Daily., Disp: , Rfl:     pantoprazole (PROTONIX) 40 MG EC tablet, Take 1 tablet by mouth Daily., Disp: 90 tablet, Rfl: 1    traMADol (ULTRAM) 50 MG tablet, Take 1 tablet by mouth Every 12 (Twelve) Hours As Needed., Disp: , Rfl:     Vitamin D, Cholecalciferol, (CHOLECALCIFEROL) 10 MCG (400 UNIT) tablet, Take 1 tablet by mouth Daily., Disp: , Rfl:     Accu-Chek Guide test strip, 1 each by Other route Daily. E 11.9, Disp: 50 each, Rfl: 2    Accu-Chek Softclix Lancets lancets, test daily as directed, Disp: 100 each, Rfl: 2    collagenase (Santyl) 250 UNIT/GM ointment, Apply 1 application topically to the appropriate area as directed Daily. Apply once daily to wound bed. (Patient not taking: Reported on 9/25/2023), Disp: 90 g, Rfl: 1    Family History:  Family History   Family history unknown: Yes       Past Medical History:  Past Medical History:   Diagnosis Date    Anemia      Arthralgia     Diabetes     Fatigue     GERD (gastroesophageal reflux disease)     Gout     Hyperlipidemia     Hypertension     Liver lesion     Non Hodgkin's lymphoma     Osteoporosis     Personal history of testicular cancer     Vitamin D deficiency     Weight loss        Past Surgical History:  Past Surgical History:   Procedure Laterality Date    ANGIOGRAM - CONVERTED  07/05/2022    aif by Dr Henriquez    CARDIAC CATHETERIZATION N/A 7/5/2022    Procedure: PERIPHERAL ANGIOGRAPHY Right leg;  Surgeon: Theron Henriquez MD;  Location: [facility] CATH INVASIVE LOCATION;  Service: Cardiovascular;  Laterality: N/A;    HIP SURGERY  2005    PORTACATH PLACEMENT      TESTICLE SURGERY Right 07/17/2000    right testicular excision       Social History:  Social History     Socioeconomic History    Marital status:    Tobacco Use    Smoking status: Never    Smokeless tobacco: Never   Vaping Use    Vaping Use: Never used   Substance and Sexual Activity    Alcohol use: No    Drug use: Never    Sexual activity: Defer       Review of Systems:  The following systems were reviewed as they relate to the cardiovascular system: Constitutional, Eyes, ENT, Cardiovascular, Respiratory, Gastrointestinal, Integumentary, Neurological, Psychiatric, Hematologic, Endocrine, Musculoskeletal, and Genitourinary. The pertinent cardiovascular findings are reported above with all other cardiovascular points within those systems being negative.    Diagnostic Study Review:     Current Electrocardiogram:  Procedures no new EKG.  EKG dated 8/14/2023 demonstrates sinus rhythm with left bundle branch block.    Laboratory Data:  Lab Results   Component Value Date    GLUCOSE 115 (H) 07/28/2023    BUN 19 07/28/2023    CREATININE 0.77 07/28/2023    EGFRIFNONA 59 (L) 08/17/2021    EGFRIFAFRI 91 02/23/2021    BCR 24.7 07/28/2023    K 5.0 07/28/2023    CO2 21.6 (L) 07/28/2023    CALCIUM 9.4 07/28/2023    ALBUMIN 4.2 07/28/2023    LABIL2 1.6 05/08/2019    AST  25 07/28/2023    ALT 28 07/28/2023     Lab Results   Component Value Date    GLUCOSE 115 (H) 07/28/2023    CALCIUM 9.4 07/28/2023     07/28/2023    K 5.0 07/28/2023    CO2 21.6 (L) 07/28/2023     (H) 07/28/2023    BUN 19 07/28/2023    CREATININE 0.77 07/28/2023    EGFRIFAFRI 91 02/23/2021    EGFRIFNONA 59 (L) 08/17/2021    BCR 24.7 07/28/2023    ANIONGAP 13.4 07/28/2023     Lab Results   Component Value Date    WBC 4.87 07/28/2023    HGB 11.7 (L) 07/28/2023    HCT 35.0 (L) 07/28/2023    MCV 91.1 07/28/2023     (L) 07/28/2023     Lab Results   Component Value Date    CHOL 105 07/28/2023    TRIG 109 07/28/2023    HDL 43 07/28/2023    LDL 42 07/28/2023     Lab Results   Component Value Date    HGBA1C 7.10 (H) 07/28/2023     Lab Results   Component Value Date    INR 0.95 07/02/2022    PROTIME 9.8 07/02/2022       Most Recent Echo:  Results for orders placed during the hospital encounter of 09/12/23    Adult Transthoracic Echo Complete W/ Cont if Necessary Per Protocol    Interpretation Summary    Left ventricular ejection fraction appears to be 31 - 35%.    The following left ventricular wall segments are hypokinetic: mid anterior, apical anterior, basal anterolateral, mid anterolateral, apical lateral, basal inferolateral, mid inferolateral, apical inferior, mid inferior, basal inferoseptal, basal anterior, basal inferior and basal inferoseptal. The following left ventricular wall segments are akinetic: apical septal, mid inferoseptal, apex and mid anteroseptal.    Left ventricular diastolic function is consistent with (grade I) impaired relaxation.    Estimated right ventricular systolic pressure from tricuspid regurgitation is normal (<35 mmHg). Calculated right ventricular systolic pressure from tricuspid regurgitation is 26 mmHg.       Most Recent Stress Test:  Results for orders placed during the hospital encounter of 09/12/23    Stress Test With Myocardial Perfusion One Day    Interpretation  Summary    Myocardial perfusion imaging indicates a moderate-sized infarct located in the inferior wall and septal wall with no significant ischemia noted.    An additional small-sized infarct located in the apex with no significant ischemia was noted.    Abnormal LV wall motion consistent with severe hypokinesis of the inferior and septal wall.    (Calculated EF = 32%).    Impressions are consistent with a study indicating uncertain risk.       Most Recent Cardiac Catheterization:   Results for orders placed during the hospital encounter of 07/01/22    Cardiac Catheterization/Vascular Study    Narrative  Images from the original result were not included.    07/05/22  Theron Henriquez MD  [facility] cardiac catheterization lab    Preoperative Diagnosis:  Peripheral arterial disease with Ulcer right fourth and fifth toe and diabetes.    Postoperative Diagnosis:  Same    Procedure Performed:  Ultrasound-guided access left femoral artery, left iliofemoral arteriogram, right leg runoff arteriogram    CPT:    99305-10 Unilateral extremity runoff arteriogram (no diagnostic quality arterial imaging prior or significant change in vascular status)  81005 Aortic catheter placement  73740 Second order catheter placement (contralateral EIA, IIA, CFA or ipsilateral popliteal, 2nd order PFA branch)  64569 Ultrasound-guided percutaneous vascular access.      Surgeon:  Theron Henriquez MD    Assistant:  Oksana Rodrigues RN    Anesthesia:  MAC with local    Estimated Blood Loss:  Minimal    Findings:  Patent right femoral-popliteal and posterior tibial arteries to the foot without stenosis.  Stenosis right anterior tibial artery which occluded distal calf.  Patent right peroneal artery to ankle without stenosis.  Dorsalis pedis artery occluded and foot.  Severe disease plantar artery midfoot.  Posterior tibial artery occluded at ankle.    Implants:  None    Specimen:  none    Complications:  none    Access:  Left common  femoral artery    Closure:  None    Dispo:  To PACU    Indication for procedure:  77 y.o. male with ulcer right fourth toe and diabetic.  CT angiogram with patent aortoiliac and proximal femoral arteries without stenosis.  Patient with calcification of arteries and unable to determine severity of disease of the right popliteal tibial arteries.  Plan right leg runoff arteriogram with left iliofemoral approach with possible angioplasty right lower extremity and stent.  Plans and risk discussed with patient and daughter Ansley and agreed to proceed.    Description of procedure:  Groins prepped and draped in usual fashion.  Patient given 1 mg of Versed and 50 mcg of fentanyl prior to starting case.  1% Xylocaine utilized for local anesthesia left groin.  Ultrasound-guided access performed left common femoral artery without difficulty with single stick.  Left iliofemoral arteriogram in [place] projection with puncture left common femoral artery and patent proximal left superficial femoral and deep femoral arteries.  Wire advanced to the aorta without difficulty.  Sos Omni flush catheter placed in the aorta.  With angled Glidewire and sauce catheter selective catheterization was performed of the right iliac arteries and then the right mid superficial femoral artery without difficulty.  Sanchez cross catheter placed in the mid right superficial femoral artery.  We knew based on CT arteriogram there was no stenosis of the iliac and aorta and proximal femoral arteries.  Right leg runoff arteriogram through the Sanchez cross catheter was then performed.  Right superficial femoral and popliteal arteries widely patent.  Right tibial peroneal trunk peroneal and posterior tibial arteries are widely patent.  Right anterior tibial artery with stenoses proximally but an occluded distal calf.  Right dorsalis pedis artery occluded.  Right posterior tibial artery occluded at the ankle.  Segmental patency of the plantar artery and digital vessels  were present.  There were no endovascular options with widely patent popliteal peroneal and posterior tibial arteries without stenoses present.  The wires were removed.  Dressing applied and the sheath was to be removed in the cardiac Cath Lab recovery area by the nurses.  Patient tolerated procedure well without problems.    Theron Henriquez MD  07/05/22    I discussed operative findings of arteriogram with patient and daughter Virginia by phone.  Patient with no endovascular options of treatment.  Patient has right fourth fifth toe ulcer.  This has been present for couple weeks.  Discussed with them risks of major amputation if cannot heal local wound care or toe amputation only.  Podiatry to assess and determine treatment option for this.  Patient with adequate perfusion to the ankle with 2 patent tibial vessels without stenoses but collateral vessel and segmental patency of the plantar arch in the right foot by arteriogram with no endovascular or bypass options.       NOTE: The following portions of the patient's note were reviewed, confirmed and/or updated this visit as appropriate: History of present illness/Interval history, physical examination, assessment & plan, allergies, current medications, past family history, past medical history, past social history, past surgical history and problem list.

## 2023-09-25 NOTE — H&P (VIEW-ONLY)
Cardiology Office Visit      Encounter Date:  2023    PATIENT IDENTIFICATION    Name: Ramon Silva  Age: 78 y.o. Sex: male : 1945  MRN: [id number]    Reason For Followup:  Abnormal stress test  Abnormal echo    Brief Clinical History:  Dear Joey Schroeder APRN    I had the pleasure of seeing Ramon Silva today. As you are well aware, this is a 78 y.o. male with no established history of ischemic heart disease.  He does have a history of peripheral arterial disease.  He has additional history that includes diabetes, intermittent claudication, neuropathy, hypertension, hypertensive cardiovascular disease, osteoporosis, and acid reflux.  He presents today for follow-up on the above conditions.    Interval History:  He denies any chest pain pressure heaviness or tightness.  He denies any shortness of breath.  He denies any PND orthopnea.  He denies any syncope or near syncope.  He speaks broken English and is accompanied by his daughter today who provides historical context.    The patient has been suffering from mobility issues predominantly related to his bad knees.  He has a significant varus deformity of both knees and currently utilizes a cane for stability when ambulating.  He has seen Dr. Gonzalez and is planning on having his right knee replaced in mid September.  The plan would be to replace the left knee thereafter provided no complications occurred.    The patient does have a history of peripheral arterial disease.  He is undergone lower extremity angiography by Dr. Henriquez.  He was placed on Pletal to help with claudication and lower extremity flow.  There had been some issues with lower extremity wound healing.  These have since cleared up since his catheterization.    As a result of his upcoming surgery the patient underwent stress testing and echocardiogram in 2023.  His stress test was markedly abnormal and suggestive of an old inferior septal MI.  Significant wall  motion abnormalities of the inferior and septal walls were noted with a calculated ejection fraction of 32%.    His most recent echocardiogram was performed in September 2023.  Global hypokinesis was noted with the exception of the apical septal, mid inferoseptal, apex, and mid anterior septal walls which were akinetic.  Left ventricular ejection fraction was estimated at 30 to 35%.    We discussed the findings of this testing today in detail.  The patient has no known history of ischemic heart disease.  It is very unlikely that he will be cleared for surgery from an anesthesia standpoint with these above findings without a formalized ischemic evaluation with cardiac catheterization.  Risk benefits and options were discussed with the patient and daughter in detail.  They wish to proceed.  Catheterization is scheduled for 1 week from today.    Assessment & Plan    Impressions:  Severe degenerative bilateral knee osteoarthritis currently undergoing cardiovascular risk assessment  Abnormal nuclear stress test suggestive of prior MI and severe cardiomyopathy  Abnormal echocardiogram suggestive of severe cardiomyopathy and previous MI  Known history of peripheral arterial disease  Intermittent claudication currently on Pletal  Hypertension  Hypertensive cardiovascular disease  Osteoporosis  Acid reflux    Recommendations:  Risk benefits and options discussed.  Patient wishes to proceed with cardiac catheterization as scheduled next week.    Diagnoses and all orders for this visit:    1. Cardiomyopathy, unspecified type (Primary)  Overview:  Added automatically from request for surgery [id number]      2. Abnormal nuclear stress test  Overview:  Added automatically from request for surgery [id number]      3. 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 Mixed hyperlipidemia    6. Preop cardiovascular exam          Objective:    Vitals:  Vitals:    09/25/23 0854   BP: 122/56   BP Location: Left arm   Patient  "Position: Sitting   Pulse: 74   SpO2: 98%   Weight: 56.2 kg (124 lb)   Height: 157.5 cm (62\")     Body mass index is 22.68 kg/m².      Physical Exam:    General: Alert, cooperative, no distress, appears stated age  Head:  Normocephalic, atraumatic, mucous membranes moist  Eyes:  Conjunctiva/corneas clear, EOM's intact     Neck:  Supple,  no bruit    Lungs: Clear to auscultation bilaterally, no wheezes rhonchi rales are noted  Chest wall: No tenderness  Heart::  Regular rate and rhythm, S1 and S2 normal, 1/6 holosystolic murmur.  No rub or gallop  Abdomen: Soft, non-tender, nondistended bowel sounds active  Extremities: No cyanosis, clubbing, or edema.  Extreme ambulatory difficulty secondary to degenerative arthritis of his knees.  Ambulates with a cane.  Pulses: 2+ and symmetric all extremities  Skin:  No rashes or lesions  Neuro/psych: A&O x3. CN II through XII are grossly intact with appropriate affect      Allergies:  No Known Allergies    Medication Review:     Current Outpatient Medications:     aspirin 81 MG tablet, Take 1 tablet by mouth Daily., Disp: , Rfl:     atorvastatin (LIPITOR) 40 MG tablet, Take 1 tablet by mouth every night at bedtime., Disp: 90 tablet, Rfl: 1    bacitracin 500 UNIT/GM ointment, Apply 1 application topically to the appropriate area as directed 2 (Two) Times a Day., Disp: 28 g, Rfl: 0    carvedilol (COREG) 25 MG tablet, Take 1 tablet by mouth 2 (Two) Times a Day With Meals., Disp: 180 tablet, Rfl: 1    Cetirizine HCl (ZyrTEC Allergy) 10 MG capsule, Take 10 mg by mouth Daily., Disp: , Rfl:     cilostazol (PLETAL) 100 MG tablet, Take 1 tablet by mouth 2 (Two) Times a Day., Disp: 60 tablet, Rfl: 1    colestipol (COLESTID) 1 g tablet, Take 1 tablet by mouth 2 (Two) Times a Day., Disp: 180 tablet, Rfl: 1    dapagliflozin Propanediol (Farxiga) 10 MG tablet, Take 10 mg by mouth Daily., Disp: 30 tablet, Rfl: 5    Diclofenac Sodium (VOLTAREN) 1 % gel gel, Apply 4 g topically to the " appropriate area as directed 4 (Four) Times a Day As Needed (APPLY 4 TIMES A DAY AS NEED TO THE APPROPRIATE AREA)., Disp: 4 g, Rfl: 2    famotidine (PEPCID) 40 MG tablet, TAKE ONE TABLET BY MOUTH DAILY, Disp: 30 tablet, Rfl: 1    ferrous sulfate 324 (65 Fe) MG tablet delayed-release EC tablet, Take 1 tablet by mouth Daily With Breakfast., Disp: 90 tablet, Rfl: 1    finasteride (PROSCAR) 5 MG tablet, Take 1 tablet by mouth Daily., Disp: 90 tablet, Rfl: 1    gabapentin (NEURONTIN) 300 MG capsule, Take 1 capsule by mouth Every Evening., Disp: 90 capsule, Rfl: 0    ibandronate (BONIVA) 150 MG tablet, Take 1 tablet by mouth Every 30 (Thirty) Days., Disp: 1 tablet, Rfl: 5    losartan (COZAAR) 50 MG tablet, Take 1 tablet by mouth Daily., Disp: 90 tablet, Rfl: 1    Menthol (GNP HERBAL MT), Apply  to the mouth or throat. Neuroflow plus, Disp: , Rfl:     metFORMIN ER (GLUCOPHAGE-XR) 500 MG 24 hr tablet, Take 2 tablets by mouth 2 (Two) Times a Day Before Meals., Disp: 360 tablet, Rfl: 1    Omega-3 Fatty Acids (OMEGA-3 FISH OIL PO), Take 1 capsule by mouth Daily., Disp: , Rfl:     pantoprazole (PROTONIX) 40 MG EC tablet, Take 1 tablet by mouth Daily., Disp: 90 tablet, Rfl: 1    traMADol (ULTRAM) 50 MG tablet, Take 1 tablet by mouth Every 12 (Twelve) Hours As Needed., Disp: , Rfl:     Vitamin D, Cholecalciferol, (CHOLECALCIFEROL) 10 MCG (400 UNIT) tablet, Take 1 tablet by mouth Daily., Disp: , Rfl:     Accu-Chek Guide test strip, 1 each by Other route Daily. E 11.9, Disp: 50 each, Rfl: 2    Accu-Chek Softclix Lancets lancets, test daily as directed, Disp: 100 each, Rfl: 2    collagenase (Santyl) 250 UNIT/GM ointment, Apply 1 application topically to the appropriate area as directed Daily. Apply once daily to wound bed. (Patient not taking: Reported on 9/25/2023), Disp: 90 g, Rfl: 1    Family History:  Family History   Family history unknown: Yes       Past Medical History:  Past Medical History:   Diagnosis Date    Anemia      Arthralgia     Diabetes     Fatigue     GERD (gastroesophageal reflux disease)     Gout     Hyperlipidemia     Hypertension     Liver lesion     Non Hodgkin's lymphoma     Osteoporosis     Personal history of testicular cancer     Vitamin D deficiency     Weight loss        Past Surgical History:  Past Surgical History:   Procedure Laterality Date    ANGIOGRAM - CONVERTED  07/05/2022    aif by Dr Henriquez    CARDIAC CATHETERIZATION N/A 7/5/2022    Procedure: PERIPHERAL ANGIOGRAPHY Right leg;  Surgeon: Theron Henriquez MD;  Location: [facility] CATH INVASIVE LOCATION;  Service: Cardiovascular;  Laterality: N/A;    HIP SURGERY  2005    PORTACATH PLACEMENT      TESTICLE SURGERY Right 07/17/2000    right testicular excision       Social History:  Social History     Socioeconomic History    Marital status:    Tobacco Use    Smoking status: Never    Smokeless tobacco: Never   Vaping Use    Vaping Use: Never used   Substance and Sexual Activity    Alcohol use: No    Drug use: Never    Sexual activity: Defer       Review of Systems:  The following systems were reviewed as they relate to the cardiovascular system: Constitutional, Eyes, ENT, Cardiovascular, Respiratory, Gastrointestinal, Integumentary, Neurological, Psychiatric, Hematologic, Endocrine, Musculoskeletal, and Genitourinary. The pertinent cardiovascular findings are reported above with all other cardiovascular points within those systems being negative.    Diagnostic Study Review:     Current Electrocardiogram:  Procedures no new EKG.  EKG dated 8/14/2023 demonstrates sinus rhythm with left bundle branch block.    Laboratory Data:  Lab Results   Component Value Date    GLUCOSE 115 (H) 07/28/2023    BUN 19 07/28/2023    CREATININE 0.77 07/28/2023    EGFRIFNONA 59 (L) 08/17/2021    EGFRIFAFRI 91 02/23/2021    BCR 24.7 07/28/2023    K 5.0 07/28/2023    CO2 21.6 (L) 07/28/2023    CALCIUM 9.4 07/28/2023    ALBUMIN 4.2 07/28/2023    LABIL2 1.6 05/08/2019    AST  25 07/28/2023    ALT 28 07/28/2023     Lab Results   Component Value Date    GLUCOSE 115 (H) 07/28/2023    CALCIUM 9.4 07/28/2023     07/28/2023    K 5.0 07/28/2023    CO2 21.6 (L) 07/28/2023     (H) 07/28/2023    BUN 19 07/28/2023    CREATININE 0.77 07/28/2023    EGFRIFAFRI 91 02/23/2021    EGFRIFNONA 59 (L) 08/17/2021    BCR 24.7 07/28/2023    ANIONGAP 13.4 07/28/2023     Lab Results   Component Value Date    WBC 4.87 07/28/2023    HGB 11.7 (L) 07/28/2023    HCT 35.0 (L) 07/28/2023    MCV 91.1 07/28/2023     (L) 07/28/2023     Lab Results   Component Value Date    CHOL 105 07/28/2023    TRIG 109 07/28/2023    HDL 43 07/28/2023    LDL 42 07/28/2023     Lab Results   Component Value Date    HGBA1C 7.10 (H) 07/28/2023     Lab Results   Component Value Date    INR 0.95 07/02/2022    PROTIME 9.8 07/02/2022       Most Recent Echo:  Results for orders placed during the hospital encounter of 09/12/23    Adult Transthoracic Echo Complete W/ Cont if Necessary Per Protocol    Interpretation Summary    Left ventricular ejection fraction appears to be 31 - 35%.    The following left ventricular wall segments are hypokinetic: mid anterior, apical anterior, basal anterolateral, mid anterolateral, apical lateral, basal inferolateral, mid inferolateral, apical inferior, mid inferior, basal inferoseptal, basal anterior, basal inferior and basal inferoseptal. The following left ventricular wall segments are akinetic: apical septal, mid inferoseptal, apex and mid anteroseptal.    Left ventricular diastolic function is consistent with (grade I) impaired relaxation.    Estimated right ventricular systolic pressure from tricuspid regurgitation is normal (<35 mmHg). Calculated right ventricular systolic pressure from tricuspid regurgitation is 26 mmHg.       Most Recent Stress Test:  Results for orders placed during the hospital encounter of 09/12/23    Stress Test With Myocardial Perfusion One Day    Interpretation  Summary    Myocardial perfusion imaging indicates a moderate-sized infarct located in the inferior wall and septal wall with no significant ischemia noted.    An additional small-sized infarct located in the apex with no significant ischemia was noted.    Abnormal LV wall motion consistent with severe hypokinesis of the inferior and septal wall.    (Calculated EF = 32%).    Impressions are consistent with a study indicating uncertain risk.       Most Recent Cardiac Catheterization:   Results for orders placed during the hospital encounter of 07/01/22    Cardiac Catheterization/Vascular Study    Narrative  Images from the original result were not included.    07/05/22  Theron Henriquez MD  [facility] cardiac catheterization lab    Preoperative Diagnosis:  Peripheral arterial disease with Ulcer right fourth and fifth toe and diabetes.    Postoperative Diagnosis:  Same    Procedure Performed:  Ultrasound-guided access left femoral artery, left iliofemoral arteriogram, right leg runoff arteriogram    CPT:    32579-74 Unilateral extremity runoff arteriogram (no diagnostic quality arterial imaging prior or significant change in vascular status)  04797 Aortic catheter placement  37692 Second order catheter placement (contralateral EIA, IIA, CFA or ipsilateral popliteal, 2nd order PFA branch)  68963 Ultrasound-guided percutaneous vascular access.      Surgeon:  Theron Henriquez MD    Assistant:  Oksana Rodrigues RN    Anesthesia:  MAC with local    Estimated Blood Loss:  Minimal    Findings:  Patent right femoral-popliteal and posterior tibial arteries to the foot without stenosis.  Stenosis right anterior tibial artery which occluded distal calf.  Patent right peroneal artery to ankle without stenosis.  Dorsalis pedis artery occluded and foot.  Severe disease plantar artery midfoot.  Posterior tibial artery occluded at ankle.    Implants:  None    Specimen:  none    Complications:  none    Access:  Left common  femoral artery    Closure:  None    Dispo:  To PACU    Indication for procedure:  77 y.o. male with ulcer right fourth toe and diabetic.  CT angiogram with patent aortoiliac and proximal femoral arteries without stenosis.  Patient with calcification of arteries and unable to determine severity of disease of the right popliteal tibial arteries.  Plan right leg runoff arteriogram with left iliofemoral approach with possible angioplasty right lower extremity and stent.  Plans and risk discussed with patient and daughter Ansley and agreed to proceed.    Description of procedure:  Groins prepped and draped in usual fashion.  Patient given 1 mg of Versed and 50 mcg of fentanyl prior to starting case.  1% Xylocaine utilized for local anesthesia left groin.  Ultrasound-guided access performed left common femoral artery without difficulty with single stick.  Left iliofemoral arteriogram in Syriac projection with puncture left common femoral artery and patent proximal left superficial femoral and deep femoral arteries.  Wire advanced to the aorta without difficulty.  Sos Omni flush catheter placed in the aorta.  With angled Glidewire and sauce catheter selective catheterization was performed of the right iliac arteries and then the right mid superficial femoral artery without difficulty.  Sanchez cross catheter placed in the mid right superficial femoral artery.  We knew based on CT arteriogram there was no stenosis of the iliac and aorta and proximal femoral arteries.  Right leg runoff arteriogram through the Sanchez cross catheter was then performed.  Right superficial femoral and popliteal arteries widely patent.  Right tibial peroneal trunk peroneal and posterior tibial arteries are widely patent.  Right anterior tibial artery with stenoses proximally but an occluded distal calf.  Right dorsalis pedis artery occluded.  Right posterior tibial artery occluded at the ankle.  Segmental patency of the plantar artery and digital vessels  were present.  There were no endovascular options with widely patent popliteal peroneal and posterior tibial arteries without stenoses present.  The wires were removed.  Dressing applied and the sheath was to be removed in the cardiac Cath Lab recovery area by the nurses.  Patient tolerated procedure well without problems.    Theron Henriquez MD  07/05/22    I discussed operative findings of arteriogram with patient and daughter Virginia by phone.  Patient with no endovascular options of treatment.  Patient has right fourth fifth toe ulcer.  This has been present for couple weeks.  Discussed with them risks of major amputation if cannot heal local wound care or toe amputation only.  Podiatry to assess and determine treatment option for this.  Patient with adequate perfusion to the ankle with 2 patent tibial vessels without stenoses but collateral vessel and segmental patency of the plantar arch in the right foot by arteriogram with no endovascular or bypass options.       NOTE: The following portions of the patient's note were reviewed, confirmed and/or updated this visit as appropriate: History of present illness/Interval history, physical examination, assessment & plan, allergies, current medications, past family history, past medical history, past social history, past surgical history and problem list.

## 2023-10-02 ENCOUNTER — HOSPITAL ENCOUNTER (OUTPATIENT)
Facility: HOSPITAL | Age: 78
Setting detail: HOSPITAL OUTPATIENT SURGERY
Discharge: HOME | End: 2023-10-02
Attending: INTERNAL MEDICINE | Admitting: INTERNAL MEDICINE
Payer: MEDICARE

## 2023-10-02 ENCOUNTER — APPOINTMENT (OUTPATIENT)
Dept: GENERAL RADIOLOGY | Facility: HOSPITAL | Age: 78
End: 2023-10-02
Payer: MEDICARE

## 2023-10-02 ENCOUNTER — APPOINTMENT (OUTPATIENT)
Dept: CARDIOLOGY | Facility: HOSPITAL | Age: 78
End: 2023-10-02
Payer: MEDICARE

## 2023-10-02 VITALS
OXYGEN SATURATION: 98 % | WEIGHT: 119.71 LBS | DIASTOLIC BLOOD PRESSURE: 49 MMHG | BODY MASS INDEX: 22.6 KG/M2 | HEART RATE: 68 BPM | TEMPERATURE: 98.6 F | HEIGHT: 61 IN | RESPIRATION RATE: 17 BRPM | SYSTOLIC BLOOD PRESSURE: 126 MMHG

## 2023-10-02 DIAGNOSIS — I42.8 NICM (NONISCHEMIC CARDIOMYOPATHY): Primary | ICD-10-CM

## 2023-10-02 DIAGNOSIS — R94.39 ABNORMAL NUCLEAR STRESS TEST: ICD-10-CM

## 2023-10-02 DIAGNOSIS — I42.9 CARDIOMYOPATHY, UNSPECIFIED TYPE: ICD-10-CM

## 2023-10-02 LAB
ANION GAP SERPL CALCULATED.3IONS-SCNC: 10 MMOL/L (ref 5–15)
BASE DEFICIT: ABNORMAL
BASE DEFICIT: ABNORMAL
BASE EXCESS BLDA CALC-SCNC: 1 MMOL/L (ref 0–3)
BASE EXCESS BLDV CALC-SCNC: ABNORMAL MMOL/L
BASOPHILS # BLD AUTO: 0 10*3/MM3 (ref 0–0.2)
BASOPHILS NFR BLD AUTO: 0.6 % (ref 0–1.5)
BUN SERPL-MCNC: 23 MG/DL (ref 8–23)
BUN/CREAT SERPL: 25 (ref 7–25)
CA-I BLDA-SCNC: 1.29 MMOL/L (ref 1.12–1.32)
CA-I BLDA-SCNC: 1.29 MMOL/L (ref 1.12–1.32)
CALCIUM SPEC-SCNC: 9.8 MG/DL (ref 8.6–10.5)
CHLORIDE SERPL-SCNC: 102 MMOL/L (ref 98–107)
CHOLEST SERPL-MCNC: 138 MG/DL (ref 0–200)
CO2 BLDA-SCNC: 29 MMOL/L (ref 23–27)
CO2 CONTENT VENOUS: ABNORMAL
CO2 SERPL-SCNC: 28 MMOL/L (ref 22–29)
CREAT SERPL-MCNC: 0.92 MG/DL (ref 0.76–1.27)
DEPRECATED RDW RBC AUTO: 48.6 FL (ref 37–54)
EGFRCR SERPLBLD CKD-EPI 2021: 85.1 ML/MIN/1.73
EOSINOPHIL # BLD AUTO: 0.2 10*3/MM3 (ref 0–0.4)
EOSINOPHIL NFR BLD AUTO: 2.9 % (ref 0.3–6.2)
ERYTHROCYTE [DISTWIDTH] IN BLOOD BY AUTOMATED COUNT: 14.9 % (ref 12.3–15.4)
GLUCOSE BLDC GLUCOMTR-MCNC: 148 MG/DL (ref 70–105)
GLUCOSE BLDC GLUCOMTR-MCNC: 149 MG/DL (ref 70–105)
GLUCOSE SERPL-MCNC: 145 MG/DL (ref 65–99)
HCO3 BLDA-SCNC: 27 MMOL/L (ref 22–26)
HCO3 BLDV-SCNC: 26.9 MMOL/L (ref 23–28)
HCT VFR BLD AUTO: 37.4 % (ref 37.5–51)
HCT VFR BLDA CALC: 32 % (ref 38–51)
HCT VFR BLDA CALC: 32 % (ref 38–51)
HDLC SERPL-MCNC: 59 MG/DL (ref 40–60)
HGB BLD-MCNC: 12 G/DL (ref 13–17.7)
HGB BLDA-MCNC: 10.9 G/DL (ref 12–17)
HGB BLDA-MCNC: 10.9 G/DL (ref 12–17)
INR PPP: <0.93 (ref 0.93–1.1)
LDLC SERPL CALC-MCNC: 55 MG/DL (ref 0–100)
LDLC/HDLC SERPL: 0.86 {RATIO}
LYMPHOCYTES # BLD AUTO: 1.8 10*3/MM3 (ref 0.7–3.1)
LYMPHOCYTES NFR BLD AUTO: 27.8 % (ref 19.6–45.3)
MCH RBC QN AUTO: 30 PG (ref 26.6–33)
MCHC RBC AUTO-ENTMCNC: 32 G/DL (ref 31.5–35.7)
MCV RBC AUTO: 93.8 FL (ref 79–97)
MONOCYTES # BLD AUTO: 0.5 10*3/MM3 (ref 0.1–0.9)
MONOCYTES NFR BLD AUTO: 8.2 % (ref 5–12)
NEUTROPHILS NFR BLD AUTO: 4 10*3/MM3 (ref 1.7–7)
NEUTROPHILS NFR BLD AUTO: 60.5 % (ref 42.7–76)
NRBC BLD AUTO-RTO: 0 /100 WBC (ref 0–0.2)
PCO2 BLDA: 48.7 MM HG (ref 35–45)
PCO2 BLDV: 53.3 MM HG (ref 41–51)
PH BLDA: 7.35 PH UNITS (ref 7.35–7.45)
PH BLDV: 7.31 PH UNITS (ref 7.31–7.41)
PLATELET # BLD AUTO: 177 10*3/MM3 (ref 140–450)
PMV BLD AUTO: 8.2 FL (ref 6–12)
PO2 BLDA: 178 MM HG (ref 80–105)
PO2 BLDV: 45 MM HG (ref 35–42)
POTASSIUM BLDA-SCNC: 3.9 MMOL/L (ref 3.5–4.9)
POTASSIUM BLDA-SCNC: 3.9 MMOL/L (ref 3.5–4.9)
POTASSIUM SERPL-SCNC: 4.3 MMOL/L (ref 3.5–5.2)
PROTHROMBIN TIME: 10 SECONDS (ref 9.6–11.7)
RBC # BLD AUTO: 3.98 10*6/MM3 (ref 4.14–5.8)
SAO2 % BLDCOA: 100 % (ref 95–98)
SAO2 % BLDCOV: 28 % (ref 45–75)
SODIUM BLD-SCNC: 142 MMOL/L (ref 138–146)
SODIUM BLD-SCNC: 142 MMOL/L (ref 138–146)
SODIUM SERPL-SCNC: 140 MMOL/L (ref 136–145)
TRIGL SERPL-MCNC: 141 MG/DL (ref 0–150)
VLDLC SERPL-MCNC: 24 MG/DL (ref 5–40)
WBC NRBC COR # BLD: 6.6 10*3/MM3 (ref 3.4–10.8)

## 2023-10-02 PROCEDURE — 84295 ASSAY OF SERUM SODIUM: CPT

## 2023-10-02 PROCEDURE — 80061 LIPID PANEL: CPT | Performed by: INTERNAL MEDICINE

## 2023-10-02 PROCEDURE — 99153 MOD SED SAME PHYS/QHP EA: CPT | Performed by: INTERNAL MEDICINE

## 2023-10-02 PROCEDURE — 25010000002 FENTANYL CITRATE (PF) 100 MCG/2ML SOLUTION: Performed by: INTERNAL MEDICINE

## 2023-10-02 PROCEDURE — 85014 HEMATOCRIT: CPT

## 2023-10-02 PROCEDURE — 84132 ASSAY OF SERUM POTASSIUM: CPT

## 2023-10-02 PROCEDURE — C1769 GUIDE WIRE: HCPCS | Performed by: INTERNAL MEDICINE

## 2023-10-02 PROCEDURE — 85025 COMPLETE CBC W/AUTO DIFF WBC: CPT | Performed by: INTERNAL MEDICINE

## 2023-10-02 PROCEDURE — 25510000001 IOPAMIDOL PER 1 ML: Performed by: INTERNAL MEDICINE

## 2023-10-02 PROCEDURE — C1894 INTRO/SHEATH, NON-LASER: HCPCS | Performed by: INTERNAL MEDICINE

## 2023-10-02 PROCEDURE — 82330 ASSAY OF CALCIUM: CPT

## 2023-10-02 PROCEDURE — 99152 MOD SED SAME PHYS/QHP 5/>YRS: CPT | Performed by: INTERNAL MEDICINE

## 2023-10-02 PROCEDURE — 80048 BASIC METABOLIC PNL TOTAL CA: CPT | Performed by: INTERNAL MEDICINE

## 2023-10-02 PROCEDURE — 85610 PROTHROMBIN TIME: CPT | Performed by: INTERNAL MEDICINE

## 2023-10-02 PROCEDURE — 82947 ASSAY GLUCOSE BLOOD QUANT: CPT

## 2023-10-02 PROCEDURE — 71046 X-RAY EXAM CHEST 2 VIEWS: CPT

## 2023-10-02 PROCEDURE — 93460 R&L HRT ART/VENTRICLE ANGIO: CPT | Performed by: INTERNAL MEDICINE

## 2023-10-02 PROCEDURE — 25010000002 MIDAZOLAM PER 1 MG: Performed by: INTERNAL MEDICINE

## 2023-10-02 PROCEDURE — 82803 BLOOD GASES ANY COMBINATION: CPT

## 2023-10-02 RX ORDER — NICOTINE 21 MG/24HR
1 PATCH, TRANSDERMAL 24 HOURS TRANSDERMAL DAILY PRN
Status: DISCONTINUED | OUTPATIENT
Start: 2023-10-02 | End: 2023-10-02 | Stop reason: HOSPADM

## 2023-10-02 RX ORDER — FENTANYL CITRATE 50 UG/ML
INJECTION, SOLUTION INTRAMUSCULAR; INTRAVENOUS
Status: DISCONTINUED | OUTPATIENT
Start: 2023-10-02 | End: 2023-10-02 | Stop reason: HOSPADM

## 2023-10-02 RX ORDER — DIPHENHYDRAMINE HCL 25 MG
25 CAPSULE ORAL EVERY 6 HOURS PRN
Status: DISCONTINUED | OUTPATIENT
Start: 2023-10-02 | End: 2023-10-02 | Stop reason: HOSPADM

## 2023-10-02 RX ORDER — SODIUM CHLORIDE 9 MG/ML
30 INJECTION, SOLUTION INTRAVENOUS CONTINUOUS
Status: DISCONTINUED | OUTPATIENT
Start: 2023-10-02 | End: 2023-10-02 | Stop reason: HOSPADM

## 2023-10-02 RX ORDER — ACETAMINOPHEN 325 MG/1
650 TABLET ORAL EVERY 4 HOURS PRN
Status: DISCONTINUED | OUTPATIENT
Start: 2023-10-02 | End: 2023-10-02 | Stop reason: HOSPADM

## 2023-10-02 RX ORDER — NITROGLYCERIN 0.4 MG/1
0.4 TABLET SUBLINGUAL
Status: DISCONTINUED | OUTPATIENT
Start: 2023-10-02 | End: 2023-10-02 | Stop reason: HOSPADM

## 2023-10-02 RX ORDER — ONDANSETRON 4 MG/1
4 TABLET, FILM COATED ORAL EVERY 6 HOURS PRN
Status: DISCONTINUED | OUTPATIENT
Start: 2023-10-02 | End: 2023-10-02 | Stop reason: HOSPADM

## 2023-10-02 RX ORDER — ONDANSETRON 2 MG/ML
4 INJECTION INTRAMUSCULAR; INTRAVENOUS EVERY 6 HOURS PRN
Status: DISCONTINUED | OUTPATIENT
Start: 2023-10-02 | End: 2023-10-02 | Stop reason: HOSPADM

## 2023-10-02 RX ORDER — ASPIRIN 81 MG/1
81 TABLET ORAL DAILY
Status: DISCONTINUED | OUTPATIENT
Start: 2023-10-02 | End: 2023-10-02 | Stop reason: HOSPADM

## 2023-10-02 RX ORDER — ALUMINA, MAGNESIA, AND SIMETHICONE 2400; 2400; 240 MG/30ML; MG/30ML; MG/30ML
15 SUSPENSION ORAL EVERY 6 HOURS PRN
Status: DISCONTINUED | OUTPATIENT
Start: 2023-10-02 | End: 2023-10-02 | Stop reason: HOSPADM

## 2023-10-02 RX ORDER — MIDAZOLAM HYDROCHLORIDE 1 MG/ML
INJECTION INTRAMUSCULAR; INTRAVENOUS
Status: DISCONTINUED | OUTPATIENT
Start: 2023-10-02 | End: 2023-10-02 | Stop reason: HOSPADM

## 2023-10-02 RX ORDER — SODIUM CHLORIDE 9 MG/ML
250 INJECTION, SOLUTION INTRAVENOUS ONCE AS NEEDED
Status: DISCONTINUED | OUTPATIENT
Start: 2023-10-02 | End: 2023-10-02 | Stop reason: HOSPADM

## 2023-10-02 RX ORDER — LIDOCAINE HYDROCHLORIDE 20 MG/ML
INJECTION, SOLUTION INFILTRATION; PERINEURAL
Status: DISCONTINUED | OUTPATIENT
Start: 2023-10-02 | End: 2023-10-02 | Stop reason: HOSPADM

## 2023-10-02 RX ORDER — SODIUM CHLORIDE 9 MG/ML
INJECTION, SOLUTION INTRAVENOUS
Status: COMPLETED | OUTPATIENT
Start: 2023-10-02 | End: 2023-10-02

## 2023-10-02 NOTE — Clinical Note
The left DP pulse is +1. The right DP pulse is detected w/ doppler. The PT pulses are +1 bilaterally.

## 2023-10-02 NOTE — DISCHARGE INSTRUCTIONS
Post Cath Instructions         Call  ***’s office to schedule a follow up appointment in *** days/weeks at ***.  Specific Physician Instructions: ***    Drink plenty of fluids for the next 24 hours.  This helps to eliminate the dye used in your procedure through urination.  You may resume a normal diet; however, try to avoid foods that would cause gas or constipation.    Sedative medication given to you during your catheterization may decrease your judgement and reaction time for up to 24-48 hours.  Therefore:  DO NOT drive or operate hazardous machinery (48 hours)  DO NOT consume alcoholic beverages  DO NOT make any important/legal decisions  Have someone stay with you for at least 24 hours    To allow proper healing and prevent bleeding, the following activities are to be strictly avoided for the next 24-48 hours:  Excessive bending at wound site  Straining (anything that would tense up muscles around the affected puncture site)  Lifting objects greater than 5 pounds, pushing, or pulling for 5 days      For Groin Cases:  Refrain from sexual activity  Refrain from running or vigorous walking  No prolonged sitting or standing  Limit stair climbing as much as possible    Keep the puncture site clean and dry.  You may remove the dressing tomorrow and replace it with a band-aid for at least one additional day.  Gently clean the site with mild soap and water.  No scrubbing/rubbing and lightly pat the area dry.  Showers are acceptable; however, avoid submerging in water (tub baths, hot tubs, swimming pools, dishwater, etc…) for at least one week.  The site should be completely healed before resuming these activities to reduce the risk of infection.  Check the site often.  Watch for signs and symptoms of infection and notify your physician if any of the following occur:  Bleeding or an increase in swelling at the puncture site  Fever  Increased soreness around puncture site  Foul odor or significant drainage from the  puncture site  Swelling, redness, or warmth at the puncture site    **A bruise or small “pea sized” lump under the skin at the puncture site is not unusual.  This should disappear within 3-4 weeks.**  CONTACT YOUR PHYSICIAN OR CALL 911 IF YOU EXPERIENCE ANY OF THE FOLLOWING:  Increased angina (chest pain) or frequent sensations of pressure, burning, pain, or other discomfort in the chest, arm, jaws, or stomach  Lightheadedness, dizziness, faint feeling, sweating, or difficulty breathing  Odd sensation changes like numbness, tingling, coldness, or pain in the arm or leg in which the catheter was inserted  Limb in which the catheter was inserted becomes pale/bluish in color    IMPORTANT:  Although this occurs very rarely, if you should develop bright red or excessive bleeding, feel a “pop” inside at the insertion site, or notice a sudden increase in swelling larger than a walnut, you should call 911.  Hold continuous firm pressure to the access site until emergency personnel arrive.  It is best if someone else can do this for you.

## 2023-10-02 NOTE — Clinical Note
A 6 fr sheath was  inserted using micropuncture technique into the right femoral artery. Sheath insertion delayed.

## 2023-10-02 NOTE — INTERVAL H&P NOTE
Lengthy conversation with patient's daughter today after the case.  Patient had testicular cancer greater than 20 years ago.  He underwent chemotherapy and radiation for that at that time.  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  He also underwent chemotherapy and radiation at that time.  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  We discussed LifeVest as well as ICD therapy.  She wishes to talk to the patient about this.  LifeVest may present some problems due to language barriers.  H&P updated. The patient was examined and the following changes are noted:  See above

## 2023-10-05 ENCOUNTER — CLINICAL SUPPORT (OUTPATIENT)
Dept: FAMILY MEDICINE CLINIC | Facility: CLINIC | Age: 78
End: 2023-10-05
Payer: MEDICARE

## 2023-10-05 DIAGNOSIS — R80.9 MICROALBUMINURIA: ICD-10-CM

## 2023-10-05 DIAGNOSIS — N18.2 CKD (CHRONIC KIDNEY DISEASE) STAGE 2, GFR 60-89 ML/MIN: ICD-10-CM

## 2023-10-05 DIAGNOSIS — I73.9 INTERMITTENT CLAUDICATION: ICD-10-CM

## 2023-10-05 DIAGNOSIS — K90.9 MALABSORPTION OF IRON: ICD-10-CM

## 2023-10-05 DIAGNOSIS — E55.9 VITAMIN D DEFICIENCY: Primary | ICD-10-CM

## 2023-10-05 LAB
ALBUMIN SERPL-MCNC: 4.1 G/DL (ref 3.5–5.2)
ALBUMIN/GLOB SERPL: 1.7 G/DL
ALP SERPL-CCNC: 94 U/L (ref 39–117)
ALT SERPL W P-5'-P-CCNC: 22 U/L (ref 1–41)
ANION GAP SERPL CALCULATED.3IONS-SCNC: 10 MMOL/L (ref 5–15)
AST SERPL-CCNC: 19 U/L (ref 1–40)
BASOPHILS # BLD AUTO: 0 10*3/MM3 (ref 0–0.2)
BASOPHILS NFR BLD AUTO: 0.5 % (ref 0–1.5)
BILIRUB SERPL-MCNC: 0.4 MG/DL (ref 0–1.2)
BUN SERPL-MCNC: 28 MG/DL (ref 8–23)
BUN/CREAT SERPL: 16.9 (ref 7–25)
CALCIUM SPEC-SCNC: 9.6 MG/DL (ref 8.6–10.5)
CHLORIDE SERPL-SCNC: 108 MMOL/L (ref 98–107)
CO2 SERPL-SCNC: 24 MMOL/L (ref 22–29)
CREAT SERPL-MCNC: 1.66 MG/DL (ref 0.76–1.27)
DEPRECATED RDW RBC AUTO: 46.8 FL (ref 37–54)
EGFRCR SERPLBLD CKD-EPI 2021: 41.9 ML/MIN/1.73
EOSINOPHIL # BLD AUTO: 0.2 10*3/MM3 (ref 0–0.4)
EOSINOPHIL NFR BLD AUTO: 2.6 % (ref 0.3–6.2)
ERYTHROCYTE [DISTWIDTH] IN BLOOD BY AUTOMATED COUNT: 14.3 % (ref 12.3–15.4)
GLOBULIN UR ELPH-MCNC: 2.4 GM/DL
GLUCOSE SERPL-MCNC: 109 MG/DL (ref 65–99)
HCT VFR BLD AUTO: 35.1 % (ref 37.5–51)
HGB BLD-MCNC: 11.2 G/DL (ref 13–17.7)
LYMPHOCYTES # BLD AUTO: 1.7 10*3/MM3 (ref 0.7–3.1)
LYMPHOCYTES NFR BLD AUTO: 23.2 % (ref 19.6–45.3)
MCH RBC QN AUTO: 29.9 PG (ref 26.6–33)
MCHC RBC AUTO-ENTMCNC: 31.9 G/DL (ref 31.5–35.7)
MCV RBC AUTO: 93.8 FL (ref 79–97)
MONOCYTES # BLD AUTO: 0.6 10*3/MM3 (ref 0.1–0.9)
MONOCYTES NFR BLD AUTO: 7.9 % (ref 5–12)
NEUTROPHILS NFR BLD AUTO: 4.8 10*3/MM3 (ref 1.7–7)
NEUTROPHILS NFR BLD AUTO: 65.8 % (ref 42.7–76)
NRBC BLD AUTO-RTO: 0.1 /100 WBC (ref 0–0.2)
PLATELET # BLD AUTO: 183 10*3/MM3 (ref 140–450)
PMV BLD AUTO: 9.2 FL (ref 6–12)
POTASSIUM SERPL-SCNC: 5.3 MMOL/L (ref 3.5–5.2)
PROT SERPL-MCNC: 6.5 G/DL (ref 6–8.5)
RBC # BLD AUTO: 3.75 10*6/MM3 (ref 4.14–5.8)
SODIUM SERPL-SCNC: 142 MMOL/L (ref 136–145)
WBC NRBC COR # BLD: 7.3 10*3/MM3 (ref 3.4–10.8)

## 2023-10-05 PROCEDURE — 85025 COMPLETE CBC W/AUTO DIFF WBC: CPT | Performed by: INTERNAL MEDICINE

## 2023-10-05 PROCEDURE — 80053 COMPREHEN METABOLIC PANEL: CPT | Performed by: INTERNAL MEDICINE

## 2023-10-05 PROCEDURE — 36415 COLL VENOUS BLD VENIPUNCTURE: CPT | Performed by: INTERNAL MEDICINE

## 2023-10-10 DIAGNOSIS — E78.2 MIXED HYPERLIPIDEMIA: ICD-10-CM

## 2023-10-10 NOTE — TELEPHONE ENCOUNTER
[facility]: 90 DAY SUPPLY      Rx Refill Note  Requested Prescriptions     Pending Prescriptions Disp Refills    atorvastatin (LIPITOR) 40 MG tablet 90 tablet 1     Sig: Take 1 tablet by mouth every night at bedtime.      Last office visit with prescribing clinician: Visit date not found   Last telemedicine visit with prescribing clinician: Visit date not found   Next office visit with prescribing clinician: Visit date not found                         Would you like a call back once the refill request has been completed: [] Yes [] No    If the office needs to give you a call back, can they leave a voicemail: [] Yes [] No    Aj Pedraza Rep  10/10/23, 09:53 EDT

## 2023-10-11 RX ORDER — ATORVASTATIN CALCIUM 40 MG/1
40 TABLET, FILM COATED ORAL
Qty: 90 TABLET | Refills: 1 | Status: SHIPPED | OUTPATIENT
Start: 2023-10-11

## 2023-10-12 ENCOUNTER — OFFICE VISIT (OUTPATIENT)
Dept: ONCOLOGY | Facility: CLINIC | Age: 78
End: 2023-10-12
Payer: MEDICARE

## 2023-10-12 VITALS
HEART RATE: 72 BPM | SYSTOLIC BLOOD PRESSURE: 118 MMHG | WEIGHT: 125.6 LBS | HEIGHT: 62 IN | RESPIRATION RATE: 16 BRPM | DIASTOLIC BLOOD PRESSURE: 61 MMHG | TEMPERATURE: 98.2 F | OXYGEN SATURATION: 95 % | BODY MASS INDEX: 23.11 KG/M2

## 2023-10-12 DIAGNOSIS — C85.90 NON-HODGKIN'S LYMPHOMA, UNSPECIFIED BODY REGION, UNSPECIFIED NON-HODGKIN LYMPHOMA TYPE: Primary | ICD-10-CM

## 2023-10-12 DIAGNOSIS — D50.9 IRON DEFICIENCY ANEMIA, UNSPECIFIED IRON DEFICIENCY ANEMIA TYPE: ICD-10-CM

## 2023-10-12 NOTE — PROGRESS NOTES
HEMATOLOGY ONCOLOGY FOLLOW UP        Patient name: Ramon Silva  : 1945  MRN: [id number]  Primary Care Physician: Joey Crump APRN  Referring Physician: Joey Crump, *  Diagnosis:   Sinus Non-Hodgkin's lymphoma  Anemia  Chief Complaint   Patient presents with    Follow-up     Non-Hodgkin's lymphoma, unspecified body region, unspecified non-Hodgkin lymphoma type       History of Present Illness:  Mr. Silva is a 78 y.o.  male with a history of testicular lymphoma diagnosed in , status post chemotherapy with R-CHOP, who has been in complete remission for several years.  He presented to his primary care provider, Mariam Price, with symptoms of left-sided facial pain radiating to the eye, left-sided headache, and he was referred to ENT, Dr. Harry, for further evaluation.  CT scan of the paranasal sinuses was performed without contrast on 11/30/15 and it showed marked bony destruction and an expansile soft tissue mass involving the left frontal, left ethmoid, left sphenoid and left maxillary sinus.  There was a soft tissue mass encroaching in the left orbit and left side of the face towards the muscles of mastication as well as through the superior wall of the left sphenoid sinus.  After evaluation, Dr. Harry ordered MRI of the brain with and without contrast and that showed a multi-lobulated mass filling and expanding the left maxillary sinus as well as the left ethmoid air cells in the left locule of the sphenoid sinus, with extension into the left orbit and left medial cranial fossa.  Dr. Harry performed fiberoptic nasal endoscopy and biopsy of the left nasal mass on 12/11/15.  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  Cyclin D1 was negative.  All of these stains were done by immunohistochemistry.  Sections of the nasal  mass showed diffuse infiltrate by predominantly intermediate-to-large sized lymphoid cells.  The cells have a vesicular nuclei and inconspicuous cytoplasm.  FISH was negative for rearrangements involving BCL6, MYC, and IGH/BCL-2 [t(14;18)].  FISH for MYC/IGH was negative.  The patient denied any B symptoms, such as fevers, chills, night sweats, weight loss or fatigue.  He did not report any lymphadenopathy.  Dr. Harry referred the patient to us for further evaluation of his lymphoma.    Today, 12/30/15, the patient presents for initial evaluation.  He denies any fever, chills, night sweats, weight loss or lymph node swelling.  His only symptom is left facial pain.  The patient also reports left eye blurry vision.  12/30/15 - WBC 8.3, hemoglobin 13.3, platelet count 233, MCV 87.5.  1/7/16 - Echocardiogram:  Ejection fraction 50-55%.  Normal LVEF.  There is slight impaired LV relaxation.  1/8/16 - PET/CT scan:  Soft tissue mass originating in the left maxillary sinus erodes through the medial sinus wall spilling out into the nasopharynx.  It abates the nasal septum.  Lesion measures about 4 x 4 cm in AP and transverse dimension.  It measures about 8 cm in the cephalocaudal dimension.  SUV is 10.8  In the abdomen, there is a focus of activity in the left lobe of the liver which measures about 2 cm and has SUV 9.4.    1/11/16 - Bone marrow biopsy:  Normal cellular bone marrow 25-35%.  Adequate iron stores.  No malignancy.  Flow cytometry is negative.  Normal karyotype.  1/22/16 - Patient underwent left subclavian Port-A-Cath placement.  1/25/16 - WBC 8.1, hemoglobin 12.2, platelet count 216, MCV 87.1.  1/26/16 - Patient was seen at [facility] by Dr. Jamal Pisano.  He has recommended R-GCVP combination chemo treatment.  If the patient does not achieve complete response or if he has liver involvement, he will consider autologous stem cell transplantation after high-dose  chemotherapy.  If lymphoma is present in the liver, this tumor will likely represent a recurrent lymphoma rather than a new primary lymphoma of the sinuses.  2/2/16 - Hepatitis panel negative.  2/4/16 - Patient started chemotherapy with Rituximab, Gemcitabine, Cytoxan, Vincristine, and Prednisolone (R-GCVP q. 21 days regimen).  2/24/16 - Patient received cycle 2 of R-GCVP.  3/1/16 - Patient received intrathecal chemotherapy cycle 1 with Cytarabine plus Hydrocortisone plus Methotrexate.  3/2/16 - Patient received cycle 2, day 8 of Gemcitabine.  WBC 3.9, platelet count 145,000.  3/9/16 - WBC 9.1, hemoglobin 9.2, platelet count 33,000.  3/10/16 - Platelet count 32,000.  3/16/16 - Patient received cycle 3 of R-GCVP day 1.  3/23/16 - Creatinine 0.85, BUN 19.  Retic count 19,950.  Ferritin 369.  Erythropoietin 26.9.  Iron saturation 11%, TIBC 22.  CBC showed WBC 7.3, hemoglobin 8.1, platelet count 395,000, MCV 87.  3/23/16 - Patient received cycle 3, day 8 of Gemcitabine.  Patient received Neulasta 6 mg.   3/26/16 - Brain MRI with and without contrast:  There is residual soft tissue mass located in the left ethmoid and sphenoid sinus which is much smaller compared to the previous exam, now measuring 2.8 x 1.8 cm.  There is better aeration in the left maxillary sinus.  3/26/16 - MRI abdomen with and without contrast:   A small 1.3 x 1.1 cm rim enhancing lesion is seen near the dome of the medial segment of the left lobe of liver.  This area corresponds to the hypermetabolic activity seen on the previous PET scan.  3/30/16 - Neutrophils 53,000, hemoglobin 9.5, platelet count 328,000.  4/6/16 - Patient received cycle 4, day 1 of R-GCVP.  WBC 17.9, hemoglobin 9.6, platelet count 256,000.    4/7/16 - Patient received intrathecal chemotherapy with Cytarabine plus Hydrocortisone plus Methotrexate.    4/13/16 - Patient received cycle 4, day 8 of Gemcitabine.  WBC 4.8, hemoglobin 8.1, platelet count 147,000.  The patient had  hypotension and received IV fluids.    4/14/16 - Ultrasound of the liver:  There is a small nodule measuring 15 mm in the left lobe of the liver.    4/18/16 - WBC 3.6, hemoglobin 8.1, platelet count 73,000.    4/27/16 - Patient received cycle 5, day 1 R-GCVP.  4/28/16 - Patient received intrathecal chemotherapy.    5/4/16 - Patient received cycle 5, day 8 of Gemcitabine.  5/18/16 - Patient received cycle 6, day 1 of R-GCVP.  CBC shows WBC 10.7, hemoglobin 9.7, platelet count 417,000.  5/19/16 - Patient received cycle 5 of intrathecal chemotherapy with Methotrexate, Cytarabine and Hydrocortisone.  6/10/16 - Brain MRI with and without contrast:  Residual abnormal signal involving the region of the left sphenoid sinus.  There appears to be mild improvement compared to prior.  Findings may be due to chronic inflammation of sinusitis.  Residual tumor is possible but less likely.  Overall, there is improvement.  No evidence of acute focal ischemia.  Nonspecific white matter changes.    6/16/16 - Patient received the last, sixth cycle of intrathecal chemotherapy.  7/7/16 - PET/CT scan:  Small focal area of increased nuclear activity in the lateral segment of the left lobe of the liver is no longer visualized.    8/1/16 to 8/19/16 - Patient received 15 fractions of radiation therapy, total of 30 Gy.  11/3/16 - PET/CT scan:  Stable exam with no hypermetabolic activity seen within the neck, chest, abdomen and pelvis.  New sub-centimeter non-calcified pulmonary nodules within the posterior lateral aspect of the right upper lobe.  There may be inflammatory or infectious.  Stable bilateral sub-centimeter non-calcified pulmonary nodules which are not hypermetabolic.    1/28/17 - MRI of brain with and without contrast:  Abnormal signal in the left sphenoid sinus is redemonstrated.  It is similar in size to the previous MRI from June 2016 and measures about 23 x 14 mm.  No evidence of progression.  No evidence of  metastases.  2/17/17 - CT scan:  No abnormalities in the chest.  No lymphadenopathy.  5/8/17 - Patient underwent nasal endoscopy which showed maxillary sinusitis.  No tumor reported.  9/8/17 - PET/CT scan:  No evidence of metastasis or disease recurrence.  No hypermetabolic activity anywhere.  Stable partial opacification of the left sphenoid sinus likely due to chronic sinusitis.    3/3/18 - WBC 7.2, hemoglobin 11.4, platelet count 181,000, MCV 91.8.  Creatinine 0.77.  LFTs normal.  Albumin 4.    3/30/18 - Brain MRI with and without contrast:  Decreasing size of the abnormal soft tissue within the left pterygomaxillary fissure.  This area demonstrates mild heterogeneous contrast enhancement which is similar to prior studies.  No new enlarging soft tissue mass.  Mucosal thickening with the left maxillary and sphenoid sinuses.  No evidence of malignancy.  No acute intracranial abnormality.    8/24/18 - CT scan of chest, abdomen and pelvis:  No evidence of lymphadenopathy.  A 1 cm pulmonary nodule in the right middle lobe is not significantly changed.  No evidence of lymphadenopathy.  CT abdomen is also unremarkable.  Enlarged heterogenous prostate gland, hyperplasia is noted.  Small hiatal hernia.  Moderate osteopenia and degenerative changes in hips and spine.  Left renal cyst.    8/28/18 - Vitamin D 31.  PSA 1.6.    10/5/18 - DEXAscan:  Osteoporosis with T-score of -2.5 in the distal forearm.    3/4/19 - WBC 6.9, hemoglobin 12.8, platelet count 187,000, MCV 91.  Creatinine 1.03.  Total bilirubin 0.5.  .  5/8/2019 25 hydroxy vitamin D 32  8/28/2019: Brain MRI:1. The abnormal material in the left sphenoid sinus does not appear significantly changed.The brain appears stable and within normal for age. 3. Minor chronic maxillary sinus mucosal thickening \  2/19/2020: WBC 8.8, hemoglobin 9.2, MCV 92.7, platelets 280, creatinine 0.88, alk phos 122, 25-hydroxy vitamin D 31.3, B12 640, TSH 0.886  3/7/2020: CT abdomen  and pelvis- No evidence of lymphadenopathy in the abdomen pelvis or chest.  Stable right middle lobe noncalcified pulmonary nodule unchanged since 2017.  Degenerative changes of the lumbar spine and hips.  Chest with contrast:.  Stable 1 cm right middle lobe lung nodule.  No evidence of malignancy.  3/10/2020: Iron 35 low, ferritin 92, iron saturation 14% low, TIBC 258, folate greater than 20, WBC 5.8, hemoglobin 9.4, MCV 91, platelets 228, ESR 25  6/30/2020: Creatinine 0.9, LFTs normal, , WBC 6.48, hemoglobin 10.6, platelets 202, ferritin 71, iron saturation 25%, TIBC 242,  9/1/2020: Creatinine 1.33, BUN 26, LFTs normal, WBC 6.7, hemoglobin 10.9, platelets 190,, ESR 15, CRP 0.1,  3/23/2021: Stool Hemoccult is positive  4/28/2021: WBC 7.13, hemoglobin 11.6, platelets 163, MCV 94.1, ferritin 77.3, iron 59, iron saturation 41%, TIBC 286, LFTs normal, , creatinine 1.06,  10/2/2021 : Stable 9 mm nodule in the medial segment of the right middle lobe.  8/1/2022 WBC 4.5, hemoglobin 10.5, hematocrit 32.4, platelet 179  1/6/2023 - WBC 6.27, Hb 10.7, hct 31.9, plt 98    Subjective:  No new symptoms, no constitutional symptoms.       Past Medical History:   Diagnosis Date    Anemia     Arthralgia     Diabetes     Fatigue     GERD (gastroesophageal reflux disease)     Gout     Hyperlipidemia     Hypertension     Liver lesion     Non Hodgkin's lymphoma     Osteoporosis     Personal history of testicular cancer     Vitamin D deficiency     Weight loss        Past Surgical History:   Procedure Laterality Date    ANGIOGRAM - CONVERTED  07/05/2022    aif by Dr Henriquez    CARDIAC CATHETERIZATION N/A 7/5/2022    Procedure: PERIPHERAL ANGIOGRAPHY Right leg;  Surgeon: Theron Henriquez MD;  Location: [facility] CATH INVASIVE LOCATION;  Service: Cardiovascular;  Laterality: N/A;    CARDIAC CATHETERIZATION N/A 10/2/2023    Procedure: Right and Left Heart Cath with possible PCI;  Surgeon: Joey Kirkland, DO;   Location: [facility] CATH INVASIVE LOCATION;  Service: Cardiovascular;  Laterality: N/A;  Local and IV sedation    HIP SURGERY  2005    PORTACATH PLACEMENT      TESTICLE SURGERY Right 07/17/2000    right testicular excision       Current Outpatient Medications:     Accu-Chek Guide test strip, 1 each by Other route Daily. E 11.9, Disp: 50 each, Rfl: 2    Accu-Chek Softclix Lancets lancets, test daily as directed, Disp: 100 each, Rfl: 2    aspirin 81 MG tablet, Take 1 tablet by mouth Daily., Disp: , Rfl:     atorvastatin (LIPITOR) 40 MG tablet, Take 1 tablet by mouth every night at bedtime., Disp: 90 tablet, Rfl: 1    bacitracin 500 UNIT/GM ointment, Apply 1 application topically to the appropriate area as directed 2 (Two) Times a Day., Disp: 28 g, Rfl: 0    carvedilol (COREG) 25 MG tablet, Take 1 tablet by mouth 2 (Two) Times a Day With Meals., Disp: 180 tablet, Rfl: 1    Cetirizine HCl (ZyrTEC Allergy) 10 MG capsule, Take 10 mg by mouth Daily., Disp: , Rfl:     cilostazol (PLETAL) 100 MG tablet, Take 1 tablet by mouth 2 (Two) Times a Day., Disp: 60 tablet, Rfl: 1    colestipol (COLESTID) 1 g tablet, Take 1 tablet by mouth 2 (Two) Times a Day., Disp: 180 tablet, Rfl: 1    dapagliflozin Propanediol (Farxiga) 10 MG tablet, Take 10 mg by mouth Daily., Disp: 30 tablet, Rfl: 5    Diclofenac Sodium (VOLTAREN) 1 % gel gel, Apply 4 g topically to the appropriate area as directed 4 (Four) Times a Day As Needed (APPLY 4 TIMES A DAY AS NEED TO THE APPROPRIATE AREA)., Disp: 4 g, Rfl: 2    famotidine (PEPCID) 40 MG tablet, TAKE ONE TABLET BY MOUTH DAILY, Disp: 30 tablet, Rfl: 1    ferrous sulfate 324 (65 Fe) MG tablet delayed-release EC tablet, Take 1 tablet by mouth Daily With Breakfast., Disp: 90 tablet, Rfl: 1    finasteride (PROSCAR) 5 MG tablet, Take 1 tablet by mouth Daily., Disp: 90 tablet, Rfl: 1    gabapentin (NEURONTIN) 300 MG capsule, Take 1 capsule by mouth Every Evening., Disp: 90 capsule, Rfl: 0    ibandronate (BONIVA)  "150 MG tablet, Take 1 tablet by mouth Every 30 (Thirty) Days., Disp: 1 tablet, Rfl: 5    losartan (COZAAR) 50 MG tablet, Take 1 tablet by mouth Daily., Disp: 90 tablet, Rfl: 1    Menthol (GNP HERBAL MT), Apply  to the mouth or throat. Neuroflow plus, Disp: , Rfl:     metFORMIN ER (GLUCOPHAGE-XR) 500 MG 24 hr tablet, Take 2 tablets by mouth 2 (Two) Times a Day Before Meals., Disp: 360 tablet, Rfl: 1    Omega-3 Fatty Acids (OMEGA-3 FISH OIL PO), Take 1 capsule by mouth Daily., Disp: , Rfl:     pantoprazole (PROTONIX) 40 MG EC tablet, Take 1 tablet by mouth Daily., Disp: 90 tablet, Rfl: 1    traMADol (ULTRAM) 50 MG tablet, Take 1 tablet by mouth Every 12 (Twelve) Hours As Needed., Disp: , Rfl:     Vitamin D, Cholecalciferol, (CHOLECALCIFEROL) 10 MCG (400 UNIT) tablet, Take 1 tablet by mouth Daily., Disp: , Rfl:     No Known Allergies    Family History   Family history unknown: Yes       Family history is unknown by patient.    Social History     Tobacco Use    Smoking status: Never    Smokeless tobacco: Never   Vaping Use    Vaping Use: Never used   Substance Use Topics    Alcohol use: No    Drug use: Never     ROS:     12 point review system negative.  No significant fatigue.    Objective:    Vitals:    10/12/23 1405   BP: 118/61   Pulse: 72   Resp: 16   Temp: 98.2 øF (36.8 øC)   SpO2: 95%   Weight: 57 kg (125 lb 9.6 oz)   Height: 157.5 cm (62\")   PainSc: 0-No pain       ECOG  (1) Restricted in physically strenuous activity, ambulatory and able to do work of light nature    Physical Exam:     Physical Exam  Constitutional:       Appearance: Normal appearance.   HENT:      Head: Normocephalic and atraumatic.      Nose: Nose normal.   Eyes:      Extraocular Movements: Extraocular movements intact.      Pupils: Pupils are equal, round, and reactive to light.   Cardiovascular:      Rate and Rhythm: Normal rate and regular rhythm.      Pulses: Normal pulses.      Heart sounds: No murmur heard.  Pulmonary:      Effort: " Pulmonary effort is normal.      Breath sounds: Normal breath sounds.   Abdominal:      General: There is no distension.      Palpations: Abdomen is soft. There is no mass.      Tenderness: There is no abdominal tenderness.   Musculoskeletal:         General: Normal range of motion.      Cervical back: Normal range of motion and neck supple.      Comments: Right foot wrapped with bandage   Skin:     General: Skin is warm.   Neurological:      General: No focal deficit present.      Mental Status: He is alert.   Psychiatric:         Mood and Affect: Mood normal.       Lab Results - Last 18 Months   Lab Units 10/05/23  1518 10/02/23  0944 10/02/23  0941 10/02/23  0820 07/28/23  1207   WBC 10*3/mm3 7.30  --   --  6.60 4.87   HEMOGLOBIN g/dL 11.2*  --   --  12.0* 11.7*   HEMOGLOBIN, POC g/dL  --  10.9* 10.9*  --   --    HEMATOCRIT % 35.1*  --   --  37.4* 35.0*   HEMATOCRIT POC %  --  32* 32*  --   --    PLATELETS 10*3/mm3 183  --   --  177 129*   MCV fL 93.8  --   --  93.8 91.1     Lab Results - Last 18 Months   Lab Units 10/05/23  1518 10/02/23  0754 07/28/23  1207 01/06/23  1434   SODIUM mmol/L 142 140 143 141   POTASSIUM mmol/L 5.3* 4.3 5.0 4.9   CHLORIDE mmol/L 108* 102 108* 105   CO2 mmol/L 24.0 28.0 21.6* 27.0   BUN mg/dL 28* 23 19 28*   CREATININE mg/dL 1.66* 0.92 0.77 1.30*   CALCIUM mg/dL 9.6 9.8 9.4 9.6   BILIRUBIN mg/dL 0.4  --  0.3 0.3   ALK PHOS U/L 94  --  69 85   ALT (SGPT) U/L 22  --  28 24   AST (SGOT) U/L 19  --  25 19   GLUCOSE mg/dL 109* 145* 115* 199*       Lab Results   Component Value Date    GLUCOSE 109 (H) 10/05/2023    BUN 28 (H) 10/05/2023    CREATININE 1.66 (H) 10/05/2023    EGFRIFNONA 59 (L) 08/17/2021    EGFRIFAFRI 91 02/23/2021    BCR 16.9 10/05/2023    K 5.3 (H) 10/05/2023    CO2 24.0 10/05/2023    CALCIUM 9.6 10/05/2023    ALBUMIN 4.1 10/05/2023    LABIL2 1.6 05/08/2019    AST 19 10/05/2023    ALT 22 10/05/2023       Lab Results - Last 18 Months   Lab Units 10/02/23  0754 07/02/22  0220  "  INR  <0.93* 0.95   APTT seconds  --  27.6       Lab Results   Component Value Date    IRON 41 (L) 06/27/2022    TIBC 253 (L) 06/27/2022    FERRITIN 838.80 (H) 06/27/2022       Lab Results   Component Value Date    FOLATE >20.0 03/10/2020     No results found for: \"OCCULTBLD\"    No results found for: \"RETICCTPCT\"    Lab Results   Component Value Date    DLTXFYWR55 210 (L) 08/01/2022     No results found for: \"SPEP\", \"UPEP\"  LDH   Date Value Ref Range Status   07/28/2023 164 135 - 225 U/L Final     Lab Results   Component Value Date    SEDRATE 23 (H) 07/05/2022     No results found for: \"FIBRINOGEN\", \"HAPTOGLOBIN\"  Lab Results   Component Value Date    PTT 27.6 07/02/2022    INR <0.93 (L) 10/02/2023     No results found for: \"\"  No results found for: \"CEA\"  No components found for: \"CA-19-9\"  Lab Results   Component Value Date    PSA 1.520 05/25/2022     Assessment & Plan     Assessment:  History of high-grade non-Hodgkin's B cell lymphoma involving the left facial sinus:  Stage IE (extranodal) lymphoma.  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  It is unclear whether the patient has recurrent versus new de zulma lymphoma.  His PET scan also showed an indeterminate lesion in the left lobe of the liver, which could not be biopsied.  He was treated with Rituximab-GCVP chemotherapy x6 cycles.  The patient also received six cycles of intrathecal chemotherapy with Methotrexate, Cytarabine, and Hydrocortisone.  PET/CT scan showed a complete response.  His restaging PET scan on 11/3/16 continues to show remission.  He also received consolidation radiation therapy for 30 Gy finished on 8/19/16.    PET/CT scan in September 2017 does not show any evidence of disease recurrence.  Patient was reporting left-sided headaches.  repeat MRI on 3/30/18 and it fails to show any evidence of disease progression or recurrence.  Restaging CT scan on " 3/7/2020 does not show any evidence of disease recurrence.  Now again had a repeat CT scan in October 2021 with no concerning findings for recurrent disease.  There is a stable 9 mm nodule in the right middle lobe which has been stable for the last several years.  No routine CT imaging needed.  Has had increased fatigue. Monitor for now recent labs reveiwed.  Mild persistent anemia: Appears to be from chronic disease hemoglobin 10.5 repeat in 6 months.Positive stool Hemoccult test: Patient had a colonoscopy which was unremarkable this was in June 2021 hb has been stable. Could be persistent myelosupression post chemotherapy.   History of testicular lymphoma: In 2000  History of right knee osteoarthritis:   Benign prostatic hypertrophy followed by urology no worsening of symptoms.  History of liver lesion:  This was noted initially on PET scan.  Repeat MRI showed a 1.3 x 1.7 cm, rim-enhancing lesion which could not be biopsied.  His restaging PET scan on 9/8/16 does not show this lesion anymore.  No findings October 2021 CT.  No further imaging needed.  Osteoporosis:  This was seen on DEXAscan 10/5/18.  Calcium vitamin D.    PLAN:      Patient is more than 5 years out from treatment.  routine CT imaging not needed.    No s/s of recurrence  Continue Boniva.  Cbc iron studies cmp ldh in 6 months.      There are no diagnoses linked to this encounter.    No orders of the defined types were placed in this encounter.      Jessica Kwon MD 10/12/23

## 2023-10-13 ENCOUNTER — OFFICE VISIT (OUTPATIENT)
Dept: CARDIOLOGY | Facility: CLINIC | Age: 78
End: 2023-10-13
Payer: MEDICARE

## 2023-10-13 VITALS
SYSTOLIC BLOOD PRESSURE: 150 MMHG | DIASTOLIC BLOOD PRESSURE: 65 MMHG | HEART RATE: 70 BPM | OXYGEN SATURATION: 98 % | WEIGHT: 124 LBS | BODY MASS INDEX: 22.82 KG/M2 | HEIGHT: 62 IN

## 2023-10-13 DIAGNOSIS — I42.9 CARDIOMYOPATHY, UNSPECIFIED TYPE: Primary | ICD-10-CM

## 2023-10-13 DIAGNOSIS — I44.7 LBBB (LEFT BUNDLE BRANCH BLOCK): ICD-10-CM

## 2023-10-13 DIAGNOSIS — E78.2 MIXED HYPERLIPIDEMIA: ICD-10-CM

## 2023-10-13 DIAGNOSIS — R94.39 ABNORMAL NUCLEAR STRESS TEST: ICD-10-CM

## 2023-10-13 NOTE — PROGRESS NOTES
CC---Cardiomyopathy      Sub  78-year-old male patient with complex medical problems came for evaluation because of cardiomyopathy.  Patient had severe degenerative knee disease.  Preoperatively came for cardiac evaluation and was found to have severe LV dysfunction with EF less than 35% with left bundle branch block with abnormal stress test.  Further cardiac catheterization showed 50% mid LAD stenosis and severe cardiomyopathy with EF less than 30% with mild mitral regurgitation.  Patient also has additional history of severe peripheral vascular disease followed by vascular surgery.  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  He is regularly followed by hematology and oncology and has been in remission for more than 5 years.  He denies any syncope or any stroke.              Past Medical History:   Diagnosis Date    Anemia     Arthralgia     Diabetes     Fatigue     GERD (gastroesophageal reflux disease)     Gout     Hyperlipidemia     Hypertension     Liver lesion     Non Hodgkin's lymphoma     Osteoporosis     Personal history of testicular cancer     Vitamin D deficiency     Weight loss      Past Surgical History:   Procedure Laterality Date    ANGIOGRAM - CONVERTED  07/05/2022    aif by Dr Henriquez    CARDIAC CATHETERIZATION N/A 7/5/2022    Procedure: PERIPHERAL ANGIOGRAPHY Right leg;  Surgeon: Theron Henriquez MD;  Location:  [facility];  Service: Cardiovascular;  Laterality: N/A;    CARDIAC CATHETERIZATION N/A 10/2/2023    Procedure: Right and Left Heart Cath with possible PCI;  Surgeon: Joey Kirkland DO;  Location:  [facility];  Service: Cardiovascular;  Laterality: N/A;  Local and IV sedation    HIP SURGERY  2005    PORTACATH PLACEMENT      TESTICLE SURGERY Right 07/17/2000    right testicular excision      Family History   Family history unknown: Yes       Review of Systems   General:  positive for fatigue and tiredness  Eyes: No redness  Cardiovascular: No chest pain, no palpitations        Physical Exam    General:      well developed, well nourished, in no acute distress.    Head:      normocephalic and atraumatic.    Eyes:      PERRL/EOM intact, conjunctivae and sclerae clear without nystagmus.    Neck:      no  thyromegaly, trachea central with normal respiratory effort  Lungs:      clear bilaterally to auscultation.    Heart:       regular rate and rhythm, S1, S2 without murmurs, rubs, or gallops  Skin:      intact without lesions or rashes.    Psych:      alert and cooperative; normal mood and affect; normal attention span and concentration.          Assessment and plan  Extensive review of records was done including cardiology notes, review of echo report, Cath Lab report and also peripheral vascular disease reports and also notes from hematology and oncology.  Echocardiography with EF less than 35% with mild MR.  Cardiac catheterization showed mid LAD 50% stenosis with EF less than 30% with mild MR.  Left bundle branch block  Significant peripheral vascular disease  Presence of a port in the left subclavian  History of lymphoma including the involvement of testicular area and also sinuses needing 2 cycles of chemotherapy in remission in last 5 years.  Prior therapy also include CHOP regimen.  Creatinine of 1.44 and TSH is normal with normal potassium with hemoglobin of 10.1 g.    Chronic anemia.    Etiology of cardiomyopathy could be related to chemotherapy and other differential diagnosis could be completed infarct or nonischemic cardiomyopathy.  I would recommend cardiac MRI to evaluate etiology of cardiomyopathy and then decide on ICD therapy.  If patient needs an ICD placement including a CRT device because of left bundle branch block patient will need the subclavian port to be removed in the left  side prior to implantation which was discussed with the patient and family.  Blood pressure to be monitored at home and treatment goals for hypertension educated.  Close clinical follow-up.    More than 90 minutes on reviewing the chart spent        Electronically signed by Chago Lambert MD, 10/13/23, 12:49 PM EDT.

## 2023-10-24 ENCOUNTER — OFFICE VISIT (OUTPATIENT)
Dept: FAMILY MEDICINE CLINIC | Facility: CLINIC | Age: 78
End: 2023-10-24
Payer: MEDICARE

## 2023-10-24 VITALS
DIASTOLIC BLOOD PRESSURE: 50 MMHG | WEIGHT: 125 LBS | HEART RATE: 68 BPM | HEIGHT: 62 IN | RESPIRATION RATE: 18 BRPM | TEMPERATURE: 97.4 F | SYSTOLIC BLOOD PRESSURE: 100 MMHG | BODY MASS INDEX: 23 KG/M2 | OXYGEN SATURATION: 99 %

## 2023-10-24 DIAGNOSIS — I73.9 INTERMITTENT CLAUDICATION: ICD-10-CM

## 2023-10-24 DIAGNOSIS — N13.8 BENIGN PROSTATIC HYPERPLASIA WITH URINARY OBSTRUCTION: ICD-10-CM

## 2023-10-24 DIAGNOSIS — M81.8 OTHER OSTEOPOROSIS WITHOUT CURRENT PATHOLOGICAL FRACTURE: ICD-10-CM

## 2023-10-24 DIAGNOSIS — M25.50 ARTHRALGIA, UNSPECIFIED JOINT: ICD-10-CM

## 2023-10-24 DIAGNOSIS — K21.9 GASTROESOPHAGEAL REFLUX DISEASE, UNSPECIFIED WHETHER ESOPHAGITIS PRESENT: ICD-10-CM

## 2023-10-24 DIAGNOSIS — N40.1 BENIGN PROSTATIC HYPERPLASIA WITH URINARY OBSTRUCTION: ICD-10-CM

## 2023-10-24 DIAGNOSIS — I10 PRIMARY HYPERTENSION: ICD-10-CM

## 2023-10-24 DIAGNOSIS — Z23 NEED FOR IMMUNIZATION AGAINST INFLUENZA: Primary | ICD-10-CM

## 2023-10-24 DIAGNOSIS — K04.7 DENTAL ABSCESS: ICD-10-CM

## 2023-10-24 DIAGNOSIS — E11.65 TYPE 2 DIABETES MELLITUS WITH HYPERGLYCEMIA: ICD-10-CM

## 2023-10-24 LAB
EXPIRATION DATE: ABNORMAL
HBA1C MFR BLD: 6.3 % (ref 4.5–5.7)
Lab: ABNORMAL

## 2023-10-24 RX ORDER — METFORMIN HYDROCHLORIDE 500 MG/1
1000 TABLET, EXTENDED RELEASE ORAL
Qty: 360 TABLET | Refills: 1 | Status: SHIPPED | OUTPATIENT
Start: 2023-10-24

## 2023-10-24 RX ORDER — DAPAGLIFLOZIN 10 MG/1
1 TABLET, FILM COATED ORAL DAILY
Qty: 30 TABLET | Refills: 5 | Status: SHIPPED | OUTPATIENT
Start: 2023-10-24 | End: 2023-11-20 | Stop reason: SDUPTHER

## 2023-10-24 RX ORDER — CARVEDILOL 25 MG/1
25 TABLET ORAL 2 TIMES DAILY WITH MEALS
Qty: 180 TABLET | Refills: 1 | Status: SHIPPED | OUTPATIENT
Start: 2023-10-24

## 2023-10-24 RX ORDER — GABAPENTIN 300 MG/1
300 CAPSULE ORAL EVERY EVENING
Qty: 90 CAPSULE | Refills: 0 | Status: SHIPPED | OUTPATIENT
Start: 2023-10-24

## 2023-10-24 RX ORDER — CEFTRIAXONE 1 G/1
1 INJECTION, POWDER, FOR SOLUTION INTRAMUSCULAR; INTRAVENOUS ONCE
Status: COMPLETED | OUTPATIENT
Start: 2023-10-24 | End: 2023-10-24

## 2023-10-24 RX ORDER — LOSARTAN POTASSIUM 50 MG/1
50 TABLET ORAL DAILY
Qty: 90 TABLET | Refills: 1 | Status: SHIPPED | OUTPATIENT
Start: 2023-10-24

## 2023-10-24 RX ORDER — IBANDRONATE SODIUM 150 MG/1
150 TABLET, FILM COATED ORAL
Qty: 1 TABLET | Refills: 5 | Status: SHIPPED | OUTPATIENT
Start: 2023-10-24

## 2023-10-24 RX ORDER — PANTOPRAZOLE SODIUM 40 MG/1
40 TABLET, DELAYED RELEASE ORAL DAILY
Qty: 90 TABLET | Refills: 1 | Status: SHIPPED | OUTPATIENT
Start: 2023-10-24

## 2023-10-24 RX ORDER — AMOXICILLIN AND CLAVULANATE POTASSIUM 500; 125 MG/1; MG/1
1 TABLET, FILM COATED ORAL 2 TIMES DAILY
Qty: 20 TABLET | Refills: 0 | Status: SHIPPED | OUTPATIENT
Start: 2023-10-24 | End: 2023-11-03

## 2023-10-24 RX ORDER — FAMOTIDINE 40 MG/1
40 TABLET, FILM COATED ORAL DAILY
Qty: 30 TABLET | Refills: 1 | Status: SHIPPED | OUTPATIENT
Start: 2023-10-24

## 2023-10-24 RX ORDER — FINASTERIDE 5 MG/1
5 TABLET, FILM COATED ORAL DAILY
Qty: 90 TABLET | Refills: 1 | Status: SHIPPED | OUTPATIENT
Start: 2023-10-24

## 2023-10-24 RX ORDER — CILOSTAZOL 100 MG/1
100 TABLET ORAL 2 TIMES DAILY
Qty: 60 TABLET | Refills: 1 | Status: SHIPPED | OUTPATIENT
Start: 2023-10-24

## 2023-10-24 RX ADMIN — CEFTRIAXONE 1 G: 1 INJECTION, POWDER, FOR SOLUTION INTRAMUSCULAR; INTRAVENOUS at 10:56

## 2023-10-24 NOTE — PROGRESS NOTES
Chief Complaint  Follow-up (3 month follow up), Diabetes, Hypertension, and Pain (Patient has a tooth ache and is wanting antibiotic for tooth infection, he is having a hard time finding a dentist that will take his insurance. )    Subjective    History of Present Illness {CC  Problem List  Visit  Diagnosis   Encounters  Notes  Medications  Labs  Result Review Imaging  Media :23}     Ramon Silva presents to [facility] for Follow-up (3 month follow up), Diabetes, Hypertension, and Pain (Patient has a tooth ache and is wanting antibiotic for tooth infection, he is having a hard time finding a dentist that will take his insurance. ).      History of Present Illness  Patient is a 78 y.o. male  presents to the clinic today for routine 3-month follow-up for hypertension and type 2 diabetes mellitus, with concerns of dental abscess x3 weeks.  Patient denies any chest pain, shortness of breath, or any fevers.  Patient denies any known exposure to COVID, flu, or any other contagious illnesses.    In regards to hypertension, patient's blood pressure today is 100/50.  Patient has no concerns in regards to his hypertension at this time.  Patient denies any dizziness or other symptoms of hypotension today.  Patient is currently taking carvedilol, losartan, and trying to eat a low salt diet when possible to manage his hypertension.    In regards to type 2 diabetes, patient is currently taking Farxiga, metformin, and trying to eat a reduced carbohydrate diet to manage his type 2 diabetes.  Patient has no concerns in regards to his type 2 diabetes at this time or his diabetic medication.    In regards to dental abscess, patient shares that it has been in place now for about 3 weeks approximately.  Patient does have an appointment with a dentist to address this concern.  We discussed options and patient is agreeable to start antibiotics immediately so that by the time he sees the dentist if  "dental surgery is an option or removal then he can move forward with all options.         Review of Systems   Constitutional: Negative.  Negative for activity change, chills, fatigue and fever.   HENT: Negative.  Negative for congestion, dental problem, ear pain, hearing loss, rhinorrhea, sinus pain, sore throat, tinnitus and trouble swallowing.    Eyes: Negative.  Negative for pain and visual disturbance.   Respiratory: Negative.  Negative for cough, chest tightness, shortness of breath and wheezing.    Cardiovascular: Negative.  Negative for chest pain, palpitations and leg swelling.   Gastrointestinal: Negative.  Negative for abdominal pain, diarrhea, nausea and vomiting.   Endocrine: Negative.  Negative for polydipsia, polyphagia and polyuria.   Genitourinary: Negative.  Negative for difficulty urinating, dysuria, frequency and urgency.   Musculoskeletal: Negative.  Negative for arthralgias, back pain and myalgias.   Skin:  Positive for wound. Negative for color change, pallor and rash.   Allergic/Immunologic: Negative.  Negative for environmental allergies.   Neurological: Negative.  Negative for dizziness, speech difficulty, weakness, light-headedness, numbness and headaches.   Hematological: Negative.    Psychiatric/Behavioral: Negative.  Negative for confusion, decreased concentration, self-injury and suicidal ideas. The patient is not nervous/anxious.    All other systems reviewed and are negative.       Objective     Vital Signs:   /50 (BP Location: Left arm, Patient Position: Sitting, Cuff Size: Adult)   Pulse 68   Temp 97.4 °F (36.3 °C) (Oral)   Resp 18   Ht 157.5 cm (62\")   Wt 56.7 kg (125 lb)   SpO2 99%   BMI 22.86 kg/m²   Current Outpatient Medications on File Prior to Visit   Medication Sig Dispense Refill    Accu-Chek Guide test strip 1 each by Other route Daily. E 11.9 50 each 2    Accu-Chek Softclix Lancets lancets test daily as directed 100 each 2    aspirin 81 MG tablet Take 1 " tablet by mouth Daily.      atorvastatin (LIPITOR) 40 MG tablet Take 1 tablet by mouth every night at bedtime. 90 tablet 1    bacitracin 500 UNIT/GM ointment Apply 1 application topically to the appropriate area as directed 2 (Two) Times a Day. 28 g 0    Cetirizine HCl (ZyrTEC Allergy) 10 MG capsule Take 10 mg by mouth Daily.      colestipol (COLESTID) 1 g tablet Take 1 tablet by mouth 2 (Two) Times a Day. 180 tablet 1    ferrous sulfate 324 (65 Fe) MG tablet delayed-release EC tablet Take 1 tablet by mouth Daily With Breakfast. 90 tablet 1    Menthol (GNP HERBAL MT) Apply  to the mouth or throat. Neuroflow plus      Omega-3 Fatty Acids (OMEGA-3 FISH OIL PO) Take 1 capsule by mouth Daily.      traMADol (ULTRAM) 50 MG tablet Take 1 tablet by mouth Every 12 (Twelve) Hours As Needed.      Vitamin D, Cholecalciferol, (CHOLECALCIFEROL) 10 MCG (400 UNIT) tablet Take 1 tablet by mouth Daily.       No current facility-administered medications on file prior to visit.        Past Medical History:   Diagnosis Date    Anemia     Arthralgia     Diabetes     Fatigue     GERD (gastroesophageal reflux disease)     Gout     Hyperlipidemia     Hypertension     Liver lesion     Non Hodgkin's lymphoma     Osteoporosis     Personal history of testicular cancer     Vitamin D deficiency     Weight loss       Past Surgical History:   Procedure Laterality Date    ANGIOGRAM - CONVERTED  07/05/2022    aif by Dr Henriquez    CARDIAC CATHETERIZATION N/A 7/5/2022    Procedure: PERIPHERAL ANGIOGRAPHY Right leg;  Surgeon: Theron Henriquez MD;  Location: [facility] CATH INVASIVE LOCATION;  Service: Cardiovascular;  Laterality: N/A;    CARDIAC CATHETERIZATION N/A 10/2/2023    Procedure: Right and Left Heart Cath with possible PCI;  Surgeon: Joey Kirkland DO;  Location:  [facility] CATH INVASIVE LOCATION;  Service: Cardiovascular;  Laterality: N/A;  Local and IV sedation    HIP SURGERY  2005    PORTACATH PLACEMENT      TESTICLE SURGERY  Right 07/17/2000    right testicular excision      Family History   Family history unknown: Yes      Social History     Socioeconomic History    Marital status:    Tobacco Use    Smoking status: Never    Smokeless tobacco: Never   Vaping Use    Vaping Use: Never used   Substance and Sexual Activity    Alcohol use: No    Drug use: Never    Sexual activity: Defer         Office Visit on 10/24/2023   Component Date Value Ref Range Status    Hemoglobin A1C 10/24/2023 6.3 (A)  4.5 - 5.7 % Final    Lot Number 10/24/2023 [id number]   Final    Expiration Date 10/24/2023 02/19/2025   Final   Admission on 10/02/2023, Discharged on 10/02/2023   Component Date Value Ref Range Status    Glucose 10/02/2023 145 (H)  65 - 99 mg/dL Final    BUN 10/02/2023 23  8 - 23 mg/dL Final    Creatinine 10/02/2023 0.92  0.76 - 1.27 mg/dL Final    Sodium 10/02/2023 140  136 - 145 mmol/L Final    Potassium 10/02/2023 4.3  3.5 - 5.2 mmol/L Final    Slight hemolysis detected by analyzer. Results may be affected.    Chloride 10/02/2023 102  98 - 107 mmol/L Final    CO2 10/02/2023 28.0  22.0 - 29.0 mmol/L Final    Calcium 10/02/2023 9.8  8.6 - 10.5 mg/dL Final    BUN/Creatinine Ratio 10/02/2023 25.0  7.0 - 25.0 Final    Anion Gap 10/02/2023 10.0  5.0 - 15.0 mmol/L Final    eGFR 10/02/2023 85.1  >60.0 mL/min/1.73 Final    Protime 10/02/2023 10.0  9.6 - 11.7 Seconds Final    INR 10/02/2023 <0.93 (L)  0.93 - 1.10 Final    Total Cholesterol 10/02/2023 138  0 - 200 mg/dL Final    Triglycerides 10/02/2023 141  0 - 150 mg/dL Final    HDL Cholesterol 10/02/2023 59  40 - 60 mg/dL Final    LDL Cholesterol  10/02/2023 55  0 - 100 mg/dL Final    VLDL Cholesterol 10/02/2023 24  5 - 40 mg/dL Final    LDL/HDL Ratio 10/02/2023 0.86   Final    WBC 10/02/2023 6.60  3.40 - 10.80 10*3/mm3 Final    RBC 10/02/2023 3.98 (L)  4.14 - 5.80 10*6/mm3 Final    Hemoglobin 10/02/2023 12.0 (L)  13.0 - 17.7 g/dL Final    Hematocrit 10/02/2023 37.4 (L)  37.5 - 51.0 % Final     MCV 10/02/2023 93.8  79.0 - 97.0 fL Final    MCH 10/02/2023 30.0  26.6 - 33.0 pg Final    MCHC 10/02/2023 32.0  31.5 - 35.7 g/dL Final    RDW 10/02/2023 14.9  12.3 - 15.4 % Final    RDW-SD 10/02/2023 48.6  37.0 - 54.0 fl Final    MPV 10/02/2023 8.2  6.0 - 12.0 fL Final    Platelets 10/02/2023 177  140 - 450 10*3/mm3 Final    Neutrophil % 10/02/2023 60.5  42.7 - 76.0 % Final    Lymphocyte % 10/02/2023 27.8  19.6 - 45.3 % Final    Monocyte % 10/02/2023 8.2  5.0 - 12.0 % Final    Eosinophil % 10/02/2023 2.9  0.3 - 6.2 % Final    Basophil % 10/02/2023 0.6  0.0 - 1.5 % Final    Neutrophils, Absolute 10/02/2023 4.00  1.70 - 7.00 10*3/mm3 Final    Lymphocytes, Absolute 10/02/2023 1.80  0.70 - 3.10 10*3/mm3 Final    Monocytes, Absolute 10/02/2023 0.50  0.10 - 0.90 10*3/mm3 Final    Eosinophils, Absolute 10/02/2023 0.20  0.00 - 0.40 10*3/mm3 Final    Basophils, Absolute 10/02/2023 0.00  0.00 - 0.20 10*3/mm3 Final    nRBC 10/02/2023 0.0  0.0 - 0.2 /100 WBC Final    Ionized Calcium 10/02/2023 1.29  1.12 - 1.32 mmol/L Final    pH, Arterial 10/02/2023 7.350  7.350 - 7.450 pH units Final    pCO2, Arterial 10/02/2023 48.7 (H)  35.0 - 45.0 mm Hg Final    pO2, Arterial 10/02/2023 178.0 (H)  80.0 - 105.0 mm Hg Final    HCO3, Arterial 10/02/2023 27.0 (H)  22.0 - 26.0 mmol/L Final    Base Excess, Arterial 10/02/2023 1.0  0.0 - 3.0 mmol/L Final    O2 Saturation, Arterial 10/02/2023 100.0 (H)  95.0 - 98.0 % Final    Glucose 10/02/2023 149 (H)  70 - 105 mg/dL Final    Serial Number: [id number]:  [id number]    Sodium 10/02/2023 142  138 - 146 mmol/L Final    POC Potassium 10/02/2023 3.9  3.5 - 4.9 mmol/L Final    Hematocrit 10/02/2023 32 (L)  38 - 51 % Final    Hemoglobin 10/02/2023 10.9 (L)  12.0 - 17.0 g/dL Final    CO2 Content 10/02/2023 29 (H)  23 - 27 mmol/L Final    Glucose 10/02/2023 148 (H)  70 - 105 mg/dL Final    Serial Number: [id number]:  [id number]    Sodium 10/02/2023 142  138 - 146 mmol/L Final    POC Potassium 10/02/2023  3.9  3.5 - 4.9 mmol/L Final    Ionized Calcium 10/02/2023 1.29  1.12 - 1.32 mmol/L Final    Hematocrit 10/02/2023 32 (L)  38 - 51 % Final    Hemoglobin 10/02/2023 10.9 (L)  12.0 - 17.0 g/dL Final    pH, Venous 10/02/2023 7.310  7.310 - 7.410 pH Units Final    pCO2, Venous 10/02/2023 53.3 (H)  41.0 - 51.0 mm Hg Final    HCO3, Venous 10/02/2023 26.9  23.0 - 28.0 mmol/L Final    O2 Saturation, Venous 10/02/2023 28.0 (L)  45.0 - 75.0 % Final    pO2, Venous 10/02/2023 45.0 (H)  35.0 - 42.0 mm Hg Final   Hospital Outpatient Visit on 09/12/2023   Component Date Value Ref Range Status    EF(MOD-bp) 09/12/2023 40.1  % Final    LVIDd 09/12/2023 5.4  cm Final    LVIDs 09/12/2023 4.0  cm Final    IVSd 09/12/2023 0.90  cm Final    LVPWd 09/12/2023 0.90  cm Final    FS 09/12/2023 25.9  % Final    IVS/LVPW 09/12/2023 1.00  cm Final    ESV(cubed) 09/12/2023 64.2  ml Final    LV Sys Vol (BSA corrected) 09/12/2023 78.0  cm2 Final    EDV(cubed) 09/12/2023 157.5  ml Final    LV Ribera Vol (BSA corrected) 09/12/2023 142.3  cm2 Final    LVOT area 09/12/2023 4.2  cm2 Final    LV mass(C)d 09/12/2023 180.1  grams Final    LVOT diam 09/12/2023 2.31  cm Final    EDV(MOD-sp2) 09/12/2023 164.0  ml Final    EDV(MOD-sp4) 09/12/2023 219.0  ml Final    ESV(MOD-sp2) 09/12/2023 108.0  ml Final    ESV(MOD-sp4) 09/12/2023 120.0  ml Final    SV(MOD-sp2) 09/12/2023 56.0  ml Final    SV(MOD-sp4) 09/12/2023 99.0  ml Final    SI(MOD-sp2) 09/12/2023 36.4  ml/m2 Final    SI(MOD-sp4) 09/12/2023 64.3  ml/m2 Final    EF(MOD-sp2) 09/12/2023 34.1  % Final    EF(MOD-sp4) 09/12/2023 45.2  % Final    MV E max dhiraj 09/12/2023 63.0  cm/sec Final    MV A max dhiraj 09/12/2023 105.4  cm/sec Final    MV dec time 09/12/2023 0.13  msec Final    MV E/A 09/12/2023 0.60   Final    MV A dur 09/12/2023 0.16  sec Final    LA ESV Index (BP) 09/12/2023 33.0  ml/m2 Final    Med Peak E' Dhiraj 09/12/2023 4.1  cm/sec Final    Lat Peak E' Dhiraj 09/12/2023 5.7  cm/sec Final    Avg E/e' ratio  09/12/2023 12.86   Final    SV(LVOT) 09/12/2023 85.8  ml Final    SV(RVOT) 09/12/2023 48.3  ml Final    Qp/Qs 09/12/2023 0.56   Final    RV Base 09/12/2023 2.6  cm Final    RV Mid 09/12/2023 2.14  cm Final    RV Length 09/12/2023 8.1  cm Final    TAPSE (>1.6) 09/12/2023 2.44  cm Final    RV S' 09/12/2023 14.7  cm/sec Final    LV V1 max 09/12/2023 86.6  cm/sec Final    LV V1 max PG 09/12/2023 3.0  mmHg Final    LV V1 mean PG 09/12/2023 1.00  mmHg Final    LV V1 VTI 09/12/2023 20.5  cm Final    Ao pk jason 09/12/2023 102.0  cm/sec Final    Ao max PG 09/12/2023 4.2  mmHg Final    Ao mean PG 09/12/2023 2.00  mmHg Final    Ao V2 VTI 09/12/2023 25.7  cm Final    KEN(I,D) 09/12/2023 3.3  cm2 Final    AI P1/2t 09/12/2023 682.9  msec Final    MV max PG 09/12/2023 4.3  mmHg Final    MV mean PG 09/12/2023 1.56  mmHg Final    MV V2 VTI 09/12/2023 27.6  cm Final    MV P1/2t 09/12/2023 72.4  msec Final    MVA(P1/2t) 09/12/2023 3.0  cm2 Final    MVA(VTI) 09/12/2023 3.1  cm2 Final    MV dec slope 09/12/2023 291.1  cm/sec2 Final    MR max jason 09/12/2023 445.2  cm/sec Final    MR max PG 09/12/2023 79.3  mmHg Final    TR max jason 09/12/2023 241.1  cm/sec Final    TR max PG 09/12/2023 23.2  mmHg Final    RVOT diam 09/12/2023 2.12  cm Final    RV V1 max PG 09/12/2023 1.98  mmHg Final    RV V1 max 09/12/2023 70.3  cm/sec Final    RV V1 VTI 09/12/2023 13.6  cm Final    PA V2 max 09/12/2023 97.7  cm/sec Final    PA acc time 09/12/2023 0.09  sec Final    Ao root diam 09/12/2023 3.7  cm Final    ACS 09/12/2023 2.04  cm Final    Sinus 09/12/2023 3.3  cm Final    STJ 09/12/2023 2.7  cm Final    Dimensionless Index 09/12/2023 0.80  (DI) Final    RVSP(TR) 09/12/2023 26  mmHg Final    RAP systole 09/12/2023 3  mmHg Final    Ascending aorta 09/12/2023 3.2  cm Final    Aortic arch 09/12/2023 1.6  cm Final   Hospital Outpatient Visit on 09/12/2023   Component Date Value Ref Range Status     CV STRESS PROTOCOL 1 09/12/2023 Pharmacologic   Final     Stage 1 09/12/2023 1.0   Final    Duration Min Stage 1 09/12/2023 0   Final    Duration Sec Stage 1 09/12/2023 10   Final    Stress Dose Regadenoson Stage 1 09/12/2023 0.40   Final    Stress Comments Stage 1 09/12/2023 10 sec bolus injection   Final    Baseline HR 09/12/2023 59  bpm Final    Baseline BP 09/12/2023 137/51  mmHg Final    Peak HR 09/12/2023 100  bpm Final    Peak BP 09/12/2023 142/57  mmHg Final    Recovery HR 09/12/2023 90  bpm Final    Recovery BP 09/12/2023 142/50  mmHg Final    Target HR (85%) 09/12/2023 121  bpm Final    Max. Pred. HR (100%) 09/12/2023 142  bpm Final    Percent Max Pred HR 09/12/2023 70.42  % Final    Percent Target HR 09/12/2023 83  % Final    BH CV REST NUCLEAR ISOTOPE DOSE 09/12/2023 11.7  mCi Final    BH CV STRESS NUCLEAR ISOTOPE DOSE 09/12/2023 34.7  mCi Final    Nuc Stress EF 09/12/2023 32  % Final   Office Visit on 07/28/2023   Component Date Value Ref Range Status    Glucose 07/28/2023 115 (H)  65 - 99 mg/dL Final    BUN 07/28/2023 19  8 - 23 mg/dL Final    Creatinine 07/28/2023 0.77  0.76 - 1.27 mg/dL Final    Sodium 07/28/2023 143  136 - 145 mmol/L Final    Potassium 07/28/2023 5.0  3.5 - 5.2 mmol/L Final    Chloride 07/28/2023 108 (H)  98 - 107 mmol/L Final    CO2 07/28/2023 21.6 (L)  22.0 - 29.0 mmol/L Final    Calcium 07/28/2023 9.4  8.6 - 10.5 mg/dL Final    Total Protein 07/28/2023 6.4  6.0 - 8.5 g/dL Final    Albumin 07/28/2023 4.2  3.5 - 5.2 g/dL Final    ALT (SGPT) 07/28/2023 28  1 - 41 U/L Final    AST (SGOT) 07/28/2023 25  1 - 40 U/L Final    Alkaline Phosphatase 07/28/2023 69  39 - 117 U/L Final    Total Bilirubin 07/28/2023 0.3  0.0 - 1.2 mg/dL Final    Globulin 07/28/2023 2.2  gm/dL Final    A/G Ratio 07/28/2023 1.9  g/dL Final    BUN/Creatinine Ratio 07/28/2023 24.7  7.0 - 25.0 Final    Anion Gap 07/28/2023 13.4  5.0 - 15.0 mmol/L Final    eGFR 07/28/2023 91.6  >60.0 mL/min/1.73 Final    Hemoglobin A1C 07/28/2023 7.10 (H)  4.80 - 5.60 % Final     Microalbumin, Urine 07/28/2023 4.0  mg/dL Final    Total Cholesterol 07/28/2023 105  0 - 200 mg/dL Final    Triglycerides 07/28/2023 109  0 - 150 mg/dL Final    HDL Cholesterol 07/28/2023 43  40 - 60 mg/dL Final    LDL Cholesterol  07/28/2023 42  0 - 100 mg/dL Final    VLDL Cholesterol 07/28/2023 20  5 - 40 mg/dL Final    LDL/HDL Ratio 07/28/2023 0.93   Final    TSH 07/28/2023 0.838  0.270 - 4.200 uIU/mL Final    Color, UA 07/28/2023 Yellow  Yellow, Straw Final    Appearance, UA 07/28/2023 Clear  Clear Final    pH, UA 07/28/2023 6.0  5.0 - 8.0 Final    Specific Gravity, UA 07/28/2023 1.027  1.005 - 1.030 Final    Glucose, UA 07/28/2023 100 mg/dL (Trace) (A)  Negative Final    Ketones, UA 07/28/2023 Negative  Negative Final    Bilirubin, UA 07/28/2023 Negative  Negative Final    Blood, UA 07/28/2023 Negative  Negative Final    Protein, UA 07/28/2023 Trace (A)  Negative Final    Leuk Esterase, UA 07/28/2023 Negative  Negative Final    Nitrite, UA 07/28/2023 Negative  Negative Final    Urobilinogen, UA 07/28/2023 0.2 E.U./dL  0.2 - 1.0 E.U./dL Final    RBC, UA 07/28/2023 0-2  None Seen, 0-2 /HPF Final    WBC, UA 07/28/2023 0-2  None Seen, 0-2 /HPF Final    Bacteria, UA 07/28/2023 None Seen  None Seen /HPF Final    Squamous Epithelial Cells, UA 07/28/2023 0-2  None Seen, 0-2 /HPF Final    Hyaline Casts, UA 07/28/2023 None Seen  None Seen /LPF Final    Methodology 07/28/2023 Automated Microscopy   Final    WBC 07/28/2023 4.87  3.40 - 10.80 10*3/mm3 Final    RBC 07/28/2023 3.84 (L)  4.14 - 5.80 10*6/mm3 Final    Hemoglobin 07/28/2023 11.7 (L)  13.0 - 17.7 g/dL Final    Hematocrit 07/28/2023 35.0 (L)  37.5 - 51.0 % Final    MCV 07/28/2023 91.1  79.0 - 97.0 fL Final    MCH 07/28/2023 30.5  26.6 - 33.0 pg Final    MCHC 07/28/2023 33.4  31.5 - 35.7 g/dL Final    RDW 07/28/2023 13.1  12.3 - 15.4 % Final    RDW-SD 07/28/2023 43.4  37.0 - 54.0 fl Final    MPV 07/28/2023 13.0 (H)  6.0 - 12.0 fL Final    Platelets 07/28/2023 129  (L)  140 - 450 10*3/mm3 Final    Neutrophil % 07/28/2023 50.7  42.7 - 76.0 % Final    Lymphocyte % 07/28/2023 38.2  19.6 - 45.3 % Final    Monocyte % 07/28/2023 8.2  5.0 - 12.0 % Final    Eosinophil % 07/28/2023 2.3  0.3 - 6.2 % Final    Basophil % 07/28/2023 0.4  0.0 - 1.5 % Final    Immature Grans % 07/28/2023 0.2  0.0 - 0.5 % Final    Neutrophils, Absolute 07/28/2023 2.47  1.70 - 7.00 10*3/mm3 Final    Lymphocytes, Absolute 07/28/2023 1.86  0.70 - 3.10 10*3/mm3 Final    Monocytes, Absolute 07/28/2023 0.40  0.10 - 0.90 10*3/mm3 Final    Eosinophils, Absolute 07/28/2023 0.11  0.00 - 0.40 10*3/mm3 Final    Basophils, Absolute 07/28/2023 0.02  0.00 - 0.20 10*3/mm3 Final    Immature Grans, Absolute 07/28/2023 0.01  0.00 - 0.05 10*3/mm3 Final    nRBC 07/28/2023 0.0  0.0 - 0.2 /100 WBC Final    Color 07/28/2023 Yellow  Yellow, Straw, Dark Yellow, Alissa Final    Clarity, UA 07/28/2023 Slightly Cloudy (A)  Clear Final    Specific Gravity  07/28/2023 1.025  1.005 - 1.030 Final    pH, Urine 07/28/2023 6.0  5.0 - 8.0 Final    Leukocytes 07/28/2023 Negative  Negative Final    Nitrite, UA 07/28/2023 Negative  Negative Final    Protein, POC 07/28/2023 Trace (A)  Negative mg/dL Final    Glucose, UA 07/28/2023 Negative  Negative mg/dL Final    Ketones, UA 07/28/2023 Negative  Negative Final    Urobilinogen, UA 07/28/2023 0.2 E.U./dL  Normal, 0.2 E.U./dL Final    Bilirubin 07/28/2023 Negative  Negative Final    Blood, UA 07/28/2023 Negative  Negative Final    Lot Number 07/28/2023 [id number]   Final    Expiration Date 07/28/2023 09/30/2023   Final         Physical Exam  Vitals and nursing note reviewed.   Constitutional:       Appearance: Normal appearance. He is normal weight.   HENT:      Head: Normocephalic and atraumatic.      Mouth/Throat:      Dentition: Dental abscesses present.     Cardiovascular:      Rate and Rhythm: Normal rate and regular rhythm.      Pulses: Normal pulses.      Heart sounds: Normal heart sounds. No  murmur heard.     No friction rub. No gallop.   Pulmonary:      Effort: Pulmonary effort is normal. No respiratory distress.      Breath sounds: Normal breath sounds. No stridor. No wheezing, rhonchi or rales.   Chest:      Chest wall: No tenderness.   Abdominal:      General: Abdomen is flat. Bowel sounds are normal. There is no distension.      Palpations: Abdomen is soft. There is no mass.      Tenderness: There is no abdominal tenderness. There is no right CVA tenderness, left CVA tenderness, guarding or rebound.      Hernia: No hernia is present.   Skin:     General: Skin is warm and dry.      Capillary Refill: Capillary refill takes less than 2 seconds.      Coloration: Skin is not jaundiced or pale.   Neurological:      General: No focal deficit present.      Mental Status: He is alert and oriented to person, place, and time. Mental status is at baseline.      Motor: No weakness.      Coordination: Coordination normal.      Gait: Gait normal.   Psychiatric:         Mood and Affect: Mood normal.         Behavior: Behavior normal.         Thought Content: Thought content normal.         Judgment: Judgment normal.          Result Review  Data Reviewed:{ Labs  Result Review  Imaging  Med Tab  Media :23}   I have reviewed this patient's chart.  I have reviewed previous labs, previous imaging, previous medications, and previous encounters with notes that were available in this patient's chart.               Assessment and Plan {CC Problem List  Visit Diagnosis  ROS  Review (Popup)  [facility]  Quality  BestPractice  Medications  SmartSets  SnapShot Encounters  Media :23}   Diagnoses and all orders for this visit:    1. Need for immunization against influenza (Primary)  -     Fluzone High-Dose 65+yrs    2. Dental abscess  -     amoxicillin-clavulanate (Augmentin) 500-125 MG per tablet; Take 1 tablet by mouth 2 (Two) Times a Day for 10 days.  Dispense: 20 tablet; Refill: 0  -     cefTRIAXone  (ROCEPHIN) injection 1 g    3. Primary hypertension  -     carvedilol (COREG) 25 MG tablet; Take 1 tablet by mouth 2 (Two) Times a Day With Meals.  Dispense: 180 tablet; Refill: 1  -     losartan (COZAAR) 50 MG tablet; Take 1 tablet by mouth Daily.  Dispense: 90 tablet; Refill: 1    4. Intermittent claudication  -     cilostazol (PLETAL) 100 MG tablet; Take 1 tablet by mouth 2 (Two) Times a Day.  Dispense: 60 tablet; Refill: 1  -     gabapentin (NEURONTIN) 300 MG capsule; Take 1 capsule by mouth Every Evening.  Dispense: 90 capsule; Refill: 0    5. Type 2 diabetes mellitus with hyperglycemia  -     Discontinue: dapagliflozin Propanediol (Farxiga) 10 MG tablet; Take 10 mg by mouth Daily.  Dispense: 30 tablet; Refill: 5  -     metFORMIN ER (GLUCOPHAGE-XR) 500 MG 24 hr tablet; Take 2 tablets by mouth 2 (Two) Times a Day Before Meals.  Dispense: 360 tablet; Refill: 1  -     POC Glycosylated Hemoglobin (Hb A1C)    6. Benign prostatic hyperplasia with urinary obstruction  -     finasteride (PROSCAR) 5 MG tablet; Take 1 tablet by mouth Daily.  Dispense: 90 tablet; Refill: 1    7. Arthralgia, unspecified joint  -     Diclofenac Sodium (VOLTAREN) 1 % gel gel; Apply 4 g topically to the appropriate area as directed 4 (Four) Times a Day As Needed (APPLY 4 TIMES A DAY AS NEED TO THE APPROPRIATE AREA).  Dispense: 4 g; Refill: 2  -     gabapentin (NEURONTIN) 300 MG capsule; Take 1 capsule by mouth Every Evening.  Dispense: 90 capsule; Refill: 0    8. Other osteoporosis without current pathological fracture  -     ibandronate (BONIVA) 150 MG tablet; Take 1 tablet by mouth Every 30 (Thirty) Days.  Dispense: 1 tablet; Refill: 5    9. Gastroesophageal reflux disease, unspecified whether esophagitis present  -     famotidine (PEPCID) 40 MG tablet; Take 1 tablet by mouth Daily.  Dispense: 30 tablet; Refill: 1  -     pantoprazole (PROTONIX) 40 MG EC tablet; Take 1 tablet by mouth Daily.  Dispense: 90 tablet; Refill: 1        -Start  Augmentin to cover dental abscess.  -Multiple medication refills today.  -Fasting labs today or when patient is next available.  -Follow-up with dentist immediately to ensure dental abscess has been taking care of.  -ER red flags discussed with patient including risk versus benefit and education provided.  -Follow-up with me in 2 weeks after dental procedure is complete to ensure dental abscess is healed.    I spent 40 minutes caring for Ramon on this date of service. This time includes time spent by me in the following activities:preparing for the visit, reviewing tests, obtaining and/or reviewing a separately obtained history, performing a medically appropriate examination and/or evaluation , counseling and educating the patient/family/caregiver, ordering medications, tests, or procedures, referring and communicating with other health care professionals , documenting information in the medical record, independently interpreting results and communicating that information with the patient/family/caregiver, and care coordination.    Follow Up {Instructions Charge Capture  Follow-up Communications :23}     Patient was given instructions and counseling regarding his condition or for health maintenance advice. Please see specific information pulled into the AVS (placed there by myself) if appropriate.    Return in about 2 weeks (around 11/7/2023) for Recheck dental abscess.    BMI is within normal parameters. No other follow-up for BMI required.       CON Monahan, FNP-BC

## 2023-11-20 DIAGNOSIS — E11.65 TYPE 2 DIABETES MELLITUS WITH HYPERGLYCEMIA: ICD-10-CM

## 2023-11-20 RX ORDER — DAPAGLIFLOZIN 10 MG/1
1 TABLET, FILM COATED ORAL DAILY
Qty: 90 TABLET | Refills: 1 | Status: SHIPPED | OUTPATIENT
Start: 2023-11-20

## 2023-11-20 NOTE — TELEPHONE ENCOUNTER
90 DAY SUPPLY     Rx Refill Note  Requested Prescriptions     Pending Prescriptions Disp Refills    dapagliflozin Propanediol (Farxiga) 10 MG tablet 30 tablet 5     Sig: Take 10 mg by mouth Daily.      Last office visit with prescribing clinician: Visit date not found   Last telemedicine visit with prescribing clinician: Visit date not found   Next office visit with prescribing clinician: Visit date not found                         Would you like a call back once the refill request has been completed: [] Yes [] No    If the office needs to give you a call back, can they leave a voicemail: [] Yes [] No    Aj Pedraza Rep  11/20/23, 13:43 EST

## 2023-12-08 ENCOUNTER — PATIENT ROUNDING (BHMG ONLY) (OUTPATIENT)
Dept: CARDIOLOGY | Facility: CLINIC | Age: 78
End: 2023-12-08
Payer: MEDICARE

## 2023-12-08 NOTE — PROGRESS NOTES
A My-Chart message has been sent to the patient for PATIENT ROUNDING with [facility].

## 2024-01-24 ENCOUNTER — OFFICE VISIT (OUTPATIENT)
Dept: FAMILY MEDICINE CLINIC | Facility: CLINIC | Age: 79
End: 2024-01-24
Payer: MEDICARE

## 2024-01-24 VITALS
TEMPERATURE: 95.9 F | HEART RATE: 71 BPM | SYSTOLIC BLOOD PRESSURE: 110 MMHG | OXYGEN SATURATION: 98 % | RESPIRATION RATE: 18 BRPM | DIASTOLIC BLOOD PRESSURE: 60 MMHG | BODY MASS INDEX: 22.63 KG/M2 | WEIGHT: 123 LBS | HEIGHT: 62 IN

## 2024-01-24 DIAGNOSIS — I73.9 INTERMITTENT CLAUDICATION: ICD-10-CM

## 2024-01-24 DIAGNOSIS — Z13.29 SCREENING FOR THYROID DISORDER: ICD-10-CM

## 2024-01-24 DIAGNOSIS — E78.2 MIXED HYPERLIPIDEMIA: Primary | ICD-10-CM

## 2024-01-24 DIAGNOSIS — I15.2 HYPERTENSION DUE TO ENDOCRINE DISORDER: ICD-10-CM

## 2024-01-24 DIAGNOSIS — E11.65 TYPE 2 DIABETES MELLITUS WITH HYPERGLYCEMIA, WITHOUT LONG-TERM CURRENT USE OF INSULIN: ICD-10-CM

## 2024-01-24 DIAGNOSIS — M25.50 ARTHRALGIA, UNSPECIFIED JOINT: ICD-10-CM

## 2024-01-24 DIAGNOSIS — I10 PRIMARY HYPERTENSION: ICD-10-CM

## 2024-01-24 LAB
ALBUMIN SERPL-MCNC: 4.2 G/DL (ref 3.5–5.2)
ALBUMIN/GLOB SERPL: 1.8 G/DL
ALP SERPL-CCNC: 81 U/L (ref 39–117)
ALT SERPL W P-5'-P-CCNC: 15 U/L (ref 1–41)
ANION GAP SERPL CALCULATED.3IONS-SCNC: 12.9 MMOL/L (ref 5–15)
AST SERPL-CCNC: 18 U/L (ref 1–40)
BASOPHILS # BLD AUTO: 0.03 10*3/MM3 (ref 0–0.2)
BASOPHILS NFR BLD AUTO: 0.4 % (ref 0–1.5)
BILIRUB SERPL-MCNC: 0.4 MG/DL (ref 0–1.2)
BUN SERPL-MCNC: 17 MG/DL (ref 8–23)
BUN/CREAT SERPL: 19.3 (ref 7–25)
CALCIUM SPEC-SCNC: 9.9 MG/DL (ref 8.6–10.5)
CHLORIDE SERPL-SCNC: 105 MMOL/L (ref 98–107)
CHOLEST SERPL-MCNC: 109 MG/DL (ref 0–200)
CO2 SERPL-SCNC: 24.1 MMOL/L (ref 22–29)
CREAT SERPL-MCNC: 0.88 MG/DL (ref 0.76–1.27)
DEPRECATED RDW RBC AUTO: 41.9 FL (ref 37–54)
EGFRCR SERPLBLD CKD-EPI 2021: 88 ML/MIN/1.73
EOSINOPHIL # BLD AUTO: 0.17 10*3/MM3 (ref 0–0.4)
EOSINOPHIL NFR BLD AUTO: 2.3 % (ref 0.3–6.2)
ERYTHROCYTE [DISTWIDTH] IN BLOOD BY AUTOMATED COUNT: 13.1 % (ref 12.3–15.4)
GLOBULIN UR ELPH-MCNC: 2.3 GM/DL
GLUCOSE SERPL-MCNC: 149 MG/DL (ref 65–99)
HBA1C MFR BLD: 7.2 % (ref 4.8–5.6)
HCT VFR BLD AUTO: 38.8 % (ref 37.5–51)
HDLC SERPL-MCNC: 45 MG/DL (ref 40–60)
HGB BLD-MCNC: 12.4 G/DL (ref 13–17.7)
IMM GRANULOCYTES # BLD AUTO: 0.03 10*3/MM3 (ref 0–0.05)
IMM GRANULOCYTES NFR BLD AUTO: 0.4 % (ref 0–0.5)
LDLC SERPL CALC-MCNC: 35 MG/DL (ref 0–100)
LDLC/HDLC SERPL: 0.62 {RATIO}
LYMPHOCYTES # BLD AUTO: 2.1 10*3/MM3 (ref 0.7–3.1)
LYMPHOCYTES NFR BLD AUTO: 28.7 % (ref 19.6–45.3)
MCH RBC QN AUTO: 28.4 PG (ref 26.6–33)
MCHC RBC AUTO-ENTMCNC: 32 G/DL (ref 31.5–35.7)
MCV RBC AUTO: 88.8 FL (ref 79–97)
MONOCYTES # BLD AUTO: 0.54 10*3/MM3 (ref 0.1–0.9)
MONOCYTES NFR BLD AUTO: 7.4 % (ref 5–12)
NEUTROPHILS NFR BLD AUTO: 4.44 10*3/MM3 (ref 1.7–7)
NEUTROPHILS NFR BLD AUTO: 60.8 % (ref 42.7–76)
NRBC BLD AUTO-RTO: 0 /100 WBC (ref 0–0.2)
PLATELET # BLD AUTO: 202 10*3/MM3 (ref 140–450)
PMV BLD AUTO: 11 FL (ref 6–12)
POTASSIUM SERPL-SCNC: 4.5 MMOL/L (ref 3.5–5.2)
PROT SERPL-MCNC: 6.5 G/DL (ref 6–8.5)
RBC # BLD AUTO: 4.37 10*6/MM3 (ref 4.14–5.8)
SODIUM SERPL-SCNC: 142 MMOL/L (ref 136–145)
TRIGL SERPL-MCNC: 181 MG/DL (ref 0–150)
TSH SERPL DL<=0.05 MIU/L-ACNC: 0.87 UIU/ML (ref 0.27–4.2)
VLDLC SERPL-MCNC: 29 MG/DL (ref 5–40)
WBC NRBC COR # BLD AUTO: 7.31 10*3/MM3 (ref 3.4–10.8)

## 2024-01-24 PROCEDURE — 3074F SYST BP LT 130 MM HG: CPT | Performed by: REGISTERED NURSE

## 2024-01-24 PROCEDURE — 80053 COMPREHEN METABOLIC PANEL: CPT | Performed by: REGISTERED NURSE

## 2024-01-24 PROCEDURE — 1159F MED LIST DOCD IN RCRD: CPT | Performed by: REGISTERED NURSE

## 2024-01-24 PROCEDURE — 83036 HEMOGLOBIN GLYCOSYLATED A1C: CPT | Performed by: REGISTERED NURSE

## 2024-01-24 PROCEDURE — 80061 LIPID PANEL: CPT | Performed by: REGISTERED NURSE

## 2024-01-24 PROCEDURE — 84443 ASSAY THYROID STIM HORMONE: CPT | Performed by: REGISTERED NURSE

## 2024-01-24 PROCEDURE — 99215 OFFICE O/P EST HI 40 MIN: CPT | Performed by: REGISTERED NURSE

## 2024-01-24 PROCEDURE — 3078F DIAST BP <80 MM HG: CPT | Performed by: REGISTERED NURSE

## 2024-01-24 PROCEDURE — 85025 COMPLETE CBC W/AUTO DIFF WBC: CPT | Performed by: REGISTERED NURSE

## 2024-01-24 NOTE — PROGRESS NOTES
Chief Complaint  Follow-up (Patient is here for a 3 month follow up), Hypertension, Hyperlipidemia, and Diabetes    Subjective    History of Present Illness {CC  Problem List  Visit  Diagnosis   Encounters  Notes  Medications  Labs  Result Review Imaging  Media :23}     Ramon Silva presents to [facility] for Follow-up (Patient is here for a 3 month follow up), Hypertension, Hyperlipidemia, and Diabetes.      History of Present Illness  Patient is a 78 y.o. male  presents to the clinic today for 3-month follow-up for type 2 diabetes mellitus, hypertension, and hyperlipidemia.  Patient denies any chest pain, shortness of breath, or any fevers.  Patient denies any known exposure to COVID, flu, or any other contagious illnesses.    In regards to type 2 diabetes mellitus, patient is currently taking Farxiga 10 mg and metformin to manage this.  Patient is unsure of his fasting glucose.  Patient is agreeable to labs but does share that he does not check his glucose routinely.    In regards to hypertension, patient's blood pressure today is 110/60.  This is well-controlled. Patient is currently taking carvedilol and losartan to manage this.  Patient denies any issues in regards to his hypertension or hypertension medication at this time.  Patient denies any headaches or recent changes related to his blood pressure.    In regards to hyperlipidemia, patient is currently taking atorvastatin and omega-3 fish oil to manage this.  Patient denies any issues with his atorvastatin or hyperlipidemia at this time.       Review of Systems   Constitutional: Negative.  Negative for activity change, chills, fatigue and fever.   HENT: Negative.  Negative for congestion, dental problem, ear pain, hearing loss, rhinorrhea, sinus pain, sore throat, tinnitus and trouble swallowing.    Eyes: Negative.  Negative for pain and visual disturbance.   Respiratory: Negative.  Negative for cough, chest  "tightness, shortness of breath and wheezing.    Cardiovascular: Negative.  Negative for chest pain, palpitations and leg swelling.   Gastrointestinal: Negative.  Negative for abdominal pain, diarrhea, nausea and vomiting.   Endocrine: Negative.  Negative for polydipsia, polyphagia and polyuria.   Genitourinary: Negative.  Negative for difficulty urinating, dysuria, frequency and urgency.   Musculoskeletal: Negative.  Negative for arthralgias, back pain and myalgias.   Skin: Negative.  Negative for color change, pallor, rash and wound.   Allergic/Immunologic: Negative.  Negative for environmental allergies.   Neurological: Negative.  Negative for dizziness, speech difficulty, weakness, light-headedness, numbness and headaches.   Hematological: Negative.    Psychiatric/Behavioral: Negative.  Negative for confusion, decreased concentration, self-injury and suicidal ideas. The patient is not nervous/anxious.    All other systems reviewed and are negative.       Objective     Vital Signs:   /60 (BP Location: Left arm, Patient Position: Sitting, Cuff Size: Adult)   Pulse 71   Temp 95.9 °F (35.5 °C) (Temporal)   Resp 18   Ht 157.5 cm (62\")   Wt 55.8 kg (123 lb)   SpO2 98%   BMI 22.50 kg/m²   Current Outpatient Medications on File Prior to Visit   Medication Sig Dispense Refill    Accu-Chek Guide test strip 1 each by Other route Daily. E 11.9 50 each 2    Accu-Chek Softclix Lancets lancets test daily as directed 100 each 2    aspirin 81 MG tablet Take 1 tablet by mouth Daily.      atorvastatin (LIPITOR) 40 MG tablet Take 1 tablet by mouth every night at bedtime. 90 tablet 1    bacitracin 500 UNIT/GM ointment Apply 1 application topically to the appropriate area as directed 2 (Two) Times a Day. 28 g 0    Cetirizine HCl (ZyrTEC Allergy) 10 MG capsule Take 10 mg by mouth Daily.      cilostazol (PLETAL) 100 MG tablet Take 1 tablet by mouth 2 (Two) Times a Day. 60 tablet 1    colestipol (COLESTID) 1 g tablet Take 1 " tablet by mouth 2 (Two) Times a Day. 180 tablet 1    dapagliflozin Propanediol (Farxiga) 10 MG tablet Take 10 mg by mouth Daily. 90 tablet 1    Diclofenac Sodium (VOLTAREN) 1 % gel gel Apply 4 g topically to the appropriate area as directed 4 (Four) Times a Day As Needed (APPLY 4 TIMES A DAY AS NEED TO THE APPROPRIATE AREA). 4 g 2    famotidine (PEPCID) 40 MG tablet Take 1 tablet by mouth Daily. 30 tablet 1    ferrous sulfate 324 (65 Fe) MG tablet delayed-release EC tablet Take 1 tablet by mouth Daily With Breakfast. 90 tablet 1    finasteride (PROSCAR) 5 MG tablet Take 1 tablet by mouth Daily. 90 tablet 1    losartan (COZAAR) 50 MG tablet Take 1 tablet by mouth Daily. 90 tablet 1    Menthol (GNP HERBAL MT) Apply  to the mouth or throat. Neuroflow plus      metFORMIN ER (GLUCOPHAGE-XR) 500 MG 24 hr tablet Take 2 tablets by mouth 2 (Two) Times a Day Before Meals. 360 tablet 1    Omega-3 Fatty Acids (OMEGA-3 FISH OIL PO) Take 1 capsule by mouth Daily.      pantoprazole (PROTONIX) 40 MG EC tablet Take 1 tablet by mouth Daily. 90 tablet 1    traMADol (ULTRAM) 50 MG tablet Take 1 tablet by mouth Every 12 (Twelve) Hours As Needed.      Vitamin D, Cholecalciferol, (CHOLECALCIFEROL) 10 MCG (400 UNIT) tablet Take 1 tablet by mouth Daily.      [DISCONTINUED] carvedilol (COREG) 25 MG tablet Take 1 tablet by mouth 2 (Two) Times a Day With Meals. 180 tablet 1    [DISCONTINUED] gabapentin (NEURONTIN) 300 MG capsule Take 1 capsule by mouth Every Evening. 90 capsule 0     No current facility-administered medications on file prior to visit.        Past Medical History:   Diagnosis Date    Anemia     Arthralgia     Diabetes     Fatigue     GERD (gastroesophageal reflux disease)     Gout     Hyperlipidemia     Hypertension     Liver lesion     Non Hodgkin's lymphoma     Osteoporosis     Personal history of testicular cancer     Vitamin D deficiency     Weight loss       Past Surgical History:   Procedure Laterality Date    ANGIOGRAM -  CONVERTED  07/05/2022    aif by Dr Henriquez    CARDIAC CATHETERIZATION N/A 7/5/2022    Procedure: PERIPHERAL ANGIOGRAPHY Right leg;  Surgeon: Theron Henriquez MD;  Location: [facility] CATH INVASIVE LOC[facility];  Service: Cardiovascular;  Laterality: N/A;    CARDIAC CATHETERIZATION N/A 10/2/2023    Procedure: Right and Left Heart Cath with possible PCI;  Surgeon: Joey Kirkland DO;  Location:  [facility] CATH INVASIVE LOCATION;  Service: Cardiovascular;  Laterality: N/A;  Local and IV sedation    HIP SURGERY  2005    PORTACATH PLACEMENT      TESTICLE SURGERY Right 07/17/2000    right testicular excision      Family History   Family history unknown: Yes      Social History     Socioeconomic History    Marital status:    Tobacco Use    Smoking status: Never    Smokeless tobacco: Never   Vaping Use    Vaping Use: Never used   Substance and Sexual Activity    Alcohol use: No    Drug use: Never    Sexual activity: Defer         Office Visit on 01/24/2024   Component Date Value Ref Range Status    Glucose 01/24/2024 149 (H)  65 - 99 mg/dL Final    BUN 01/24/2024 17  8 - 23 mg/dL Final    Creatinine 01/24/2024 0.88  0.76 - 1.27 mg/dL Final    Sodium 01/24/2024 142  136 - 145 mmol/L Final    Potassium 01/24/2024 4.5  3.5 - 5.2 mmol/L Final    Chloride 01/24/2024 105  98 - 107 mmol/L Final    CO2 01/24/2024 24.1  22.0 - 29.0 mmol/L Final    Calcium 01/24/2024 9.9  8.6 - 10.5 mg/dL Final    Total Protein 01/24/2024 6.5  6.0 - 8.5 g/dL Final    Albumin 01/24/2024 4.2  3.5 - 5.2 g/dL Final    ALT (SGPT) 01/24/2024 15  1 - 41 U/L Final    AST (SGOT) 01/24/2024 18  1 - 40 U/L Final    Alkaline Phosphatase 01/24/2024 81  39 - 117 U/L Final    Total Bilirubin 01/24/2024 0.4  0.0 - 1.2 mg/dL Final    Globulin 01/24/2024 2.3  gm/dL Final    A/G Ratio 01/24/2024 1.8  g/dL Final    BUN/Creatinine Ratio 01/24/2024 19.3  7.0 - 25.0 Final    Anion Gap 01/24/2024 12.9  5.0 - 15.0 mmol/L Final    eGFR 01/24/2024 88.0  >60.0  mL/min/1.73 Final    Hemoglobin A1C 01/24/2024 7.20 (H)  4.80 - 5.60 % Final    Total Cholesterol 01/24/2024 109  0 - 200 mg/dL Final    Triglycerides 01/24/2024 181 (H)  0 - 150 mg/dL Final    HDL Cholesterol 01/24/2024 45  40 - 60 mg/dL Final    LDL Cholesterol  01/24/2024 35  0 - 100 mg/dL Final    VLDL Cholesterol 01/24/2024 29  5 - 40 mg/dL Final    LDL/HDL Ratio 01/24/2024 0.62   Final    TSH 01/24/2024 0.870  0.270 - 4.200 uIU/mL Final    WBC 01/24/2024 7.31  3.40 - 10.80 10*3/mm3 Final    RBC 01/24/2024 4.37  4.14 - 5.80 10*6/mm3 Final    Hemoglobin 01/24/2024 12.4 (L)  13.0 - 17.7 g/dL Final    Hematocrit 01/24/2024 38.8  37.5 - 51.0 % Final    MCV 01/24/2024 88.8  79.0 - 97.0 fL Final    MCH 01/24/2024 28.4  26.6 - 33.0 pg Final    MCHC 01/24/2024 32.0  31.5 - 35.7 g/dL Final    RDW 01/24/2024 13.1  12.3 - 15.4 % Final    RDW-SD 01/24/2024 41.9  37.0 - 54.0 fl Final    MPV 01/24/2024 11.0  6.0 - 12.0 fL Final    Platelets 01/24/2024 202  140 - 450 10*3/mm3 Final    Neutrophil % 01/24/2024 60.8  42.7 - 76.0 % Final    Lymphocyte % 01/24/2024 28.7  19.6 - 45.3 % Final    Monocyte % 01/24/2024 7.4  5.0 - 12.0 % Final    Eosinophil % 01/24/2024 2.3  0.3 - 6.2 % Final    Basophil % 01/24/2024 0.4  0.0 - 1.5 % Final    Immature Grans % 01/24/2024 0.4  0.0 - 0.5 % Final    Neutrophils, Absolute 01/24/2024 4.44  1.70 - 7.00 10*3/mm3 Final    Lymphocytes, Absolute 01/24/2024 2.10  0.70 - 3.10 10*3/mm3 Final    Monocytes, Absolute 01/24/2024 0.54  0.10 - 0.90 10*3/mm3 Final    Eosinophils, Absolute 01/24/2024 0.17  0.00 - 0.40 10*3/mm3 Final    Basophils, Absolute 01/24/2024 0.03  0.00 - 0.20 10*3/mm3 Final    Immature Grans, Absolute 01/24/2024 0.03  0.00 - 0.05 10*3/mm3 Final    nRBC 01/24/2024 0.0  0.0 - 0.2 /100 WBC Final         Physical Exam  Vitals and nursing note reviewed.   Constitutional:       Appearance: Normal appearance. He is normal weight.   HENT:      Head: Normocephalic and atraumatic.    Cardiovascular:      Rate and Rhythm: Normal rate and regular rhythm.      Pulses: Normal pulses.      Heart sounds: Normal heart sounds. No murmur heard.     No friction rub. No gallop.   Pulmonary:      Effort: Pulmonary effort is normal. No respiratory distress.      Breath sounds: Normal breath sounds. No stridor. No wheezing, rhonchi or rales.   Chest:      Chest wall: No tenderness.   Abdominal:      General: Abdomen is flat. Bowel sounds are normal. There is no distension.      Palpations: Abdomen is soft. There is no mass.      Tenderness: There is no abdominal tenderness. There is no right CVA tenderness, left CVA tenderness, guarding or rebound.      Hernia: No hernia is present.   Skin:     General: Skin is warm and dry.      Capillary Refill: Capillary refill takes less than 2 seconds.      Coloration: Skin is not jaundiced or pale.   Neurological:      General: No focal deficit present.      Mental Status: He is alert and oriented to person, place, and time. Mental status is at baseline.      Motor: No weakness.      Coordination: Coordination normal.      Gait: Gait normal.   Psychiatric:         Mood and Affect: Mood normal.         Behavior: Behavior normal.         Thought Content: Thought content normal.         Judgment: Judgment normal.          Result Review  Data Reviewed:{ Labs  Result Review  Imaging  Med Tab  Media :23}   I have reviewed this patient's chart.  I have reviewed previous labs, previous imaging, previous medications, and previous encounters with notes that were available in this patient's chart.               Assessment and Plan {CC Problem List  Visit Diagnosis  ROS  Review (Popup)  [facility]  Quality  BestPractice  Medications  SmartSets  SnapShot Encounters  Media :23}   Diagnoses and all orders for this visit:    1. 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 Type 2 diabetes mellitus with hyperglycemia, without long-term current use of insulin  -     Hemoglobin A1c    4. Screening for thyroid disorder  -     TSH    5. Primary hypertension  -     carvedilol (COREG) 25 MG tablet; Take 1 tablet by mouth 2 (Two) Times a Day With Meals.  Dispense: 180 tablet; Refill: 1    6. Intermittent claudication  -     gabapentin (NEURONTIN) 300 MG capsule; Take 1 capsule by mouth Every Evening.  Dispense: 90 capsule; Refill: 0    7. Arthralgia, unspecified joint  -     gabapentin (NEURONTIN) 300 MG capsule; Take 1 capsule by mouth Every Evening.  Dispense: 90 capsule; Refill: 0        -Fasting labs today.  -Med refill sent today.  -ER red flags discussed with patient including risk versus benefit and education provided.  -Follow-up with me in 3 months or sooner if needed.    I spent 40 minutes caring for Ramon on this date of service. This time includes time spent by me in the following activities:preparing for the visit, reviewing tests, obtaining and/or reviewing a separately obtained history, performing a medically appropriate examination and/or evaluation , counseling and educating the patient/family/caregiver, ordering medications, tests, or procedures, referring and communicating with other health care professionals , documenting information in the medical record, independently interpreting results and communicating that information with the patient/family/caregiver, and care coordination.    Follow Up {Instructions Charge Capture  Follow-up Communications :23}     Patient was given instructions and counseling regarding his condition or for health maintenance advice. Please see specific information pulled into the AVS (placed there by myself) if appropriate.    Return in about 3 months (around 4/24/2024) for routine follow up.    BMI is within normal parameters. No other follow-up for BMI required.       CON Monahan,  FNP-BC

## 2024-01-24 NOTE — PROGRESS NOTES
Venipuncture Blood Specimen Collection  Venipuncture performed in lt arm via venipuncture by Aj Herrera with good hemostasis. Patient tolerated the procedure well without complications.   01/24/24   Aj Herrera

## 2024-01-31 NOTE — PROGRESS NOTES
HEMATOLOGY ONCOLOGY FOLLOW UP        Patient name: aRmon Silva  : 1945  MRN: [id number]  Primary Care Physician: Joey Crump APRN  Referring Physician: Joey Crump, *  Diagnosis:   Sinus Non-Hodgkin's lymphoma  Anemia  Chief Complaint   Patient presents with    Follow-up     Non-Hodgkin's lymphoma, unspecified body region, unspecified non-Hodgkin lymphoma type       History of Present Illness:  Mr. Silva is a 78 y.o.  male with a history of testicular lymphoma diagnosed in , status post chemotherapy with R-CHOP, who has been in complete remission for several years.  He presented to his primary care provider, Mariam Price, with symptoms of left-sided facial pain radiating to the eye, left-sided headache, and he was referred to ENT, Dr. Harry, for further evaluation.  CT scan of the paranasal sinuses was performed without contrast on 11/30/15 and it showed marked bony destruction and an expansile soft tissue mass involving the left frontal, left ethmoid, left sphenoid and left maxillary sinus.  There was a soft tissue mass encroaching in the left orbit and left side of the face towards the muscles of mastication as well as through the superior wall of the left sphenoid sinus.  After evaluation, Dr. Harry ordered MRI of the brain with and without contrast and that showed a multi-lobulated mass filling and expanding the left maxillary sinus as well as the left ethmoid air cells in the left locule of the sphenoid sinus, with extension into the left orbit and left medial cranial fossa.  Dr. Harry performed fiberoptic nasal endoscopy and biopsy of the left nasal mass on 12/11/15.  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  Cyclin D1 was negative.  All of these stains were done by immunohistochemistry.  Sections of the nasal  mass showed diffuse infiltrate by predominantly intermediate-to-large sized lymphoid cells.  The cells have a vesicular nuclei and inconspicuous cytoplasm.  FISH was negative for rearrangements involving BCL6, MYC, and IGH/BCL-2 [t(14;18)].  FISH for MYC/IGH was negative.  The patient denied any B symptoms, such as fevers, chills, night sweats, weight loss or fatigue.  He did not report any lymphadenopathy.  Dr. Harry referred the patient to us for further evaluation of his lymphoma.    Today, 12/30/15, the patient presents for initial evaluation.  He denies any fever, chills, night sweats, weight loss or lymph node swelling.  His only symptom is left facial pain.  The patient also reports left eye blurry vision.  12/30/15 - WBC 8.3, hemoglobin 13.3, platelet count 233, MCV 87.5.  1/7/16 - Echocardiogram:  Ejection fraction 50-55%.  Normal LVEF.  There is slight impaired LV relaxation.  1/8/16 - PET/CT scan:  Soft tissue mass originating in the left maxillary sinus erodes through the medial sinus wall spilling out into the nasopharynx.  It abates the nasal septum.  Lesion measures about 4 x 4 cm in AP and transverse dimension.  It measures about 8 cm in the cephalocaudal dimension.  SUV is 10.8  In the abdomen, there is a focus of activity in the left lobe of the liver which measures about 2 cm and has SUV 9.4.    1/11/16 - Bone marrow biopsy:  Normal cellular bone marrow 25-35%.  Adequate iron stores.  No malignancy.  Flow cytometry is negative.  Normal karyotype.  1/22/16 - Patient underwent left subclavian Port-A-Cath placement.  1/25/16 - WBC 8.1, hemoglobin 12.2, platelet count 216, MCV 87.1.  1/26/16 - Patient was seen at [facility] by Dr. Jamal Pisano.  He has recommended R-GCVP combination chemo treatment.  If the patient does not achieve complete response or if he has liver involvement, he will consider autologous stem cell transplantation after high-dose  chemotherapy.  If lymphoma is present in the liver, this tumor will likely represent a recurrent lymphoma rather than a new primary lymphoma of the sinuses.  2/2/16 - Hepatitis panel negative.  2/4/16 - Patient started chemotherapy with Rituximab, Gemcitabine, Cytoxan, Vincristine, and Prednisolone (R-GCVP q. 21 days regimen).  2/24/16 - Patient received cycle 2 of R-GCVP.  3/1/16 - Patient received intrathecal chemotherapy cycle 1 with Cytarabine plus Hydrocortisone plus Methotrexate.  3/2/16 - Patient received cycle 2, day 8 of Gemcitabine.  WBC 3.9, platelet count 145,000.  3/9/16 - WBC 9.1, hemoglobin 9.2, platelet count 33,000.  3/10/16 - Platelet count 32,000.  3/16/16 - Patient received cycle 3 of R-GCVP day 1.  3/23/16 - Creatinine 0.85, BUN 19.  Retic count 19,950.  Ferritin 369.  Erythropoietin 26.9.  Iron saturation 11%, TIBC 22.  CBC showed WBC 7.3, hemoglobin 8.1, platelet count 395,000, MCV 87.  3/23/16 - Patient received cycle 3, day 8 of Gemcitabine.  Patient received Neulasta 6 mg.   3/26/16 - Brain MRI with and without contrast:  There is residual soft tissue mass located in the left ethmoid and sphenoid sinus which is much smaller compared to the previous exam, now measuring 2.8 x 1.8 cm.  There is better aeration in the left maxillary sinus.  3/26/16 - MRI abdomen with and without contrast:   A small 1.3 x 1.1 cm rim enhancing lesion is seen near the dome of the medial segment of the left lobe of liver.  This area corresponds to the hypermetabolic activity seen on the previous PET scan.  3/30/16 - Neutrophils 53,000, hemoglobin 9.5, platelet count 328,000.  4/6/16 - Patient received cycle 4, day 1 of R-GCVP.  WBC 17.9, hemoglobin 9.6, platelet count 256,000.    4/7/16 - Patient received intrathecal chemotherapy with Cytarabine plus Hydrocortisone plus Methotrexate.    4/13/16 - Patient received cycle 4, day 8 of Gemcitabine.  WBC 4.8, hemoglobin 8.1, platelet count 147,000.  The patient had  hypotension and received IV fluids.    4/14/16 - Ultrasound of the liver:  There is a small nodule measuring 15 mm in the left lobe of the liver.    4/18/16 - WBC 3.6, hemoglobin 8.1, platelet count 73,000.    4/27/16 - Patient received cycle 5, day 1 R-GCVP.  4/28/16 - Patient received intrathecal chemotherapy.    5/4/16 - Patient received cycle 5, day 8 of Gemcitabine.  5/18/16 - Patient received cycle 6, day 1 of R-GCVP.  CBC shows WBC 10.7, hemoglobin 9.7, platelet count 417,000.  5/19/16 - Patient received cycle 5 of intrathecal chemotherapy with Methotrexate, Cytarabine and Hydrocortisone.  6/10/16 - Brain MRI with and without contrast:  Residual abnormal signal involving the region of the left sphenoid sinus.  There appears to be mild improvement compared to prior.  Findings may be due to chronic inflammation of sinusitis.  Residual tumor is possible but less likely.  Overall, there is improvement.  No evidence of acute focal ischemia.  Nonspecific white matter changes.    6/16/16 - Patient received the last, sixth cycle of intrathecal chemotherapy.  7/7/16 - PET/CT scan:  Small focal area of increased nuclear activity in the lateral segment of the left lobe of the liver is no longer visualized.    8/1/16 to 8/19/16 - Patient received 15 fractions of radiation therapy, total of 30 Gy.  11/3/16 - PET/CT scan:  Stable exam with no hypermetabolic activity seen within the neck, chest, abdomen and pelvis.  New sub-centimeter non-calcified pulmonary nodules within the posterior lateral aspect of the right upper lobe.  There may be inflammatory or infectious.  Stable bilateral sub-centimeter non-calcified pulmonary nodules which are not hypermetabolic.    1/28/17 - MRI of brain with and without contrast:  Abnormal signal in the left sphenoid sinus is redemonstrated.  It is similar in size to the previous MRI from June 2016 and measures about 23 x 14 mm.  No evidence of progression.  No evidence of  metastases.  2/17/17 - CT scan:  No abnormalities in the chest.  No lymphadenopathy.  5/8/17 - Patient underwent nasal endoscopy which showed maxillary sinusitis.  No tumor reported.  9/8/17 - PET/CT scan:  No evidence of metastasis or disease recurrence.  No hypermetabolic activity anywhere.  Stable partial opacification of the left sphenoid sinus likely due to chronic sinusitis.    3/3/18 - WBC 7.2, hemoglobin 11.4, platelet count 181,000, MCV 91.8.  Creatinine 0.77.  LFTs normal.  Albumin 4.    3/30/18 - Brain MRI with and without contrast:  Decreasing size of the abnormal soft tissue within the left pterygomaxillary fissure.  This area demonstrates mild heterogeneous contrast enhancement which is similar to prior studies.  No new enlarging soft tissue mass.  Mucosal thickening with the left maxillary and sphenoid sinuses.  No evidence of malignancy.  No acute intracranial abnormality.    8/24/18 - CT scan of chest, abdomen and pelvis:  No evidence of lymphadenopathy.  A 1 cm pulmonary nodule in the right middle lobe is not significantly changed.  No evidence of lymphadenopathy.  CT abdomen is also unremarkable.  Enlarged heterogenous prostate gland, hyperplasia is noted.  Small hiatal hernia.  Moderate osteopenia and degenerative changes in hips and spine.  Left renal cyst.    8/28/18 - Vitamin D 31.  PSA 1.6.    10/5/18 - DEXAscan:  Osteoporosis with T-score of -2.5 in the distal forearm.    3/4/19 - WBC 6.9, hemoglobin 12.8, platelet count 187,000, MCV 91.  Creatinine 1.03.  Total bilirubin 0.5.  .  5/8/2019 25 hydroxy vitamin D 32  8/28/2019: Brain MRI:1. The abnormal material in the left sphenoid sinus does not appear significantly changed.The brain appears stable and within normal for age. 3. Minor chronic maxillary sinus mucosal thickening \  2/19/2020: WBC 8.8, hemoglobin 9.2, MCV 92.7, platelets 280, creatinine 0.88, alk phos 122, 25-hydroxy vitamin D 31.3, B12 640, TSH 0.886  3/7/2020: CT abdomen  and pelvis- No evidence of lymphadenopathy in the abdomen pelvis or chest.  Stable right middle lobe noncalcified pulmonary nodule unchanged since 2017.  Degenerative changes of the lumbar spine and hips.  Chest with contrast:.  Stable 1 cm right middle lobe lung nodule.  No evidence of malignancy.  3/10/2020: Iron 35 low, ferritin 92, iron saturation 14% low, TIBC 258, folate greater than 20, WBC 5.8, hemoglobin 9.4, MCV 91, platelets 228, ESR 25  6/30/2020: Creatinine 0.9, LFTs normal, , WBC 6.48, hemoglobin 10.6, platelets 202, ferritin 71, iron saturation 25%, TIBC 242,  9/1/2020: Creatinine 1.33, BUN 26, LFTs normal, WBC 6.7, hemoglobin 10.9, platelets 190,, ESR 15, CRP 0.1,  3/23/2021: Stool Hemoccult is positive  4/28/2021: WBC 7.13, hemoglobin 11.6, platelets 163, MCV 94.1, ferritin 77.3, iron 59, iron saturation 41%, TIBC 286, LFTs normal, , creatinine 1.06,  10/2/2021 : Stable 9 mm nodule in the medial segment of the right middle lobe.  8/1/2022 WBC 4.5, hemoglobin 10.5, hematocrit 32.4, platelet 179  1/6/2023 - WBC 6.27, Hb 10.7, hct 31.9, plt 98  1/24/24 - WBC 7.31, hb 12.4, plt 202,     Subjective:  No new symptoms, no weight loss, no night sweats      Past Medical History:   Diagnosis Date    Anemia     Arthralgia     Diabetes     Fatigue     GERD (gastroesophageal reflux disease)     Gout     Hyperlipidemia     Hypertension     Liver lesion     Non Hodgkin's lymphoma     Osteoporosis     Personal history of testicular cancer     Vitamin D deficiency     Weight loss        Past Surgical History:   Procedure Laterality Date    ANGIOGRAM - CONVERTED  07/05/2022    aif by Dr Henriquez    CARDIAC CATHETERIZATION N/A 7/5/2022    Procedure: PERIPHERAL ANGIOGRAPHY Right leg;  Surgeon: Theron Henriquez MD;  Location: [facility] CATH INVASIVE LOCATION;  Service: Cardiovascular;  Laterality: N/A;    CARDIAC CATHETERIZATION N/A 10/2/2023    Procedure: Right and Left Heart Cath with possible PCI;   Surgeon: Joey Kirkland DO;  Location: [facility] INVASIVE LOCATION;  Service: Cardiovascular;  Laterality: N/A;  Local and IV sedation    HIP SURGERY  2005    PORTACATH PLACEMENT      TESTICLE SURGERY Right 07/17/2000    right testicular excision       Current Outpatient Medications:     ibandronate (BONIVA) 150 MG tablet, Take 1 tablet by mouth Every 30 (Thirty) Days., Disp: 1 tablet, Rfl: 5    Accu-Chek Guide test strip, 1 each by Other route Daily. E 11.9, Disp: 50 each, Rfl: 2    Accu-Chek Softclix Lancets lancets, test daily as directed, Disp: 100 each, Rfl: 2    aspirin 81 MG tablet, Take 1 tablet by mouth Daily., Disp: , Rfl:     atorvastatin (LIPITOR) 40 MG tablet, Take 1 tablet by mouth every night at bedtime., Disp: 90 tablet, Rfl: 1    bacitracin 500 UNIT/GM ointment, Apply 1 application topically to the appropriate area as directed 2 (Two) Times a Day., Disp: 28 g, Rfl: 0    carvedilol (COREG) 25 MG tablet, Take 1 tablet by mouth 2 (Two) Times a Day With Meals., Disp: 180 tablet, Rfl: 1    Cetirizine HCl (ZyrTEC Allergy) 10 MG capsule, Take 10 mg by mouth Daily., Disp: , Rfl:     cilostazol (PLETAL) 100 MG tablet, Take 1 tablet by mouth 2 (Two) Times a Day., Disp: 60 tablet, Rfl: 1    colestipol (COLESTID) 1 g tablet, Take 1 tablet by mouth 2 (Two) Times a Day., Disp: 180 tablet, Rfl: 1    dapagliflozin Propanediol (Farxiga) 10 MG tablet, Take 10 mg by mouth Daily., Disp: 90 tablet, Rfl: 1    Diclofenac Sodium (VOLTAREN) 1 % gel gel, Apply 4 g topically to the appropriate area as directed 4 (Four) Times a Day As Needed (APPLY 4 TIMES A DAY AS NEED TO THE APPROPRIATE AREA)., Disp: 4 g, Rfl: 2    famotidine (PEPCID) 40 MG tablet, Take 1 tablet by mouth Daily., Disp: 30 tablet, Rfl: 1    ferrous sulfate 324 (65 Fe) MG tablet delayed-release EC tablet, Take 1 tablet by mouth Daily With Breakfast., Disp: 90 tablet, Rfl: 1    finasteride (PROSCAR) 5 MG tablet, Take 1 tablet by mouth Daily.,  "Disp: 90 tablet, Rfl: 1    gabapentin (NEURONTIN) 300 MG capsule, Take 1 capsule by mouth Every Evening., Disp: 90 capsule, Rfl: 0    losartan (COZAAR) 50 MG tablet, Take 1 tablet by mouth Daily., Disp: 90 tablet, Rfl: 1    Menthol (GNP HERBAL MT), Apply  to the mouth or throat. Neuroflow plus, Disp: , Rfl:     metFORMIN ER (GLUCOPHAGE-XR) 500 MG 24 hr tablet, Take 2 tablets by mouth 2 (Two) Times a Day Before Meals., Disp: 360 tablet, Rfl: 1    Omega-3 Fatty Acids (OMEGA-3 FISH OIL PO), Take 1 capsule by mouth Daily., Disp: , Rfl:     pantoprazole (PROTONIX) 40 MG EC tablet, Take 1 tablet by mouth Daily., Disp: 90 tablet, Rfl: 1    traMADol (ULTRAM) 50 MG tablet, Take 1 tablet by mouth Every 12 (Twelve) Hours As Needed., Disp: , Rfl:     Vitamin D, Cholecalciferol, (CHOLECALCIFEROL) 10 MCG (400 UNIT) tablet, Take 1 tablet by mouth Daily., Disp: , Rfl:     No Known Allergies    Family History   Family history unknown: Yes       Family history is unknown by patient.    Social History     Tobacco Use    Smoking status: Never    Smokeless tobacco: Never   Vaping Use    Vaping Use: Never used   Substance Use Topics    Alcohol use: No    Drug use: Never     ROS:     12 point review system negative.  No significant fatigue.    Objective:    Vitals:    02/01/24 1400   BP: 137/67   Pulse: 82   Resp: 16   Temp: 98.2 °F (36.8 °C)   SpO2: 95%   Weight: 56.1 kg (123 lb 9.6 oz)   Height: 157.5 cm (62\")   PainSc:   4   PainLoc: Generalized         ECOG  (1) Restricted in physically strenuous activity, ambulatory and able to do work of light nature    Physical Exam:     Physical Exam  Constitutional:       Appearance: Normal appearance.   HENT:      Head: Normocephalic and atraumatic.      Nose: Nose normal.   Eyes:      Extraocular Movements: Extraocular movements intact.      Pupils: Pupils are equal, round, and reactive to light.   Cardiovascular:      Rate and Rhythm: Normal rate and regular rhythm.      Pulses: Normal pulses. "      Heart sounds: No murmur heard.  Pulmonary:      Effort: Pulmonary effort is normal.      Breath sounds: Normal breath sounds.   Abdominal:      General: There is no distension.      Palpations: Abdomen is soft. There is no mass.      Tenderness: There is no abdominal tenderness.   Musculoskeletal:         General: Normal range of motion.      Cervical back: Normal range of motion and neck supple.      Comments: Right foot wrapped with bandage   Skin:     General: Skin is warm.   Neurological:      General: No focal deficit present.      Mental Status: He is alert.   Psychiatric:         Mood and Affect: Mood normal.       Lab Results - Last 18 Months   Lab Units 01/24/24  1612 10/05/23  1518 10/02/23  0944 10/02/23  0941 10/02/23  0820   WBC 10*3/mm3 7.31 7.30  --   --  6.60   HEMOGLOBIN g/dL 12.4* 11.2*  --   --  12.0*   HEMOGLOBIN, POC g/dL  --   --  10.9*   < >  --    HEMATOCRIT % 38.8 35.1*  --   --  37.4*   HEMATOCRIT POC %  --   --  32*   < >  --    PLATELETS 10*3/mm3 202 183  --   --  177   MCV fL 88.8 93.8  --   --  93.8    < > = values in this interval not displayed.     Lab Results - Last 18 Months   Lab Units 01/24/24  1612 10/05/23  1518 10/02/23  0754 07/28/23  1207   SODIUM mmol/L 142 142 140 143   POTASSIUM mmol/L 4.5 5.3* 4.3 5.0   CHLORIDE mmol/L 105 108* 102 108*   CO2 mmol/L 24.1 24.0 28.0 21.6*   BUN mg/dL 17 28* 23 19   CREATININE mg/dL 0.88 1.66* 0.92 0.77   CALCIUM mg/dL 9.9 9.6 9.8 9.4   BILIRUBIN mg/dL 0.4 0.4  --  0.3   ALK PHOS U/L 81 94  --  69   ALT (SGPT) U/L 15 22  --  28   AST (SGOT) U/L 18 19  --  25   GLUCOSE mg/dL 149* 109* 145* 115*       Lab Results   Component Value Date    GLUCOSE 149 (H) 01/24/2024    BUN 17 01/24/2024    CREATININE 0.88 01/24/2024    EGFRIFNONA 59 (L) 08/17/2021    EGFRIFAFRI 91 02/23/2021    BCR 19.3 01/24/2024    K 4.5 01/24/2024    CO2 24.1 01/24/2024    CALCIUM 9.9 01/24/2024    ALBUMIN 4.2 01/24/2024    LABIL2 1.6 05/08/2019    AST 18 01/24/2024     "ALT 15 01/24/2024       Lab Results - Last 18 Months   Lab Units 10/02/23  0754   INR  <0.93*       Lab Results   Component Value Date    IRON 41 (L) 06/27/2022    TIBC 253 (L) 06/27/2022    FERRITIN 838.80 (H) 06/27/2022       Lab Results   Component Value Date    FOLATE >20.0 03/10/2020     No results found for: \"OCCULTBLD\"    No results found for: \"RETICCTPCT\"    Lab Results   Component Value Date    MVDEILLP08 210 (L) 08/01/2022     No results found for: \"SPEP\", \"UPEP\"  LDH   Date Value Ref Range Status   07/28/2023 164 135 - 225 U/L Final     Lab Results   Component Value Date    SEDRATE 23 (H) 07/05/2022     No results found for: \"FIBRINOGEN\", \"HAPTOGLOBIN\"  Lab Results   Component Value Date    PTT 27.6 07/02/2022    INR <0.93 (L) 10/02/2023     No results found for: \"\"  No results found for: \"CEA\"  No components found for: \"CA-19-9\"  Lab Results   Component Value Date    PSA 1.520 05/25/2022     Assessment & Plan     Assessment:  History of high-grade non-Hodgkin’s B cell lymphoma involving the left facial sinus:  Stage IE (extranodal) lymphoma.  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  It is unclear whether the patient has recurrent versus new de zulma lymphoma.  His PET scan also showed an indeterminate lesion in the left lobe of the liver, which could not be biopsied.  He was treated with Rituximab-GCVP chemotherapy x6 cycles.  The patient also received six cycles of intrathecal chemotherapy with Methotrexate, Cytarabine, and Hydrocortisone.  PET/CT scan showed a complete response.  His restaging PET scan on 11/3/16 continues to show remission.  He also received consolidation radiation therapy for 30 Gy finished on 8/19/16.    PET/CT scan in September 2017 does not show any evidence of disease recurrence.  Patient was reporting left-sided headaches.  repeat MRI on 3/30/18 and it fails to show any evidence of disease " progression or recurrence.  Restaging CT scan on 3/7/2020 does not show any evidence of disease recurrence.  Now again had a repeat CT scan in October 2021 with no concerning findings for recurrent disease.  There is a stable 9 mm nodule in the right middle lobe which has been stable for the last several years.  No routine CT imaging needed.  No s/s of relapse will monitor labs for now.  Mild persistent anemia: Appears to be from chronic disease hemoglobin 10.5 repeat in 6 months.Positive stool Hemoccult test: Patient had a colonoscopy which was unremarkable this was in June 2021 hb has been stable. Could be persistent myelosupression post chemotherapy. Hb improved now.  History of testicular lymphoma: In 2000  History of right knee osteoarthritis:   Benign prostatic hypertrophy followed by urology no worsening of symptoms.  History of liver lesion:  This was noted initially on PET scan.  Repeat MRI showed a 1.3 x 1.7 cm, rim-enhancing lesion which could not be biopsied.  His restaging PET scan on 9/8/16 does not show this lesion anymore.  No findings October 2021 CT.  No further imaging needed.  Osteoporosis:  This was seen on DEXAscan 10/5/18.  Calcium vitamin D.    PLAN:      Patient is more than 5 years out from treatment.  routine CT imaging not needed.    No s/s of recurrence continue exam for lymph nodes every 6 months.   Continue Boniva.  Cbc iron studies cmp ldh in 6 months.      There are no diagnoses linked to this encounter.    No orders of the defined types were placed in this encounter.      Jessica Kwon MD 02/01/24

## 2024-02-01 ENCOUNTER — TELEPHONE (OUTPATIENT)
Dept: CARDIOLOGY | Facility: CLINIC | Age: 79
End: 2024-02-01
Payer: MEDICARE

## 2024-02-01 ENCOUNTER — OFFICE VISIT (OUTPATIENT)
Dept: ONCOLOGY | Facility: CLINIC | Age: 79
End: 2024-02-01
Payer: MEDICARE

## 2024-02-01 VITALS
BODY MASS INDEX: 22.74 KG/M2 | OXYGEN SATURATION: 95 % | TEMPERATURE: 98.2 F | RESPIRATION RATE: 16 BRPM | HEIGHT: 62 IN | HEART RATE: 82 BPM | WEIGHT: 123.6 LBS | SYSTOLIC BLOOD PRESSURE: 137 MMHG | DIASTOLIC BLOOD PRESSURE: 67 MMHG

## 2024-02-01 DIAGNOSIS — C85.90 NON-HODGKIN'S LYMPHOMA, UNSPECIFIED BODY REGION, UNSPECIFIED NON-HODGKIN LYMPHOMA TYPE: Primary | ICD-10-CM

## 2024-02-01 DIAGNOSIS — M81.8 OTHER OSTEOPOROSIS WITHOUT CURRENT PATHOLOGICAL FRACTURE: ICD-10-CM

## 2024-02-01 RX ORDER — IBANDRONATE SODIUM 150 MG/1
150 TABLET, FILM COATED ORAL
Qty: 1 TABLET | Refills: 5 | Status: SHIPPED | OUTPATIENT
Start: 2024-02-01

## 2024-02-01 NOTE — TELEPHONE ENCOUNTER
Caller: REECE HOANG    Relationship: Emergency Contact    Best call back number:[phone]     What is the best time to reach you: ANYTIME    Who are you requesting to speak with (clinical staff, provider,  specific staff member): ANYONE    Do you know the name of the person who called: NA    What was the call regarding: PT HAS NOT HEARD FROM [facility] ABOUT SCHEDULING - THEY REACHED OUT AND PT WAS ASKED IF HE WAS WEARING A PORT AND THEY TOLD HIM THEY WOULD REACH BACK OUT AFTER CONFIRMING IT WAS OKAY TO PROCEED WITH MRI BUT THEY HAVENT HEARD ANYTHING BACK FROM OFFICE TO MOVE FORWARD WITH SCHEDULING AND PT AND DAUGHTER ASKING WHAT NEXT STEPS SHOULD BE

## 2024-02-05 RX ORDER — CARVEDILOL 25 MG/1
25 TABLET ORAL 2 TIMES DAILY WITH MEALS
Qty: 180 TABLET | Refills: 1 | Status: SHIPPED | OUTPATIENT
Start: 2024-02-05

## 2024-02-05 RX ORDER — GABAPENTIN 300 MG/1
300 CAPSULE ORAL EVERY EVENING
Qty: 90 CAPSULE | Refills: 0 | Status: SHIPPED | OUTPATIENT
Start: 2024-02-05

## 2024-02-09 NOTE — TELEPHONE ENCOUNTER
LMOM for the patients daughter to let them know that the order has been refaxed spoke with the [facility] that they received it and will be calling to set it up.

## 2024-02-12 DIAGNOSIS — I73.9 INTERMITTENT CLAUDICATION: ICD-10-CM

## 2024-02-12 DIAGNOSIS — M25.50 ARTHRALGIA, UNSPECIFIED JOINT: ICD-10-CM

## 2024-02-12 RX ORDER — GABAPENTIN 300 MG/1
300 CAPSULE ORAL EVERY EVENING
Qty: 90 CAPSULE | Refills: 0 | OUTPATIENT
Start: 2024-02-12

## 2024-02-19 DIAGNOSIS — I73.9 INTERMITTENT CLAUDICATION: ICD-10-CM

## 2024-02-20 RX ORDER — CILOSTAZOL 100 MG/1
100 TABLET ORAL 2 TIMES DAILY
Qty: 60 TABLET | Refills: 1 | Status: SHIPPED | OUTPATIENT
Start: 2024-02-20

## 2024-02-21 ENCOUNTER — TELEPHONE (OUTPATIENT)
Dept: FAMILY MEDICINE CLINIC | Facility: CLINIC | Age: 79
End: 2024-02-21
Payer: MEDICARE

## 2024-02-21 NOTE — TELEPHONE ENCOUNTER
Hub is instructed to read the documentation below to patient    Please let patient know below results. Recommended to cut back on fats and carbs. Patient needs to schedule lab visit within the next couple months     Can you please inform patient of the following results:    CBC looks good with no significant signs of infection, inflammation, or anemia.     CMP is grossly normal. No significant signs of kidney or liver dysfunction.     Lipid panel looks grossly normal with good cholesterol control. Triglycerides are elevated at 181 with a goal of 150 or less.  I recommend cutting back on any fats and saturated fats within your diet as much as possible. We should recheck these numbers again in 3 months to see if its improved. Please come fasting for these labs.     Hemoglobin A1C is in type II diabetic range at 7.2.  Keep trying to cut back on carbs when possible. We will recheck this in 3 months.     TSH looks normal at 0.87.    Please call or message with any questions or concerns.    CON Monahan

## 2024-03-01 ENCOUNTER — TELEPHONE (OUTPATIENT)
Dept: CARDIOLOGY | Facility: CLINIC | Age: 79
End: 2024-03-01
Payer: MEDICARE

## 2024-03-01 NOTE — TELEPHONE ENCOUNTER
Attempted to reach pt to schedule follow up appt. I left voice mail for pt to call office back for a 3/8/24 @ 10:00 work in appt.

## 2024-03-13 ENCOUNTER — OFFICE VISIT (OUTPATIENT)
Dept: CARDIOLOGY | Facility: CLINIC | Age: 79
End: 2024-03-13
Payer: MEDICARE

## 2024-03-13 VITALS
BODY MASS INDEX: 22.68 KG/M2 | OXYGEN SATURATION: 98 % | HEART RATE: 76 BPM | SYSTOLIC BLOOD PRESSURE: 135 MMHG | WEIGHT: 124 LBS | DIASTOLIC BLOOD PRESSURE: 56 MMHG

## 2024-03-13 DIAGNOSIS — I42.0 DILATED CARDIOMYOPATHY: Primary | ICD-10-CM

## 2024-03-13 PROCEDURE — 1159F MED LIST DOCD IN RCRD: CPT | Performed by: NURSE PRACTITIONER

## 2024-03-13 PROCEDURE — 3075F SYST BP GE 130 - 139MM HG: CPT | Performed by: NURSE PRACTITIONER

## 2024-03-13 PROCEDURE — 1160F RVW MEDS BY RX/DR IN RCRD: CPT | Performed by: NURSE PRACTITIONER

## 2024-03-13 PROCEDURE — 99214 OFFICE O/P EST MOD 30 MIN: CPT | Performed by: NURSE PRACTITIONER

## 2024-03-13 PROCEDURE — 3078F DIAST BP <80 MM HG: CPT | Performed by: NURSE PRACTITIONER

## 2024-03-13 NOTE — PROGRESS NOTES
Psychiatric CARDIOLOGY      REASON FOR FOLLOW-UP:  Follow-up dilated cardiomyopathy and cardiac MRI          Chief Complaint   Patient presents with    Cardiomyopathy     F/U to MRI.    Hypertension         Dear Joey Crump APRN        Cardiomyopathy    Hypertension       It was my pleasure to see Ramon Silva in the office today.  He is a 78-year-old gentleman with known history of severe dilated nonischemic cardiomyopathy, non-Hodgkin's lymphoma status post chemo, diabetes mellitus 2, hypertension, dyslipidemia, history of testicular cancer, vitamin D deficiency, history of lymphoma involving the sinus needing CHOP therapy in the past.  He is followed by hematology/oncology heart catheterization revealed 50% mid LAD lesion Mr. Silva is followed by Dr. Lambert.    Mr. Silva underwent cardiac MRI 2/27/2024 to further differentiate the cause of his cardiomyopathy.  Impression: Dilated cardiomyopathy (LVEDD 5.9 cm) with severely reduced global systolic function of 30%.  Dyskinetic septum and global hypokinesis, no thrombus noted.  Late gadolinium images showed no focal scar, however elevated T1 values and ECV suggest diffuse fibrosis.  Normal RV size and systolic function with EF 48%.  Mr. Silva presents today with his grandson to review results of MRI.  These findings were reviewed with both the patient and his grandson in detail.  I also reviewed the indication for ICD.  I explained that his port would need to be removed in order to proceed with ICD.  They both verbalized understanding.  The patient denies any complaints of chest pain, pressure, tightness or palpitations.  He denies any shortness of breath, orthopnea, lower extremity edema.      ASSESSMENT:  Severe dilated cardiomyopathy  Coronary artery disease-nonocclusive mid LAD 50% lesion  Left bundle branch block  Presence of port in the left subclavian  History of lymphoma involving testicular area and sinuses  requiring chemotherapy  Primary hypertension  Diabetes mellitus 2  Dyslipidemia    PLAN:  We will arrange for port removal in order to proceed with ICD implant.  Order placed.  Will also arrange for ICD once port is removed.      Diagnoses and all orders for this visit:    1. Dilated cardiomyopathy (Primary)  Overview:  Added automatically from request for surgery [id number]    Orders:  -     IR Removal Tunnel CV With Port Different Site; Future          The following portions of the patient's history were reviewed and updated as appropriate: allergies, current medications, past family history, past medical history, past social history, past surgical history, and problem list.    REVIEW OF SYSTEMS:    Review of Systems   All other systems reviewed and are negative.      Vitals:    03/13/24 1022   BP: 135/56   Pulse:    SpO2:          PHYSICAL EXAM:    General: Alert, cooperative, no distress, appears stated age  Head:  Normocephalic, atraumatic, mucous membranes moist  Eyes:  Conjunctiva/corneas clear, EOM's intact     Neck:  Supple,  no JVD or bruit     Lungs: Clear to auscultation bilaterally, no wheezes rhonchi rales are noted  Chest wall: Left subclavian port  Musculoskeletal:   Ambulates freely without assistance  Heart::  Regular rate and rhythm, S1 and S2 normal, no murmur, rub or gallop  Abdomen: Soft, non-tender, nondistended, bowel sounds active, no abdominal bruit  Extremities: No cyanosis, clubbing, or edema   Pulses: 2+ and symmetric all extremities  Skin:  No rashes or lesions  Neuro/psych: A&O x3. CN II through XII are grossly intact with appropriate affect        Past Medical History:   Diagnosis Date    Anemia     Arthralgia     Diabetes     Fatigue     GERD (gastroesophageal reflux disease)     Gout     Hyperlipidemia     Hypertension     Liver lesion     Non Hodgkin's lymphoma     Osteoporosis     Personal history of testicular cancer     Vitamin D deficiency     Weight loss        Past Surgical  History:   Procedure Laterality Date    ANGIOGRAM - CONVERTED  07/05/2022    aif by Dr Henriquez    CARDIAC CATHETERIZATION N/A 7/5/2022    Procedure: PERIPHERAL ANGIOGRAPHY Right leg;  Surgeon: Theron Henriquez MD;  Location: [facility] CATH INVASIVE LOCATION;  Service: Cardiovascular;  Laterality: N/A;    CARDIAC CATHETERIZATION N/A 10/2/2023    Procedure: Right and Left Heart Cath with possible PCI;  Surgeon: Joey Kirkland DO;  Location: [facility] CATH INVASIVE LOCATION;  Service: Cardiovascular;  Laterality: N/A;  Local and IV sedation    HIP SURGERY  2005    PORTACATH PLACEMENT      TESTICLE SURGERY Right 07/17/2000    right testicular excision         Current Outpatient Medications:     Accu-Chek Guide test strip, 1 each by Other route Daily. E 11.9, Disp: 50 each, Rfl: 2    Accu-Chek Softclix Lancets lancets, test daily as directed, Disp: 100 each, Rfl: 2    aspirin 81 MG tablet, Take 1 tablet by mouth Daily., Disp: , Rfl:     atorvastatin (LIPITOR) 40 MG tablet, Take 1 tablet by mouth every night at bedtime., Disp: 90 tablet, Rfl: 1    bacitracin 500 UNIT/GM ointment, Apply 1 application topically to the appropriate area as directed 2 (Two) Times a Day., Disp: 28 g, Rfl: 0    carvedilol (COREG) 25 MG tablet, Take 1 tablet by mouth 2 (Two) Times a Day With Meals., Disp: 180 tablet, Rfl: 1    Cetirizine HCl (ZyrTEC Allergy) 10 MG capsule, Take 10 mg by mouth Daily., Disp: , Rfl:     cilostazol (PLETAL) 100 MG tablet, TAKE ONE TABLET BY MOUTH TWICE DAILY, Disp: 60 tablet, Rfl: 1    colestipol (COLESTID) 1 g tablet, Take 1 tablet by mouth 2 (Two) Times a Day., Disp: 180 tablet, Rfl: 1    dapagliflozin Propanediol (Farxiga) 10 MG tablet, Take 10 mg by mouth Daily., Disp: 90 tablet, Rfl: 1    Diclofenac Sodium (VOLTAREN) 1 % gel gel, Apply 4 g topically to the appropriate area as directed 4 (Four) Times a Day As Needed (APPLY 4 TIMES A DAY AS NEED TO THE APPROPRIATE AREA)., Disp: 4 g, Rfl: 2     "famotidine (PEPCID) 40 MG tablet, Take 1 tablet by mouth Daily., Disp: 30 tablet, Rfl: 1    ferrous sulfate 324 (65 Fe) MG tablet delayed-release EC tablet, Take 1 tablet by mouth Daily With Breakfast., Disp: 90 tablet, Rfl: 1    finasteride (PROSCAR) 5 MG tablet, Take 1 tablet by mouth Daily., Disp: 90 tablet, Rfl: 1    gabapentin (NEURONTIN) 300 MG capsule, Take 1 capsule by mouth Every Evening., Disp: 90 capsule, Rfl: 0    ibandronate (BONIVA) 150 MG tablet, Take 1 tablet by mouth Every 30 (Thirty) Days., Disp: 1 tablet, Rfl: 5    losartan (COZAAR) 50 MG tablet, Take 1 tablet by mouth Daily., Disp: 90 tablet, Rfl: 1    Menthol (GNP HERBAL MT), Apply  to the mouth or throat. Neuroflow plus, Disp: , Rfl:     metFORMIN ER (GLUCOPHAGE-XR) 500 MG 24 hr tablet, Take 2 tablets by mouth 2 (Two) Times a Day Before Meals., Disp: 360 tablet, Rfl: 1    Omega-3 Fatty Acids (OMEGA-3 FISH OIL PO), Take 1 capsule by mouth Daily., Disp: , Rfl:     pantoprazole (PROTONIX) 40 MG EC tablet, Take 1 tablet by mouth Daily., Disp: 90 tablet, Rfl: 1    traMADol (ULTRAM) 50 MG tablet, Take 1 tablet by mouth Every 12 (Twelve) Hours As Needed., Disp: , Rfl:     Vitamin D, Cholecalciferol, (CHOLECALCIFEROL) 10 MCG (400 UNIT) tablet, Take 1 tablet by mouth Daily., Disp: , Rfl:     No Known Allergies    Family History   Family history unknown: Yes       Social History     Tobacco Use    Smoking status: Never    Smokeless tobacco: Never   Substance Use Topics    Alcohol use: No           Current Electrocardiogram:  Procedures        EMR Dragon/Transcription:   \"Dictated utilizing Dragon dictation\".     Copied text in this note has been reviewed by me and is accurate as of 03/13/24.    "

## 2024-03-15 DIAGNOSIS — M81.8 OTHER OSTEOPOROSIS WITHOUT CURRENT PATHOLOGICAL FRACTURE: ICD-10-CM

## 2024-03-15 RX ORDER — IBANDRONATE SODIUM 150 MG/1
TABLET, FILM COATED ORAL
Qty: 1 TABLET | Refills: 5 | Status: SHIPPED | OUTPATIENT
Start: 2024-03-15

## 2024-03-16 DIAGNOSIS — K21.9 GASTROESOPHAGEAL REFLUX DISEASE, UNSPECIFIED WHETHER ESOPHAGITIS PRESENT: ICD-10-CM

## 2024-03-18 RX ORDER — FAMOTIDINE 40 MG/1
40 TABLET, FILM COATED ORAL DAILY
Qty: 30 TABLET | Refills: 1 | Status: SHIPPED | OUTPATIENT
Start: 2024-03-18

## 2024-03-26 ENCOUNTER — HOSPITAL ENCOUNTER (OUTPATIENT)
Dept: INTERVENTIONAL RADIOLOGY/VASCULAR | Facility: HOSPITAL | Age: 79
Discharge: HOME | End: 2024-03-26
Admitting: RADIOLOGY
Payer: MEDICARE

## 2024-03-26 VITALS
HEART RATE: 64 BPM | HEIGHT: 62 IN | OXYGEN SATURATION: 100 % | WEIGHT: 124 LBS | TEMPERATURE: 98.1 F | SYSTOLIC BLOOD PRESSURE: 134 MMHG | DIASTOLIC BLOOD PRESSURE: 50 MMHG | BODY MASS INDEX: 22.82 KG/M2 | RESPIRATION RATE: 11 BRPM

## 2024-03-26 DIAGNOSIS — I42.0 DILATED CARDIOMYOPATHY: ICD-10-CM

## 2024-03-26 LAB
APTT PPP: 25.6 SECONDS (ref 24–31)
BASOPHILS # BLD AUTO: 0.03 10*3/MM3 (ref 0–0.2)
BASOPHILS NFR BLD AUTO: 0.5 % (ref 0–1.5)
DEPRECATED RDW RBC AUTO: 48.6 FL (ref 37–54)
EOSINOPHIL # BLD AUTO: 0.2 10*3/MM3 (ref 0–0.4)
EOSINOPHIL NFR BLD AUTO: 3.2 % (ref 0.3–6.2)
ERYTHROCYTE [DISTWIDTH] IN BLOOD BY AUTOMATED COUNT: 14.1 % (ref 12.3–15.4)
HCT VFR BLD AUTO: 36.2 % (ref 37.5–51)
HGB BLD-MCNC: 11.4 G/DL (ref 13–17.7)
IMM GRANULOCYTES # BLD AUTO: 0.02 10*3/MM3 (ref 0–0.05)
IMM GRANULOCYTES NFR BLD AUTO: 0.3 % (ref 0–0.5)
INR PPP: 0.97 (ref 0.93–1.1)
LYMPHOCYTES # BLD AUTO: 2.04 10*3/MM3 (ref 0.7–3.1)
LYMPHOCYTES NFR BLD AUTO: 32.6 % (ref 19.6–45.3)
MCH RBC QN AUTO: 29.8 PG (ref 26.6–33)
MCHC RBC AUTO-ENTMCNC: 31.5 G/DL (ref 31.5–35.7)
MCV RBC AUTO: 94.8 FL (ref 79–97)
MONOCYTES # BLD AUTO: 0.5 10*3/MM3 (ref 0.1–0.9)
MONOCYTES NFR BLD AUTO: 8 % (ref 5–12)
NEUTROPHILS NFR BLD AUTO: 3.46 10*3/MM3 (ref 1.7–7)
NEUTROPHILS NFR BLD AUTO: 55.4 % (ref 42.7–76)
NRBC BLD AUTO-RTO: 0 /100 WBC (ref 0–0.2)
PLATELET # BLD AUTO: 172 10*3/MM3 (ref 140–450)
PMV BLD AUTO: 10.2 FL (ref 6–12)
PROTHROMBIN TIME: 10.6 SECONDS (ref 9.6–11.7)
RBC # BLD AUTO: 3.82 10*6/MM3 (ref 4.14–5.8)
WBC NRBC COR # BLD AUTO: 6.25 10*3/MM3 (ref 3.4–10.8)

## 2024-03-26 PROCEDURE — 25010000002 MIDAZOLAM PER 1 MG

## 2024-03-26 PROCEDURE — 85025 COMPLETE CBC W/AUTO DIFF WBC: CPT | Performed by: RADIOLOGY

## 2024-03-26 PROCEDURE — 99152 MOD SED SAME PHYS/QHP 5/>YRS: CPT

## 2024-03-26 PROCEDURE — 77001 FLUOROGUIDE FOR VEIN DEVICE: CPT

## 2024-03-26 PROCEDURE — 99153 MOD SED SAME PHYS/QHP EA: CPT

## 2024-03-26 PROCEDURE — 25010000002 LIDOCAINE 1 % SOLUTION

## 2024-03-26 PROCEDURE — 85730 THROMBOPLASTIN TIME PARTIAL: CPT | Performed by: RADIOLOGY

## 2024-03-26 PROCEDURE — 25010000002 CEFAZOLIN PER 500 MG

## 2024-03-26 PROCEDURE — 25010000002 FENTANYL CITRATE (PF) 50 MCG/ML SOLUTION

## 2024-03-26 PROCEDURE — 85610 PROTHROMBIN TIME: CPT | Performed by: RADIOLOGY

## 2024-03-26 PROCEDURE — 25010000002 ONDANSETRON PER 1 MG

## 2024-03-26 PROCEDURE — 25810000003 SODIUM CHLORIDE 0.9 % SOLUTION: Performed by: RADIOLOGY

## 2024-03-26 RX ORDER — SODIUM CHLORIDE 0.9 % (FLUSH) 0.9 %
10 SYRINGE (ML) INJECTION AS NEEDED
Status: DISCONTINUED | OUTPATIENT
Start: 2024-03-26 | End: 2024-03-27 | Stop reason: HOSPADM

## 2024-03-26 RX ORDER — FENTANYL CITRATE 50 UG/ML
INJECTION, SOLUTION INTRAMUSCULAR; INTRAVENOUS AS NEEDED
Status: COMPLETED | OUTPATIENT
Start: 2024-03-26 | End: 2024-03-26

## 2024-03-26 RX ORDER — MIDAZOLAM HYDROCHLORIDE 1 MG/ML
INJECTION INTRAMUSCULAR; INTRAVENOUS AS NEEDED
Status: COMPLETED | OUTPATIENT
Start: 2024-03-26 | End: 2024-03-26

## 2024-03-26 RX ORDER — SODIUM CHLORIDE 9 MG/ML
75 INJECTION, SOLUTION INTRAVENOUS CONTINUOUS
Status: DISCONTINUED | OUTPATIENT
Start: 2024-03-26 | End: 2024-03-27 | Stop reason: HOSPADM

## 2024-03-26 RX ORDER — SODIUM CHLORIDE 0.9 % (FLUSH) 0.9 %
10 SYRINGE (ML) INJECTION EVERY 12 HOURS SCHEDULED
Status: DISCONTINUED | OUTPATIENT
Start: 2024-03-26 | End: 2024-03-27 | Stop reason: HOSPADM

## 2024-03-26 RX ORDER — LIDOCAINE HYDROCHLORIDE 10 MG/ML
INJECTION, SOLUTION INFILTRATION; PERINEURAL AS NEEDED
Status: COMPLETED | OUTPATIENT
Start: 2024-03-26 | End: 2024-03-26

## 2024-03-26 RX ORDER — ONDANSETRON 2 MG/ML
INJECTION INTRAMUSCULAR; INTRAVENOUS AS NEEDED
Status: COMPLETED | OUTPATIENT
Start: 2024-03-26 | End: 2024-03-26

## 2024-03-26 RX ADMIN — MIDAZOLAM 1 MG: 1 INJECTION INTRAMUSCULAR; INTRAVENOUS at 12:59

## 2024-03-26 RX ADMIN — LIDOCAINE HYDROCHLORIDE 5 ML: 10 INJECTION, SOLUTION INFILTRATION; PERINEURAL at 13:10

## 2024-03-26 RX ADMIN — CEFAZOLIN 1000 MG: 1 INJECTION, POWDER, FOR SOLUTION INTRAMUSCULAR; INTRAVENOUS at 12:50

## 2024-03-26 RX ADMIN — FENTANYL CITRATE 50 MCG: 50 INJECTION, SOLUTION INTRAMUSCULAR; INTRAVENOUS at 13:11

## 2024-03-26 RX ADMIN — Medication 10 ML: at 12:00

## 2024-03-26 RX ADMIN — ONDANSETRON 4 MG: 2 INJECTION INTRAMUSCULAR; INTRAVENOUS at 12:56

## 2024-03-26 RX ADMIN — FENTANYL CITRATE 50 MCG: 50 INJECTION, SOLUTION INTRAMUSCULAR; INTRAVENOUS at 12:58

## 2024-03-26 RX ADMIN — SODIUM CHLORIDE 75 ML/HR: 9 INJECTION, SOLUTION INTRAVENOUS at 12:00

## 2024-03-26 RX ADMIN — MIDAZOLAM 1 MG: 1 INJECTION INTRAMUSCULAR; INTRAVENOUS at 13:09

## 2024-03-26 NOTE — H&P
[facility]   Interventional Radiology H&P    Patient Name: Ramon Silva  : 1945  MRN: [id number]  Primary Care Physician:  Joey Crump APRN  Referring Physician: DAVID Ardon*  Date of admission: 3/26/2024    Subjective   Subjective     HPI:  Ramon Silva is a 78 y.o. male with left chest port.    Review of Systems:   Constitutional no fever,  no weight loss       Otolaryngeal no difficulty swallowing   Cardiovascular no chest pain   Pulmonary no cough, no sputum production   Gastrointestinal no constipation, no diarrhea                         Personal History       Past Medical/Surgical History:   Past Medical History:   Diagnosis Date    Anemia     Arthralgia     Diabetes     Fatigue     GERD (gastroesophageal reflux disease)     Gout     Hyperlipidemia     Hypertension     Liver lesion     Non Hodgkin's lymphoma     Osteoporosis     Personal history of testicular cancer     Vitamin D deficiency     Weight loss      Past Surgical History:   Procedure Laterality Date    ANGIOGRAM - CONVERTED  2022    aif by Dr Henriquez    CARDIAC CATHETERIZATION N/A 2022    Procedure: PERIPHERAL ANGIOGRAPHY Right leg;  Surgeon: Theron Henriquez MD;  Location: [facility] CATH INVASIVE LOCATION;  Service: Cardiovascular;  Laterality: N/A;    CARDIAC CATHETERIZATION N/A 10/2/2023    Procedure: Right and Left Heart Cath with possible PCI;  Surgeon: Joey Kirkland DO;  Location:  [facility] CATH INVASIVE LOCATION;  Service: Cardiovascular;  Laterality: N/A;  Local and IV sedation    HIP SURGERY      PORTACATH PLACEMENT      TESTICLE SURGERY Right 2000    right testicular excision       Social History:  reports that he has never smoked. He has never used smokeless tobacco. He reports that he does not drink alcohol and does not use drugs.    Medications:  (Not in a hospital admission)    Current medications:  sodium chloride, 10 mL, Intravenous, Q12H      Current IV  "drips:  ceFAZolin, , Last Rate: 1,000 mg (03/26/24 1250)  sodium chloride, 75 mL/hr, Last Rate: 75 mL/hr (03/26/24 1200)        Allergies:  No Known Allergies    Objective    Objective     Vitals:   Temp:  [98.1 °F (36.7 °C)] 98.1 °F (36.7 °C)  Heart Rate:  [66] 66  Resp:  [14] 14  BP: (112)/(45) 112/45      Physical Exam:   Constitutional: Awake, alert, No acute distress    Respiratory: Clear to auscultation bilaterally, nonlabored respirations    Cardiovascular: RRR, no murmurs, rubs, or gallops, palpable pedal pulses bilaterally   Gastrointestinal: Positive bowel sounds, soft, nontender, nondistended        ASA SCALE ASSESSMENT:  2-Mild to moderate systemic disease, medically well controlled, with no functional limitation    MALLAMPATI CLASSIFICATION:  2-Able to visualize the soft palate, fauces, uvula. The anterior & posterior tonsilar pillars are hidden by the tongue.       Result Review        Result Review:     No results found for: \"NA\"    No results found for: \"K\"    No results found for: \"CL\"    No results found for: \"PLASMABICARB\"    No results found for: \"BUN\"    No results found for: \"CREATININE\"    No results found for: \"CALCIUM\"        No components found for: \"GLUCOSE.*\"  Results from last 7 days   Lab Units 03/26/24  1131   WBC 10*3/mm3 6.25   HEMOGLOBIN g/dL 11.4*   HEMATOCRIT % 36.2*   PLATELETS 10*3/mm3 172      Results from last 7 days   Lab Units 03/26/24  1131   INR  0.97           Assessment / Plan     Assesment:  Left chest port.      Plan:   Port removal.    The risks and benefits of the procedure were discussed with the patient.    Electronically signed by CON Figueroa, 03/26/24, 12:53 PM EDT.  "

## 2024-04-11 DIAGNOSIS — I10 PRIMARY HYPERTENSION: ICD-10-CM

## 2024-04-11 DIAGNOSIS — K21.9 GASTROESOPHAGEAL REFLUX DISEASE, UNSPECIFIED WHETHER ESOPHAGITIS PRESENT: ICD-10-CM

## 2024-04-11 RX ORDER — CARVEDILOL 25 MG/1
25 TABLET ORAL 2 TIMES DAILY WITH MEALS
Qty: 180 TABLET | Refills: 1 | Status: SHIPPED | OUTPATIENT
Start: 2024-04-11

## 2024-04-11 RX ORDER — PANTOPRAZOLE SODIUM 40 MG/1
40 TABLET, DELAYED RELEASE ORAL DAILY
Qty: 90 TABLET | Refills: 1 | Status: SHIPPED | OUTPATIENT
Start: 2024-04-11

## 2024-04-15 DIAGNOSIS — I73.9 INTERMITTENT CLAUDICATION: ICD-10-CM

## 2024-04-15 RX ORDER — CILOSTAZOL 100 MG/1
100 TABLET ORAL 2 TIMES DAILY
Qty: 60 TABLET | Refills: 1 | Status: SHIPPED | OUTPATIENT
Start: 2024-04-15

## 2024-04-17 ENCOUNTER — HOSPITAL ENCOUNTER (OUTPATIENT)
Facility: HOSPITAL | Age: 79
Discharge: HOME | End: 2024-04-18
Attending: INTERNAL MEDICINE | Admitting: INTERNAL MEDICINE
Payer: MEDICARE

## 2024-04-17 ENCOUNTER — ANESTHESIA EVENT (OUTPATIENT)
Dept: CARDIOLOGY | Facility: HOSPITAL | Age: 79
End: 2024-04-17
Payer: MEDICARE

## 2024-04-17 ENCOUNTER — ANESTHESIA (OUTPATIENT)
Dept: CARDIOLOGY | Facility: HOSPITAL | Age: 79
End: 2024-04-17
Payer: MEDICARE

## 2024-04-17 ENCOUNTER — APPOINTMENT (OUTPATIENT)
Dept: GENERAL RADIOLOGY | Facility: HOSPITAL | Age: 79
End: 2024-04-17
Payer: MEDICARE

## 2024-04-17 DIAGNOSIS — I42.0 DILATED CARDIOMYOPATHY: Primary | ICD-10-CM

## 2024-04-17 PROBLEM — I44.7 LBBB (LEFT BUNDLE BRANCH BLOCK): Status: ACTIVE | Noted: 2024-04-17

## 2024-04-17 PROBLEM — I50.22 CHRONIC SYSTOLIC CONGESTIVE HEART FAILURE: Status: ACTIVE | Noted: 2024-04-17

## 2024-04-17 LAB
GLUCOSE BLDC GLUCOMTR-MCNC: 121 MG/DL (ref 70–105)
GLUCOSE BLDC GLUCOMTR-MCNC: 129 MG/DL (ref 70–105)
GLUCOSE BLDC GLUCOMTR-MCNC: 143 MG/DL (ref 70–105)

## 2024-04-17 PROCEDURE — C1892 INTRO/SHEATH,FIXED,PEEL-AWAY: HCPCS | Performed by: INTERNAL MEDICINE

## 2024-04-17 PROCEDURE — C1894 INTRO/SHEATH, NON-LASER: HCPCS | Performed by: INTERNAL MEDICINE

## 2024-04-17 PROCEDURE — 82948 REAGENT STRIP/BLOOD GLUCOSE: CPT

## 2024-04-17 PROCEDURE — 25010000002 PHENYLEPHRINE 10 MG/ML SOLUTION 5 ML VIAL: Performed by: NURSE ANESTHETIST, CERTIFIED REGISTERED

## 2024-04-17 PROCEDURE — C1777 LEAD, AICD, ENDO SINGLE COIL: HCPCS | Performed by: INTERNAL MEDICINE

## 2024-04-17 PROCEDURE — 33225 L VENTRIC PACING LEAD ADD-ON: CPT | Performed by: INTERNAL MEDICINE

## 2024-04-17 PROCEDURE — C1730 CATH, EP, 19 OR FEW ELECT: HCPCS | Performed by: INTERNAL MEDICINE

## 2024-04-17 PROCEDURE — 25010000002 PROPOFOL 1000 MG/100ML EMULSION: Performed by: NURSE ANESTHETIST, CERTIFIED REGISTERED

## 2024-04-17 PROCEDURE — 25010000002 CEFAZOLIN PER 500 MG: Performed by: NURSE ANESTHETIST, CERTIFIED REGISTERED

## 2024-04-17 PROCEDURE — 25810000003 SODIUM CHLORIDE 0.9 % SOLUTION 250 ML FLEX CONT: Performed by: NURSE ANESTHETIST, CERTIFIED REGISTERED

## 2024-04-17 PROCEDURE — 33249 INSJ/RPLCMT DEFIB W/LEAD(S): CPT | Performed by: INTERNAL MEDICINE

## 2024-04-17 PROCEDURE — 71045 X-RAY EXAM CHEST 1 VIEW: CPT

## 2024-04-17 PROCEDURE — 25810000003 SODIUM CHLORIDE 0.9 % SOLUTION: Performed by: NURSE ANESTHETIST, CERTIFIED REGISTERED

## 2024-04-17 PROCEDURE — 25010000002 CEFAZOLIN PER 500 MG: Performed by: INTERNAL MEDICINE

## 2024-04-17 PROCEDURE — C1882 AICD, OTHER THAN SING/DUAL: HCPCS | Performed by: INTERNAL MEDICINE

## 2024-04-17 PROCEDURE — C1889 IMPLANT/INSERT DEVICE, NOC: HCPCS | Performed by: INTERNAL MEDICINE

## 2024-04-17 PROCEDURE — C1900 LEAD, CORONARY VENOUS: HCPCS | Performed by: INTERNAL MEDICINE

## 2024-04-17 PROCEDURE — C1769 GUIDE WIRE: HCPCS | Performed by: INTERNAL MEDICINE

## 2024-04-17 PROCEDURE — 25510000001 IOPAMIDOL PER 1 ML: Performed by: INTERNAL MEDICINE

## 2024-04-17 DEVICE — IMPLANTABLE DEVICE
Type: IMPLANTABLE DEVICE | Site: HEART | Status: FUNCTIONAL
Brand: ACTICOR 7 HF-T QP

## 2024-04-17 DEVICE — LD PM SENTUS PROMRI OTW QP L75/49: Type: IMPLANTABLE DEVICE | Site: HEART | Status: FUNCTIONAL

## 2024-04-17 DEVICE — THE CANGAROO® ENVELOPE IS INTENDED TO SECURELY HOLD A CARDIAC IMPLANTABLE ELECTRONIC DEVICE OR AN IMPLANTABLE NEUROSTIMULATOR TO CREATE A STABLE ENVIRONMENT WHEN IMPLANTED IN THE BODY. THE CARDIAC IMPLANTABLE ELECTRONIC DEVICES THAT MAY BE USED WITH THE CANGAROO® ENVELOPE INCLUDE PACEMAKER PULSE GENERATORS, DEFIBRILLATORS, OR OTHER CARDIAC IMPLANTABLE ELECTRONIC DEVICES. THE IMPLANTABLE NEUROSTIMULATOR DEVICES THAT MAY BE USED WITH THE CANGAROO® ENVELOPE INCLUDE VAGUS NERVE STIMULATORS, SPINAL CORDNEUROMODULATORS, DEEP BRAIN STIMULATORS AND SACRAL NERVE STIMULATORS.
Type: IMPLANTABLE DEVICE | Site: HEART | Status: FUNCTIONAL
Brand: CANGAROO ENVELOPE

## 2024-04-17 DEVICE — LD ICD PLEXA PROMRI SGL COIL S DX 65/15: Type: IMPLANTABLE DEVICE | Site: HEART | Status: FUNCTIONAL

## 2024-04-17 RX ORDER — ACETAMINOPHEN 325 MG/1
650 TABLET ORAL EVERY 4 HOURS PRN
Status: DISCONTINUED | OUTPATIENT
Start: 2024-04-17 | End: 2024-04-18 | Stop reason: HOSPADM

## 2024-04-17 RX ORDER — PROMETHAZINE HYDROCHLORIDE 25 MG/1
25 SUPPOSITORY RECTAL ONCE AS NEEDED
Status: DISCONTINUED | OUTPATIENT
Start: 2024-04-17 | End: 2024-04-18 | Stop reason: HOSPADM

## 2024-04-17 RX ORDER — FLUMAZENIL 0.1 MG/ML
0.2 INJECTION INTRAVENOUS AS NEEDED
Status: DISCONTINUED | OUTPATIENT
Start: 2024-04-17 | End: 2024-04-18 | Stop reason: HOSPADM

## 2024-04-17 RX ORDER — ACETAMINOPHEN 650 MG/1
650 SUPPOSITORY RECTAL EVERY 4 HOURS PRN
Status: DISCONTINUED | OUTPATIENT
Start: 2024-04-17 | End: 2024-04-18 | Stop reason: HOSPADM

## 2024-04-17 RX ORDER — FINASTERIDE 5 MG/1
5 TABLET, FILM COATED ORAL DAILY
Status: DISCONTINUED | OUTPATIENT
Start: 2024-04-17 | End: 2024-04-18 | Stop reason: HOSPADM

## 2024-04-17 RX ORDER — HYDROCODONE BITARTRATE AND ACETAMINOPHEN 5; 325 MG/1; MG/1
1 TABLET ORAL EVERY 4 HOURS PRN
Status: DISCONTINUED | OUTPATIENT
Start: 2024-04-17 | End: 2024-04-18 | Stop reason: HOSPADM

## 2024-04-17 RX ORDER — CARVEDILOL 25 MG/1
25 TABLET ORAL 2 TIMES DAILY WITH MEALS
Status: DISCONTINUED | OUTPATIENT
Start: 2024-04-17 | End: 2024-04-18 | Stop reason: HOSPADM

## 2024-04-17 RX ORDER — ATORVASTATIN CALCIUM 40 MG/1
40 TABLET, FILM COATED ORAL DAILY
Status: DISCONTINUED | OUTPATIENT
Start: 2024-04-17 | End: 2024-04-18 | Stop reason: HOSPADM

## 2024-04-17 RX ORDER — NALOXONE HCL 0.4 MG/ML
0.4 VIAL (ML) INJECTION
Status: DISCONTINUED | OUTPATIENT
Start: 2024-04-17 | End: 2024-04-18 | Stop reason: HOSPADM

## 2024-04-17 RX ORDER — LABETALOL HYDROCHLORIDE 5 MG/ML
5 INJECTION, SOLUTION INTRAVENOUS
Status: DISCONTINUED | OUTPATIENT
Start: 2024-04-17 | End: 2024-04-18 | Stop reason: HOSPADM

## 2024-04-17 RX ORDER — SODIUM CHLORIDE 9 MG/ML
INJECTION, SOLUTION INTRAVENOUS CONTINUOUS PRN
Status: DISCONTINUED | OUTPATIENT
Start: 2024-04-17 | End: 2024-04-17 | Stop reason: SURG

## 2024-04-17 RX ORDER — HYDRALAZINE HYDROCHLORIDE 20 MG/ML
5 INJECTION INTRAMUSCULAR; INTRAVENOUS
Status: DISCONTINUED | OUTPATIENT
Start: 2024-04-17 | End: 2024-04-18 | Stop reason: HOSPADM

## 2024-04-17 RX ORDER — LOSARTAN POTASSIUM 50 MG/1
50 TABLET ORAL DAILY
Status: DISCONTINUED | OUTPATIENT
Start: 2024-04-17 | End: 2024-04-18 | Stop reason: HOSPADM

## 2024-04-17 RX ORDER — DROPERIDOL 2.5 MG/ML
0.62 INJECTION, SOLUTION INTRAMUSCULAR; INTRAVENOUS
Status: DISCONTINUED | OUTPATIENT
Start: 2024-04-17 | End: 2024-04-18 | Stop reason: HOSPADM

## 2024-04-17 RX ORDER — PROPOFOL 10 MG/ML
INJECTION, EMULSION INTRAVENOUS CONTINUOUS PRN
Status: DISCONTINUED | OUTPATIENT
Start: 2024-04-17 | End: 2024-04-17 | Stop reason: SURG

## 2024-04-17 RX ORDER — LIDOCAINE HYDROCHLORIDE AND EPINEPHRINE BITARTRATE 20; .01 MG/ML; MG/ML
INJECTION, SOLUTION SUBCUTANEOUS
Status: DISCONTINUED | OUTPATIENT
Start: 2024-04-17 | End: 2024-04-17 | Stop reason: HOSPADM

## 2024-04-17 RX ORDER — IPRATROPIUM BROMIDE AND ALBUTEROL SULFATE 2.5; .5 MG/3ML; MG/3ML
3 SOLUTION RESPIRATORY (INHALATION) ONCE AS NEEDED
Status: DISCONTINUED | OUTPATIENT
Start: 2024-04-17 | End: 2024-04-18 | Stop reason: HOSPADM

## 2024-04-17 RX ORDER — ONDANSETRON 2 MG/ML
4 INJECTION INTRAMUSCULAR; INTRAVENOUS ONCE AS NEEDED
Status: DISCONTINUED | OUTPATIENT
Start: 2024-04-17 | End: 2024-04-18 | Stop reason: HOSPADM

## 2024-04-17 RX ORDER — MORPHINE SULFATE 2 MG/ML
1 INJECTION, SOLUTION INTRAMUSCULAR; INTRAVENOUS EVERY 4 HOURS PRN
Status: DISCONTINUED | OUTPATIENT
Start: 2024-04-17 | End: 2024-04-18 | Stop reason: HOSPADM

## 2024-04-17 RX ORDER — ONDANSETRON 2 MG/ML
4 INJECTION INTRAMUSCULAR; INTRAVENOUS EVERY 6 HOURS PRN
Status: DISCONTINUED | OUTPATIENT
Start: 2024-04-17 | End: 2024-04-18 | Stop reason: HOSPADM

## 2024-04-17 RX ORDER — CILOSTAZOL 100 MG/1
100 TABLET ORAL 2 TIMES DAILY
Status: DISCONTINUED | OUTPATIENT
Start: 2024-04-17 | End: 2024-04-18 | Stop reason: HOSPADM

## 2024-04-17 RX ORDER — FERROUS SULFATE 324(65)MG
324 TABLET, DELAYED RELEASE (ENTERIC COATED) ORAL
Status: DISCONTINUED | OUTPATIENT
Start: 2024-04-18 | End: 2024-04-18 | Stop reason: HOSPADM

## 2024-04-17 RX ORDER — DIPHENHYDRAMINE HYDROCHLORIDE 50 MG/ML
12.5 INJECTION INTRAMUSCULAR; INTRAVENOUS
Status: DISCONTINUED | OUTPATIENT
Start: 2024-04-17 | End: 2024-04-18 | Stop reason: HOSPADM

## 2024-04-17 RX ORDER — PHENYLEPHRINE HYDROCHLORIDE 10 MG/ML
INJECTION INTRAVENOUS AS NEEDED
Status: DISCONTINUED | OUTPATIENT
Start: 2024-04-17 | End: 2024-04-17

## 2024-04-17 RX ORDER — PROMETHAZINE HYDROCHLORIDE 25 MG/1
25 TABLET ORAL ONCE AS NEEDED
Status: DISCONTINUED | OUTPATIENT
Start: 2024-04-17 | End: 2024-04-18 | Stop reason: HOSPADM

## 2024-04-17 RX ORDER — PANTOPRAZOLE SODIUM 40 MG/1
40 TABLET, DELAYED RELEASE ORAL DAILY
Status: DISCONTINUED | OUTPATIENT
Start: 2024-04-17 | End: 2024-04-18 | Stop reason: HOSPADM

## 2024-04-17 RX ORDER — TRAMADOL HYDROCHLORIDE 50 MG/1
50 TABLET ORAL EVERY 12 HOURS PRN
Status: DISCONTINUED | OUTPATIENT
Start: 2024-04-17 | End: 2024-04-18 | Stop reason: HOSPADM

## 2024-04-17 RX ORDER — FAMOTIDINE 20 MG/1
40 TABLET, FILM COATED ORAL DAILY
Status: DISCONTINUED | OUTPATIENT
Start: 2024-04-17 | End: 2024-04-18 | Stop reason: HOSPADM

## 2024-04-17 RX ORDER — METFORMIN HYDROCHLORIDE 500 MG/1
1000 TABLET, EXTENDED RELEASE ORAL
Status: DISCONTINUED | OUTPATIENT
Start: 2024-04-19 | End: 2024-04-18 | Stop reason: HOSPADM

## 2024-04-17 RX ORDER — NALOXONE HCL 0.4 MG/ML
0.2 VIAL (ML) INJECTION AS NEEDED
Status: DISCONTINUED | OUTPATIENT
Start: 2024-04-17 | End: 2024-04-18 | Stop reason: HOSPADM

## 2024-04-17 RX ORDER — GABAPENTIN 300 MG/1
300 CAPSULE ORAL EVERY EVENING
Status: DISCONTINUED | OUTPATIENT
Start: 2024-04-17 | End: 2024-04-18 | Stop reason: HOSPADM

## 2024-04-17 RX ADMIN — ATORVASTATIN CALCIUM 40 MG: 40 TABLET, FILM COATED ORAL at 22:22

## 2024-04-17 RX ADMIN — LOSARTAN POTASSIUM 50 MG: 50 TABLET, FILM COATED ORAL at 15:20

## 2024-04-17 RX ADMIN — SODIUM CHLORIDE: 9 INJECTION, SOLUTION INTRAVENOUS at 11:11

## 2024-04-17 RX ADMIN — SODIUM CHLORIDE 2000 MG: 900 INJECTION INTRAVENOUS at 18:28

## 2024-04-17 RX ADMIN — CARVEDILOL 25 MG: 25 TABLET, FILM COATED ORAL at 17:37

## 2024-04-17 RX ADMIN — FAMOTIDINE 40 MG: 20 TABLET, FILM COATED ORAL at 15:20

## 2024-04-17 RX ADMIN — PROPOFOL INJECTABLE EMULSION 50 MG: 10 INJECTION, EMULSION INTRAVENOUS at 11:43

## 2024-04-17 RX ADMIN — PROPOFOL INJECTABLE EMULSION 150 MCG/KG/MIN: 10 INJECTION, EMULSION INTRAVENOUS at 11:20

## 2024-04-17 RX ADMIN — PANTOPRAZOLE SODIUM 40 MG: 40 TABLET, DELAYED RELEASE ORAL at 15:20

## 2024-04-17 RX ADMIN — FINASTERIDE 5 MG: 5 TABLET, FILM COATED ORAL at 15:19

## 2024-04-17 RX ADMIN — GABAPENTIN 300 MG: 300 CAPSULE ORAL at 17:37

## 2024-04-17 RX ADMIN — CEFAZOLIN 2 G: 2 INJECTION, POWDER, FOR SOLUTION INTRAMUSCULAR; INTRAVENOUS at 11:21

## 2024-04-17 RX ADMIN — LIDOCAINE HYDROCHLORIDE 60 MG: 20 INJECTION, SOLUTION EPIDURAL; INFILTRATION; INTRACAUDAL; PERINEURAL at 11:20

## 2024-04-17 RX ADMIN — PHENYLEPHRINE HYDROCHLORIDE 0.3 MCG/KG/MIN: 10 INJECTION INTRAVENOUS at 11:29

## 2024-04-17 RX ADMIN — CILOSTAZOL 100 MG: 100 TABLET ORAL at 22:22

## 2024-04-17 NOTE — NURSING NOTE
Transported patient to 2121 per stretcher; bedside report and left chest ICD site check done with FERNANDO Tellez on PCU; site clean and dry; daughter at bedside

## 2024-04-17 NOTE — ANESTHESIA POSTPROCEDURE EVALUATION
Patient: Ramon Silva    Procedure Summary       Date: 04/17/24 Room / Location: [place] CATH LAB 3 / [facility] CATH INVASIVE LOCATION    Anesthesia Start: 1111 Anesthesia Stop: 1258    Procedure: ICD DC new [employer] notified 03/22/24 Diagnosis:       Dilated cardiomyopathy      (Non-ischemic dilated cardiomyopathy)    Providers: Chago Lambert MD Provider: Alberto Ballard MD    Anesthesia Type: general ASA Status: 3            Anesthesia Type: general    Vitals  Vitals Value Taken Time   /61 04/17/24 1332   Temp     Pulse 83 04/17/24 1336   Resp 16 04/17/24 1330   SpO2 92 % 04/17/24 1336   Vitals shown include unfiled device data.        Post Anesthesia Care and Evaluation    Patient location during evaluation: PACU  Patient participation: complete - patient participated  Level of consciousness: awake  Pain scale: See nurse's notes for pain score.  Pain management: adequate    Airway patency: patent  Anesthetic complications: No anesthetic complications  PONV Status: none  Cardiovascular status: acceptable  Respiratory status: acceptable and spontaneous ventilation  Hydration status: acceptable    Comments: Patient seen and examined postoperatively; vital signs stable; SpO2 greater than or equal to 90%; cardiopulmonary status stable; nausea/vomiting adequately controlled; pain adequately controlled; no apparent anesthesia complications; patient discharged from anesthesia care when discharge criteria were met

## 2024-04-17 NOTE — ANESTHESIA PREPROCEDURE EVALUATION
Anesthesia Evaluation     Patient summary reviewed and Nursing notes reviewed   NPO Solid Status: > 8 hours  NPO Liquid Status: > 2 hours           Airway   Mallampati: III  TM distance: >3 FB  Neck ROM: full  No difficulty expected  Dental    (+) poor dentition    Pulmonary - normal exam   (+) ,shortness of breath  Cardiovascular   Exercise tolerance: good (4-7 METS)    ECG reviewed  Rhythm: irregular  Rate: normal    (+) hypertension well controlled, CHF Systolic <55%, hyperlipidemia      Neuro/Psych  GI/Hepatic/Renal/Endo    (+) GERD well controlled, renal disease- CRI, diabetes mellitus type 2 well controlled    Musculoskeletal     (+) arthralgias  Abdominal  - normal exam   Substance History - negative use     OB/GYN          Other   arthritis,   history of cancer remission    ROS/Med Hx Other:  9/23    ·  Left ventricular ejection fraction appears to be 31 - 35%.  ·  The following left ventricular wall segments are hypokinetic: mid anterior, apical anterior, basal anterolateral, mid anterolateral, apical lateral, basal inferolateral, mid inferolateral, apical inferior, mid inferior, basal inferoseptal, basal anterior, basal inferior and basal inferoseptal. The following left ventricular wall segments are akinetic: apical septal, mid inferoseptal, apex and mid anteroseptal.  ·  Left ventricular diastolic function is consistent with (grade I) impaired relaxation.  ·  Estimated right ventricular systolic pressure from tricuspid regurgitation is normal (<35 mmHg). Calculated right ventricular systolic pressure from tricuspid regurgitation is 26 mmHg.                      Anesthesia Plan    ASA 3     general     intravenous induction     Anesthetic plan, risks, benefits, and alternatives have been provided, discussed and informed consent has been obtained with: patient.  Pre-procedure education provided  Plan discussed with CRNA.        CODE STATUS:

## 2024-04-17 NOTE — H&P
CC---Cardiomyopathy        Sub  79-year-old male patient with complex medical problems came for evaluation because of cardiomyopathy.  Patient had severe degenerative knee disease.  Preoperatively came for cardiac evaluation and was found to have severe LV dysfunction with EF less than 35% with left bundle branch block with abnormal stress test.  Further cardiac catheterization showed 50% mid LAD stenosis and severe cardiomyopathy with EF less than 30% with mild mitral regurgitation.  Patient also has additional history of severe peripheral vascular disease followed by vascular surgery.  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  He is regularly followed by hematology and oncology and has been in remission for more than 5 years.  He denies any syncope or any stroke.                    Medical History        Past Medical History:   Diagnosis Date    Anemia      Arthralgia      Diabetes      Fatigue      GERD (gastroesophageal reflux disease)      Gout      Hyperlipidemia      Hypertension      Liver lesion      Non Hodgkin's lymphoma      Osteoporosis      Personal history of testicular cancer      Vitamin D deficiency      Weight loss           Surgical History         Past Surgical History:   Procedure Laterality Date    ANGIOGRAM - CONVERTED   07/05/2022     aif by Dr Henriquez    CARDIAC CATHETERIZATION N/A 7/5/2022     Procedure: PERIPHERAL ANGIOGRAPHY Right leg;  Surgeon: Theron Henriquez MD;  Location:  [facility] CATH INVASIVE LOCATION;  Service: Cardiovascular;  Laterality: N/A;    CARDIAC CATHETERIZATION N/A 10/2/2023     Procedure: Right and Left Heart Cath with possible PCI;  Surgeon: Joey Kirkland DO;  Location:  [facility] CATH INVASIVE LOCATION;  Service: Cardiovascular;  Laterality: N/A;  Local and IV sedation    HIP SURGERY   2005    [facility]  PLACEMENT        TESTICLE SURGERY Right 07/17/2000     right testicular excision         Family History   Family history unknown: Yes         Review of Systems   General:  positive for fatigue and tiredness  Eyes: No redness  Cardiovascular: No chest pain, no palpitations           Physical Exam     General:      well developed, well nourished, in no acute distress.    Head:      normocephalic and atraumatic.    Eyes:      PERRL/EOM intact, conjunctivae and sclerae clear without nystagmus.    Neck:      no  thyromegaly, trachea central with normal respiratory effort  Lungs:      clear bilaterally to auscultation.    Heart:       regular rate and rhythm, S1, S2 without murmurs, rubs, or gallops  Skin:      intact without lesions or rashes.    Psych:      alert and cooperative; normal mood and affect; normal attention span and concentration.             Assessment and plan  Extensive review of records was done including cardiology notes, review of echo report, Cath Lab report and also peripheral vascular disease reports and also notes from hematology and oncology.  Echocardiography with EF less than 35% with mild MR. MRI was done which revealed EF of 30%  Cardiac catheterization showed mid LAD 50% stenosis with EF less than 30% with mild MR.  Left bundle branch block  Significant peripheral vascular disease  Presence of a port in the left subclavian which has been removed  History of lymphoma including the involvement of testicular area and also sinuses needing 2 cycles of chemotherapy in remission in last 5 years.  Prior therapy also include CHOP regimen.  Chronic anemia.  Patient scheduled for CRT-D device in the send benefits and outcomes educated which is scheduled today        Electronically signed by Chago Lambert MD, 04/17/24, 10:58 AM EDT.

## 2024-04-17 NOTE — PLAN OF CARE
Goal Outcome Evaluation:      Pt arrived to unit post AICD placement. Left chest incision clean, dry and intact. Ice pack applied. Pt A/Ox4 and compliant with bedrest orders. VSS with no complaints at this time.    Problem: Adult Inpatient Plan of Care  Goal: Plan of Care Review  Outcome: Ongoing, Progressing

## 2024-04-18 VITALS
OXYGEN SATURATION: 95 % | SYSTOLIC BLOOD PRESSURE: 121 MMHG | DIASTOLIC BLOOD PRESSURE: 56 MMHG | HEIGHT: 61 IN | TEMPERATURE: 98.1 F | HEART RATE: 80 BPM | BODY MASS INDEX: 22.89 KG/M2 | WEIGHT: 121.25 LBS | RESPIRATION RATE: 17 BRPM

## 2024-04-18 DIAGNOSIS — N13.8 BENIGN PROSTATIC HYPERPLASIA WITH URINARY OBSTRUCTION: ICD-10-CM

## 2024-04-18 DIAGNOSIS — N40.1 BENIGN PROSTATIC HYPERPLASIA WITH URINARY OBSTRUCTION: ICD-10-CM

## 2024-04-18 DIAGNOSIS — I10 PRIMARY HYPERTENSION: ICD-10-CM

## 2024-04-18 LAB — GLUCOSE BLDC GLUCOMTR-MCNC: 111 MG/DL (ref 70–105)

## 2024-04-18 PROCEDURE — 25010000002 CEFAZOLIN PER 500 MG: Performed by: INTERNAL MEDICINE

## 2024-04-18 PROCEDURE — 93005 ELECTROCARDIOGRAM TRACING: CPT | Performed by: INTERNAL MEDICINE

## 2024-04-18 PROCEDURE — 82948 REAGENT STRIP/BLOOD GLUCOSE: CPT

## 2024-04-18 PROCEDURE — 97161 PT EVAL LOW COMPLEX 20 MIN: CPT

## 2024-04-18 PROCEDURE — 97166 OT EVAL MOD COMPLEX 45 MIN: CPT | Performed by: OCCUPATIONAL THERAPIST

## 2024-04-18 PROCEDURE — 93010 ELECTROCARDIOGRAM REPORT: CPT | Performed by: INTERNAL MEDICINE

## 2024-04-18 RX ORDER — HYDROCODONE BITARTRATE AND ACETAMINOPHEN 5; 325 MG/1; MG/1
1 TABLET ORAL EVERY 4 HOURS PRN
Qty: 20 TABLET | Refills: 0 | Status: SHIPPED | OUTPATIENT
Start: 2024-04-18 | End: 2024-04-22

## 2024-04-18 RX ORDER — CEPHALEXIN 500 MG/1
500 CAPSULE ORAL 3 TIMES DAILY
Qty: 15 CAPSULE | Refills: 0 | Status: SHIPPED | OUTPATIENT
Start: 2024-04-18 | End: 2024-04-23

## 2024-04-18 RX ORDER — FINASTERIDE 5 MG/1
5 TABLET, FILM COATED ORAL DAILY
Qty: 90 TABLET | Refills: 1 | Status: SHIPPED | OUTPATIENT
Start: 2024-04-18

## 2024-04-18 RX ORDER — LOSARTAN POTASSIUM 50 MG/1
50 TABLET ORAL DAILY
Qty: 90 TABLET | Refills: 1 | Status: SHIPPED | OUTPATIENT
Start: 2024-04-18

## 2024-04-18 RX ADMIN — LOSARTAN POTASSIUM 50 MG: 50 TABLET, FILM COATED ORAL at 08:50

## 2024-04-18 RX ADMIN — PANTOPRAZOLE SODIUM 40 MG: 40 TABLET, DELAYED RELEASE ORAL at 08:50

## 2024-04-18 RX ADMIN — FINASTERIDE 5 MG: 5 TABLET, FILM COATED ORAL at 08:50

## 2024-04-18 RX ADMIN — CILOSTAZOL 100 MG: 100 TABLET ORAL at 08:50

## 2024-04-18 RX ADMIN — FERROUS SULFATE TAB EC 324 MG (65 MG FE EQUIVALENT) 324 MG: 324 (65 FE) TABLET DELAYED RESPONSE at 08:50

## 2024-04-18 RX ADMIN — CARVEDILOL 25 MG: 25 TABLET, FILM COATED ORAL at 08:50

## 2024-04-18 RX ADMIN — SODIUM CHLORIDE 2000 MG: 900 INJECTION INTRAVENOUS at 04:23

## 2024-04-18 RX ADMIN — FAMOTIDINE 40 MG: 20 TABLET, FILM COATED ORAL at 08:50

## 2024-04-18 NOTE — PLAN OF CARE
Goal Outcome Evaluation:  Plan of Care Reviewed With: patient, grandchild(davida)        Progress: no change  Outcome Evaluation: Pt is a 80 y/o male who presented to [facility] 4/17/2024 for elective BiV ICD implant secondary to severe dilated cardiomyopathy with persistent LV dysfunction and EF less than 35%. At baseline, pt lives w/ adult grandchildren in a H with 4-5 ASHLY and was MOD I with household/community mobility w/ SPC (though typically uses in LUE 2*/2 chronic R knee pain), still drives, independent w/ ADLs, and with 1 fall over the past year related to tripping. During eval, pt alert/oriented x 4, though required cues, and up in chair with OT (OT reports pt required CGA for supine to sit transfer). Pt required CGA for transfers and CGA for gait training 170 ft with SPC in RUE. Pt educated on post-op pacemaker precautions with LUE sling in place. Vitals stable and WNL throughout session. Anticipate safe d/c home with assist from grandchildren as needed and use of SPC. Pt with no ongoing skilled PT needs. Eval only.      Anticipated Discharge Disposition (PT): home with assist

## 2024-04-18 NOTE — THERAPY EVALUATION
Patient Name: Ramon Silva  : 1945    MRN: [id number]                              Today's Date: 2024       Admit Date: 2024    Visit Dx:     ICD-10-CM ICD-9-CM   1. Dilated cardiomyopathy  I42.0 425.4     Patient Active Problem List   Diagnosis    Arthralgia    Benign prostatic hyperplasia with urinary obstruction    Diabetes mellitus, type II    Diabetic foot ulcer    Fatigue    Gastroesophageal reflux disease    Hyperlipidemia    Hypertension    Knee pain, right    Non Hodgkin's lymphoma    Mass of nasal sinus    Microalbuminuria    Onychomycosis of toenail    Osteoarthritis of knee    Other dorsalgia    Type 2 diabetes mellitus with hyperglycemia    Vitamin D deficiency    Weight loss    Anemia    Liver lesion    Osteoporosis    Prostatic hypertrophy    YARELI (iron deficiency anemia)    Malabsorption of iron    Allergic rhinitis    Ulcer of toe of right foot, unspecified ulcer stage    Intermittent claudication    CKD (chronic kidney disease) stage 2, GFR 60-89 ml/min    Abnormal EKG    Preop cardiovascular exam    Dyspnea on exertion    Palpitations    Abnormal nuclear stress test    Cardiomyopathy    LBBB (left bundle branch block)    Chronic systolic congestive heart failure    Dilated cardiomyopathy     Past Medical History:   Diagnosis Date    Anemia     Arthralgia     Diabetes     Fatigue     GERD (gastroesophageal reflux disease)     Gout     Hyperlipidemia     Hypertension     Liver lesion     Non Hodgkin's lymphoma     Osteoporosis     Personal history of testicular cancer     Vitamin D deficiency     Weight loss      Past Surgical History:   Procedure Laterality Date    ANGIOGRAM - CONVERTED  2022    aif by Dr Henriquez    CARDIAC CATHETERIZATION N/A 2022    Procedure: PERIPHERAL ANGIOGRAPHY Right leg;  Surgeon: Theron Henriquez MD;  Location: [facility] INVASIVE LOCATION;  Service: Cardiovascular;  Laterality: N/A;    CARDIAC CATHETERIZATION N/A 10/2/2023    Procedure:  Right and Left Heart Cath with possible PCI;  Surgeon: Joey Kirkland DO;  Location:  [facility] CATH INVASIVE LOCATION;  Service: Cardiovascular;  Laterality: N/A;  Local and IV sedation    CARDIAC ELECTROPHYSIOLOGY PROCEDURE N/A 4/17/2024    Procedure: ICD DC new Biotronik notified 03/22/24;  Surgeon: Chago Lambert MD;  Location:  [facility] CATH INVASIVE LOCATION;  Service: Cardiovascular;  Laterality: N/A;  Needs Port-a-Cath removal prior to ICD    HIP SURGERY  2005    PORTACATH PLACEMENT      TESTICLE SURGERY Right 07/17/2000    right testicular excision      General Information       Row Name 04/18/24 1447          OT Time and Intention    Document Type evaluation  -DT     Mode of Treatment occupational therapy  -DT       Row Name 04/18/24 1447          General Information    Patient Profile Reviewed yes  -DT     Prior Level of Function independent:;transfer;ADL's  Uses spc in left hand  -DT     Existing Precautions/Restrictions fall;non-weight bearing;pacemaker;left  -DT     Barriers to Rehab none identified  -DT       Row Name 04/18/24 1447          Living Environment    People in Home grandchild(davida)  -DT       Row Name 04/18/24 1447          Home Main Entrance    Number of Stairs, Main Entrance five  -DT     Stair Railings, Main Entrance railing on right side (ascending)  -DT       Row Name 04/18/24 1447          Stairs Within Home, Primary    Number of Stairs, Within Home, Primary none  -DT       Row Name 04/18/24 1447          Cognition    Orientation Status (Cognition) oriented x 4;verbal cues/prompts needed for orientation  -DT       Row Name 04/18/24 1447          Safety Issues, Functional Mobility    Impairments Affecting Function (Mobility) balance;endurance/activity tolerance;pain  -DT               User Key  (r) = Recorded By, (t) = Taken By, (c) = Cosigned By      Initials Name Provider Type    DT Deborah Anand, OT Occupational Therapist                     Mobility/ADL's        Row Name 04/18/24 1447          Bed Mobility    Bed Mobility supine-sit  -DT     Supine-Sit [place] (Bed Mobility) contact guard  -DT       [facility] Name 04/18/24 1447          Bed-Chair Transfer    Bed-Chair [place] (Transfers) contact guard  -DT       [facility] Name 04/18/24 1447          Sit-Stand Transfer    Sit-Stand [place] (Transfers) contact guard  -DT       Row Name 04/18/24 1447          Functional Mobility    Functional Mobility- Ind. Level contact guard assist  -DT     Functional Mobility- Device cane, straight  -DT     Functional Mobility-Distance (Feet) 20  -DT     Patient was able to Ambulate yes  -DT       [facility] Name 04/18/24 1447          Mobility    Extremity Weight-bearing Status left upper extremity  -DT     Left Upper Extremity (Weight-bearing Status) non weight-bearing (NWB)  -DT               User Key  (r) = Recorded By, (t) = Taken By, (c) = Cosigned By      Initials Name Provider Type    DT Deborah Anand, OT Occupational Therapist                   Obj/Interventions       Row Name 04/18/24 1448          Range of Motion Comprehensive    Comment, General Range of Motion BUE WFL within limits of pacemaker/ICD precautions.  -DT       Row Name 04/18/24 1448          Strength Comprehensive (MMT)    Comment, General Manual Muscle Testing (MMT) Assessment RUE = 4/5, LUE - NT due to post-op precautions.  -DT       Row Name 04/18/24 1448          Balance    Balance Assessment sitting static balance;sitting dynamic balance;standing static balance;standing dynamic balance  -DT     Static Sitting Balance independent  -DT     Dynamic Sitting Balance independent  -DT     Position, Sitting Balance sitting edge of bed  -DT     Static Standing Balance standby assist  -DT     Dynamic Standing Balance contact guard  -DT     Position/Device Used, Standing Balance cane, straight  -DT               User Key  (r) = Recorded By, (t) = Taken By, (c) = Cosigned By      Initials Name Provider Type     DT Deborah Anand, OT Occupational Therapist                   Goals/Plan    No documentation.                  Clinical Impression       Row Name 04/18/24 1449          Pain Assessment    Pretreatment Pain Rating 2/10  -DT     Posttreatment Pain Rating 2/10  -DT     Pain Location - Side/Orientation Left  -DT     Pain Location incisional  -DT     Pain Location - chest  -DT       Row Name 04/18/24 1449          Plan of Care Review    Plan of Care Reviewed With patient;grandchild(davida)  -DT     Outcome Evaluation Pt is a 79 y.o. M adm to [facility] on 04/17/24 for elective BiV ICD implant secondary to severe dilated cardiomyopathy with persistent LV dysfunction & EF <35%. At baseline, pt lives with adult grandchildren in [facility] with 5 ASHLY. He is ind with BADLs & amb with a straight cane in LUE. He also still drives. Upon eval, pt A&O X4. He completed ADL transfers & bed mobility with CGA with min v.c. for post-op precautions. He was able to use his cane using his RUE without issues. Reviewed precautions with pt & grandson & provided them with written instructions as a reminder. Pt has LUE immobilizer in place. Pt completing LB ADLs with CGA. Pt is functioning near his baseline. Recommend home with family assist while he is to follow his precautions for safety. No further OT tx recommended at this time. OT will sign off.  -DT       Row Name 04/18/24 1449          Therapy Assessment/Plan (OT)    Criteria for Skilled Therapeutic Interventions Met (OT) no;no problems identified which require skilled intervention  -DT     Therapy Frequency (OT) evaluation only  -DT       Row Name 04/18/24 1449          Therapy Plan Review/Discharge Plan (OT)    Anticipated Discharge Disposition (OT) home with assist  -DT       Row Name 04/18/24 1449          Positioning and Restraints    Pre-Treatment Position in bed  -DT     Post Treatment Position chair  -DT     In Chair notified nsg;sitting;with PT  -DT               User Key  (r) =  Recorded By, (t) = Taken By, (c) = Cosigned By      Initials Name Provider Type    DT Deborah Anand, OT Occupational Therapist                   Outcome Measures    No documentation.                   Occupational Therapy Education       Title: PT OT SLP Therapies (In Progress)       Topic: Occupational Therapy (In Progress)       Point: ADL training (Done)       Description:   Instruct learner(s) on proper safety adaptation and remediation techniques during self care or transfers.   Instruct in proper use of assistive devices.                  Learning Progress Summary             Patient Acceptance, E,TB, VU by DT at 4/18/2024 1456    Comment: Role of OT, post-op precautions, safety awareness.   Family Acceptance, E,TB, VU by DT at 4/18/2024 1456    Comment: Role of OT, post-op precautions, safety awareness.                         Point: Home exercise program (Not Started)       Description:   Instruct learner(s) on appropriate technique for monitoring, assisting and/or progressing therapeutic exercises/activities.                  Learner Progress:  Not documented in this visit.              Point: Precautions (Done)       Description:   Instruct learner(s) on prescribed precautions during self-care and functional transfers.                  Learning Progress Summary             Patient Acceptance, E,TB, VU by DT at 4/18/2024 1456    Comment: Role of OT, post-op precautions, safety awareness.   Family Acceptance, E,TB, VU by DT at 4/18/2024 1456    Comment: Role of OT, post-op precautions, safety awareness.                         Point: Body mechanics (Not Started)       Description:   Instruct learner(s) on proper positioning and spine alignment during self-care, functional mobility activities and/or exercises.                  Learner Progress:  Not documented in this visit.                              User Key       Initials Effective Dates Name Provider Type Discipline    DT 07/11/23 -  Cheng  Deborah Santacruz, OT Occupational Therapist OT                  OT Recommendation and Plan  Therapy Frequency (OT): evaluation only  Plan of Care Review  Plan of Care Reviewed With: patient, grandchild(davida)  Outcome Evaluation: Pt is a 79 y.o. M adm to [facility] on 04/17/24 for elective BiV ICD implant secondary to severe dilated cardiomyopathy with persistent LV dysfunction & EF <35%. At baseline, pt lives with adult grandchildren in [facility] with 5 ASHLY. He is ind with BADLs & amb with a straight cane in LUE. He also still drives. Upon eval, pt A&O X4. He completed ADL transfers & bed mobility with CGA with min v.c. for post-op precautions. He was able to use his cane using his RUE without issues. Reviewed precautions with pt & grandson & provided them with written instructions as a reminder. Pt has LUE immobilizer in place. Pt completing LB ADLs with CGA. Pt is functioning near his baseline. Recommend home with family assist while he is to follow his precautions for safety. No further OT tx recommended at this time. OT will sign off.     Time Calculation:         Time Calculation- OT       Row Name 04/18/24 1456             Time Calculation- OT    OT Start Time 0909  -DT      OT Stop Time 0943  -DT      OT Time Calculation (min) 34 min  -DT      OT Received On 04/18/24  -DT                User Key  (r) = Recorded By, (t) = Taken By, (c) = Cosigned By      Initials Name Provider Type    Deborah Pate, OT Occupational Therapist                           Deborah Anand, OT  4/18/2024

## 2024-04-18 NOTE — DISCHARGE SUMMARY
Cardiology Discharge Summary      Patient Care Team:  Joey Crump APRN as PCP - General (Family Medicine)  Virginia Melgoza APRN as Nurse Practitioner (Orthopedic Surgery)  DIANE Oneal DPM as Consulting Physician (Podiatry)  Theron Hneriquez MD as Consulting Physician (Vascular Surgery)  Jessica Kwon MD as Consulting Physician (Hematology and Oncology)    Date of Admission: 4/17/2024    Date of Discharge:  4/18/2024    Length of stay:  LOS: 0 days     ADMISSION DIAGNOSIS:    Cardiomyopathy    Hypertension    Type 2 diabetes mellitus with hyperglycemia    Dyspnea on exertion    LBBB (left bundle branch block)    Chronic systolic congestive heart failure    Dilated cardiomyopathy        DISCHARGE DIAGNOSIS:    Cardiomyopathy    Hypertension    Type 2 diabetes mellitus with hyperglycemia    Dyspnea on exertion    LBBB (left bundle branch block)    Chronic systolic congestive heart failure    Dilated cardiomyopathy  Status post biventricular ICD implant    Presenting Problem/History of Present Illness    Active Hospital Problems    Diagnosis  POA    **Cardiomyopathy [I42.9]  Yes    LBBB (left bundle branch block) [I44.7]  Yes    Chronic systolic congestive heart failure [I50.22]  Yes    Dilated cardiomyopathy [I42.0]  Yes    Dyspnea on exertion [R06.09]  Yes    Hypertension [I10]  Yes    Type 2 diabetes mellitus with hyperglycemia [E11.65]  Yes      Resolved Hospital Problems   No resolved problems to display.          HOSPITAL COURSE:  Patient is a 79 y.o. male who presented to [facility] 4/17/2024 for elective BiV ICD implant secondary to severe dilated cardiomyopathy with persistent LV dysfunction and EF less than 35%.  Please see full procedure note for specific details.  He was monitored in the progressive care unit overnight with no events reported.  His device has been interrogated with proper functioning.  He reports some mild pain at implant site.  The site  has scant amount of old drainage, very mild swelling.  Extremity immobilizer in place.  Discharge instructions were reviewed with the patient and son at bedside, questions were answered.  Aftercare instructions reviewed.  Patient stable for discharge home today.    Past Medical History:     Past Medical History:   Diagnosis Date    Anemia     Arthralgia     Diabetes     Fatigue     GERD (gastroesophageal reflux disease)     Gout     Hyperlipidemia     Hypertension     Liver lesion     Non Hodgkin's lymphoma     Osteoporosis     Personal history of testicular cancer     Vitamin D deficiency     Weight loss        Past Surgical History:     Past Surgical History:   Procedure Laterality Date    ANGIOGRAM - CONVERTED  07/05/2022    aif by Dr Henriquez    CARDIAC CATHETERIZATION N/A 7/5/2022    Procedure: PERIPHERAL ANGIOGRAPHY Right leg;  Surgeon: Theron Henriquez MD;  Location:  [facility] CATH INVASIVE LOCATION;  Service: Cardiovascular;  Laterality: N/A;    CARDIAC CATHETERIZATION N/A 10/2/2023    Procedure: Right and Left Heart Cath with possible PCI;  Surgeon: Joey Kirkland DO;  Location:  [facility] CATH INVASIVE LOCATION;  Service: Cardiovascular;  Laterality: N/A;  Local and IV sedation    CARDIAC ELECTROPHYSIOLOGY PROCEDURE N/A 4/17/2024    Procedure: ICD DC new Biotronik notified 03/22/24;  Surgeon: Chago Lambert MD;  Location:  [facility] CATH INVASIVE LOCATION;  Service: Cardiovascular;  Laterality: N/A;  Needs Port-a-Cath removal prior to ICD    HIP SURGERY  2005    PORTACATH PLACEMENT      TESTICLE SURGERY Right 07/17/2000    right testicular excision       Social History:   Social History     Socioeconomic History    Marital status:    Tobacco Use    Smoking status: Never    Smokeless tobacco: Never   Vaping Use    Vaping status: Never Used   Substance and Sexual Activity    Alcohol use: No    Drug use: Never    Sexual activity: Defer       Procedures Performed     1. Implantation of a  "biventricular ICD system with placement of biventricular ICD generator and placement of a right ventricular ICD lead which is a VDD lead and left ventricular coronary sinus lead with coronary sinus venography  2.  Subclavian venography  3.  Biventricular ICD programming   4.  Lead testing and fluoroscopy  Consults:   Consulting Physician(s)                     None                Pertinent Test Results:     CBC      Cr Clearance CrCl cannot be calculated (Patient's most recent lab result is older than the maximum 30 days allowed.).  Coag     HbA1C   Lab Results   Component Value Date    HGBA1C 7.20 (H) 01/24/2024    HGBA1C 6.3 (A) 10/24/2023    HGBA1C 7.10 (H) 07/28/2023     Blood Glucose   Glucose   Date/Time Value Ref Range Status   04/18/2024 0707 111 (H) 70 - 105 mg/dL Final     Comment:     Serial Number: [id number]:  [id number]   04/17/2024 2036 143 (H) 70 - 105 mg/dL Final     Comment:     Serial Number: [id number]:  [id number]   04/17/2024 1701 129 (H) 70 - 105 mg/dL Final     Comment:     Serial Number: [id number]:  [id number]   04/17/2024 1001 121 (H) 70 - 105 mg/dL Final     Comment:     Serial Number: [id number]:  [id number]     Infection     CMP     ABG      UA      WANDA  No results found for: \"POCMETH\", \"POCAMPHET\", \"POCBARBITUR\", \"POCBENZO\", \"POCCOCAINE\", \"POCOPIATES\", \"POCOXYCODO\", \"POCPHENCYC\", \"POCPROPOXY\", \"POCTHC\", \"POCTRICYC\"  [facility]     Radiology(recent) XR Chest 1 View    Result Date: 4/17/2024  Impression: No acute pulmonary process Electronically Signed: Kosta Tate MD  4/17/2024 1:57 PM EDT  Workstation ID: [id number]              Condition on Discharge: Stable    Vital Signs  Visit Vitals  /57 (BP Location: Right arm, Patient Position: Lying)   Pulse 78   Temp 98.7 °F (37.1 °C) (Oral)   Resp 16   Ht 153.7 cm (60.51\")   Wt 55 kg (121 lb 4.1 oz)   SpO2 95%   BMI 23.28 kg/m²         PHYSICAL EXAM:    General: Alert, cooperative, no distress, appears stated " age  Head:  Normocephalic, atraumatic, mucous membranes moist  Eyes:  Conjunctivae/corneas clear, EOM's intact     Neck:  Supple,  no adenopathy; no JVD or bruit  Lungs:  Clear to auscultation bilaterally, no wheezes, rhonchi or rales are noted  Chest wall: Dressing with scant amount of old drainage, site with mild swelling  Heart::  Regular rate and rhythm, S1 and S2 normal, no murmur, rub or gallop  Abdomen: Soft, nontender, nondistended, bowel sounds active  Extremities: No cyanosis, clubbing, or edema   Pulses: 2+ and symmetric all extremities  Skin:  No rashes or lesions  Neuro/psych: A&O x3. CN II through XII are grossly intact with appropriate affect         Discharge Disposition: Home  Home or Self Care    Discharge Medications     Discharge Medications        New Medications        Instructions Start Date   cephalexin 500 MG capsule  Commonly known as: Keflex   500 mg, Oral, 3 Times Daily      HYDROcodone-acetaminophen 5-325 MG per tablet  Commonly known as: NORCO   1 tablet, Oral, Every 4 Hours PRN             Continue These Medications        Instructions Start Date   Accu-Chek Guide test strip  Generic drug: glucose blood   1 each, Other, Daily, E 11.9      Accu-Chek Softclix Lancets lancets   test daily as directed      aspirin 81 MG tablet   81 mg, Oral, Daily      atorvastatin 40 MG tablet  Commonly known as: LIPITOR   40 mg, Oral, Every Night at Bedtime      carvedilol 25 MG tablet  Commonly known as: COREG   25 mg, Oral, 2 Times Daily With Meals      cilostazol 100 MG tablet  Commonly known as: PLETAL   100 mg, Oral, 2 Times Daily      colestipol 1 g tablet  Commonly known as: COLESTID   1 g, Oral, 2 Times Daily      Diclofenac Sodium 1 % gel gel  Commonly known as: VOLTAREN   4 g, Topical, 4 Times Daily PRN      famotidine 40 MG tablet  Commonly known as: PEPCID   40 mg, Oral, Daily      Farxiga 10 MG tablet  Generic drug: dapagliflozin Propanediol   10 mg, Oral, Daily      ferrous sulfate 324 (65  "Fe) MG tablet delayed-release EC tablet   324 mg, Oral, Daily With Breakfast      finasteride 5 MG tablet  Commonly known as: PROSCAR   5 mg, Oral, Daily      gabapentin 300 MG capsule  Commonly known as: NEURONTIN   300 mg, Oral, Every Evening      GNP HERBAL MT   Mouth/Throat, Neuroflow plus      ibandronate 150 MG tablet  Commonly known as: BONIVA   TAKE ONE TABLET BY MOUTH EVERY 30 DAYS      losartan 50 MG tablet  Commonly known as: COZAAR   50 mg, Oral, Daily      metFORMIN  MG 24 hr tablet  Commonly known as: GLUCOPHAGE-XR   1,000 mg, Oral, 2 Times Daily Before Meals      OMEGA-3 FISH OIL PO   1 capsule, Oral, Daily      pantoprazole 40 MG EC tablet  Commonly known as: PROTONIX   40 mg, Oral, Daily      traMADol 50 MG tablet  Commonly known as: ULTRAM   1 tablet, Oral, Every 12 Hours PRN      Vitamin D (Cholecalciferol) 10 MCG (400 UNIT) tablet  Commonly known as: CHOLECALCIFEROL   400 Units, Oral, Daily      ZyrTEC Allergy 10 MG capsule  Generic drug: Cetirizine HCl   10 mg, Oral, Daily               Discharge Diet:  Regular    Activity at Discharge:   Routine post ICD discharge instructions    Follow-up Appointments  Future Appointments   Date Time Provider Department Center   7/25/2024  2:00 PM NURSE/MA PC [facility]   8/1/2024  2:00 PM Jessica Kwon MD [facility]   10/30/2024  9:00 AM Katheryn Wheatley APRN [facility]         Test Results Pending at Discharge       Risk for Readmission (LACE) Score: 7 (4/18/2024  6:00 AM)      Time: Discharge 15 min    CON Ocampo  04/18/24  08:36 EDT      EMR Dragon/Transcription:   \"Dictated utilizing Dragon dictation\".       Electronically signed by CON Ocampo, 04/18/24, 8:29 AM EDT.      "

## 2024-04-18 NOTE — PLAN OF CARE
Goal Outcome Evaluation:  Plan of Care Reviewed With: patient, grandchild(davida)           Outcome Evaluation: Pt is a 79 y.o. M adm to [facility] on 04/17/24 for elective BiV ICD implant secondary to severe dilated cardiomyopathy with persistent LV dysfunction & EF <35%. At baseline, pt lives with adult grandchildren in [facility] with 5 ASHLY. He is ind with BADLs & amb with a straight cane in LUE. He also still drives. Upon eval, pt A&O X4. He completed ADL transfers & bed mobility with CGA with min v.c. for post-op precautions. He was able to use his cane using his RUE without issues. Reviewed precautions with pt & grandson & provided them with written instructions as a reminder. Pt has LUE immobilizer in place. Pt completing LB ADLs with CGA. Pt is functioning near his baseline. Recommend home with family assist while he is to follow his precautions for safety. No further OT tx recommended at this time. OT will sign off.      Anticipated Discharge Disposition (OT): home with assist

## 2024-04-18 NOTE — CASE MANAGEMENT/SOCIAL WORK
Discharge Planning Assessment   [place]     Patient Name: Ramon Silva  MRN: [id number]  Today's Date: 4/18/2024    Admit Date: 4/17/2024    Plan: Anticipate routine home with family.   Discharge Needs Assessment       Row Name 04/18/24 1036       Living Environment    People in Home grandchild(davida)    Current Living Arrangements home    Potentially Unsafe Housing Conditions none    In the past 12 months has the electric, gas, oil, or water company threatened to shut off services in your home? No    Primary Care Provided by self    Provides Primary Care For no one    Family Caregiver if Needed grandchild(davida), adult;child(davida), adult    Quality of Family Relationships helpful    Able to Return to Prior Arrangements yes       Resource/Environmental Concerns    Resource/Environmental Concerns none    Transportation Concerns none       Transportation Needs    In the past 12 months, has lack of transportation kept you from medical appointments or from getting medications? no    In the past 12 months, has lack of transportation kept you from meetings, work, or from getting things needed for daily living? No       Food Insecurity    Within the past 12 months, you worried that your food would run out before you got the money to buy more. Never true    Within the past 12 months, the food you bought just didn't last and you didn't have money to get more. Never true       Transition Planning    Patient/Family Anticipates Transition to home with family    Patient/Family Anticipated Services at Transition none    Transportation Anticipated family or friend will provide       Discharge Needs Assessment    Readmission Within the Last 30 Days no previous admission in last 30 days    Equipment Currently Used at Home cane, straight;glucometer    Concerns to be Addressed denies needs/concerns at this time    Anticipated Changes Related to Illness none    Equipment Needed After Discharge none                   Discharge Plan        Row Name 04/18/24 1036       Plan    Plan Anticipate routine home with family.    Patient/Family in Agreement with Plan yes    Plan Comments CM met with patient and grandson at bedside. Patient lives with grandson, is independent with ADLs and drives. PCP and pharmacy verified-denies any difficulty affording meds. Grandson denies any d/c needs at this time and will transport at d/c. D/C orders noted.    Final Discharge Disposition Code 01 - home or self-care    Final Note Home with grandson                    Expected Discharge Date and Time       Expected Discharge Date Expected Discharge Time    Apr 18, 2024            Demographic Summary       Row Name 04/18/24 1035       General Information    Admission Type same day    Arrived From procedure suite    Referral Source admission list    Reason for Consult discharge planning    Preferred Language English       Contact Information    Permission Granted to Share Info With                    Functional Status       Row Name 04/18/24 1035       Functional Status    Usual Activity Tolerance good    Current Activity Tolerance good       Functional Status, IADL    Medications independent    Meal Preparation independent    Housekeeping independent    Laundry independent    Shopping independent       Mental Status    General Appearance WDL WDL       Mental Status Summary    Recent Changes in Mental Status/Cognitive Functioning no changes                  Case Management Discharge Note      Final Note: Home with grandson         Selected Continued Care - Admitted Since 4/17/2024          Transportation Services  Private: Car    Final Discharge Disposition Code: 01 - home or self-care    Met with patient in room.  Maintained distance greater than six feet and spent less than 15 minutes in the room.   SCOTT PrestonN, RN    [address] [address]    Office: [phone]  Fax: [phone]

## 2024-04-18 NOTE — THERAPY EVALUATION
Patient Name: Ramon Silva  : 1945    MRN: [id number]                              Today's Date: 2024       Admit Date: 2024    Visit Dx:     ICD-10-CM ICD-9-CM   1. Dilated cardiomyopathy  I42.0 425.4     Patient Active Problem List   Diagnosis    Arthralgia    Benign prostatic hyperplasia with urinary obstruction    Diabetes mellitus, type II    Diabetic foot ulcer    Fatigue    Gastroesophageal reflux disease    Hyperlipidemia    Hypertension    Knee pain, right    Non Hodgkin's lymphoma    Mass of nasal sinus    Microalbuminuria    Onychomycosis of toenail    Osteoarthritis of knee    Other dorsalgia    Type 2 diabetes mellitus with hyperglycemia    Vitamin D deficiency    Weight loss    Anemia    Liver lesion    Osteoporosis    Prostatic hypertrophy    YARELI (iron deficiency anemia)    Malabsorption of iron    Allergic rhinitis    Ulcer of toe of right foot, unspecified ulcer stage    Intermittent claudication    CKD (chronic kidney disease) stage 2, GFR 60-89 ml/min    Abnormal EKG    Preop cardiovascular exam    Dyspnea on exertion    Palpitations    Abnormal nuclear stress test    Cardiomyopathy    LBBB (left bundle branch block)    Chronic systolic congestive heart failure    Dilated cardiomyopathy     Past Medical History:   Diagnosis Date    Anemia     Arthralgia     Diabetes     Fatigue     GERD (gastroesophageal reflux disease)     Gout     Hyperlipidemia     Hypertension     Liver lesion     Non Hodgkin's lymphoma     Osteoporosis     Personal history of testicular cancer     Vitamin D deficiency     Weight loss      Past Surgical History:   Procedure Laterality Date    ANGIOGRAM - CONVERTED  2022    aif by Dr Henriquez    CARDIAC CATHETERIZATION N/A 2022    Procedure: PERIPHERAL ANGIOGRAPHY Right leg;  Surgeon: Theron Henriquez MD;  Location: [facility] INVASIVE LOCATION;  Service: Cardiovascular;  Laterality: N/A;    CARDIAC CATHETERIZATION N/A 10/2/2023    Procedure:  Right and Left Heart Cath with possible PCI;  Surgeon: Joey Kirkland DO;  Location:  [facility] C[facility] INVASIVE LOCATION;  Service: Cardiovascular;  Laterality: N/A;  Local and IV sedation    CARDIAC ELECTROPHYSIOLOGY PROCEDURE N/A 4/17/2024    Procedure: ICD DC new [employer] notified 03/22/24;  Surgeon: Chago Lambert MD;  Location:  [facility] INVASIVE LOCATION;  Service: Cardiovascular;  Laterality: N/A;  Needs Port-a-Cath removal prior to ICD    HIP SURGERY  2005    PORTACATH PLACEMENT      TESTICLE SURGERY Right 07/17/2000    right testicular excision      General Information       Row Name 04/18/24 0930          Physical Therapy Time and Intention    Document Type evaluation  -AO     Mode of Treatment physical therapy  -AO       Row Name 04/18/24 0930          General Information    Patient Profile Reviewed yes  -AO     Prior Level of Function --  Previously MOD I with SPC (though used in LUE), independent w/ ADLs, 1 fall over the past year (tripped), still drives  -AO     Existing Precautions/Restrictions fall;non-weight bearing;pacemaker;left  -AO     Barriers to Rehab none identified  -AO       Row Name 04/18/24 0930          Living Environment    People in Home grandchild(davida)  Lives w/ adult grandson and grandaughters  -AO       Row Name 04/18/24 0930          Home Main Entrance    Number of Stairs, Main Entrance five  -AO       Row Name 04/18/24 0930          Stairs Within Home, Primary    Number of Stairs, Within Home, Primary none  -AO       Row Name 04/18/24 0930          Cognition    Orientation Status (Cognition) oriented x 4;verbal cues/prompts needed for orientation  -AO       Row Name 04/18/24 0930          Safety Issues, Functional Mobility    Impairments Affecting Function (Mobility) balance;endurance/activity tolerance;pain  -AO               User Key  (r) = Recorded By, (t) = Taken By, (c) = Cosigned By      Initials Name Provider Type    AO Sidra Benavides, PT Physical  Therapist                   Mobility       Row Name 04/18/24 0930          Bed Mobility    Bed Mobility supine-sit  -AO     Supine-Sit Krebs (Bed Mobility) contact guard  -AO     Comment, (Bed Mobility) Per OT (had just gotten out of bed with OT)  -AO       Row Name 04/18/24 0930          Bed-Chair Transfer    Bed-Chair Krebs (Transfers) contact guard  -AO       Row Name 04/18/24 0930          Sit-Stand Transfer    Sit-Stand Krebs (Transfers) contact guard  -AO       Row Name 04/18/24 0930          Gait/Stairs (Locomotion)    Krebs Level (Gait) contact guard  -AO     Assistive Device (Gait) cane, straight  -AO     Distance in Feet (Gait) 170  -AO     Deviations/Abnormal Patterns (Gait) antalgic;right sided deviations;weight shifting decreased;stride length decreased;gait speed decreased  -AO     Comment, (Gait/Stairs) Pt with antalgic gait RLE and reports of chronic R knee pain (typically uses SPC in LUE)  -AO       Row Name 04/18/24 0930          Mobility    Extremity Weight-bearing Status left upper extremity  -AO     Left Upper Extremity (Weight-bearing Status) non weight-bearing (NWB)  -AO               User Key  (r) = Recorded By, (t) = Taken By, (c) = Cosigned By      Initials Name Provider Type    AO Sidra Benavides, PT Physical Therapist                   Obj/Interventions       Row Name 04/18/24 0930          Range of Motion Comprehensive    General Range of Motion bilateral lower extremity ROM WFL  -AO       Row Name 04/18/24 0930          Strength Comprehensive (MMT)    Comment, General Manual Muscle Testing (MMT) Assessment BLEs: 5/5  -AO       Row Name 04/18/24 0930          Balance    Balance Assessment sitting static balance;sitting dynamic balance;sit to stand dynamic balance;standing static balance;standing dynamic balance  -AO     Static Sitting Balance independent  -AO     Dynamic Sitting Balance independent  -AO     Position, Sitting Balance supported;sitting in chair   -AO     Sit to Stand Dynamic Balance contact guard  -AO     Static Standing Balance standby assist  -AO     Dynamic Standing Balance contact guard  -AO     Position/Device Used, Standing Balance supported;cane, straight  -AO       Row Name 04/18/24 0930          Sensory Assessment (Somatosensory)    Sensory Assessment (Somatosensory) sensation intact  -AO               User Key  (r) = Recorded By, (t) = Taken By, (c) = Cosigned By      Initials Name Provider Type    AO Sidra Benavides, PT Physical Therapist                   Goals/Plan    No documentation.                  Clinical Impression       Row Name 04/18/24 0930          Pain    Additional Documentation Pain Scale: FACES Pre/Post-Treatment (Group)  -AO       Row Name 04/18/24 0930          Pain Scale: FACES Pre/Post-Treatment    Pain: FACES Scale, Pretreatment 0-->no hurt  -AO     Posttreatment Pain Rating 0-->no hurt  -AO     Pain Location - Side/Orientation Left  -AO     Pain Location incisional  -AO       Row Name 04/18/24 0963          Plan of Care Review    Plan of Care Reviewed With patient;grandchild(davida)  -AO     Progress no change  -AO     Outcome Evaluation Pt is a 78 y/o male who presented to [facility] 4/17/2024 for elective BiV ICD implant secondary to severe dilated cardiomyopathy with persistent LV dysfunction and EF less than 35%. At baseline, pt lives w/ adult grandchildren in a H with 4-5 ASHLY and was MOD I with household/community mobility w/ SPC (though typically uses in LUE 2*/2 chronic R knee pain), still drives, independent w/ ADLs, and with 1 fall over the past year related to tripping. During eval, pt alert/oriented x 4, though required cues, and up in chair with OT (OT reports pt required CGA for supine to sit transfer). Pt required CGA for transfers and CGA for gait training 170 ft with SPC in RUE. Pt educated on post-op pacemaker precautions with LUE sling in place. Vitals stable and WNL throughout session.  Anticipate safe d/c home with assist from grandchildren as needed and use of SPC. Pt with no ongoing skilled PT needs. Eval only.  -AO       Row Name 04/18/24 0930          Therapy Assessment/Plan (PT)    Criteria for Skilled Interventions Met (PT) no;no problems identified which require skilled intervention  -AO     Therapy Frequency (PT) evaluation only  -AO       Row Name 04/18/24 0930          Vital Signs    Recovery Time BP: 125/68 mmHg (after gait training bout), HR: 92bpm, Sp02 95% RA  -AO       Row Name 04/18/24 0930          Positioning and Restraints    Pre-Treatment Position sitting in chair/recliner  -AO     Post Treatment Position chair  -AO     In Chair sitting;call light within reach;with family/caregiver  -AO               User Key  (r) = Recorded By, (t) = Taken By, (c) = Cosigned By      Initials Name Provider Type    AO Sidra Benavides, PT Physical Therapist                   Outcome Measures    No documentation.                                Physical Therapy Education       Title: PT OT SLP Therapies (Done)       Topic: Physical Therapy (Done)       Point: Mobility training (Done)       Learning Progress Summary             Patient Acceptance, E,TB, VU by AO at 4/18/2024 1043                         Point: Body mechanics (Done)       Learning Progress Summary             Patient Acceptance, E,TB, VU by AO at 4/18/2024 1043                         Point: Precautions (Done)       Learning Progress Summary             Patient Acceptance, E,TB, VU by AO at 4/18/2024 1043                                         User Key       Initials Effective Dates Name Provider Type Discipline    AO 06/16/21 -  Sidra Benavides, PT Physical Therapist PT                  PT Recommendation and Plan     Plan of Care Reviewed With: patient, grandchild(davida)  Progress: no change  Outcome Evaluation: Pt is a 80 y/o male who presented to [facility] 4/17/2024 for elective BiV ICD implant secondary to severe  dilated cardiomyopathy with persistent LV dysfunction and EF less than 35%. At baseline, pt lives w/ adult grandchildren in a SSH with 4-5 ASHLY and was MOD I with household/community mobility w/ SPC (though typically uses in LUE 2*/2 chronic R knee pain), still drives, independent w/ ADLs, and with 1 fall over the past year related to tripping. During eval, pt alert/oriented x 4, though required cues, and up in chair with OT (OT reports pt required CGA for supine to sit transfer). Pt required CGA for transfers and CGA for gait training 170 ft with SPC in RUE. Pt educated on post-op pacemaker precautions with LUE sling in place. Vitals stable and WNL throughout session. Anticipate safe d/c home with assist from grandchildren as needed and use of SPC. Pt with no ongoing skilled PT needs. Eval only.     Time Calculation:   PT Evaluation Complexity  History, PT Evaluation Complexity: 1-2 personal factors and/or comorbidities  Examination of Body Systems (PT Eval Complexity): total of 3 or more elements  Clinical Presentation (PT Evaluation Complexity): stable  Clinical Decision Making (PT Evaluation Complexity): low complexity  Overall Complexity (PT Evaluation Complexity): low complexity     PT Charges       Row Name 04/18/24 1043             Time Calculation    Start Time 0930  -AO      Stop Time 0947  -AO      Time Calculation (min) 17 min  -AO      PT Received On 04/18/24  -AO         Time Calculation- PT    Total Timed Code Minutes- PT 0 minute(s)  -AO                User Key  (r) = Recorded By, (t) = Taken By, (c) = Cosigned By      Initials Name Provider Type    Sidra Palacios, PT Physical Therapist                  Therapy Charges for Today       Code Description Service Date Service Provider Modifiers Qty    [id number]  PT EVAL LOW COMPLEXITY 3 4/18/2024 Sidra Benavides, PT GP 1            PT G-Codes  AM-PAC 6 Clicks Score (PT): 18  PT Discharge Summary  Anticipated Discharge Disposition (PT): home with  assist    Sidra Benavides, PT  4/18/2024

## 2024-04-18 NOTE — PLAN OF CARE
Goal Outcome Evaluation:   Patient discharged on wheelchair accompanied by dimitrios. Transport to home via private vehicle. Home instructions  and home medications dicussed with grandson and well understood. Informed about appointments and offered will arrange but grandson said they will take care of it. Home medications given and delivered by Pharmacy. Patient's vital signs stable. No further concerns raised.

## 2024-04-22 LAB
QT INTERVAL: 422 MS
QTC INTERVAL: 482 MS

## 2024-04-23 ENCOUNTER — TELEPHONE (OUTPATIENT)
Dept: CARDIOLOGY | Facility: CLINIC | Age: 79
End: 2024-04-23
Payer: MEDICARE

## 2024-04-23 NOTE — TELEPHONE ENCOUNTER
Caller: ANGELA RYAN    Relationship: Emergency Contact    Best call back number: [phone]     What is the best time to reach you: ANYTIME    Who are you requesting to speak with (clinical staff, provider,  specific staff member): BAO    Do you know the name of the person who called: BAO    What was the call regarding: PT DAUGHTER ANGELA STATES HER SISTER RECEIVED A VM ABOUT PATIENT AND SHE IS AT WORK SO ANGELA IS RETURNING CALL TO SEE WHAT WAS NEEDED     Is it okay if the provider responds through MyChart: CALL BACK PLEASE TO NUMBER ABOVE

## 2024-04-23 NOTE — TELEPHONE ENCOUNTER
Spoke with pt daughter she is aware pt is scheduled for one week incision check on 4/24/24 in [place].

## 2024-04-24 ENCOUNTER — OFFICE VISIT (OUTPATIENT)
Dept: CARDIOLOGY | Facility: CLINIC | Age: 79
End: 2024-04-24
Payer: MEDICARE

## 2024-04-24 DIAGNOSIS — Z95.810 BIVENTRICULAR AUTOMATIC IMPLANTABLE CARDIOVERTER DEFIBRILLATOR IN SITU: Primary | ICD-10-CM

## 2024-04-24 DIAGNOSIS — I42.0 DILATED CARDIOMYOPATHY: ICD-10-CM

## 2024-04-24 NOTE — PROGRESS NOTES
Patient presents today for 1 week incision check, staple removal and device check.  His device was interrogated with proper functioning.  Staples were removed without difficulty and Steri-Strips applied.  Discharge instructions were reviewed again with the patient and grandson who presents with him today.  Questions were answered.  Restrictions reviewed.

## 2024-05-05 DIAGNOSIS — E11.65 TYPE 2 DIABETES MELLITUS WITH HYPERGLYCEMIA: ICD-10-CM

## 2024-05-06 RX ORDER — METFORMIN HYDROCHLORIDE 500 MG/1
TABLET, EXTENDED RELEASE ORAL
Qty: 360 TABLET | Refills: 1 | Status: SHIPPED | OUTPATIENT
Start: 2024-05-06

## 2024-05-15 DIAGNOSIS — E78.2 MIXED HYPERLIPIDEMIA: ICD-10-CM

## 2024-05-15 RX ORDER — ATORVASTATIN CALCIUM 40 MG/1
40 TABLET, FILM COATED ORAL
Qty: 90 TABLET | Refills: 1 | Status: SHIPPED | OUTPATIENT
Start: 2024-05-15

## 2024-05-18 DIAGNOSIS — K21.9 GASTROESOPHAGEAL REFLUX DISEASE, UNSPECIFIED WHETHER ESOPHAGITIS PRESENT: ICD-10-CM

## 2024-05-21 RX ORDER — FAMOTIDINE 40 MG/1
40 TABLET, FILM COATED ORAL DAILY
Qty: 30 TABLET | Refills: 1 | Status: SHIPPED | OUTPATIENT
Start: 2024-05-21

## 2024-07-11 ENCOUNTER — TELEPHONE (OUTPATIENT)
Dept: CARDIOLOGY | Facility: CLINIC | Age: 79
End: 2024-07-11
Payer: MEDICARE

## 2024-07-11 NOTE — TELEPHONE ENCOUNTER
Patient had to be rescheduled due to Fausto being out of the office on 7/12. They are asking if he can start driving and also he is going to be having a knee replacement and they are needing cardiac clearance for that. I have him rescheduled to 7/25 in [place] but I wasn't sure if he could be cleared or not [place] his daughter was asking. They have not yet scheduled the surgery. They see the surgeon for a f/u on Tuesday.

## 2024-07-11 NOTE — TELEPHONE ENCOUNTER
Spoke with pt daughter she is aware pt ok to drive, and Dr Lambert will discuss knee surgery clearance at 7/25 appt.

## 2024-07-16 DIAGNOSIS — I73.9 INTERMITTENT CLAUDICATION: ICD-10-CM

## 2024-07-16 DIAGNOSIS — M25.50 ARTHRALGIA, UNSPECIFIED JOINT: ICD-10-CM

## 2024-07-16 NOTE — TELEPHONE ENCOUNTER
Rx Refill Note  Requested Prescriptions     Pending Prescriptions Disp Refills    gabapentin (NEURONTIN) 300 MG capsule [Pharmacy Med Name: gabapentin 300 mg capsule] 90 capsule 0     Sig: TAKE ONE CAPSULE BY MOUTH EVERY EVENING      Last office visit with prescribing clinician: 1/24/2024   Last telemedicine visit with prescribing clinician: Visit date not found   Next office visit with prescribing clinician: 8/9/2024     UDS  None on file  CSA  None on file      INSPECT - SCAN - INSPECT / [facility]/ 07/16/2024 (07/16/2024)     Talia Rodriguez MA  07/16/24, 14:31 EDT

## 2024-07-17 RX ORDER — GABAPENTIN 300 MG/1
300 CAPSULE ORAL EVERY EVENING
Qty: 30 CAPSULE | Refills: 0 | Status: SHIPPED | OUTPATIENT
Start: 2024-07-17

## 2024-07-18 DIAGNOSIS — I73.9 INTERMITTENT CLAUDICATION: ICD-10-CM

## 2024-07-18 RX ORDER — CILOSTAZOL 100 MG/1
100 TABLET ORAL 2 TIMES DAILY
Qty: 60 TABLET | Refills: 1 | Status: SHIPPED | OUTPATIENT
Start: 2024-07-18

## 2024-07-25 ENCOUNTER — CLINICAL SUPPORT (OUTPATIENT)
Dept: FAMILY MEDICINE CLINIC | Facility: CLINIC | Age: 79
End: 2024-07-25
Payer: MEDICARE

## 2024-07-25 DIAGNOSIS — C85.90 NON-HODGKIN'S LYMPHOMA, UNSPECIFIED BODY REGION, UNSPECIFIED NON-HODGKIN LYMPHOMA TYPE: ICD-10-CM

## 2024-07-25 LAB
ALBUMIN SERPL-MCNC: 4.1 G/DL (ref 3.5–5.2)
ALBUMIN/GLOB SERPL: 1.8 G/DL
ALP SERPL-CCNC: 80 U/L (ref 39–117)
ALT SERPL W P-5'-P-CCNC: 11 U/L (ref 1–41)
ANION GAP SERPL CALCULATED.3IONS-SCNC: 10.2 MMOL/L (ref 5–15)
AST SERPL-CCNC: 17 U/L (ref 1–40)
BASOPHILS # BLD AUTO: 0.02 10*3/MM3 (ref 0–0.2)
BASOPHILS NFR BLD AUTO: 0.4 % (ref 0–1.5)
BILIRUB SERPL-MCNC: 0.3 MG/DL (ref 0–1.2)
BUN SERPL-MCNC: 28 MG/DL (ref 8–23)
BUN/CREAT SERPL: 18.7 (ref 7–25)
CALCIUM SPEC-SCNC: 9.6 MG/DL (ref 8.6–10.5)
CHLORIDE SERPL-SCNC: 106 MMOL/L (ref 98–107)
CO2 SERPL-SCNC: 24.8 MMOL/L (ref 22–29)
CREAT SERPL-MCNC: 1.5 MG/DL (ref 0.76–1.27)
DEPRECATED RDW RBC AUTO: 51.2 FL (ref 37–54)
EGFRCR SERPLBLD CKD-EPI 2021: 47.1 ML/MIN/1.73
EOSINOPHIL # BLD AUTO: 0.17 10*3/MM3 (ref 0–0.4)
EOSINOPHIL NFR BLD AUTO: 3.2 % (ref 0.3–6.2)
ERYTHROCYTE [DISTWIDTH] IN BLOOD BY AUTOMATED COUNT: 14.4 % (ref 12.3–15.4)
GLOBULIN UR ELPH-MCNC: 2.3 GM/DL
GLUCOSE SERPL-MCNC: 136 MG/DL (ref 65–99)
HCT VFR BLD AUTO: 33.1 % (ref 37.5–51)
HGB BLD-MCNC: 10.2 G/DL (ref 13–17.7)
IMM GRANULOCYTES # BLD AUTO: 0.02 10*3/MM3 (ref 0–0.05)
IMM GRANULOCYTES NFR BLD AUTO: 0.4 % (ref 0–0.5)
LDH SERPL-CCNC: 167 U/L (ref 135–225)
LYMPHOCYTES # BLD AUTO: 1.98 10*3/MM3 (ref 0.7–3.1)
LYMPHOCYTES NFR BLD AUTO: 37.1 % (ref 19.6–45.3)
MCH RBC QN AUTO: 30.1 PG (ref 26.6–33)
MCHC RBC AUTO-ENTMCNC: 30.8 G/DL (ref 31.5–35.7)
MCV RBC AUTO: 97.6 FL (ref 79–97)
MONOCYTES # BLD AUTO: 0.42 10*3/MM3 (ref 0.1–0.9)
MONOCYTES NFR BLD AUTO: 7.9 % (ref 5–12)
NEUTROPHILS NFR BLD AUTO: 2.72 10*3/MM3 (ref 1.7–7)
NEUTROPHILS NFR BLD AUTO: 51 % (ref 42.7–76)
NRBC BLD AUTO-RTO: 0 /100 WBC (ref 0–0.2)
PLATELET # BLD AUTO: 160 10*3/MM3 (ref 140–450)
PMV BLD AUTO: 11.5 FL (ref 6–12)
POTASSIUM SERPL-SCNC: 5 MMOL/L (ref 3.5–5.2)
PROT SERPL-MCNC: 6.4 G/DL (ref 6–8.5)
RBC # BLD AUTO: 3.39 10*6/MM3 (ref 4.14–5.8)
SODIUM SERPL-SCNC: 141 MMOL/L (ref 136–145)
WBC NRBC COR # BLD AUTO: 5.33 10*3/MM3 (ref 3.4–10.8)

## 2024-07-25 PROCEDURE — 83615 LACTATE (LD) (LDH) ENZYME: CPT | Performed by: INTERNAL MEDICINE

## 2024-07-25 PROCEDURE — 85025 COMPLETE CBC W/AUTO DIFF WBC: CPT | Performed by: INTERNAL MEDICINE

## 2024-07-25 PROCEDURE — 80053 COMPREHEN METABOLIC PANEL: CPT | Performed by: INTERNAL MEDICINE

## 2024-07-25 PROCEDURE — 36415 COLL VENOUS BLD VENIPUNCTURE: CPT | Performed by: INTERNAL MEDICINE

## 2024-07-25 NOTE — PROGRESS NOTES
Venipuncture Blood Specimen Collection  Venipuncture performed in rt arm via venipuncture by Aj Holman with good hemostasis. Patient tolerated the procedure well without complications.   07/25/24   Aj Holman

## 2024-07-31 NOTE — PROGRESS NOTES
HEMATOLOGY ONCOLOGY FOLLOW UP        Patient name: Ramon Silva  : 1945  MRN: [id number]  Primary Care Physician: Joey Crump APRN  Referring Physician: Joey Crump, *  Diagnosis:   Sinus Non-Hodgkin's lymphoma  Anemia  Chief Complaint   Patient presents with    Follow-up     Non-Hodgkin's lymphoma, unspecified body region, unspecified non-Hodgkin lymphoma type           History of Present Illness:  Mr. Silva is a 79 y.o.  male with a history of testicular lymphoma diagnosed in , status post chemotherapy with R-CHOP, who has been in complete remission for several years.  He presented to his primary care provider, Mariam Price, with symptoms of left-sided facial pain radiating to the eye, left-sided headache, and he was referred to ENT, Dr. Harry, for further evaluation.  CT scan of the paranasal sinuses was performed without contrast on 11/30/15 and it showed marked bony destruction and an expansile soft tissue mass involving the left frontal, left ethmoid, left sphenoid and left maxillary sinus.  There was a soft tissue mass encroaching in the left orbit and left side of the face towards the muscles of mastication as well as through the superior wall of the left sphenoid sinus.  After evaluation, Dr. Harry ordered MRI of the brain with and without contrast and that showed a multi-lobulated mass filling and expanding the left maxillary sinus as well as the left ethmoid air cells in the left locule of the sphenoid sinus, with extension into the left orbit and left medial cranial fossa.  Dr. Harry performed fiberoptic nasal endoscopy and biopsy of the left nasal mass on 12/11/15.  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  Cyclin D1 was negative.  All of these stains were done by immunohistochemistry.  Sections of the  nasal mass showed diffuse infiltrate by predominantly intermediate-to-large sized lymphoid cells.  The cells have a vesicular nuclei and inconspicuous cytoplasm.  FISH was negative for rearrangements involving BCL6, MYC, and IGH/BCL-2 [t(14;18)].  FISH for MYC/IGH was negative.  The patient denied any B symptoms, such as fevers, chills, night sweats, weight loss or fatigue.  He did not report any lymphadenopathy.  Dr. Harry referred the patient to us for further evaluation of his lymphoma.    Today, 12/30/15, the patient presents for initial evaluation.  He denies any fever, chills, night sweats, weight loss or lymph node swelling.  His only symptom is left facial pain.  The patient also reports left eye blurry vision.  12/30/15 - WBC 8.3, hemoglobin 13.3, platelet count 233, MCV 87.5.  1/7/16 - Echocardiogram:  Ejection fraction 50-55%.  Normal LVEF.  There is slight impaired LV relaxation.  1/8/16 - PET/CT scan:  Soft tissue mass originating in the left maxillary sinus erodes through the medial sinus wall spilling out into the nasopharynx.  It abates the nasal septum.  Lesion measures about 4 x 4 cm in AP and transverse dimension.  It measures about 8 cm in the cephalocaudal dimension.  SUV is 10.8  In the abdomen, there is a focus of activity in the left lobe of the liver which measures about 2 cm and has SUV 9.4.    1/11/16 - Bone marrow biopsy:  Normal cellular bone marrow 25-35%.  Adequate iron stores.  No malignancy.  Flow cytometry is negative.  Normal karyotype.  1/22/16 - Patient underwent left subclavian Port-A-Cath placement.  1/25/16 - WBC 8.1, hemoglobin 12.2, platelet count 216, MCV 87.1.  1/26/16 - Patient was seen at [facility] by Dr. Jamal Pisano.  He has recommended R-GCVP combination chemo treatment.  If the patient does not achieve complete response or if he has liver involvement, he will consider autologous stem cell transplantation after high-dose  chemotherapy.  If lymphoma is present in the liver, this tumor will likely represent a recurrent lymphoma rather than a new primary lymphoma of the sinuses.  2/2/16 - Hepatitis panel negative.  2/4/16 - Patient started chemotherapy with Rituximab, Gemcitabine, Cytoxan, Vincristine, and Prednisolone (R-GCVP q. 21 days regimen).  2/24/16 - Patient received cycle 2 of R-GCVP.  3/1/16 - Patient received intrathecal chemotherapy cycle 1 with Cytarabine plus Hydrocortisone plus Methotrexate.  3/2/16 - Patient received cycle 2, day 8 of Gemcitabine.  WBC 3.9, platelet count 145,000.  3/9/16 - WBC 9.1, hemoglobin 9.2, platelet count 33,000.  3/10/16 - Platelet count 32,000.  3/16/16 - Patient received cycle 3 of R-GCVP day 1.  3/23/16 - Creatinine 0.85, BUN 19.  Retic count 19,950.  Ferritin 369.  Erythropoietin 26.9.  Iron saturation 11%, TIBC 22.  CBC showed WBC 7.3, hemoglobin 8.1, platelet count 395,000, MCV 87.  3/23/16 - Patient received cycle 3, day 8 of Gemcitabine.  Patient received Neulasta 6 mg.   3/26/16 - Brain MRI with and without contrast:  There is residual soft tissue mass located in the left ethmoid and sphenoid sinus which is much smaller compared to the previous exam, now measuring 2.8 x 1.8 cm.  There is better aeration in the left maxillary sinus.  3/26/16 - MRI abdomen with and without contrast:   A small 1.3 x 1.1 cm rim enhancing lesion is seen near the dome of the medial segment of the left lobe of liver.  This area corresponds to the hypermetabolic activity seen on the previous PET scan.  3/30/16 - Neutrophils 53,000, hemoglobin 9.5, platelet count 328,000.  4/6/16 - Patient received cycle 4, day 1 of R-GCVP.  WBC 17.9, hemoglobin 9.6, platelet count 256,000.    4/7/16 - Patient received intrathecal chemotherapy with Cytarabine plus Hydrocortisone plus Methotrexate.    4/13/16 - Patient received cycle 4, day 8 of Gemcitabine.  WBC 4.8, hemoglobin 8.1, platelet count 147,000.  The patient had  hypotension and received IV fluids.    4/14/16 - Ultrasound of the liver:  There is a small nodule measuring 15 mm in the left lobe of the liver.    4/18/16 - WBC 3.6, hemoglobin 8.1, platelet count 73,000.    4/27/16 - Patient received cycle 5, day 1 R-GCVP.  4/28/16 - Patient received intrathecal chemotherapy.    5/4/16 - Patient received cycle 5, day 8 of Gemcitabine.  5/18/16 - Patient received cycle 6, day 1 of R-GCVP.  CBC shows WBC 10.7, hemoglobin 9.7, platelet count 417,000.  5/19/16 - Patient received cycle 5 of intrathecal chemotherapy with Methotrexate, Cytarabine and Hydrocortisone.  6/10/16 - Brain MRI with and without contrast:  Residual abnormal signal involving the region of the left sphenoid sinus.  There appears to be mild improvement compared to prior.  Findings may be due to chronic inflammation of sinusitis.  Residual tumor is possible but less likely.  Overall, there is improvement.  No evidence of acute focal ischemia.  Nonspecific white matter changes.    6/16/16 - Patient received the last, sixth cycle of intrathecal chemotherapy.  7/7/16 - PET/CT scan:  Small focal area of increased nuclear activity in the lateral segment of the left lobe of the liver is no longer visualized.    8/1/16 to 8/19/16 - Patient received 15 fractions of radiation therapy, total of 30 Gy.  11/3/16 - PET/CT scan:  Stable exam with no hypermetabolic activity seen within the neck, chest, abdomen and pelvis.  New sub-centimeter non-calcified pulmonary nodules within the posterior lateral aspect of the right upper lobe.  There may be inflammatory or infectious.  Stable bilateral sub-centimeter non-calcified pulmonary nodules which are not hypermetabolic.    1/28/17 - MRI of brain with and without contrast:  Abnormal signal in the left sphenoid sinus is redemonstrated.  It is similar in size to the previous MRI from June 2016 and measures about 23 x 14 mm.  No evidence of progression.  No evidence of  metastases.  2/17/17 - CT scan:  No abnormalities in the chest.  No lymphadenopathy.  5/8/17 - Patient underwent nasal endoscopy which showed maxillary sinusitis.  No tumor reported.  9/8/17 - PET/CT scan:  No evidence of metastasis or disease recurrence.  No hypermetabolic activity anywhere.  Stable partial opacification of the left sphenoid sinus likely due to chronic sinusitis.    3/3/18 - WBC 7.2, hemoglobin 11.4, platelet count 181,000, MCV 91.8.  Creatinine 0.77.  LFTs normal.  Albumin 4.    3/30/18 - Brain MRI with and without contrast:  Decreasing size of the abnormal soft tissue within the left pterygomaxillary fissure.  This area demonstrates mild heterogeneous contrast enhancement which is similar to prior studies.  No new enlarging soft tissue mass.  Mucosal thickening with the left maxillary and sphenoid sinuses.  No evidence of malignancy.  No acute intracranial abnormality.    8/24/18 - CT scan of chest, abdomen and pelvis:  No evidence of lymphadenopathy.  A 1 cm pulmonary nodule in the right middle lobe is not significantly changed.  No evidence of lymphadenopathy.  CT abdomen is also unremarkable.  Enlarged heterogenous prostate gland, hyperplasia is noted.  Small hiatal hernia.  Moderate osteopenia and degenerative changes in hips and spine.  Left renal cyst.    8/28/18 - Vitamin D 31.  PSA 1.6.    10/5/18 - DEXAscan:  Osteoporosis with T-score of -2.5 in the distal forearm.    3/4/19 - WBC 6.9, hemoglobin 12.8, platelet count 187,000, MCV 91.  Creatinine 1.03.  Total bilirubin 0.5.  .  5/8/2019 25 hydroxy vitamin D 32  8/28/2019: Brain MRI:1. The abnormal material in the left sphenoid sinus does not appear significantly changed.The brain appears stable and within normal for age. 3. Minor chronic maxillary sinus mucosal thickening \  2/19/2020: WBC 8.8, hemoglobin 9.2, MCV 92.7, platelets 280, creatinine 0.88, alk phos 122, 25-hydroxy vitamin D 31.3, B12 640, TSH 0.886  3/7/2020: CT abdomen  and pelvis- No evidence of lymphadenopathy in the abdomen pelvis or chest.  Stable right middle lobe noncalcified pulmonary nodule unchanged since 2017.  Degenerative changes of the lumbar spine and hips.  Chest with contrast:.  Stable 1 cm right middle lobe lung nodule.  No evidence of malignancy.  3/10/2020: Iron 35 low, ferritin 92, iron saturation 14% low, TIBC 258, folate greater than 20, WBC 5.8, hemoglobin 9.4, MCV 91, platelets 228, ESR 25  6/30/2020: Creatinine 0.9, LFTs normal, , WBC 6.48, hemoglobin 10.6, platelets 202, ferritin 71, iron saturation 25%, TIBC 242,  9/1/2020: Creatinine 1.33, BUN 26, LFTs normal, WBC 6.7, hemoglobin 10.9, platelets 190,, ESR 15, CRP 0.1,  3/23/2021: Stool Hemoccult is positive  4/28/2021: WBC 7.13, hemoglobin 11.6, platelets 163, MCV 94.1, ferritin 77.3, iron 59, iron saturation 41%, TIBC 286, LFTs normal, , creatinine 1.06,  10/2/2021 : Stable 9 mm nodule in the medial segment of the right middle lobe.  8/1/2022 WBC 4.5, hemoglobin 10.5, hematocrit 32.4, platelet 179  1/6/2023 - WBC 6.27, Hb 10.7, hct 31.9, plt 98  1/24/24 - WBC 7.31, hb 12.4, plt 202,   7/25/2024-WBC 5.33, hemoglobin 10.2, MCV 97.6, platelets 160,000, BUN 28, creatinine 1.5,     Subjective:  Ramon is here for his routine follow-up.  He is accompanied at the visit by his granddaughter.  He does report that he experiences some tiredness and fatigue.  He denies any signs or symptoms of bleeding.  He denies any fevers, night sweats, swollen lymph nodes.  He does note considerable pain in his knees and also some shooting pains in the lower extremities.  He has an appointment to see an orthopedic to discuss knee replacement surgery.      Past Medical History:   Diagnosis Date    Anemia     Arthralgia     Diabetes     Fatigue     GERD (gastroesophageal reflux disease)     Gout     Hyperlipidemia     Hypertension     Liver lesion     Non Hodgkin's lymphoma     Osteoporosis     Personal history  of testicular cancer     Vitamin D deficiency     Weight loss        Past Surgical History:   Procedure Laterality Date    ANGIOGRAM - CONVERTED  07/05/2022    aif by Dr Henriquez    CARDIAC CATHETERIZATION N/A 7/5/2022    Procedure: PERIPHERAL ANGIOGRAPHY Right leg;  Surgeon: Theron Henriquez MD;  Location:  [facility];  Service: Cardiovascular;  Laterality: N/A;    CARDIAC CATHETERIZATION N/A 10/2/2023    Procedure: Right and Left Heart Cath with possible PCI;  Surgeon: Joey Kirkland DO;  Location:  [facility];  Service: Cardiovascular;  Laterality: N/A;  Local and IV sedation    CARDIAC ELECTROPHYSIOLOGY PROCEDURE N/A 4/17/2024    Procedure: ICD DC new [employer] notified 03/22/24;  Surgeon: Chago Lambert MD;  Location:  [facility];  Service: Cardiovascular;  Laterality: N/A;  Needs Port-a-Cath removal prior to ICD    HIP SURGERY  2005    PORTACATH PLACEMENT      TESTICLE SURGERY Right 07/17/2000    right testicular excision       Current Outpatient Medications:     Accu-Chek Guide test strip, 1 each by Other route Daily. E 11.9, Disp: 50 each, Rfl: 2    Accu-Chek Softclix Lancets lancets, test daily as directed, Disp: 100 each, Rfl: 2    aspirin 81 MG tablet, Take 1 tablet by mouth Daily., Disp: , Rfl:     atorvastatin (LIPITOR) 40 MG tablet, TAKE ONE TABLET BY MOUTH EVERY NIGHT AT BEDTIME, Disp: 90 tablet, Rfl: 1    carvedilol (COREG) 25 MG tablet, TAKE ONE TABLET BY MOUTH TWICE DAILY WITH MEALS, Disp: 180 tablet, Rfl: 1    Cetirizine HCl (ZyrTEC Allergy) 10 MG capsule, Take 10 mg by mouth Daily., Disp: , Rfl:     cilostazol (PLETAL) 100 MG tablet, TAKE ONE TABLET BY MOUTH TWICE DAILY, Disp: 60 tablet, Rfl: 1    colestipol (COLESTID) 1 g tablet, Take 1 tablet by mouth 2 (Two) Times a Day., Disp: 180 tablet, Rfl: 1    dapagliflozin Propanediol (Farxiga) 10 MG tablet, Take 10 mg by mouth Daily., Disp: 90 tablet, Rfl: 1    Diclofenac  Sodium (VOLTAREN) 1 % gel gel, Apply 4 g topically to the appropriate area as directed 4 (Four) Times a Day As Needed (APPLY 4 TIMES A DAY AS NEED TO THE APPROPRIATE AREA)., Disp: 4 g, Rfl: 2    famotidine (PEPCID) 40 MG tablet, TAKE ONE TABLET BY MOUTH ONCE DAILY, Disp: 30 tablet, Rfl: 1    ferrous sulfate 324 (65 Fe) MG tablet delayed-release EC tablet, Take 1 tablet by mouth Daily With Breakfast., Disp: 90 tablet, Rfl: 1    finasteride (PROSCAR) 5 MG tablet, TAKE ONE TABLET BY MOUTH ONCE DAILY, Disp: 90 tablet, Rfl: 1    gabapentin (NEURONTIN) 300 MG capsule, TAKE ONE CAPSULE BY MOUTH EVERY EVENING, Disp: 30 capsule, Rfl: 0    ibandronate (BONIVA) 150 MG tablet, TAKE ONE TABLET BY MOUTH EVERY 30 DAYS, Disp: 1 tablet, Rfl: 5    losartan (COZAAR) 50 MG tablet, TAKE ONE TABLET BY MOUTH ONCE DAILY, Disp: 90 tablet, Rfl: 1    Menthol (GNP HERBAL MT), Apply  to the mouth or throat. Neuroflow plus, Disp: , Rfl:     metFORMIN ER (GLUCOPHAGE-XR) 500 MG 24 hr tablet, TAKE TWO TABLETS BY MOUTH TWICE DAILY BEFORE MEALS, Disp: 360 tablet, Rfl: 1    Omega-3 Fatty Acids (OMEGA-3 FISH OIL PO), Take 1 capsule by mouth Daily., Disp: , Rfl:     pantoprazole (PROTONIX) 40 MG EC tablet, TAKE ONE TABLET BY MOUTH ONCE DAILY, Disp: 90 tablet, Rfl: 1    traMADol (ULTRAM) 50 MG tablet, Take 1 tablet by mouth Every 12 (Twelve) Hours As Needed., Disp: , Rfl:     Vitamin D, Cholecalciferol, (CHOLECALCIFEROL) 10 MCG (400 UNIT) tablet, Take 1 tablet by mouth Daily., Disp: , Rfl:     No Known Allergies    Family History   Family history unknown: Yes       Family history is unknown by patient.    Social History     Tobacco Use    Smoking status: Never    Smokeless tobacco: Never   Vaping Use    Vaping status: Never Used   Substance Use Topics    Alcohol use: No    Drug use: Never     ROS:     12 point review system negative.  No significant fatigue.    Objective:    Vitals:    08/01/24 1403   BP: 154/71   Pulse: 72   Resp: 18   Temp: 98 °F (36.7  "°C)   SpO2: 95%   Weight: 56.7 kg (125 lb)   Height: 153.7 cm (60.5\")   PainSc: 0-No pain           ECOG  (1) Restricted in physically strenuous activity, ambulatory and able to do work of light nature    Physical Exam:     Physical Exam  Constitutional:       Appearance: Normal appearance.   HENT:      Head: Normocephalic and atraumatic.      Nose: Nose normal.   Eyes:      Extraocular Movements: Extraocular movements intact.      Pupils: Pupils are equal, round, and reactive to light.   Cardiovascular:      Rate and Rhythm: Normal rate and regular rhythm.      Pulses: Normal pulses.      Heart sounds: No murmur heard.  Pulmonary:      Effort: Pulmonary effort is normal.      Breath sounds: Normal breath sounds.   Abdominal:      General: There is no distension.      Palpations: Abdomen is soft. There is no mass.      Tenderness: There is no abdominal tenderness.   Musculoskeletal:      Cervical back: Normal range of motion and neck supple.      Comments: Degenerative changes to bilateral knees   Skin:     General: Skin is warm.   Neurological:      General: No focal deficit present.      Mental Status: He is alert.   Psychiatric:         Mood and Affect: Mood normal.       Lab Results - Last 18 Months   Lab Units 07/25/24  1147 03/26/24  1131 01/24/24  1612   WBC 10*3/mm3 5.33 6.25 7.31   HEMOGLOBIN g/dL 10.2* 11.4* 12.4*   HEMATOCRIT % 33.1* 36.2* 38.8   PLATELETS 10*3/mm3 160 172 202   MCV fL 97.6* 94.8 88.8     Lab Results - Last 18 Months   Lab Units 07/25/24  1147 01/24/24  1612 10/05/23  1518   SODIUM mmol/L 141 142 142   POTASSIUM mmol/L 5.0 4.5 5.3*   CHLORIDE mmol/L 106 105 108*   CO2 mmol/L 24.8 24.1 24.0   BUN mg/dL 28* 17 28*   CREATININE mg/dL 1.50* 0.88 1.66*   CALCIUM mg/dL 9.6 9.9 9.6   BILIRUBIN mg/dL 0.3 0.4 0.4   ALK PHOS U/L 80 81 94   ALT (SGPT) U/L 11 15 22   AST (SGOT) U/L 17 18 19   GLUCOSE mg/dL 136* 149* 109*       Lab Results   Component Value Date    GLUCOSE 136 (H) 07/25/2024    BUN 28 " "(H) 07/25/2024    CREATININE 1.50 (H) 07/25/2024    EGFRIFNONA 59 (L) 08/17/2021    EGFRIFAFRI 91 02/23/2021    BCR 18.7 07/25/2024    K 5.0 07/25/2024    CO2 24.8 07/25/2024    CALCIUM 9.6 07/25/2024    ALBUMIN 4.1 07/25/2024    LABIL2 1.6 05/08/2019    AST 17 07/25/2024    ALT 11 07/25/2024       Lab Results - Last 18 Months   Lab Units 03/26/24  1131 10/02/23  0754   INR  0.97 <0.93*   APTT seconds 25.6  --        Lab Results   Component Value Date    IRON 41 (L) 06/27/2022    TIBC 253 (L) 06/27/2022    FERRITIN 838.80 (H) 06/27/2022       Lab Results   Component Value Date    FOLATE >20.0 03/10/2020     No results found for: \"OCCULTBLD\"    No results found for: \"RETICCTPCT\"    Lab Results   Component Value Date    IPLHBWDI82 210 (L) 08/01/2022     No results found for: \"SPEP\", \"UPEP\"  LDH   Date Value Ref Range Status   07/25/2024 167 135 - 225 U/L Final     Lab Results   Component Value Date    SEDRATE 23 (H) 07/05/2022     No results found for: \"FIBRINOGEN\", \"HAPTOGLOBIN\"  Lab Results   Component Value Date    PTT 25.6 03/26/2024    INR 0.97 03/26/2024     No results found for: \"\"  No results found for: \"CEA\"  No components found for: \"CA-19-9\"  Lab Results   Component Value Date    PSA 1.520 05/25/2022     Assessment & Plan     Assessment:  History of high-grade non-Hodgkin’s B cell lymphoma involving the left facial sinus:  Stage IE (extranodal) lymphoma.  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  It is unclear whether the patient has recurrent versus new de zulma lymphoma.  His PET scan also showed an indeterminate lesion in the left lobe of the liver, which could not be biopsied.  He was treated with Rituximab-GCVP chemotherapy x6 cycles.  The patient also received six cycles of intrathecal chemotherapy with Methotrexate, Cytarabine, and Hydrocortisone.  PET/CT scan showed a complete response.  His restaging PET scan " on 11/3/16 continues to show remission.  He also received consolidation radiation therapy for 30 Gy finished on 8/19/16.    PET/CT scan in September 2017 does not show any evidence of disease recurrence.  Patient was reporting left-sided headaches.  repeat MRI on 3/30/18 and it fails to show any evidence of disease progression or recurrence.  Restaging CT scan on 3/7/2020 does not show any evidence of disease recurrence.  Now again had a repeat CT scan in October 2021 with no concerning findings for recurrent disease.  There is a stable 9 mm nodule in the right middle lobe which has been stable for the last several years.  No routine CT imaging needed.  No s/s of relapse will monitor labs for now.  Mild persistent anemia: Appears to be from chronic disease hemoglobin 10.5 repeat in 6 months.Positive stool Hemoccult test: Patient had a colonoscopy which was unremarkable this was in June 2021 hb has been stable. Could be persistent myelosupression post chemotherapy.  Hemoglobin again trending down will investigate further.  History of testicular lymphoma: In 2000  History of right knee osteoarthritis: Has an appointment this week to discuss with orthopedist for knee replacement.  Benign prostatic hypertrophy followed by urology no worsening of symptoms.  History of liver lesion:  This was noted initially on PET scan.  Repeat MRI showed a 1.3 x 1.7 cm, rim-enhancing lesion which could not be biopsied.  His restaging PET scan on 9/8/16 does not show this lesion anymore.  No findings October 2021 CT.  No further imaging needed.  Osteoporosis:  This was seen on DEXAscan 10/5/18.  Calcium vitamin D.    PLAN:      Patient is more than 5 years out from treatment.  Imaging on clinical basis only  No s/s of recurrence continue exam for lymph nodes every 6 months.   Continue Boniva.  Check iron profile, ferritin, B12, folate due to worsening anemia and replace as indicated  Follow-up in 6 months with MD with CBC, CMP, LDH 1  week prior  Call in the interim with any questions or concerns      Iron deficiency anemia, unspecified iron deficiency anemia type  - Vitamin B12  - Folate  - Iron Profile  - Ferritin    Non-Hodgkin's lymphoma, unspecified body region, unspecified non-Hodgkin lymphoma type  - Vitamin B12  - Folate  - Iron Profile  - Ferritin    Malabsorption of iron  - Vitamin B12  - Folate  - Iron Profile  - Ferritin      Orders Placed This Encounter   Procedures    Vitamin B12     Standing Status:   Future     Standing Expiration Date:   8/1/2025     Order Specific Question:   Release to patient     Answer:   Routine Release [[id number]]    Folate     Standing Status:   Future     Standing Expiration Date:   8/1/2025     Order Specific Question:   Release to patient     Answer:   Routine Release [[id number]]    Iron Profile     Order Specific Question:   Release to patient     Answer:   Routine Release [[id number]]    Ferritin     Order Specific Question:   Release to patient     Answer:   Routine Release [[id number]]       Dorothy Andersen, APRN 08/01/24

## 2024-08-01 ENCOUNTER — OFFICE VISIT (OUTPATIENT)
Dept: ONCOLOGY | Facility: CLINIC | Age: 79
End: 2024-08-01
Payer: MEDICARE

## 2024-08-01 VITALS
SYSTOLIC BLOOD PRESSURE: 154 MMHG | DIASTOLIC BLOOD PRESSURE: 71 MMHG | OXYGEN SATURATION: 95 % | HEART RATE: 72 BPM | TEMPERATURE: 98 F | HEIGHT: 61 IN | RESPIRATION RATE: 18 BRPM | BODY MASS INDEX: 23.6 KG/M2 | WEIGHT: 125 LBS

## 2024-08-01 DIAGNOSIS — K90.9 MALABSORPTION OF IRON: ICD-10-CM

## 2024-08-01 DIAGNOSIS — D50.9 IRON DEFICIENCY ANEMIA, UNSPECIFIED IRON DEFICIENCY ANEMIA TYPE: Primary | ICD-10-CM

## 2024-08-01 DIAGNOSIS — C85.90 NON-HODGKIN'S LYMPHOMA, UNSPECIFIED BODY REGION, UNSPECIFIED NON-HODGKIN LYMPHOMA TYPE: ICD-10-CM

## 2024-08-01 DIAGNOSIS — K21.9 GASTROESOPHAGEAL REFLUX DISEASE, UNSPECIFIED WHETHER ESOPHAGITIS PRESENT: ICD-10-CM

## 2024-08-01 RX ORDER — FAMOTIDINE 40 MG/1
40 TABLET, FILM COATED ORAL DAILY
Qty: 30 TABLET | Refills: 1 | Status: SHIPPED | OUTPATIENT
Start: 2024-08-01

## 2024-08-02 ENCOUNTER — OFFICE VISIT (OUTPATIENT)
Dept: CARDIOLOGY | Facility: CLINIC | Age: 79
End: 2024-08-02
Payer: MEDICARE

## 2024-08-02 VITALS
HEART RATE: 66 BPM | OXYGEN SATURATION: 97 % | WEIGHT: 125 LBS | HEIGHT: 61 IN | SYSTOLIC BLOOD PRESSURE: 110 MMHG | DIASTOLIC BLOOD PRESSURE: 54 MMHG | BODY MASS INDEX: 23.6 KG/M2

## 2024-08-02 DIAGNOSIS — I44.7 LBBB (LEFT BUNDLE BRANCH BLOCK): ICD-10-CM

## 2024-08-02 DIAGNOSIS — I10 PRIMARY HYPERTENSION: ICD-10-CM

## 2024-08-02 DIAGNOSIS — I42.0 DILATED CARDIOMYOPATHY: Primary | ICD-10-CM

## 2024-08-02 DIAGNOSIS — E78.2 MIXED HYPERLIPIDEMIA: ICD-10-CM

## 2024-08-02 DIAGNOSIS — Z95.810 BIVENTRICULAR AUTOMATIC IMPLANTABLE CARDIOVERTER DEFIBRILLATOR IN SITU: ICD-10-CM

## 2024-08-02 NOTE — LETTER
August 2, 2024       No Recipients    Patient: Ramon Silva   YOB: 1945   Date of Visit: 8/2/2024     Dear CON Monahan:       Thank you for referring Ramon Silva to me for evaluation. Below are the relevant portions of my assessment and plan of care.    If you have questions, please do not hesitate to call me. I look forward to following Ramon along with you.         Sincerely,        Chago Lambert MD        CC:   No Recipients    Chago Lambert MD  08/02/24 1611  Sign when Signing Visit  CC---Cardiomyopathy      Sub  79-year-old male patient underwent a CRT-D device in April 24 and comes in for follow-up and feels well after device implantation.  Patient is between functional class I to class II  No new symptoms    Previous history attached below for reference    78-year-old male patient with complex medical problems came for evaluation because of cardiomyopathy.  Patient had severe degenerative knee disease.  Preoperatively came for cardiac evaluation and was found to have severe LV dysfunction with EF less than 35% with left bundle branch block with abnormal stress test.  Further cardiac catheterization showed 50% mid LAD stenosis and severe cardiomyopathy with EF less than 30% with mild mitral regurgitation.  Patient also has additional history of severe peripheral vascular disease followed by vascular surgery.  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  He is regularly followed by hematology and oncology and has been in remission for more than 5 years.  He denies any syncope or any stroke.              Past Medical History:   Diagnosis Date   • Anemia    • Arthralgia    • Diabetes    • Fatigue    • GERD (gastroesophageal reflux disease)    • Gout    • Hyperlipidemia    • Hypertension    • Liver lesion    • Non Hodgkin's  lymphoma    • Osteoporosis    • Personal history of testicular cancer    • Vitamin D deficiency    • Weight loss      Past Surgical History:   Procedure Laterality Date   • ANGIOGRAM - CONVERTED  07/05/2022    aif by Dr Henriquez   • CARDIAC CATHETERIZATION N/A 7/5/2022    Procedure: PERIPHERAL ANGIOGRAPHY Right leg;  Surgeon: Theron Henriquez MD;  Location:  [facility];  Service: Cardiovascular;  Laterality: N/A;   • CARDIAC CATHETERIZATION N/A 10/2/2023    Procedure: Right and Left Heart Cath with possible PCI;  Surgeon: Joey Kirkland DO;  Location:  [facility];  Service: Cardiovascular;  Laterality: N/A;  Local and IV sedation   • HIP SURGERY  2005   • PORTACATH PLACEMENT     • TESTICLE SURGERY Right 07/17/2000    right testicular excision     Family History   Family history unknown: Yes         Physical Exam    General:      well developed, well nourished, in no acute distress.    Head:      normocephalic and atraumatic.    Eyes:      PERRL/EOM intact, conjunctivae and sclerae clear without nystagmus.    Neck:      no  thyromegaly, trachea central with normal respiratory effort  Lungs:      clear bilaterally to auscultation.    Heart:       regular rate and rhythm, S1, S2 without murmurs, rubs, or gallops  Skin:      intact without lesions or rashes.    Psych:      alert and cooperative; normal mood and affect; normal attention span and concentration.          Assessment and plan    Post biventricular ICD insertion and ICD interrogated and reprogrammed with excellent QRS complex with CRT pacing post reprogramming--- LV to RV offset reprogrammed to 40 ms with excellent QRS with CRT pacing  Patient cleared for noncardiac surgery if needed  Significant cardiomyopathy with EF less than 35% with left bundle branch block optimize with CRT pacing and medical treatment including carvedilol, Farxiga, losartan  Diabetes on metformin and Farxiga  Hyperlipidemia on  atorvastatin  Previous cardiac catheterization with mid LAD 50% stenosis without angina on aspirin  Prior history of lymphoma needing 2 cycles of chemotherapy in remission last 5 years and prior therapy included CHOP regimen  Medications reviewed and follow-up appointments.      ECG 12 Lead    Date/Time: 8/2/2024 4:10 PM  Performed by: Chago Lambert MD    Authorized by: Chago Lambert MD  Comparison: compared with previous ECG   Similar to previous ECG  Comparison to previous ECG: Sinus rhythm with biventricular pacing and reprogramming of the device done with EKG guidance with excellent QRS complex post reprogramming with LV to RV offset of 40 ms      Electronically signed by Chago Lambert MD, 08/02/24, 4:10 PM EDT.

## 2024-08-02 NOTE — PROGRESS NOTES
CC---Cardiomyopathy      Sub  79-year-old male patient underwent a CRT-D device in April 24 and comes in for follow-up and feels well after device implantation.  Patient is between functional class I to class II  No new symptoms    Previous history attached below for reference    78-year-old male patient with complex medical problems came for evaluation because of cardiomyopathy.  Patient had severe degenerative knee disease.  Preoperatively came for cardiac evaluation and was found to have severe LV dysfunction with EF less than 35% with left bundle branch block with abnormal stress test.  Further cardiac catheterization showed 50% mid LAD stenosis and severe cardiomyopathy with EF less than 30% with mild mitral regurgitation.  Patient also has additional history of severe peripheral vascular disease followed by vascular surgery.  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  He is regularly followed by hematology and oncology and has been in remission for more than 5 years.  He denies any syncope or any stroke.              Past Medical History:   Diagnosis Date   • Anemia    • Arthralgia    • Diabetes    • Fatigue    • GERD (gastroesophageal reflux disease)    • Gout    • Hyperlipidemia    • Hypertension    • Liver lesion    • Non Hodgkin's lymphoma    • Osteoporosis    • Personal history of testicular cancer    • Vitamin D deficiency    • Weight loss      Past Surgical History:   Procedure Laterality Date   • ANGIOGRAM - CONVERTED  07/05/2022    aif by Dr Henriquez   • CARDIAC CATHETERIZATION N/A 7/5/2022    Procedure: PERIPHERAL ANGIOGRAPHY Right leg;  Surgeon: Theron Henriquez MD;  Location: [facility] CATH INVASIVE LOCATION;  Service: Cardiovascular;  Laterality: N/A;   • CARDIAC CATHETERIZATION N/A 10/2/2023    Procedure: Right and Left Heart Cath with possible  PCI;  Surgeon: Joey Kirkland DO;  Location: [facility] CATH INVASIVE LOCATION;  Service: Cardiovascular;  Laterality: N/A;  Local and IV sedation   • HIP SURGERY  2005   • PORTACATH PLACEMENT     • TESTICLE SURGERY Right 07/17/2000    right testicular excision     Family History   Family history unknown: Yes         Physical Exam    General:      well developed, well nourished, in no acute distress.    Head:      normocephalic and atraumatic.    Eyes:      PERRL/EOM intact, conjunctivae and sclerae clear without nystagmus.    Neck:      no  thyromegaly, trachea central with normal respiratory effort  Lungs:      clear bilaterally to auscultation.    Heart:       regular rate and rhythm, S1, S2 without murmurs, rubs, or gallops  Skin:      intact without lesions or rashes.    Psych:      alert and cooperative; normal mood and affect; normal attention span and concentration.          Assessment and plan    Post biventricular ICD insertion and ICD interrogated and reprogrammed with excellent QRS complex with CRT pacing post reprogramming--- LV to RV offset reprogrammed to 40 ms with excellent QRS with CRT pacing  Patient cleared for noncardiac surgery if needed  Significant cardiomyopathy with EF less than 35% with left bundle branch block optimize with CRT pacing and medical treatment including carvedilol, Farxiga, losartan  Diabetes on metformin and Farxiga  Hyperlipidemia on atorvastatin  Previous cardiac catheterization with mid LAD 50% stenosis without angina on aspirin  Prior history of lymphoma needing 2 cycles of chemotherapy in remission last 5 years and prior therapy included CHOP regimen  Medications reviewed and follow-up appointments.      ECG 12 Lead    Date/Time: 8/2/2024 4:10 PM  Performed by: Chago Lambert MD    Authorized by: Chago Lambert MD  Comparison: compared with previous ECG   Similar to previous ECG  Comparison to previous ECG: Sinus rhythm with biventricular pacing and  reprogramming of the device done with EKG guidance with excellent QRS complex post reprogramming with LV to RV offset of 40 ms      Electronically signed by Chago Lambert MD, 08/02/24, 4:10 PM EDT.

## 2024-08-05 ENCOUNTER — CLINICAL SUPPORT (OUTPATIENT)
Dept: FAMILY MEDICINE CLINIC | Facility: CLINIC | Age: 79
End: 2024-08-05
Payer: MEDICARE

## 2024-08-05 DIAGNOSIS — D50.9 IRON DEFICIENCY ANEMIA, UNSPECIFIED IRON DEFICIENCY ANEMIA TYPE: ICD-10-CM

## 2024-08-05 DIAGNOSIS — K90.9 MALABSORPTION OF IRON: ICD-10-CM

## 2024-08-05 DIAGNOSIS — C85.90 NON-HODGKIN'S LYMPHOMA, UNSPECIFIED BODY REGION, UNSPECIFIED NON-HODGKIN LYMPHOMA TYPE: ICD-10-CM

## 2024-08-05 LAB
FERRITIN SERPL-MCNC: 402 NG/ML (ref 30–400)
IRON 24H UR-MRATE: 51 MCG/DL (ref 59–158)
IRON SATN MFR SERPL: 21 % (ref 20–50)
TIBC SERPL-MCNC: 244 MCG/DL (ref 298–536)
TRANSFERRIN SERPL-MCNC: 164 MG/DL (ref 200–360)

## 2024-08-05 PROCEDURE — 82607 VITAMIN B-12: CPT | Performed by: NURSE PRACTITIONER

## 2024-08-05 PROCEDURE — 83540 ASSAY OF IRON: CPT | Performed by: NURSE PRACTITIONER

## 2024-08-05 PROCEDURE — 84466 ASSAY OF TRANSFERRIN: CPT | Performed by: NURSE PRACTITIONER

## 2024-08-05 PROCEDURE — 82746 ASSAY OF FOLIC ACID SERUM: CPT | Performed by: NURSE PRACTITIONER

## 2024-08-05 PROCEDURE — 82728 ASSAY OF FERRITIN: CPT | Performed by: NURSE PRACTITIONER

## 2024-08-05 NOTE — PROGRESS NOTES
Venipuncture Blood Specimen Collection  Venipuncture performed in RT ARM by Aj Holman with good hemostasis. Patient tolerated the procedure well without complications.   08/05/24   Aj Holman

## 2024-08-06 ENCOUNTER — TELEPHONE (OUTPATIENT)
Dept: ONCOLOGY | Facility: CLINIC | Age: 79
End: 2024-08-06
Payer: MEDICARE

## 2024-08-06 DIAGNOSIS — D50.9 IRON DEFICIENCY ANEMIA, UNSPECIFIED IRON DEFICIENCY ANEMIA TYPE: Primary | ICD-10-CM

## 2024-08-06 DIAGNOSIS — K90.9 MALABSORPTION OF IRON: ICD-10-CM

## 2024-08-06 LAB
FOLATE SERPL-MCNC: 6.22 NG/ML (ref 4.78–24.2)
VIT B12 BLD-MCNC: 301 PG/ML (ref 211–946)

## 2024-08-06 RX ORDER — FOLIC ACID 1 MG/1
1 TABLET ORAL DAILY
Qty: 30 TABLET | Refills: 2 | Status: SHIPPED | OUTPATIENT
Start: 2024-08-06

## 2024-08-06 NOTE — TELEPHONE ENCOUNTER
Spoke w/ pt's daughter and let her know that the nurse practitioner would like for him to start Folic acid 1 mg daily.  I also let her know that he should start Vitamin B12 1000 mg daily over the counter.  I let her know that I will send in the script for the Folic Acid.  I instructed her to have him take these until he follows up in December.  She v/u.  Script for folic acid sent to pt's pharmacy.

## 2024-08-09 ENCOUNTER — OFFICE VISIT (OUTPATIENT)
Dept: FAMILY MEDICINE CLINIC | Facility: CLINIC | Age: 79
End: 2024-08-09
Payer: MEDICARE

## 2024-08-09 VITALS
TEMPERATURE: 97.8 F | WEIGHT: 127.4 LBS | HEIGHT: 60 IN | HEART RATE: 82 BPM | SYSTOLIC BLOOD PRESSURE: 118 MMHG | RESPIRATION RATE: 18 BRPM | BODY MASS INDEX: 25.01 KG/M2 | OXYGEN SATURATION: 97 % | DIASTOLIC BLOOD PRESSURE: 60 MMHG

## 2024-08-09 DIAGNOSIS — Z23 NEED FOR VACCINATION: ICD-10-CM

## 2024-08-09 DIAGNOSIS — E11.65 TYPE 2 DIABETES MELLITUS WITH HYPERGLYCEMIA, WITHOUT LONG-TERM CURRENT USE OF INSULIN: Primary | ICD-10-CM

## 2024-08-09 DIAGNOSIS — E78.2 MIXED HYPERLIPIDEMIA: ICD-10-CM

## 2024-08-09 DIAGNOSIS — I15.9 SECONDARY HYPERTENSION: ICD-10-CM

## 2024-08-09 PROCEDURE — 90677 PCV20 VACCINE IM: CPT | Performed by: REGISTERED NURSE

## 2024-08-09 PROCEDURE — 99215 OFFICE O/P EST HI 40 MIN: CPT | Performed by: REGISTERED NURSE

## 2024-08-09 PROCEDURE — G0009 ADMIN PNEUMOCOCCAL VACCINE: HCPCS | Performed by: REGISTERED NURSE

## 2024-08-09 PROCEDURE — 3074F SYST BP LT 130 MM HG: CPT | Performed by: REGISTERED NURSE

## 2024-08-09 PROCEDURE — 1160F RVW MEDS BY RX/DR IN RCRD: CPT | Performed by: REGISTERED NURSE

## 2024-08-09 PROCEDURE — 1126F AMNT PAIN NOTED NONE PRSNT: CPT | Performed by: REGISTERED NURSE

## 2024-08-09 PROCEDURE — 1159F MED LIST DOCD IN RCRD: CPT | Performed by: REGISTERED NURSE

## 2024-08-09 PROCEDURE — 3078F DIAST BP <80 MM HG: CPT | Performed by: REGISTERED NURSE

## 2024-08-09 NOTE — PROGRESS NOTES
Chief Complaint  Follow-up, Hyperlipidemia, Diabetes, and Hypertension    Subjective    History of Present Illness {CC  Problem List  Visit  Diagnosis   Encounters  Notes  Medications  Labs  Result Review Imaging  Media :23}     Ramon Silva presents to [facility] for Follow-up, Hyperlipidemia, Diabetes, and Hypertension.      History of Present Illness  Patient is a 79 y.o. male who presents to the clinic today, accompanied by his granddaughter, for 3-month follow-up for type 2 diabetes, hypertension, and hyperlipidemia.  Patient's granddaughter speaks fluent English and has been present to help her grandfather with medical information and ensuring that he fully understands.  Patient historically has refused  services and would rather have family present and helping him.  Patient denies any chest pain, shortness of breath, or any fevers.  Patient denies any known exposure to COVID, flu, or any other contagious illnesses.    In regards to type 2 diabetes, patient is currently taking Farxiga and metformin to manage his diabetes.  Patient does not have a log of glucose readings with him today.  I did discuss with family, who are present with him for the visit, the need to bring this log with them and to make sure patient is trying to avoid carbs when possible.  Patient and family voiced understanding.  Patient denies any concerns or issues with his diabetic medication or type 2 diabetes at this time.    In regards to hypertension, patient's blood pressure is well-controlled at 118/60.  Patient is currently seeing cardiology for management of this.  Patient is currently taking carvedilol and losartan to manage his hypertension.  Patient denies any concerns with his hypertension or hypertension medication at this time.    In regards to hyperlipidemia, patient is currently taking atorvastatin to manage this.  Patient does not follow a low-fat diet at this time but  "shares that he does not have a significant intake of fat anyway.  Patient denies any concerns with his hyperlipidemia or atorvastatin at this time.       Review of Systems   Constitutional: Negative.  Negative for activity change, chills, fatigue and fever.   HENT: Negative.  Negative for congestion, dental problem, ear pain, hearing loss, rhinorrhea, sinus pain, sore throat, tinnitus and trouble swallowing.    Eyes: Negative.  Negative for pain and visual disturbance.   Respiratory: Negative.  Negative for cough, chest tightness, shortness of breath and wheezing.    Cardiovascular: Negative.  Negative for chest pain, palpitations and leg swelling.   Gastrointestinal: Negative.  Negative for abdominal pain, diarrhea, nausea and vomiting.   Endocrine: Negative.  Negative for polydipsia, polyphagia and polyuria.   Genitourinary: Negative.  Negative for difficulty urinating, dysuria, frequency and urgency.   Musculoskeletal: Negative.  Negative for arthralgias, back pain and myalgias.   Skin: Negative.  Negative for color change, pallor, rash and wound.   Allergic/Immunologic: Negative.  Negative for environmental allergies.   Neurological: Negative.  Negative for dizziness, speech difficulty, weakness, light-headedness, numbness and headaches.   Hematological: Negative.    Psychiatric/Behavioral: Negative.  Negative for confusion, decreased concentration, self-injury and suicidal ideas. The patient is not nervous/anxious.    All other systems reviewed and are negative.       Objective     Vital Signs:   /60 (BP Location: Left arm, Patient Position: Sitting, Cuff Size: Large Adult)   Pulse 82   Temp 97.8 °F (36.6 °C) (Oral)   Resp 18   Ht 152.4 cm (60\")   Wt 57.8 kg (127 lb 6.4 oz)   SpO2 97%   BMI 24.88 kg/m²   Current Outpatient Medications on File Prior to Visit   Medication Sig Dispense Refill    Accu-Chek Guide test strip 1 each by Other route Daily. E 11.9 50 each 2    Accu-Chek Softclix Lancets " lancets test daily as directed 100 each 2    aspirin 81 MG tablet Take 1 tablet by mouth Daily.      atorvastatin (LIPITOR) 40 MG tablet TAKE ONE TABLET BY MOUTH EVERY NIGHT AT BEDTIME 90 tablet 1    carvedilol (COREG) 25 MG tablet TAKE ONE TABLET BY MOUTH TWICE DAILY WITH MEALS 180 tablet 1    Cetirizine HCl (ZyrTEC Allergy) 10 MG capsule Take 10 mg by mouth Daily.      cilostazol (PLETAL) 100 MG tablet TAKE ONE TABLET BY MOUTH TWICE DAILY 60 tablet 1    colestipol (COLESTID) 1 g tablet Take 1 tablet by mouth 2 (Two) Times a Day. 180 tablet 1    dapagliflozin Propanediol (Farxiga) 10 MG tablet Take 10 mg by mouth Daily. 90 tablet 1    Diclofenac Sodium (VOLTAREN) 1 % gel gel Apply 4 g topically to the appropriate area as directed 4 (Four) Times a Day As Needed (APPLY 4 TIMES A DAY AS NEED TO THE APPROPRIATE AREA). 4 g 2    famotidine (PEPCID) 40 MG tablet TAKE ONE TABLET BY MOUTH ONCE DAILY 30 tablet 1    ferrous sulfate 324 (65 Fe) MG tablet delayed-release EC tablet Take 1 tablet by mouth Daily With Breakfast. 90 tablet 1    finasteride (PROSCAR) 5 MG tablet TAKE ONE TABLET BY MOUTH ONCE DAILY 90 tablet 1    folic acid (FOLVITE) 1 MG tablet Take 1 tablet by mouth Daily. 30 tablet 2    ibandronate (BONIVA) 150 MG tablet TAKE ONE TABLET BY MOUTH EVERY 30 DAYS 1 tablet 5    losartan (COZAAR) 50 MG tablet TAKE ONE TABLET BY MOUTH ONCE DAILY 90 tablet 1    Menthol (GNP HERBAL MT) Apply  to the mouth or throat. Neuroflow plus      metFORMIN ER (GLUCOPHAGE-XR) 500 MG 24 hr tablet TAKE TWO TABLETS BY MOUTH TWICE DAILY BEFORE MEALS 360 tablet 1    Omega-3 Fatty Acids (OMEGA-3 FISH OIL PO) Take 1 capsule by mouth Daily.      pantoprazole (PROTONIX) 40 MG EC tablet TAKE ONE TABLET BY MOUTH ONCE DAILY 90 tablet 1    traMADol (ULTRAM) 50 MG tablet Take 1 tablet by mouth Every 12 (Twelve) Hours As Needed.      Vitamin D, Cholecalciferol, (CHOLECALCIFEROL) 10 MCG (400 UNIT) tablet Take 1 tablet by mouth Daily.       No current  facility-administered medications on file prior to visit.        Past Medical History:   Diagnosis Date    Anemia     Arthralgia     Diabetes     Fatigue     GERD (gastroesophageal reflux disease)     Gout     Hyperlipidemia     Hypertension     Liver lesion     Non Hodgkin's lymphoma     Osteoporosis     Personal history of testicular cancer     Vitamin D deficiency     Weight loss       Past Surgical History:   Procedure Laterality Date    ANGIOGRAM - CONVERTED  07/05/2022    aif by Dr Henriquez    CARDIAC CATHETERIZATION N/A 7/5/2022    Procedure: PERIPHERAL ANGIOGRAPHY Right leg;  Surgeon: Theron Hneriquez MD;  Location:  [facility];  Service: Cardiovascular;  Laterality: N/A;    CARDIAC CATHETERIZATION N/A 10/2/2023    Procedure: Right and Left Heart Cath with possible PCI;  Surgeon: Joey Kirkland DO;  Location:  [facility];  Service: Cardiovascular;  Laterality: N/A;  Local and IV sedation    CARDIAC ELECTROPHYSIOLOGY PROCEDURE N/A 4/17/2024    Procedure: ICD DC new [employer] notified 03/22/24;  Surgeon: Chago Lambert MD;  Location:  [facility];  Service: Cardiovascular;  Laterality: N/A;  Needs Port-a-Cath removal prior to ICD    HIP SURGERY  2005    PORTACATH PLACEMENT      TESTICLE SURGERY Right 07/17/2000    right testicular excision      Family History   Family history unknown: Yes      Social History     Socioeconomic History    Marital status:    Tobacco Use    Smoking status: Never    Smokeless tobacco: Never   Vaping Use    Vaping status: Never Used   Substance and Sexual Activity    Alcohol use: No    Drug use: Never    Sexual activity: Defer         Clinical Support on 08/05/2024   Component Date Value Ref Range Status    Vitamin B-12 08/05/2024 301  211 - 946 pg/mL Final    Folate 08/05/2024 6.22  4.78 - 24.20 ng/mL Final   Office Visit on 08/01/2024   Component Date Value Ref Range Status    Iron 08/05/2024 51  (L)  59 - 158 mcg/dL Final    Iron Saturation (TSAT) 08/05/2024 21  20 - 50 % Final    Transferrin 08/05/2024 164 (L)  200 - 360 mg/dL Final    TIBC 08/05/2024 244 (L)  298 - 536 mcg/dL Final    Ferritin 08/05/2024 402.00 (H)  30.00 - 400.00 ng/mL Final   Clinical Support on 07/25/2024   Component Date Value Ref Range Status    Glucose 07/25/2024 136 (H)  65 - 99 mg/dL Final    BUN 07/25/2024 28 (H)  8 - 23 mg/dL Final    Creatinine 07/25/2024 1.50 (H)  0.76 - 1.27 mg/dL Final    Sodium 07/25/2024 141  136 - 145 mmol/L Final    Potassium 07/25/2024 5.0  3.5 - 5.2 mmol/L Final    Chloride 07/25/2024 106  98 - 107 mmol/L Final    CO2 07/25/2024 24.8  22.0 - 29.0 mmol/L Final    Calcium 07/25/2024 9.6  8.6 - 10.5 mg/dL Final    Total Protein 07/25/2024 6.4  6.0 - 8.5 g/dL Final    Albumin 07/25/2024 4.1  3.5 - 5.2 g/dL Final    ALT (SGPT) 07/25/2024 11  1 - 41 U/L Final    AST (SGOT) 07/25/2024 17  1 - 40 U/L Final    Alkaline Phosphatase 07/25/2024 80  39 - 117 U/L Final    Total Bilirubin 07/25/2024 0.3  0.0 - 1.2 mg/dL Final    Globulin 07/25/2024 2.3  gm/dL Final    A/G Ratio 07/25/2024 1.8  g/dL Final    BUN/Creatinine Ratio 07/25/2024 18.7  7.0 - 25.0 Final    Anion Gap 07/25/2024 10.2  5.0 - 15.0 mmol/L Final    eGFR 07/25/2024 47.1 (L)  >60.0 mL/min/1.73 Final    LDH 07/25/2024 167  135 - 225 U/L Final    WBC 07/25/2024 5.33  3.40 - 10.80 10*3/mm3 Final    RBC 07/25/2024 3.39 (L)  4.14 - 5.80 10*6/mm3 Final    Hemoglobin 07/25/2024 10.2 (L)  13.0 - 17.7 g/dL Final    Hematocrit 07/25/2024 33.1 (L)  37.5 - 51.0 % Final    MCV 07/25/2024 97.6 (H)  79.0 - 97.0 fL Final    MCH 07/25/2024 30.1  26.6 - 33.0 pg Final    MCHC 07/25/2024 30.8 (L)  31.5 - 35.7 g/dL Final    RDW 07/25/2024 14.4  12.3 - 15.4 % Final    RDW-SD 07/25/2024 51.2  37.0 - 54.0 fl Final    MPV 07/25/2024 11.5  6.0 - 12.0 fL Final    Platelets 07/25/2024 160  140 - 450 10*3/mm3 Final    Neutrophil % 07/25/2024 51.0  42.7 - 76.0 % Final     Lymphocyte % 07/25/2024 37.1  19.6 - 45.3 % Final    Monocyte % 07/25/2024 7.9  5.0 - 12.0 % Final    Eosinophil % 07/25/2024 3.2  0.3 - 6.2 % Final    Basophil % 07/25/2024 0.4  0.0 - 1.5 % Final    Immature Grans % 07/25/2024 0.4  0.0 - 0.5 % Final    Neutrophils, Absolute 07/25/2024 2.72  1.70 - 7.00 10*3/mm3 Final    Lymphocytes, Absolute 07/25/2024 1.98  0.70 - 3.10 10*3/mm3 Final    Monocytes, Absolute 07/25/2024 0.42  0.10 - 0.90 10*3/mm3 Final    Eosinophils, Absolute 07/25/2024 0.17  0.00 - 0.40 10*3/mm3 Final    Basophils, Absolute 07/25/2024 0.02  0.00 - 0.20 10*3/mm3 Final    Immature Grans, Absolute 07/25/2024 0.02  0.00 - 0.05 10*3/mm3 Final    nRBC 07/25/2024 0.0  0.0 - 0.2 /100 WBC Final         Physical Exam  Vitals and nursing note reviewed.   Constitutional:       Appearance: Normal appearance. He is normal weight.   HENT:      Head: Normocephalic and atraumatic.   Cardiovascular:      Rate and Rhythm: Normal rate and regular rhythm.      Pulses: Normal pulses.      Heart sounds: Normal heart sounds. No murmur heard.     No friction rub. No gallop.   Pulmonary:      Effort: Pulmonary effort is normal. No respiratory distress.      Breath sounds: Normal breath sounds. No stridor. No wheezing, rhonchi or rales.   Chest:      Chest wall: No tenderness.   Abdominal:      General: Abdomen is flat. Bowel sounds are normal. There is no distension.      Palpations: Abdomen is soft. There is no mass.      Tenderness: There is no abdominal tenderness. There is no right CVA tenderness, left CVA tenderness, guarding or rebound.      Hernia: No hernia is present.   Skin:     General: Skin is warm and dry.      Capillary Refill: Capillary refill takes less than 2 seconds.      Coloration: Skin is not jaundiced or pale.   Neurological:      General: No focal deficit present.      Mental Status: He is alert and oriented to person, place, and time. Mental status is at baseline.      Motor: No weakness.       Coordination: Coordination normal.      Gait: Gait normal.   Psychiatric:         Mood and Affect: Mood normal.         Behavior: Behavior normal.         Thought Content: Thought content normal.         Judgment: Judgment normal.          Result Review  Data Reviewed:{ Labs  Result Review  Imaging  Med Tab  Media :23}   I have reviewed this patient's chart.  I have reviewed previous labs, previous imaging, previous medications, and previous encounters with notes that were available in this patient's chart.               Assessment and Plan {CC Problem List  Visit Diagnosis  ROS  Review (Popup)  [facility]  BestPractice  Medications  SmartSets  SnapShot Encounters  Media :23}   Diagnoses and all orders for this visit:    1. Type 2 diabetes mellitus with hyperglycemia, without long-term current use of insulin (Primary)  -     POC Glycosylated Hemoglobin (Hb A1C)    2. Need for vaccination  -     Pneumococcal Conjugate Vaccine 20-Valent All    3. Mixed hyperlipidemia    4. Secondary hypertension        -Patient agreeable to pneumonia vaccine today.  -Recommend patient get a Tdap vaccine update.  His insurance may cover this better at the pharmacy unless he has an abrasion or concern that we are given for.  -Point-of-care A1c in clinic today.  -ER red flags discussed with patient including risk versus benefit and education provided.  -Follow-up with me in 3 months or sooner if needed.    I spent 40 minutes caring for Ramon on this date of service. This time includes time spent by me in the following activities:preparing for the visit, reviewing tests, obtaining and/or reviewing a separately obtained history, performing a medically appropriate examination and/or evaluation , counseling and educating the patient/family/caregiver, ordering medications, tests, or procedures, referring and communicating with other health care professionals , documenting information in the medical record,  independently interpreting results and communicating that information with the patient/family/caregiver, and care coordination.    Follow Up {Instructions Charge Capture  Follow-up Communications :23}     Patient was given instructions and counseling regarding his condition or for health maintenance advice. Please see specific information pulled into the AVS (placed there by myself) if appropriate.    Return in about 3 months (around 11/9/2024) for routine follow up.    BMI is within normal parameters. No other follow-up for BMI required.       CON Monahan, FNP-BC

## 2024-08-15 PROCEDURE — 93296 REM INTERROG EVL PM/IDS: CPT | Performed by: INTERNAL MEDICINE

## 2024-08-15 PROCEDURE — 93295 DEV INTERROG REMOTE 1/2/MLT: CPT | Performed by: INTERNAL MEDICINE

## 2024-08-23 DIAGNOSIS — M25.50 ARTHRALGIA, UNSPECIFIED JOINT: ICD-10-CM

## 2024-08-23 DIAGNOSIS — I73.9 INTERMITTENT CLAUDICATION: ICD-10-CM

## 2024-08-26 RX ORDER — GABAPENTIN 300 MG/1
300 CAPSULE ORAL EVERY EVENING
Qty: 90 CAPSULE | Refills: 0 | Status: SHIPPED | OUTPATIENT
Start: 2024-08-26

## 2024-08-26 NOTE — TELEPHONE ENCOUNTER
Rx Refill Note  Requested Prescriptions     Pending Prescriptions Disp Refills    gabapentin (NEURONTIN) 300 MG capsule [Pharmacy Med Name: gabapentin 300 mg capsule] 30 capsule 0     Sig: TAKE ONE CAPSULE BY MOUTH EVERY EVENING      Last office visit with prescribing clinician: 8/9/2024   Last telemedicine visit with prescribing clinician: Visit date not found   Next office visit with prescribing clinician: 11/12/2024       UDS   None on file  CSA   None on file    INSPECT - SCAN - INSPECT/ [facility]/ 08/26/2024 (08/26/2024)     Talia Rodriguez MA  08/26/24, 10:24 EDT

## 2024-09-15 DIAGNOSIS — I73.9 INTERMITTENT CLAUDICATION: ICD-10-CM

## 2024-09-16 RX ORDER — CILOSTAZOL 100 MG/1
100 TABLET ORAL 2 TIMES DAILY
Qty: 60 TABLET | Refills: 1 | Status: SHIPPED | OUTPATIENT
Start: 2024-09-16

## 2024-09-26 ENCOUNTER — EXTERNAL PBMM DATA (OUTPATIENT)
Dept: PHARMACY | Facility: OTHER | Age: 79
End: 2024-09-26
Payer: MEDICARE

## 2024-09-27 ENCOUNTER — TELEPHONE (OUTPATIENT)
Dept: FAMILY MEDICINE CLINIC | Facility: CLINIC | Age: 79
End: 2024-09-27
Payer: MEDICARE

## 2024-09-27 DIAGNOSIS — E11.65 TYPE 2 DIABETES MELLITUS WITH HYPERGLYCEMIA: ICD-10-CM

## 2024-09-27 RX ORDER — DAPAGLIFLOZIN 10 MG/1
1 TABLET, FILM COATED ORAL DAILY
Qty: 90 TABLET | Refills: 1 | Status: SHIPPED | OUTPATIENT
Start: 2024-09-27

## 2024-11-01 ENCOUNTER — POP HEALTH PHARMACY (OUTPATIENT)
Dept: PHARMACY | Facility: OTHER | Age: 79
End: 2024-11-01
Payer: MEDICARE

## 2024-11-01 DIAGNOSIS — I10 PRIMARY HYPERTENSION: ICD-10-CM

## 2024-11-01 DIAGNOSIS — K21.9 GASTROESOPHAGEAL REFLUX DISEASE, UNSPECIFIED WHETHER ESOPHAGITIS PRESENT: ICD-10-CM

## 2024-11-01 RX ORDER — CARVEDILOL 25 MG/1
25 TABLET ORAL 2 TIMES DAILY WITH MEALS
Qty: 180 TABLET | Refills: 0 | Status: SHIPPED | OUTPATIENT
Start: 2024-11-01

## 2024-11-01 RX ORDER — LOSARTAN POTASSIUM 50 MG/1
50 TABLET ORAL DAILY
Qty: 90 TABLET | Refills: 0 | Status: SHIPPED | OUTPATIENT
Start: 2024-11-01

## 2024-11-01 RX ORDER — PANTOPRAZOLE SODIUM 40 MG/1
40 TABLET, DELAYED RELEASE ORAL DAILY
Qty: 90 TABLET | Refills: 0 | Status: SHIPPED | OUTPATIENT
Start: 2024-11-01

## 2024-11-01 NOTE — TELEPHONE ENCOUNTER
Rx Refill Note  Requested Prescriptions     Pending Prescriptions Disp Refills    losartan (COZAAR) 50 MG tablet [Pharmacy Med Name: losartan 50 mg tablet] 90 tablet 1     Sig: TAKE ONE TABLET BY MOUTH ONCE DAILY    carvedilol (COREG) 25 MG tablet [Pharmacy Med Name: carvedilol 25 mg tablet] 180 tablet 1     Sig: TAKE ONE TABLET BY MOUTH TWICE DAILY WITH MEALS    pantoprazole (PROTONIX) 40 MG EC tablet [Pharmacy Med Name: pantoprazole 40 mg tablet,delayed release] 90 tablet 1     Sig: TAKE ONE TABLET BY MOUTH ONCE DAILY      Last office visit with prescribing clinician: 8/9/2024   Last telemedicine visit with prescribing clinician: Visit date not found   Next office visit with prescribing clinician: 11/12/2024                         Would you like a call back once the refill request has been completed: [] Yes [] No    If the office needs to give you a call back, can they leave a voicemail: [] Yes [] No    Talia Rodriguez MA  11/01/24, 08:09 EDT

## 2024-11-06 ENCOUNTER — OFFICE VISIT (OUTPATIENT)
Dept: CARDIOLOGY | Facility: CLINIC | Age: 79
End: 2024-11-06
Payer: MEDICARE

## 2024-11-06 VITALS
SYSTOLIC BLOOD PRESSURE: 145 MMHG | HEART RATE: 75 BPM | DIASTOLIC BLOOD PRESSURE: 69 MMHG | WEIGHT: 121 LBS | HEIGHT: 60 IN | BODY MASS INDEX: 23.75 KG/M2 | OXYGEN SATURATION: 96 %

## 2024-11-06 DIAGNOSIS — E78.2 MIXED HYPERLIPIDEMIA: ICD-10-CM

## 2024-11-06 DIAGNOSIS — I10 PRIMARY HYPERTENSION: ICD-10-CM

## 2024-11-06 DIAGNOSIS — I42.0 DILATED CARDIOMYOPATHY: Primary | ICD-10-CM

## 2024-11-06 PROCEDURE — 3077F SYST BP >= 140 MM HG: CPT | Performed by: NURSE PRACTITIONER

## 2024-11-06 PROCEDURE — 1160F RVW MEDS BY RX/DR IN RCRD: CPT | Performed by: NURSE PRACTITIONER

## 2024-11-06 PROCEDURE — 3078F DIAST BP <80 MM HG: CPT | Performed by: NURSE PRACTITIONER

## 2024-11-06 PROCEDURE — 1159F MED LIST DOCD IN RCRD: CPT | Performed by: NURSE PRACTITIONER

## 2024-11-06 PROCEDURE — 99213 OFFICE O/P EST LOW 20 MIN: CPT | Performed by: NURSE PRACTITIONER

## 2024-11-06 NOTE — PROGRESS NOTES
[facility] CARDIOLOGY      REASON FOR FOLLOW-UP:  Cardiomyopathy          Chief Complaint   Patient presents with    Heart Problem         Dear Joey Crump APRN        History of Present Illness   It was my pleasure to see Ramon Silva in the office today. He is a 79-year-old gentleman with known history of severe dilated nonischemic cardiomyopathy, non-Hodgkin's lymphoma status post chemo, diabetes mellitus 2, hypertension, dyslipidemia, history of testicular cancer, vitamin D deficiency, history of lymphoma involving the sinus needing CHOP therapy in the past. He is followed by hematology/oncology.  He underwent heart catheterization 10-23 that revealed 50% mid LAD lesion.  Mr. Silva is followed by Dr. Lambert.     Mr. Silva underwent cardiac MRI 2/27/2024 to further differentiate the cause of his cardiomyopathy. Impression: Dilated cardiomyopathy (LVEDD 5.9 cm) with severely reduced global systolic function of 30%. Dyskinetic septum and global hypokinesis, no thrombus noted. Late gadolinium images showed no focal scar, however elevated T1 values and ECV suggest diffuse fibrosis. Normal RV size and systolic function with EF 48%.    The patient underwent CRT device implant in April 2024.  Transmission reviewed from 8/15/2024 showed no new episodes.    Mr. Silva presents today with his son.  He reports no symptoms of chest pain, pressure, tightness or palpitations.  He denies any shortness of breath at rest.  His mobility is limited and he ambulates with assistance of a cane.  He denies any dizziness, lightheadedness, near syncopal or syncopal episodes.  His only complaint today is pain in his right foot in the morning that subsides after a couple of hours.      ASSESSMENT:  Severe dilated cardiomyopathy  Coronary artery disease-nonocclusive mid LAD 50% lesion  Left bundle branch block  Presence of port in the left subclavian  History of lymphoma involving testicular  area and sinuses requiring chemotherapy  Primary hypertension  Diabetes mellitus 2  Dyslipidemia    PLAN:  Continue current CV plan of care to include aspirin, statin, BB, ARB, omega-3.  Risk factor modification including heart healthy diet, activity as tolerated.  They were asked to send a transmission when they arrive home  Follow-up with Dr. Lambert as scheduled.        CHF Guideline Directed Medical Therapy  Beta Blocker:   ARNI/ACE/ARB:   SGLT 2 inhibitors:   Diuretics:   Aldosterone Antagonist:   Vasodilators & Nitrates:       Diagnoses and all orders for this visit:    1. Dilated cardiomyopathy (Primary)    2. Primary hypertension    3. Mixed hyperlipidemia          The following portions of the patient's history were reviewed and updated as appropriate: allergies, current medications, past family history, past medical history, past social history, past surgical history, and problem list.    REVIEW OF SYSTEMS:    Review of Systems   Musculoskeletal:  Positive for joint pain.   All other systems reviewed and are negative.      Vitals:    11/06/24 0852   BP: 145/69   Pulse: 75   SpO2: 96%         PHYSICAL EXAM:    General: Alert, cooperative, no distress, appears stated age  Head:  Normocephalic, atraumatic, mucous membranes moist  Eyes:  Conjunctiva/corneas clear, EOM's intact     Neck:  Supple,  no JVD or bruit     Lungs: Clear to auscultation bilaterally, no wheezes rhonchi rales are noted  Chest wall: No tenderness  Musculoskeletal:   Ambulates with assistance of a cane  Heart::  Regular rate and rhythm, S1 and S2 normal, no murmur, rub or gallop  Abdomen: Soft, non-tender, nondistended, bowel sounds active, no abdominal bruit  Extremities: No cyanosis, clubbing, or edema   Pulses: 2+ and symmetric all extremities  Skin:  No rashes or lesions  Neuro/psych: A&O x3. CN II through XII are grossly intact with appropriate affect        Past Medical History:   Diagnosis Date    Anemia     Arthralgia      Diabetes     Fatigue     GERD (gastroesophageal reflux disease)     Gout     Hyperlipidemia     Hypertension     Liver lesion     Non Hodgkin's lymphoma     Osteoporosis     Personal history of testicular cancer     Vitamin D deficiency     Weight loss        Past Surgical History:   Procedure Laterality Date    ANGIOGRAM - CONVERTED  07/05/2022    aif by Dr Henriquez    CARDIAC CATHETERIZATION N/A 7/5/2022    Procedure: PERIPHERAL ANGIOGRAPHY Right leg;  Surgeon: Theron Henriquez MD;  Location:  [facility];  Service: Cardiovascular;  Laterality: N/A;    CARDIAC CATHETERIZATION N/A 10/2/2023    Procedure: Right and Left Heart Cath with possible PCI;  Surgeon: Joey Kirkland DO;  Location:  [facility];  Service: Cardiovascular;  Laterality: N/A;  Local and IV sedation    CARDIAC ELECTROPHYSIOLOGY PROCEDURE N/A 4/17/2024    Procedure: ICD DC new [employer] notified 03/22/24;  Surgeon: Chago Lambert MD;  Location:  [facility];  Service: Cardiovascular;  Laterality: N/A;  Needs Port-a-Cath removal prior to ICD    HIP SURGERY  2005    PORTACATH PLACEMENT      TESTICLE SURGERY Right 07/17/2000    right testicular excision         Current Outpatient Medications:     Accu-Chek Guide test strip, 1 each by Other route Daily. E 11.9, Disp: 50 each, Rfl: 2    Accu-Chek Softclix Lancets lancets, test daily as directed, Disp: 100 each, Rfl: 2    aspirin 81 MG tablet, Take 1 tablet by mouth Daily., Disp: , Rfl:     atorvastatin (LIPITOR) 40 MG tablet, TAKE ONE TABLET BY MOUTH EVERY NIGHT AT BEDTIME, Disp: 90 tablet, Rfl: 1    carvedilol (COREG) 25 MG tablet, TAKE ONE TABLET BY MOUTH TWICE DAILY WITH MEALS, Disp: 180 tablet, Rfl: 0    Cetirizine HCl (ZyrTEC Allergy) 10 MG capsule, Take 10 mg by mouth Daily., Disp: , Rfl:     cilostazol (PLETAL) 100 MG tablet, TAKE ONE TABLET BY MOUTH TWICE DAILY, Disp: 60 tablet, Rfl: 1    colestipol (COLESTID) 1 g tablet,  Take 1 tablet by mouth 2 (Two) Times a Day., Disp: 180 tablet, Rfl: 1    dapagliflozin Propanediol (Farxiga) 10 MG tablet, Take 10 mg by mouth Daily., Disp: 90 tablet, Rfl: 1    Diclofenac Sodium (VOLTAREN) 1 % gel gel, Apply 4 g topically to the appropriate area as directed 4 (Four) Times a Day As Needed (APPLY 4 TIMES A DAY AS NEED TO THE APPROPRIATE AREA)., Disp: 4 g, Rfl: 2    famotidine (PEPCID) 40 MG tablet, TAKE ONE TABLET BY MOUTH ONCE DAILY, Disp: 30 tablet, Rfl: 1    ferrous sulfate 324 (65 Fe) MG tablet delayed-release EC tablet, Take 1 tablet by mouth Daily With Breakfast., Disp: 90 tablet, Rfl: 1    folic acid (FOLVITE) 1 MG tablet, Take 1 tablet by mouth Daily., Disp: 30 tablet, Rfl: 2    gabapentin (NEURONTIN) 300 MG capsule, TAKE ONE CAPSULE BY MOUTH EVERY EVENING, Disp: 90 capsule, Rfl: 0    ibandronate (BONIVA) 150 MG tablet, TAKE ONE TABLET BY MOUTH EVERY 30 DAYS, Disp: 1 tablet, Rfl: 5    losartan (COZAAR) 50 MG tablet, TAKE ONE TABLET BY MOUTH ONCE DAILY, Disp: 90 tablet, Rfl: 0    Menthol (GNP HERBAL MT), Apply  to the mouth or throat. Neuroflow plus, Disp: , Rfl:     metFORMIN ER (GLUCOPHAGE-XR) 500 MG 24 hr tablet, TAKE TWO TABLETS BY MOUTH TWICE DAILY BEFORE MEALS, Disp: 360 tablet, Rfl: 1    Omega-3 Fatty Acids (OMEGA-3 FISH OIL PO), Take 1 capsule by mouth Daily., Disp: , Rfl:     pantoprazole (PROTONIX) 40 MG EC tablet, TAKE ONE TABLET BY MOUTH ONCE DAILY, Disp: 90 tablet, Rfl: 0    traMADol (ULTRAM) 50 MG tablet, Take 1 tablet by mouth Every 12 (Twelve) Hours As Needed., Disp: , Rfl:     Vitamin D, Cholecalciferol, (CHOLECALCIFEROL) 10 MCG (400 UNIT) tablet, Take 1 tablet by mouth Daily., Disp: , Rfl:     finasteride (PROSCAR) 5 MG tablet, TAKE ONE TABLET BY MOUTH ONCE DAILY, Disp: 90 tablet, Rfl: 1    No Known Allergies    Family History   Family history unknown: Yes       Social History     Tobacco Use    Smoking status: Never    Smokeless tobacco: Never   Substance Use Topics     "Alcohol use: No           Current Electrocardiogram:  Procedures        EMR Dragon/Transcription:   \"Dictated utilizing Dragon dictation\".     Copied text in this note has been reviewed by me and is accurate as of 11/07/24.    "

## 2024-11-07 DIAGNOSIS — N40.1 BENIGN PROSTATIC HYPERPLASIA WITH URINARY OBSTRUCTION: ICD-10-CM

## 2024-11-07 DIAGNOSIS — N13.8 BENIGN PROSTATIC HYPERPLASIA WITH URINARY OBSTRUCTION: ICD-10-CM

## 2024-11-07 RX ORDER — FINASTERIDE 5 MG/1
5 TABLET, FILM COATED ORAL DAILY
Qty: 90 TABLET | Refills: 1 | Status: SHIPPED | OUTPATIENT
Start: 2024-11-07

## 2024-11-11 DIAGNOSIS — M25.50 ARTHRALGIA, UNSPECIFIED JOINT: ICD-10-CM

## 2024-11-11 DIAGNOSIS — I73.9 INTERMITTENT CLAUDICATION: ICD-10-CM

## 2024-11-11 DIAGNOSIS — K21.9 GASTROESOPHAGEAL REFLUX DISEASE, UNSPECIFIED WHETHER ESOPHAGITIS PRESENT: ICD-10-CM

## 2024-11-12 ENCOUNTER — OFFICE VISIT (OUTPATIENT)
Dept: FAMILY MEDICINE CLINIC | Facility: CLINIC | Age: 79
End: 2024-11-12
Payer: MEDICARE

## 2024-11-12 VITALS
RESPIRATION RATE: 18 BRPM | DIASTOLIC BLOOD PRESSURE: 71 MMHG | SYSTOLIC BLOOD PRESSURE: 124 MMHG | BODY MASS INDEX: 23.75 KG/M2 | HEIGHT: 60 IN | HEART RATE: 83 BPM | OXYGEN SATURATION: 98 % | WEIGHT: 121 LBS

## 2024-11-12 DIAGNOSIS — K21.9 GASTROESOPHAGEAL REFLUX DISEASE, UNSPECIFIED WHETHER ESOPHAGITIS PRESENT: ICD-10-CM

## 2024-11-12 DIAGNOSIS — R11.0 NAUSEA: ICD-10-CM

## 2024-11-12 DIAGNOSIS — Z00.00 ENCOUNTER FOR SUBSEQUENT ANNUAL WELLNESS VISIT (AWV) IN MEDICARE PATIENT: Primary | ICD-10-CM

## 2024-11-12 DIAGNOSIS — Z13.29 SCREENING FOR THYROID DISORDER: ICD-10-CM

## 2024-11-12 DIAGNOSIS — I73.9 INTERMITTENT CLAUDICATION: ICD-10-CM

## 2024-11-12 DIAGNOSIS — M25.50 ARTHRALGIA, UNSPECIFIED JOINT: ICD-10-CM

## 2024-11-12 DIAGNOSIS — E78.2 MIXED HYPERLIPIDEMIA: ICD-10-CM

## 2024-11-12 DIAGNOSIS — E11.65 TYPE 2 DIABETES MELLITUS WITH HYPERGLYCEMIA, WITHOUT LONG-TERM CURRENT USE OF INSULIN: ICD-10-CM

## 2024-11-12 DIAGNOSIS — N18.2 CKD (CHRONIC KIDNEY DISEASE) STAGE 2, GFR 60-89 ML/MIN: ICD-10-CM

## 2024-11-12 PROCEDURE — 1126F AMNT PAIN NOTED NONE PRSNT: CPT | Performed by: REGISTERED NURSE

## 2024-11-12 PROCEDURE — 3074F SYST BP LT 130 MM HG: CPT | Performed by: REGISTERED NURSE

## 2024-11-12 PROCEDURE — G0439 PPPS, SUBSEQ VISIT: HCPCS | Performed by: REGISTERED NURSE

## 2024-11-12 PROCEDURE — 1170F FXNL STATUS ASSESSED: CPT | Performed by: REGISTERED NURSE

## 2024-11-12 PROCEDURE — 3078F DIAST BP <80 MM HG: CPT | Performed by: REGISTERED NURSE

## 2024-11-12 PROCEDURE — 99213 OFFICE O/P EST LOW 20 MIN: CPT | Performed by: REGISTERED NURSE

## 2024-11-12 PROCEDURE — 1160F RVW MEDS BY RX/DR IN RCRD: CPT | Performed by: REGISTERED NURSE

## 2024-11-12 PROCEDURE — 1159F MED LIST DOCD IN RCRD: CPT | Performed by: REGISTERED NURSE

## 2024-11-12 RX ORDER — GABAPENTIN 300 MG/1
300 CAPSULE ORAL EVERY EVENING
Qty: 90 CAPSULE | Refills: 1 | OUTPATIENT
Start: 2024-11-12

## 2024-11-12 RX ORDER — GABAPENTIN 300 MG/1
300 CAPSULE ORAL EVERY EVENING
Qty: 90 CAPSULE | Refills: 0 | Status: SHIPPED | OUTPATIENT
Start: 2024-11-12

## 2024-11-12 RX ORDER — ONDANSETRON 4 MG/1
4 TABLET, ORALLY DISINTEGRATING ORAL EVERY 12 HOURS PRN
Qty: 60 TABLET | Refills: 1 | Status: SHIPPED | OUTPATIENT
Start: 2024-11-12

## 2024-11-12 RX ORDER — FAMOTIDINE 40 MG/1
40 TABLET, FILM COATED ORAL DAILY
Qty: 30 TABLET | Refills: 1 | OUTPATIENT
Start: 2024-11-12

## 2024-11-12 RX ORDER — CILOSTAZOL 100 MG/1
100 TABLET ORAL 2 TIMES DAILY
Qty: 60 TABLET | Refills: 1 | Status: SHIPPED | OUTPATIENT
Start: 2024-11-12

## 2024-11-12 NOTE — PROGRESS NOTES
The ABCs of the Annual Wellness Visit  Subsequent Medicare Wellness Visit    Subjective    Ramon Silva is a 79 y.o. patient who presents for a Subsequent Medicare Wellness Visit.      The following portions of the patient's history were reviewed and updated as appropriate: allergies, current medications, past family history, past medical history, past social history, past surgical history, and problem list.    Compared to one year ago, the patient feels his physical health is the same.    Compared to one year ago, the patient feels his mental health is the same.    Recent Hospitalizations:  He was admitted within the past 365 days at [facility].     Current Medical Providers:  Patient Care Team:  Joey Crump APRN as PCP - General (Family Medicine)  Virginia Melgoza APRN as Nurse Practitioner (Orthopedic Surgery)  DIANE Oneal DPM as Consulting Physician (Podiatry)  Theron Henriquez MD as Consulting Physician (Vascular Surgery)  Dorothy Andersen APRN as Nurse Practitioner (Hematology)    Outpatient Medications Prior to Visit   Medication Sig Dispense Refill    Accu-Chek Guide test strip 1 each by Other route Daily. E 11.9 50 each 2    Accu-Chek Softclix Lancets lancets test daily as directed 100 each 2    aspirin 81 MG tablet Take 1 tablet by mouth Daily.      atorvastatin (LIPITOR) 40 MG tablet TAKE ONE TABLET BY MOUTH EVERY NIGHT AT BEDTIME 90 tablet 1    carvedilol (COREG) 25 MG tablet TAKE ONE TABLET BY MOUTH TWICE DAILY WITH MEALS 180 tablet 0    Cetirizine HCl (ZyrTEC Allergy) 10 MG capsule Take 10 mg by mouth Daily.      colestipol (COLESTID) 1 g tablet Take 1 tablet by mouth 2 (Two) Times a Day. 180 tablet 1    dapagliflozin Propanediol (Farxiga) 10 MG tablet Take 10 mg by mouth Daily. 90 tablet 1    Diclofenac Sodium (VOLTAREN) 1 % gel gel Apply 4 g topically to the appropriate area as directed 4 (Four) Times a Day As Needed (APPLY 4 TIMES A DAY AS NEED TO THE  APPROPRIATE AREA). 4 g 2    famotidine (PEPCID) 40 MG tablet TAKE ONE TABLET BY MOUTH ONCE DAILY 30 tablet 1    ferrous sulfate 324 (65 Fe) MG tablet delayed-release EC tablet Take 1 tablet by mouth Daily With Breakfast. 90 tablet 1    finasteride (PROSCAR) 5 MG tablet TAKE ONE TABLET BY MOUTH ONCE DAILY 90 tablet 1    folic acid (FOLVITE) 1 MG tablet Take 1 tablet by mouth Daily. 30 tablet 2    ibandronate (BONIVA) 150 MG tablet TAKE ONE TABLET BY MOUTH EVERY 30 DAYS 1 tablet 5    losartan (COZAAR) 50 MG tablet TAKE ONE TABLET BY MOUTH ONCE DAILY 90 tablet 0    Menthol (GNP HERBAL MT) Apply  to the mouth or throat. Neuroflow plus      metFORMIN ER (GLUCOPHAGE-XR) 500 MG 24 hr tablet TAKE TWO TABLETS BY MOUTH TWICE DAILY BEFORE MEALS 360 tablet 1    Omega-3 Fatty Acids (OMEGA-3 FISH OIL PO) Take 1 capsule by mouth Daily.      pantoprazole (PROTONIX) 40 MG EC tablet TAKE ONE TABLET BY MOUTH ONCE DAILY 90 tablet 0    traMADol (ULTRAM) 50 MG tablet Take 1 tablet by mouth Every 12 (Twelve) Hours As Needed.      Vitamin D, Cholecalciferol, (CHOLECALCIFEROL) 10 MCG (400 UNIT) tablet Take 1 tablet by mouth Daily.      cilostazol (PLETAL) 100 MG tablet TAKE ONE TABLET BY MOUTH TWICE DAILY 60 tablet 1    gabapentin (NEURONTIN) 300 MG capsule TAKE ONE CAPSULE BY MOUTH EVERY EVENING 90 capsule 0     No facility-administered medications prior to visit.       Opioid medication/s are on active medication list.  and I have evaluated his active treatment plan and pain score trends (see table).  Vitals:    11/12/24 1410   PainSc: 0-No pain     I have reviewed the chart for potential of high risk medication and harmful drug interactions in the elderly.          Aspirin is on active medication list. Aspirin use is indicated based on review of current medical condition/s. Pros and cons of this therapy have been discussed today. Benefits of this medication outweigh potential harm.  Patient has been encouraged to continue taking this  "medication.  .        Patient Active Problem List   Diagnosis    Arthralgia    Benign prostatic hyperplasia with urinary obstruction    Diabetes mellitus, type II    Diabetic foot ulcer    Fatigue    Gastroesophageal reflux disease    Hyperlipidemia    Hypertension    Knee pain, right    Non Hodgkin's lymphoma    Mass of nasal sinus    Microalbuminuria    Onychomycosis of toenail    Osteoarthritis of knee    Other dorsalgia    Type 2 diabetes mellitus with hyperglycemia    Vitamin D deficiency    Weight loss    Anemia    Liver lesion    Osteoporosis    Prostatic hypertrophy    YARELI (iron deficiency anemia)    Malabsorption of iron    Allergic rhinitis    Ulcer of toe of right foot, unspecified ulcer stage    Intermittent claudication    CKD (chronic kidney disease) stage 2, GFR 60-89 ml/min    Abnormal EKG    Preop cardiovascular exam    Dyspnea on exertion    Palpitations    Abnormal nuclear stress test    Cardiomyopathy    LBBB (left bundle branch block)    Chronic systolic congestive heart failure    Dilated cardiomyopathy       Advance Care Planning  Advance Directive is not on file.  ACP discussion was held with the patient during this visit. Patient does not have an advance directive, information provided.      Objective    Vitals:    11/12/24 1410   BP: 124/71   BP Location: Left arm   Patient Position: Sitting   Cuff Size: Large Adult   Pulse: 83   Resp: 18   SpO2: 98%   Weight: 54.9 kg (121 lb)   Height: 152.4 cm (60\")   PainSc: 0-No pain     Estimated body mass index is 23.63 kg/m² as calculated from the following:    Height as of this encounter: 152.4 cm (60\").    Weight as of this encounter: 54.9 kg (121 lb).    BMI is within normal parameters. No other follow-up for BMI required.      Does the patient have evidence of cognitive impairment? No    Lab Results   Component Value Date    HGBA1C 7.6 (H) 08/26/2024        HEALTH RISK ASSESSMENT    Smoking Status:  Social History     Tobacco Use   Smoking Status " Never   Smokeless Tobacco Never     Alcohol Consumption:  Social History     Substance and Sexual Activity   Alcohol Use No     Fall Risk Screen:    ASHLYADI Fall Risk Assessment was completed, and patient is at LOW risk for falls.Assessment completed on:2024    Depression Screenin/12/2024     1:56 PM   PHQ-2/PHQ-9 Depression Screening   Little interest or pleasure in doing things Not at all   Feeling down, depressed, or hopeless Not at all   How difficult have these problems made it for you to do your work, take care of things at home, or get along with other people? Not difficult at all       Health Habits and Functional and Cognitive Screenin/12/2024     2:00 PM   Functional & Cognitive Status   Do you have difficulty preparing food and eating? No   Do you have difficulty bathing yourself, getting dressed or grooming yourself? No   Do you have difficulty using the toilet? No   Do you have difficulty moving around from place to place? Yes   Do you have trouble with steps or getting out of a bed or a chair? No   Current Diet Well Balanced Diet   Dental Exam Not up to date   Eye Exam Not up to date   Exercise (times per week) 0 times per week   Current Exercises Include No Regular Exercise   Do you need help using the phone?  No   Are you deaf or do you have serious difficulty hearing?  Yes   Do you need help to go to places out of walking distance? Yes   Do you need help shopping? No   Do you need help preparing meals?  No   Do you need help with housework?  No   Do you need help with laundry? Yes   Do you need help taking your medications? Yes   Do you need help managing money? No   Do you ever drive or ride in a car without wearing a seat belt? No   Have you felt unusual stress, anger or loneliness in the last month? No   Who do you live with? Child   If you need help, do you have trouble finding someone available to you? No   Have you been bothered in the last four weeks by sexual  problems? No   Do you have difficulty concentrating, remembering or making decisions? No       Age-appropriate Screening Schedule:  Refer to the list below for future screening recommendations based on patient's age, sex and/or medical conditions. Orders for these recommended tests are listed in the plan section. The patient has been provided with a written plan.    Health Maintenance   Topic Date Due    DIABETIC EYE EXAM  Never done    ANNUAL WELLNESS VISIT  Never done    ZOSTER VACCINE (1 of 2) 11/12/2024 (Originally 4/1/1964)    COVID-19 Vaccine (3 - Moderna risk series) 02/01/2025 (Originally 2/25/2022)    INFLUENZA VACCINE  03/31/2025 (Originally 8/1/2024)    RSV Vaccine - Adults (1 - 1-dose 75+ series) 08/09/2025 (Originally 4/1/2020)    TDAP/TD VACCINES (1 - Tdap) 11/12/2025 (Originally 4/1/1964)    LIPID PANEL  01/24/2025    HEMOGLOBIN A1C  02/26/2025    HEPATITIS C SCREENING  Completed    Pneumococcal Vaccine 65+  Completed    URINE MICROALBUMIN  Discontinued    COLONOSCOPY  Discontinued                CMS Preventative Services Quick Reference  Risk Factors Identified During Encounter  Fall Risk-High or Moderate: Discussed Fall Prevention in the home, Information on Fall Prevention Shared in After Visit Summary, and Sit to Stand Exercise Information Shared in After Visit Summary  The above risks/problems have been discussed with the patient.  Pertinent information has been shared with the patient in the After Visit Summary.  An After Visit Summary and PPPS were made available to the patient.      Additional E&M Note during same encounter follows:  Patient has multiple medical problems which are significant and separately identifiable that require additional work above and beyond the Medicare Wellness Visit.      Chief Complaint:    GERD and nausea    Subjective    History of Present Illness:  In addition to the Medicare wellness exam, patient wanted further address GERD and nausea.  He is currently taking  "Protonix and this is still not enough recently.  He is requesting referral to gastroenterology.    History of Present Illness         Review of Systems:  Review of Systems   Constitutional: Negative.  Negative for chills and fever.   HENT: Negative.  Negative for congestion.    Eyes: Negative.    Respiratory: Negative.  Negative for cough and shortness of breath.    Cardiovascular: Negative.  Negative for chest pain, palpitations and leg swelling.   Gastrointestinal:  Positive for diarrhea and nausea. Negative for blood in stool and constipation.   Endocrine: Negative.    Genitourinary: Negative.  Negative for difficulty urinating and hematuria.   Musculoskeletal: Negative.    Skin: Negative.    Allergic/Immunologic: Negative.    Neurological: Negative.  Negative for dizziness, headache and confusion.   Hematological: Negative.    Psychiatric/Behavioral: Negative.          Objective   Vital Signs:  /71 (BP Location: Left arm, Patient Position: Sitting, Cuff Size: Large Adult)   Pulse 83   Resp 18   Ht 152.4 cm (60\")   Wt 54.9 kg (121 lb)   SpO2 98%   BMI 23.63 kg/m²     Physical Exam  Vitals and nursing note reviewed.   Constitutional:       Appearance: Normal appearance. He is normal weight.   HENT:      Head: Normocephalic and atraumatic.   Cardiovascular:      Rate and Rhythm: Normal rate and regular rhythm.      Pulses: Normal pulses.      Heart sounds: Normal heart sounds. No murmur heard.     No friction rub. No gallop.   Pulmonary:      Effort: Pulmonary effort is normal. No respiratory distress.      Breath sounds: Normal breath sounds. No stridor. No wheezing, rhonchi or rales.   Chest:      Chest wall: No tenderness.   Abdominal:      General: Abdomen is flat. Bowel sounds are normal. There is no distension.      Palpations: Abdomen is soft. There is no mass.      Tenderness: There is no abdominal tenderness. There is no right CVA tenderness, left CVA tenderness, guarding or rebound.      " Hernia: No hernia is present.   Skin:     General: Skin is warm and dry.      Capillary Refill: Capillary refill takes less than 2 seconds.      Coloration: Skin is not jaundiced or pale.   Neurological:      General: No focal deficit present.      Mental Status: He is alert and oriented to person, place, and time. Mental status is at baseline.      Motor: No weakness.      Coordination: Coordination normal.      Gait: Gait normal.   Psychiatric:         Mood and Affect: Mood normal.         Behavior: Behavior normal.         Thought Content: Thought content normal.         Judgment: Judgment normal.       The following data was reviewed by CON Monahan on 11/12/2024.  Lab Results   Component Value Date    WBC 7.22 08/26/2024    HGB 11.3 (L) 08/26/2024    HCT 34.9 (L) 08/26/2024    MCV 93.6 08/26/2024     08/26/2024     [unfilled]  Lab Results   Component Value Date    CHOL 109 01/24/2024    TRIG 181 (H) 01/24/2024    HDL 45 01/24/2024    LDL 35 01/24/2024     Lab Results   Component Value Date    TSH 0.870 01/24/2024     Lab Results   Component Value Date    HGBA1C 7.6 (H) 08/26/2024     Lab Results   Component Value Date    PSA 1.520 05/25/2022    PSA 2.130 02/23/2021    PSA 1.47 03/17/2017                 Assessment and Plan:       Diagnoses and all orders for this visit:    1. Encounter for subsequent annual wellness visit (AWV) in Medicare patient (Primary)  -     CBC & Differential; Future  -     Comprehensive Metabolic Panel; Future  -     Lipid Panel; Future  -     Hemoglobin A1c; Future  -     TSH; Future  -     Microalbumin / Creatinine Urine Ratio - Urine, Clean Catch; Future    2. Intermittent claudication  -     gabapentin (NEURONTIN) 300 MG capsule; Take 1 capsule by mouth Every Evening.  Dispense: 90 capsule; Refill: 0  -     cilostazol (PLETAL) 100 MG tablet; Take 1 tablet by mouth 2 (Two) Times a Day.  Dispense: 60 tablet; Refill: 1    3. Arthralgia, unspecified joint  -      gabapentin (NEURONTIN) 300 MG capsule; Take 1 capsule by mouth Every Evening.  Dispense: 90 capsule; Refill: 0    4. Gastroesophageal reflux disease, unspecified whether esophagitis present  -     Ambulatory Referral to Gastroenterology    5. Nausea  -     ondansetron ODT (ZOFRAN-ODT) 4 MG disintegrating tablet; Place 1 tablet on the tongue Every 12 (Twelve) Hours As Needed for Nausea or Vomiting.  Dispense: 60 tablet; Refill: 1  -     Ambulatory Referral to Gastroenterology    6. Mixed hyperlipidemia  -     Lipid Panel; Future    7. Type 2 diabetes mellitus with hyperglycemia, without long-term current use of insulin  -     CBC & Differential; Future  -     Comprehensive Metabolic Panel; Future  -     Hemoglobin A1c; Future  -     Microalbumin / Creatinine Urine Ratio - Urine, Clean Catch; Future    8. CKD (chronic kidney disease) stage 2, GFR 60-89 ml/min  -     Comprehensive Metabolic Panel; Future    9. Screening for thyroid disorder  -     TSH; Future      -Referral to gastroenterology for worsening GERD and nausea.  -Fasting labs in 2 weeks or sooner if needed.  -ER red flags discussed today.  -Follow-up in 6 weeks or sooner if needed.       I spent 20 minutes caring for Ramon on this date of service outside of Medicare wellness exam. This time includes time spent by me in the following activities:preparing for the visit, reviewing tests, obtaining and/or reviewing a separately obtained history, performing a medically appropriate examination and/or evaluation , counseling and educating the patient/family/caregiver, ordering medications, tests, or procedures, referring and communicating with other health care professionals , documenting information in the medical record, independently interpreting results and communicating that information with the patient/family/caregiver, and care coordination    Follow Up  Return in about 6 weeks (around 12/24/2024) for Recheck GERD symptoms after establishing with  gastroenterology.    Patient was given instructions and counseling regarding his condition or for health maintenance advice. Please see specific information pulled into the AVS if appropriate.     Sit-to-Stand Exercise    The sit-to-stand exercise (also known as the chair stand or chair rise exercise) strengthens your lower body and helps you maintain or improve your mobility and independence. The goal is to do the sit-to-stand exercise without using your hands. This will be easier as you become stronger. You should always talk with your health care provider before starting any exercise program, especially if you have had recent surgery.  Do the exercise exactly as told by your health care provider and adjust it as directed. It is normal to feel mild stretching, pulling, tightness, or discomfort as you do this exercise, but you should stop right away if you feel sudden pain or your pain gets worse. Do not begin doing this exercise until told by your health care provider.  What the sit-to-stand exercise does  The sit-to-stand exercise helps to strengthen the muscles in your thighs and the muscles in the center of your body that give you stability (core muscles). This exercise is especially helpful if:  You have had knee or hip surgery.  You have trouble getting up from a chair, out of a car, or off the toilet.  How to do the sit-to-stand exercise  Sit toward the front edge of a sturdy chair without armrests. Your knees should be bent and your feet should be flat on the floor and shoulder-width apart.  Place your hands lightly on each side of the seat. Keep your back and neck as straight as possible, with your chest slightly forward.  Breathe in slowly. Lean forward and slightly shift your weight to the front of your feet.  Breathe out as you slowly stand up. Use your hands as little as possible.  Stand and pause for a full breath in and out.  Breathe in as you sit down slowly. Tighten your core and abdominal muscles  to control your lowering as much as possible.  Breathe out slowly.  Do this exercise 10-15 times. If needed, do it fewer times until you build up strength.  Rest for 1 minute, then do another set of 10-15 repetitions.  To change the difficulty of the sit-to-stand exercise  If the exercise is too difficult, use a chair with sturdy armrests, and push off the armrests to help you come to the standing position. You can also use the armrests to help slowly lower yourself back to sitting. As this gets easier, try to use your arms less. You can also place a firm cushion or pillow on the chair to make the surface higher.  If this exercise is too easy, do not use your arms to help raise or lower yourself. You can also wear a weighted vest, use hand weights, increase your repetitions, or try a lower chair.  General tips  You may feel tired when starting an exercise routine. This is normal.  You may have muscle soreness that lasts a few days. This is normal. As you get stronger, you may not feel muscle soreness.  Use smooth, steady movements.  Do not  hold your breath during strength exercises. This can cause unsafe changes in your blood pressure.  Breathe in slowly through your nose, and breathe out slowly through your mouth.  Summary  Strengthening your lower body is an important step to help you move safely and independently.  The sit-to-stand exercise helps strengthen the muscles in your thighs and core.  You should always talk with your health care provider before starting any exercise program, especially if you have had recent surgery.  This information is not intended to replace advice given to you by your health care provider. Make sure you discuss any questions you have with your health care provider.  Document Revised: 10/16/2019 Document Reviewed: 02/08/2018  Elsevier Patient Education © 2021 [employer]    Advance Care Planning and Advance Directives     You make decisions on a daily basis - decisions about where  you want to live, your career, your home, your life. Perhaps one of the most important decisions you face is your choice for future medical care. Take time to talk with your family and your healthcare team and start planning today.  Advance Care Planning is a process that can help you:  Understand possible future healthcare decisions in light of your own experiences  Reflect on those decision in light of your goals and values  Discuss your decisions with those closest to you and the healthcare professionals that care for you  Make a plan by creating a document that reflects your wishes    Surrogate Decision Maker  In the event of a medical emergency, which has left you unable to communicate or to make your own decisions, you would need someone to make decisions for you.  It is important to discuss your preferences for medical treatment with this person while you are in good health.     Qualities of a surrogate decision maker:  Willing to take on this role and responsibility  Knows what you want for future medical care  Willing to follow your wishes even if they don't agree with them  Able to make difficult medical decisions under stressful circumstances    Advance Directives  These are legal documents you can create that will guide your healthcare team and decision maker(s) when needed. These documents can be stored in the electronic medical record.    Living Will - a legal document to guide your care if you have a terminal condition or a serious illness and are unable to communicate. States vary by statute in document names/types, but most forms may include one or more of the following:        -  Directions regarding life-prolonging treatments        -  Directions regarding artificially provided nutrition/hydration        -  Choosing a healthcare decision maker        -  Direction regarding organ/tissue donation    Durable Power of  for Healthcare - this document names an -in-fact to make medical  decisions for you, but it may also allow this person to make personal and financial decisions for you. Please seek the advice of an  if you need this type of document.    **Advance Directives are not required and no one may discriminate against you if you do not sign one.    Medical Orders  Many states allow specific forms/orders signed by your physician to record your wishes for medical treatment in your current state of health. This form, signed in personal communication with your physician, addresses resuscitation and other medical interventions that you may or may not want.  For more information or to schedule a time with a [facility] Advance Care Planning Facilitator contact: [facility]/ACP or call [phone] and someone will contact you directly.    Fall Prevention in the Home, Adult  Falls can cause injuries and can happen to people of all ages. There are many things you can do to make your home safe and to help prevent falls. Ask for help when making these changes.  What actions can I take to prevent falls?  General Instructions  Use good lighting in all rooms. Replace any light bulbs that burn out.  Turn on the lights in dark areas. Use night-lights.  Keep items that you use often in easy-to-reach places. Lower the shelves around your home if needed.  Set up your furniture so you have a clear path. Avoid moving your furniture around.  Do not have throw rugs or other things on the floor that can make you trip.  Avoid walking on wet floors.  If any of your floors are uneven, fix them.  Add color or contrast paint or tape to clearly tom and help you see:  Grab bars or handrails.  First and last steps of staircases.  Where the edge of each step is.  If you use a stepladder:  Make sure that it is fully opened. Do not climb a closed stepladder.  Make sure the sides of the stepladder are locked in place.  Ask someone to hold the stepladder while you use it.  Know where your pets are when  moving through your home.  What can I do in the bathroom?         Keep the floor dry. Clean up any water on the floor right away.  Remove soap buildup in the tub or shower.  Use nonskid mats or decals on the floor of the tub or shower.  Attach bath mats securely with double-sided, nonslip rug tape.  If you need to sit down in the shower, use a plastic, nonslip stool.  Install grab bars by the toilet and in the tub and shower. Do not use towel bars as grab bars.  What can I do in the bedroom?  Make sure that you have a light by your bed that is easy to reach.  Do not use any sheets or blankets for your bed that hang to the floor.  Have a firm chair with side arms that you can use for support when you get dressed.  What can I do in the kitchen?  Clean up any spills right away.  If you need to reach something above you, use a step stool with a grab bar.  Keep electrical cords out of the way.  Do not use floor polish or wax that makes floors slippery.  What can I do with my stairs?  Do not leave any items on the stairs.  Make sure that you have a light switch at the top and the bottom of the stairs.  Make sure that there are handrails on both sides of the stairs. Fix handrails that are broken or loose.  Install nonslip stair treads on all your stairs.  Avoid having throw rugs at the top or bottom of the stairs.  Choose a carpet that does not hide the edge of the steps on the stairs.  Check carpeting to make sure that it is firmly attached to the stairs. Fix carpet that is loose or worn.  What can I do on the outside of my home?  Use bright outdoor lighting.  Fix the edges of walkways and driveways and fix any cracks.  Remove anything that might make you trip as you walk through a door, such as a raised step or threshold.  Trim any bushes or trees on paths to your home.  Check to see if handrails are loose or broken and that both sides of all steps have handrails.  Install guardrails along the edges of any raised decks  and porches.  Clear paths of anything that can make you trip, such as tools or rocks.  Have leaves, snow, or ice cleared regularly.  Use sand or salt on paths during winter.  Clean up any spills in your garage right away. This includes grease or oil spills.  What other actions can I take?  Wear shoes that:  Have a low heel. Do not wear high heels.  Have rubber bottoms.  Feel good on your feet and fit well.  Are closed at the toe. Do not wear open-toe sandals.  Use tools that help you move around if needed. These include:  Canes.  Walkers.  Scooters.  Crutches.  Review your medicines with your doctor. Some medicines can make you feel dizzy. This can increase your chance of falling.  Ask your doctor what else you can do to help prevent falls.  Where to find more information  Centers [employer] Disease Control and Prevention[employer]ANDALL: www.cdc.gov  [employer] [place] [employer]: www.nam.nih.gov  Contact a doctor if:  You are afraid of falling at home.  You feel weak, drowsy, or dizzy at home.  You fall at home.  Summary  There are many simple things that you can do to make your home safe and to help prevent falls.  Ways to make your home safe include removing things that can make you trip and installing grab bars in the bathroom.  Ask for help when making these changes in your home.  This information is not intended to replace advice given to you by your health care provider. Make sure you discuss any questions you have with your health care provider.  Document Revised: 07/21/2021 Document Reviewed: 07/21/2021  [employer] Patient Education © 2021 [employer]         Assessment & Plan         CON Monahan   11/12/24  15:15 EST     Patient or patient representative verbalized consent for the use of Ambient Listening during the visit with  CON Monahan for chart documentation. 11/12/2024  15:12 EST

## 2024-11-12 NOTE — TELEPHONE ENCOUNTER
Rx Refill Note  Requested Prescriptions     Pending Prescriptions Disp Refills    famotidine (PEPCID) 40 MG tablet [Pharmacy Med Name: famotidine 40 mg tablet] 30 tablet 1     Sig: TAKE ONE TABLET BY MOUTH ONCE DAILY    gabapentin (NEURONTIN) 300 MG capsule [Pharmacy Med Name: gabapentin 300 mg capsule] 90 capsule 1     Sig: TAKE ONE CAPSULE BY MOUTH EVERY EVENING      Last office visit with prescribing clinician: 8/9/2024   Last telemedicine visit with prescribing clinician: Visit date not found   Next office visit with prescribing clinician: 11/12/2024       UDS     None on file  CSA     None on file    INSPECT - SCAN - INSPECT/ [id number] [place]/ 11/12/2024 (11/12/2024)     Talia Rodriguez MA  11/12/24, 10:21 EST

## 2024-11-14 DIAGNOSIS — E11.65 TYPE 2 DIABETES MELLITUS WITH HYPERGLYCEMIA: ICD-10-CM

## 2024-11-14 RX ORDER — METFORMIN HYDROCHLORIDE 500 MG/1
TABLET, EXTENDED RELEASE ORAL
Qty: 360 TABLET | Refills: 1 | Status: SHIPPED | OUTPATIENT
Start: 2024-11-14

## 2024-11-16 DIAGNOSIS — K21.9 GASTROESOPHAGEAL REFLUX DISEASE, UNSPECIFIED WHETHER ESOPHAGITIS PRESENT: ICD-10-CM

## 2024-11-18 RX ORDER — FAMOTIDINE 40 MG/1
40 TABLET, FILM COATED ORAL DAILY
Qty: 30 TABLET | Refills: 1 | OUTPATIENT
Start: 2024-11-18

## 2024-12-02 ENCOUNTER — CLINICAL SUPPORT (OUTPATIENT)
Dept: FAMILY MEDICINE CLINIC | Facility: CLINIC | Age: 79
End: 2024-12-02
Payer: MEDICARE

## 2024-12-02 DIAGNOSIS — N18.2 CKD (CHRONIC KIDNEY DISEASE) STAGE 2, GFR 60-89 ML/MIN: ICD-10-CM

## 2024-12-02 DIAGNOSIS — E11.65 TYPE 2 DIABETES MELLITUS WITH HYPERGLYCEMIA, WITHOUT LONG-TERM CURRENT USE OF INSULIN: ICD-10-CM

## 2024-12-02 DIAGNOSIS — Z13.29 SCREENING FOR THYROID DISORDER: ICD-10-CM

## 2024-12-02 DIAGNOSIS — E78.2 MIXED HYPERLIPIDEMIA: ICD-10-CM

## 2024-12-02 DIAGNOSIS — Z00.00 ENCOUNTER FOR SUBSEQUENT ANNUAL WELLNESS VISIT (AWV) IN MEDICARE PATIENT: ICD-10-CM

## 2024-12-02 PROCEDURE — 83036 HEMOGLOBIN GLYCOSYLATED A1C: CPT | Performed by: REGISTERED NURSE

## 2024-12-02 PROCEDURE — 80061 LIPID PANEL: CPT | Performed by: REGISTERED NURSE

## 2024-12-02 PROCEDURE — 84443 ASSAY THYROID STIM HORMONE: CPT | Performed by: REGISTERED NURSE

## 2024-12-02 PROCEDURE — 36415 COLL VENOUS BLD VENIPUNCTURE: CPT | Performed by: REGISTERED NURSE

## 2024-12-02 PROCEDURE — 85025 COMPLETE CBC W/AUTO DIFF WBC: CPT | Performed by: REGISTERED NURSE

## 2024-12-02 PROCEDURE — 80053 COMPREHEN METABOLIC PANEL: CPT | Performed by: REGISTERED NURSE

## 2024-12-03 LAB
ALBUMIN SERPL-MCNC: 4.2 G/DL (ref 3.5–5.2)
ALBUMIN/GLOB SERPL: 1.5 G/DL
ALP SERPL-CCNC: 59 U/L (ref 39–117)
ALT SERPL W P-5'-P-CCNC: 8 U/L (ref 1–41)
ANION GAP SERPL CALCULATED.3IONS-SCNC: 12.5 MMOL/L (ref 5–15)
AST SERPL-CCNC: 15 U/L (ref 1–40)
BASOPHILS # BLD AUTO: 0.02 10*3/MM3 (ref 0–0.2)
BASOPHILS NFR BLD AUTO: 0.3 % (ref 0–1.5)
BILIRUB SERPL-MCNC: 0.3 MG/DL (ref 0–1.2)
BUN SERPL-MCNC: 21 MG/DL (ref 8–23)
BUN/CREAT SERPL: 18.8 (ref 7–25)
CALCIUM SPEC-SCNC: 9.6 MG/DL (ref 8.6–10.5)
CHLORIDE SERPL-SCNC: 99 MMOL/L (ref 98–107)
CHOLEST SERPL-MCNC: 120 MG/DL (ref 0–200)
CO2 SERPL-SCNC: 25.5 MMOL/L (ref 22–29)
CREAT SERPL-MCNC: 1.12 MG/DL (ref 0.76–1.27)
DEPRECATED RDW RBC AUTO: 43.9 FL (ref 37–54)
EGFRCR SERPLBLD CKD-EPI 2021: 66.8 ML/MIN/1.73
EOSINOPHIL # BLD AUTO: 0.21 10*3/MM3 (ref 0–0.4)
EOSINOPHIL NFR BLD AUTO: 3.6 % (ref 0.3–6.2)
ERYTHROCYTE [DISTWIDTH] IN BLOOD BY AUTOMATED COUNT: 12.9 % (ref 12.3–15.4)
GLOBULIN UR ELPH-MCNC: 2.8 GM/DL
GLUCOSE SERPL-MCNC: 99 MG/DL (ref 65–99)
HBA1C MFR BLD: 6.1 % (ref 4.8–5.6)
HCT VFR BLD AUTO: 34.6 % (ref 37.5–51)
HDLC SERPL-MCNC: 44 MG/DL (ref 40–60)
HGB BLD-MCNC: 11.4 G/DL (ref 13–17.7)
IMM GRANULOCYTES # BLD AUTO: 0.02 10*3/MM3 (ref 0–0.05)
IMM GRANULOCYTES NFR BLD AUTO: 0.3 % (ref 0–0.5)
LDLC SERPL CALC-MCNC: 54 MG/DL (ref 0–100)
LDLC/HDLC SERPL: 1.17 {RATIO}
LYMPHOCYTES # BLD AUTO: 1.78 10*3/MM3 (ref 0.7–3.1)
LYMPHOCYTES NFR BLD AUTO: 30.4 % (ref 19.6–45.3)
MCH RBC QN AUTO: 31.1 PG (ref 26.6–33)
MCHC RBC AUTO-ENTMCNC: 32.9 G/DL (ref 31.5–35.7)
MCV RBC AUTO: 94.5 FL (ref 79–97)
MONOCYTES # BLD AUTO: 0.48 10*3/MM3 (ref 0.1–0.9)
MONOCYTES NFR BLD AUTO: 8.2 % (ref 5–12)
NEUTROPHILS NFR BLD AUTO: 3.35 10*3/MM3 (ref 1.7–7)
NEUTROPHILS NFR BLD AUTO: 57.2 % (ref 42.7–76)
NRBC BLD AUTO-RTO: 0 /100 WBC (ref 0–0.2)
PLATELET # BLD AUTO: 154 10*3/MM3 (ref 140–450)
PMV BLD AUTO: 11.3 FL (ref 6–12)
POTASSIUM SERPL-SCNC: 5 MMOL/L (ref 3.5–5.2)
PROT SERPL-MCNC: 7 G/DL (ref 6–8.5)
RBC # BLD AUTO: 3.66 10*6/MM3 (ref 4.14–5.8)
SODIUM SERPL-SCNC: 137 MMOL/L (ref 136–145)
TRIGL SERPL-MCNC: 123 MG/DL (ref 0–150)
TSH SERPL DL<=0.05 MIU/L-ACNC: 1.75 UIU/ML (ref 0.27–4.2)
VLDLC SERPL-MCNC: 22 MG/DL (ref 5–40)
WBC NRBC COR # BLD AUTO: 5.86 10*3/MM3 (ref 3.4–10.8)

## 2024-12-04 ENCOUNTER — POP HEALTH PHARMACY (OUTPATIENT)
Dept: PHARMACY | Facility: OTHER | Age: 79
End: 2024-12-04
Payer: MEDICARE

## 2024-12-11 DIAGNOSIS — E78.2 MIXED HYPERLIPIDEMIA: ICD-10-CM

## 2024-12-11 RX ORDER — ATORVASTATIN CALCIUM 40 MG/1
40 TABLET, FILM COATED ORAL
Qty: 90 TABLET | Refills: 1 | Status: SHIPPED | OUTPATIENT
Start: 2024-12-11

## 2024-12-12 ENCOUNTER — CLINICAL SUPPORT (OUTPATIENT)
Dept: FAMILY MEDICINE CLINIC | Facility: CLINIC | Age: 79
End: 2024-12-12
Payer: MEDICARE

## 2024-12-12 DIAGNOSIS — D50.9 IRON DEFICIENCY ANEMIA, UNSPECIFIED IRON DEFICIENCY ANEMIA TYPE: ICD-10-CM

## 2024-12-12 DIAGNOSIS — K90.9 MALABSORPTION OF IRON: Primary | ICD-10-CM

## 2024-12-12 DIAGNOSIS — C85.90 NON-HODGKIN'S LYMPHOMA, UNSPECIFIED BODY REGION, UNSPECIFIED NON-HODGKIN LYMPHOMA TYPE: ICD-10-CM

## 2024-12-12 PROCEDURE — 83540 ASSAY OF IRON: CPT | Performed by: STUDENT IN AN ORGANIZED HEALTH CARE EDUCATION/TRAINING PROGRAM

## 2024-12-12 PROCEDURE — 82728 ASSAY OF FERRITIN: CPT | Performed by: STUDENT IN AN ORGANIZED HEALTH CARE EDUCATION/TRAINING PROGRAM

## 2024-12-12 PROCEDURE — 84466 ASSAY OF TRANSFERRIN: CPT | Performed by: STUDENT IN AN ORGANIZED HEALTH CARE EDUCATION/TRAINING PROGRAM

## 2024-12-12 PROCEDURE — 36415 COLL VENOUS BLD VENIPUNCTURE: CPT | Performed by: STUDENT IN AN ORGANIZED HEALTH CARE EDUCATION/TRAINING PROGRAM

## 2024-12-12 PROCEDURE — 85025 COMPLETE CBC W/AUTO DIFF WBC: CPT | Performed by: STUDENT IN AN ORGANIZED HEALTH CARE EDUCATION/TRAINING PROGRAM

## 2024-12-12 PROCEDURE — 82607 VITAMIN B-12: CPT | Performed by: STUDENT IN AN ORGANIZED HEALTH CARE EDUCATION/TRAINING PROGRAM

## 2024-12-12 PROCEDURE — 82746 ASSAY OF FOLIC ACID SERUM: CPT | Performed by: STUDENT IN AN ORGANIZED HEALTH CARE EDUCATION/TRAINING PROGRAM

## 2024-12-12 NOTE — PROGRESS NOTES
Venipuncture Blood Specimen Collection  Venipuncture performed in [facility] by Aj Holman with good hemostasis. Patient tolerated the procedure well without complications.   12/12/24   Aj Holman

## 2024-12-13 LAB
BASOPHILS # BLD AUTO: 0.03 10*3/MM3 (ref 0–0.2)
BASOPHILS NFR BLD AUTO: 0.6 % (ref 0–1.5)
DEPRECATED RDW RBC AUTO: 43.8 FL (ref 37–54)
EOSINOPHIL # BLD AUTO: 0.14 10*3/MM3 (ref 0–0.4)
EOSINOPHIL NFR BLD AUTO: 3 % (ref 0.3–6.2)
ERYTHROCYTE [DISTWIDTH] IN BLOOD BY AUTOMATED COUNT: 13 % (ref 12.3–15.4)
FERRITIN SERPL-MCNC: 347 NG/ML (ref 30–400)
FOLATE SERPL-MCNC: 10.2 NG/ML (ref 4.78–24.2)
HCT VFR BLD AUTO: 35.4 % (ref 37.5–51)
HGB BLD-MCNC: 11.1 G/DL (ref 13–17.7)
IMM GRANULOCYTES # BLD AUTO: 0 10*3/MM3 (ref 0–0.05)
IMM GRANULOCYTES NFR BLD AUTO: 0 % (ref 0–0.5)
IRON 24H UR-MRATE: 64 MCG/DL (ref 59–158)
IRON SATN MFR SERPL: 28 % (ref 20–50)
LYMPHOCYTES # BLD AUTO: 1.53 10*3/MM3 (ref 0.7–3.1)
LYMPHOCYTES NFR BLD AUTO: 32.6 % (ref 19.6–45.3)
MCH RBC QN AUTO: 29.5 PG (ref 26.6–33)
MCHC RBC AUTO-ENTMCNC: 31.4 G/DL (ref 31.5–35.7)
MCV RBC AUTO: 94.1 FL (ref 79–97)
MONOCYTES # BLD AUTO: 0.34 10*3/MM3 (ref 0.1–0.9)
MONOCYTES NFR BLD AUTO: 7.2 % (ref 5–12)
NEUTROPHILS NFR BLD AUTO: 2.66 10*3/MM3 (ref 1.7–7)
NEUTROPHILS NFR BLD AUTO: 56.6 % (ref 42.7–76)
NRBC BLD AUTO-RTO: 0 /100 WBC (ref 0–0.2)
PLATELET # BLD AUTO: 159 10*3/MM3 (ref 140–450)
PMV BLD AUTO: 11.9 FL (ref 6–12)
RBC # BLD AUTO: 3.76 10*6/MM3 (ref 4.14–5.8)
TIBC SERPL-MCNC: 229 MCG/DL (ref 298–536)
TRANSFERRIN SERPL-MCNC: 154 MG/DL (ref 200–360)
VIT B12 BLD-MCNC: 430 PG/ML (ref 211–946)
WBC NRBC COR # BLD AUTO: 4.7 10*3/MM3 (ref 3.4–10.8)

## 2024-12-17 NOTE — PROGRESS NOTES
HEMATOLOGY ONCOLOGY FOLLOW UP        Patient name: Ramon Silva  : 1945  MRN: [id number]  Primary Care Physician: Joey Crump APRN  Referring Physician: No ref. provider found  Diagnosis:   Sinus Non-Hodgkin's lymphoma  Anemia  No chief complaint on file.      History of Present Illness:  Mr. Silva is a 79 y.o.  male with a history of testicular lymphoma diagnosed in , status post chemotherapy with R-CHOP, who has been in complete remission for several years.  He presented to his primary care provider, Mariam Price, with symptoms of left-sided facial pain radiating to the eye, left-sided headache, and he was referred to ENT, Dr. Harry, for further evaluation.  CT scan of the paranasal sinuses was performed without contrast on 11/30/15 and it showed marked bony destruction and an expansile soft tissue mass involving the left frontal, left ethmoid, left sphenoid and left maxillary sinus.  There was a soft tissue mass encroaching in the left orbit and left side of the face towards the muscles of mastication as well as through the superior wall of the left sphenoid sinus.  After evaluation, Dr. Harry ordered MRI of the brain with and without contrast and that showed a multi-lobulated mass filling and expanding the left maxillary sinus as well as the left ethmoid air cells in the left locule of the sphenoid sinus, with extension into the left orbit and left medial cranial fossa.  Dr. Harry performed fiberoptic nasal endoscopy and biopsy of the left nasal mass on 12/11/15.  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  Cyclin D1 was negative.  All of these stains were done by immunohistochemistry.  Sections of the nasal mass showed diffuse infiltrate by predominantly intermediate-to-large sized lymphoid cells.  The cells have a vesicular  nuclei and inconspicuous cytoplasm.  FISH was negative for rearrangements involving BCL6, MYC, and IGH/BCL-2 [t(14;18)].  FISH for MYC/IGH was negative.  The patient denied any B symptoms, such as fevers, chills, night sweats, weight loss or fatigue.  He did not report any lymphadenopathy.  Dr. Harry referred the patient to us for further evaluation of his lymphoma.    Today, 12/30/15, the patient presents for initial evaluation.  He denies any fever, chills, night sweats, weight loss or lymph node swelling.  His only symptom is left facial pain.  The patient also reports left eye blurry vision.  12/30/15 - WBC 8.3, hemoglobin 13.3, platelet count 233, MCV 87.5.  1/7/16 - Echocardiogram:  Ejection fraction 50-55%.  Normal LVEF.  There is slight impaired LV relaxation.  1/8/16 - PET/CT scan:  Soft tissue mass originating in the left maxillary sinus erodes through the medial sinus wall spilling out into the nasopharynx.  It abates the nasal septum.  Lesion measures about 4 x 4 cm in AP and transverse dimension.  It measures about 8 cm in the cephalocaudal dimension.  SUV is 10.8  In the abdomen, there is a focus of activity in the left lobe of the liver which measures about 2 cm and has SUV 9.4.    1/11/16 - Bone marrow biopsy:  Normal cellular bone marrow 25-35%.  Adequate iron stores.  No malignancy.  Flow cytometry is negative.  Normal karyotype.  1/22/16 - Patient underwent left subclavian Port-A-Cath placement.  1/25/16 - WBC 8.1, hemoglobin 12.2, platelet count 216, MCV 87.1.  1/26/16 - Patient was seen at [facility] by Dr. Jamal Pisano.  He has recommended R-GCVP combination chemo treatment.  If the patient does not achieve complete response or if he has liver involvement, he will consider autologous stem cell transplantation after high-dose chemotherapy.  If lymphoma is present in the liver, this tumor will likely represent a recurrent lymphoma rather than a new primary  lymphoma of the sinuses.  2/2/16 - Hepatitis panel negative.  2/4/16 - Patient started chemotherapy with Rituximab, Gemcitabine, Cytoxan, Vincristine, and Prednisolone (R-GCVP q. 21 days regimen).  2/24/16 - Patient received cycle 2 of R-GCVP.  3/1/16 - Patient received intrathecal chemotherapy cycle 1 with Cytarabine plus Hydrocortisone plus Methotrexate.  3/2/16 - Patient received cycle 2, day 8 of Gemcitabine.  WBC 3.9, platelet count 145,000.  3/9/16 - WBC 9.1, hemoglobin 9.2, platelet count 33,000.  3/10/16 - Platelet count 32,000.  3/16/16 - Patient received cycle 3 of R-GCVP day 1.  3/23/16 - Creatinine 0.85, BUN 19.  Retic count 19,950.  Ferritin 369.  Erythropoietin 26.9.  Iron saturation 11%, TIBC 22.  CBC showed WBC 7.3, hemoglobin 8.1, platelet count 395,000, MCV 87.  3/23/16 - Patient received cycle 3, day 8 of Gemcitabine.  Patient received Neulasta 6 mg.   3/26/16 - Brain MRI with and without contrast:  There is residual soft tissue mass located in the left ethmoid and sphenoid sinus which is much smaller compared to the previous exam, now measuring 2.8 x 1.8 cm.  There is better aeration in the left maxillary sinus.  3/26/16 - MRI abdomen with and without contrast:   A small 1.3 x 1.1 cm rim enhancing lesion is seen near the dome of the medial segment of the left lobe of liver.  This area corresponds to the hypermetabolic activity seen on the previous PET scan.  3/30/16 - Neutrophils 53,000, hemoglobin 9.5, platelet count 328,000.  4/6/16 - Patient received cycle 4, day 1 of R-GCVP.  WBC 17.9, hemoglobin 9.6, platelet count 256,000.    4/7/16 - Patient received intrathecal chemotherapy with Cytarabine plus Hydrocortisone plus Methotrexate.    4/13/16 - Patient received cycle 4, day 8 of Gemcitabine.  WBC 4.8, hemoglobin 8.1, platelet count 147,000.  The patient had hypotension and received IV fluids.    4/14/16 - Ultrasound of the liver:  There is a small nodule measuring 15 mm in the left lobe of  the liver.    4/18/16 - WBC 3.6, hemoglobin 8.1, platelet count 73,000.    4/27/16 - Patient received cycle 5, day 1 R-GCVP.  4/28/16 - Patient received intrathecal chemotherapy.    5/4/16 - Patient received cycle 5, day 8 of Gemcitabine.  5/18/16 - Patient received cycle 6, day 1 of R-GCVP.  CBC shows WBC 10.7, hemoglobin 9.7, platelet count 417,000.  5/19/16 - Patient received cycle 5 of intrathecal chemotherapy with Methotrexate, Cytarabine and Hydrocortisone.  6/10/16 - Brain MRI with and without contrast:  Residual abnormal signal involving the region of the left sphenoid sinus.  There appears to be mild improvement compared to prior.  Findings may be due to chronic inflammation of sinusitis.  Residual tumor is possible but less likely.  Overall, there is improvement.  No evidence of acute focal ischemia.  Nonspecific white matter changes.    6/16/16 - Patient received the last, sixth cycle of intrathecal chemotherapy.  7/7/16 - PET/CT scan:  Small focal area of increased nuclear activity in the lateral segment of the left lobe of the liver is no longer visualized.    8/1/16 to 8/19/16 - Patient received 15 fractions of radiation therapy, total of 30 Gy.  11/3/16 - PET/CT scan:  Stable exam with no hypermetabolic activity seen within the neck, chest, abdomen and pelvis.  New sub-centimeter non-calcified pulmonary nodules within the posterior lateral aspect of the right upper lobe.  There may be inflammatory or infectious.  Stable bilateral sub-centimeter non-calcified pulmonary nodules which are not hypermetabolic.    1/28/17 - MRI of brain with and without contrast:  Abnormal signal in the left sphenoid sinus is redemonstrated.  It is similar in size to the previous MRI from June 2016 and measures about 23 x 14 mm.  No evidence of progression.  No evidence of metastases.  2/17/17 - CT scan:  No abnormalities in the chest.  No lymphadenopathy.  5/8/17 - Patient underwent nasal endoscopy which showed maxillary  sinusitis.  No tumor reported.  9/8/17 - PET/CT scan:  No evidence of metastasis or disease recurrence.  No hypermetabolic activity anywhere.  Stable partial opacification of the left sphenoid sinus likely due to chronic sinusitis.    3/3/18 - WBC 7.2, hemoglobin 11.4, platelet count 181,000, MCV 91.8.  Creatinine 0.77.  LFTs normal.  Albumin 4.    3/30/18 - Brain MRI with and without contrast:  Decreasing size of the abnormal soft tissue within the left pterygomaxillary fissure.  This area demonstrates mild heterogeneous contrast enhancement which is similar to prior studies.  No new enlarging soft tissue mass.  Mucosal thickening with the left maxillary and sphenoid sinuses.  No evidence of malignancy.  No acute intracranial abnormality.    8/24/18 - CT scan of chest, abdomen and pelvis:  No evidence of lymphadenopathy.  A 1 cm pulmonary nodule in the right middle lobe is not significantly changed.  No evidence of lymphadenopathy.  CT abdomen is also unremarkable.  Enlarged heterogenous prostate gland, hyperplasia is noted.  Small hiatal hernia.  Moderate osteopenia and degenerative changes in hips and spine.  Left renal cyst.    8/28/18 - Vitamin D 31.  PSA 1.6.    10/5/18 - DEXAscan:  Osteoporosis with T-score of -2.5 in the distal forearm.    3/4/19 - WBC 6.9, hemoglobin 12.8, platelet count 187,000, MCV 91.  Creatinine 1.03.  Total bilirubin 0.5.  .  5/8/2019 25 hydroxy vitamin D 32  8/28/2019: Brain MRI:1. The abnormal material in the left sphenoid sinus does not appear significantly changed.The brain appears stable and within normal for age. 3. Minor chronic maxillary sinus mucosal thickening \  2/19/2020: WBC 8.8, hemoglobin 9.2, MCV 92.7, platelets 280, creatinine 0.88, alk phos 122, 25-hydroxy vitamin D 31.3, B12 640, TSH 0.886  3/7/2020: CT abdomen and pelvis- No evidence of lymphadenopathy in the abdomen pelvis or chest.  Stable right middle lobe noncalcified pulmonary nodule unchanged since  2017.  Degenerative changes of the lumbar spine and hips.  Chest with contrast:.  Stable 1 cm right middle lobe lung nodule.  No evidence of malignancy.  3/10/2020: Iron 35 low, ferritin 92, iron saturation 14% low, TIBC 258, folate greater than 20, WBC 5.8, hemoglobin 9.4, MCV 91, platelets 228, ESR 25  6/30/2020: Creatinine 0.9, LFTs normal, , WBC 6.48, hemoglobin 10.6, platelets 202, ferritin 71, iron saturation 25%, TIBC 242,  9/1/2020: Creatinine 1.33, BUN 26, LFTs normal, WBC 6.7, hemoglobin 10.9, platelets 190,, ESR 15, CRP 0.1,  3/23/2021: Stool Hemoccult is positive  4/28/2021: WBC 7.13, hemoglobin 11.6, platelets 163, MCV 94.1, ferritin 77.3, iron 59, iron saturation 41%, TIBC 286, LFTs normal, , creatinine 1.06,  10/2/2021 : Stable 9 mm nodule in the medial segment of the right middle lobe.  8/1/2022 WBC 4.5, hemoglobin 10.5, hematocrit 32.4, platelet 179  1/6/2023 - WBC 6.27, Hb 10.7, hct 31.9, plt 98  1/24/24 - WBC 7.31, hb 12.4, plt 202,     Subjective:  12/19/24: Patient seen today for follow-up visit.  He was previously being seen by Dr. Kwon in this office.  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  He is in remission at this time and is only being followed with physical exam and labs.   Patient is accompanied by his family member who reported that patient had symptoms of dysphagia recently for which she was recommended to undergo an EGD but the patient did not show up for the procedure.  He denied having any symptoms at present.  Weight/appetite is stable.  No other B symptoms reported.  Unclear whether he is on any oral iron supplementation.    Past Medical History:   Diagnosis Date    Anemia     Arthralgia     Diabetes     Fatigue     GERD (gastroesophageal reflux disease)     Gout     Hyperlipidemia     Hypertension     Liver lesion     Non Hodgkin's lymphoma     Osteoporosis     Personal  history of testicular cancer     Vitamin D deficiency     Weight loss        Past Surgical History:   Procedure Laterality Date    ANGIOGRAM - CONVERTED  07/05/2022    aif by Dr Henriquez    CARDIAC CATHETERIZATION N/A 7/5/2022    Procedure: PERIPHERAL ANGIOGRAPHY Right leg;  Surgeon: Theron Henriquez MD;  Location:  [facility];  Service: Cardiovascular;  Laterality: N/A;    CARDIAC CATHETERIZATION N/A 10/2/2023    Procedure: Right and Left Heart Cath with possible PCI;  Surgeon: Joey Kirkland DO;  Location:  [facility];  Service: Cardiovascular;  Laterality: N/A;  Local and IV sedation    CARDIAC ELECTROPHYSIOLOGY PROCEDURE N/A 4/17/2024    Procedure: ICD DC new [employer] notified 03/22/24;  Surgeon: Chago Lambert MD;  Location:  [facility];  Service: Cardiovascular;  Laterality: N/A;  Needs Port-a-Cath removal prior to ICD    HIP SURGERY  2005    PORTACATH PLACEMENT      TESTICLE SURGERY Right 07/17/2000    right testicular excision       Current Outpatient Medications:     Accu-Chek Guide test strip, 1 each by Other route Daily. E 11.9, Disp: 50 each, Rfl: 2    Accu-Chek Softclix Lancets lancets, test daily as directed, Disp: 100 each, Rfl: 2    aspirin 81 MG tablet, Take 1 tablet by mouth Daily., Disp: , Rfl:     atorvastatin (LIPITOR) 40 MG tablet, TAKE ONE TABLET BY MOUTH EVERY NIGHT AT BEDTIME, Disp: 90 tablet, Rfl: 1    carvedilol (COREG) 25 MG tablet, TAKE ONE TABLET BY MOUTH TWICE DAILY WITH MEALS, Disp: 180 tablet, Rfl: 0    Cetirizine HCl (ZyrTEC Allergy) 10 MG capsule, Take 10 mg by mouth Daily., Disp: , Rfl:     cilostazol (PLETAL) 100 MG tablet, Take 1 tablet by mouth 2 (Two) Times a Day., Disp: 60 tablet, Rfl: 1    colestipol (COLESTID) 1 g tablet, Take 1 tablet by mouth 2 (Two) Times a Day., Disp: 180 tablet, Rfl: 1    dapagliflozin Propanediol (Farxiga) 10 MG tablet, Take 10 mg by mouth Daily., Disp: 90 tablet, Rfl: 1     Diclofenac Sodium (VOLTAREN) 1 % gel gel, Apply 4 g topically to the appropriate area as directed 4 (Four) Times a Day As Needed (APPLY 4 TIMES A DAY AS NEED TO THE APPROPRIATE AREA)., Disp: 4 g, Rfl: 2    famotidine (PEPCID) 40 MG tablet, TAKE ONE TABLET BY MOUTH ONCE DAILY, Disp: 30 tablet, Rfl: 1    ferrous sulfate 324 (65 Fe) MG tablet delayed-release EC tablet, Take 1 tablet by mouth Daily With Breakfast., Disp: 90 tablet, Rfl: 1    finasteride (PROSCAR) 5 MG tablet, TAKE ONE TABLET BY MOUTH ONCE DAILY, Disp: 90 tablet, Rfl: 1    folic acid (FOLVITE) 1 MG tablet, Take 1 tablet by mouth Daily., Disp: 30 tablet, Rfl: 2    gabapentin (NEURONTIN) 300 MG capsule, Take 1 capsule by mouth Every Evening., Disp: 90 capsule, Rfl: 0    ibandronate (BONIVA) 150 MG tablet, TAKE ONE TABLET BY MOUTH EVERY 30 DAYS, Disp: 1 tablet, Rfl: 5    losartan (COZAAR) 50 MG tablet, TAKE ONE TABLET BY MOUTH ONCE DAILY, Disp: 90 tablet, Rfl: 0    Menthol (GNP HERBAL MT), Apply  to the mouth or throat. Neuroflow plus, Disp: , Rfl:     metFORMIN ER (GLUCOPHAGE-XR) 500 MG 24 hr tablet, TAKE TWO TABLETS BY MOUTH TWICE DAILY BEFORE MEALS, Disp: 360 tablet, Rfl: 1    Omega-3 Fatty Acids (OMEGA-3 FISH OIL PO), Take 1 capsule by mouth Daily., Disp: , Rfl:     ondansetron ODT (ZOFRAN-ODT) 4 MG disintegrating tablet, Place 1 tablet on the tongue Every 12 (Twelve) Hours As Needed for Nausea or Vomiting., Disp: 60 tablet, Rfl: 1    pantoprazole (PROTONIX) 40 MG EC tablet, TAKE ONE TABLET BY MOUTH ONCE DAILY, Disp: 90 tablet, Rfl: 0    traMADol (ULTRAM) 50 MG tablet, Take 1 tablet by mouth Every 12 (Twelve) Hours As Needed., Disp: , Rfl:     Vitamin D, Cholecalciferol, (CHOLECALCIFEROL) 10 MCG (400 UNIT) tablet, Take 1 tablet by mouth Daily., Disp: , Rfl:     No Known Allergies    Family History   Family history unknown: Yes       Family history is unknown by patient.    Social History     Tobacco Use    Smoking status: Never    Smokeless tobacco:  "Never   Vaping Use    Vaping status: Never Used   Substance Use Topics    Alcohol use: No    Drug use: Never     ROS:     12 point review system negative.  No significant fatigue.    Objective:    Vitals:    12/19/24 1401   BP: 136/62   Pulse: 75   Resp: 15   Temp: 97.4 °F (36.3 °C)   SpO2: 99%   Weight: 54.6 kg (120 lb 6.4 oz)   Height: 152.4 cm (60\")   PainSc: 0-No pain           ECOG  (1) Restricted in physically strenuous activity, ambulatory and able to do work of light nature    Physical Exam:     Physical Exam  Constitutional:       Appearance: Normal appearance.   HENT:      Head: Normocephalic and atraumatic.      Nose: Nose normal.   Eyes:      Extraocular Movements: Extraocular movements intact.      Pupils: Pupils are equal, round, and reactive to light.   Cardiovascular:      Rate and Rhythm: Normal rate and regular rhythm.      Pulses: Normal pulses.      Heart sounds: No murmur heard.  Pulmonary:      Effort: Pulmonary effort is normal.      Breath sounds: Normal breath sounds.   Abdominal:      General: There is no distension.      Palpations: Abdomen is soft. There is no mass.      Tenderness: There is no abdominal tenderness.   Musculoskeletal:         General: Normal range of motion.      Cervical back: Normal range of motion and neck supple.      Comments: Right foot wrapped with bandage   Skin:     General: Skin is warm.   Neurological:      General: No focal deficit present.      Mental Status: He is alert.   Psychiatric:         Mood and Affect: Mood normal.     Lab Results - Last 18 Months   Lab Units 12/12/24  0150 12/02/24  0822 08/26/24  1033   WBC 10*3/mm3 4.70 5.86 7.22   HEMOGLOBIN g/dL 11.1* 11.4* 11.3*   HEMATOCRIT % 35.4* 34.6* 34.9*   PLATELETS 10*3/mm3 159 154 158   MCV fL 94.1 94.5 93.6     Lab Results - Last 18 Months   Lab Units 12/02/24  0822 07/25/24  1147 01/24/24  1612   SODIUM mmol/L 137 141 142   POTASSIUM mmol/L 5.0 5.0 4.5   CHLORIDE mmol/L 99 106 105   CO2 mmol/L 25.5 " "24.8 24.1   BUN mg/dL 21 28* 17   CREATININE mg/dL 1.12 1.50* 0.88   CALCIUM mg/dL 9.6 9.6 9.9   BILIRUBIN mg/dL 0.3 0.3 0.4   ALK PHOS U/L 59 80 81   ALT (SGPT) U/L 8 11 15   AST (SGOT) U/L 15 17 18   GLUCOSE mg/dL 99 136* 149*       Lab Results   Component Value Date    GLUCOSE 99 12/02/2024    BUN 21 12/02/2024    CREATININE 1.12 12/02/2024    EGFRIFNONA 59 (L) 08/17/2021    EGFRIFAFRI 91 02/23/2021    BCR 18.8 12/02/2024    K 5.0 12/02/2024    CO2 25.5 12/02/2024    CALCIUM 9.6 12/02/2024    ALBUMIN 4.2 12/02/2024    LABIL2 1.6 05/08/2019    AST 15 12/02/2024    ALT 8 12/02/2024       Lab Results - Last 18 Months   Lab Units 03/26/24  1131 10/02/23  0754   INR  0.97 <0.93*   APTT seconds 25.6  --        Lab Results   Component Value Date    IRON 64 12/12/2024    TIBC 229 (L) 12/12/2024    FERRITIN 347.00 12/12/2024       Lab Results   Component Value Date    FOLATE 10.20 12/12/2024     No results found for: \"OCCULTBLD\"    No results found for: \"RETICCTPCT\"    Lab Results   Component Value Date    SZQDJBZI95 430 12/12/2024     No results found for: \"SPEP\", \"UPEP\"  LDH   Date Value Ref Range Status   07/25/2024 167 135 - 225 U/L Final     Lab Results   Component Value Date    SEDRATE 23 (H) 07/05/2022     No results found for: \"FIBRINOGEN\", \"HAPTOGLOBIN\"  Lab Results   Component Value Date    PTT 25.6 03/26/2024    INR 0.97 03/26/2024     No results found for: \"\"  No results found for: \"CEA\"  No components found for: \"CA-19-9\"  Lab Results   Component Value Date    PSA 1.520 05/25/2022     Assessment & Plan     Assessment:  History of high-grade non-Hodgkin’s B cell lymphoma involving the left facial sinus:  Stage IE (extranodal) lymphoma.  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  It is unclear whether the patient has recurrent versus new de zulma lymphoma.  His PET scan also showed an indeterminate lesion in the left lobe of " the liver, which could not be biopsied.  He was treated with Rituximab-GCVP chemotherapy x6 cycles.  The patient also received six cycles of intrathecal chemotherapy with Methotrexate, Cytarabine, and Hydrocortisone.  PET/CT scan showed a complete response.  His restaging PET scan on 11/3/16 continues to show remission.  He also received consolidation radiation therapy for 30 Gy finished on 8/19/16.    PET/CT scan in September 2017 does not show any evidence of disease recurrence.  Patient was reporting left-sided headaches.  repeat MRI on 3/30/18 and it fails to show any evidence of disease progression or recurrence.  Restaging CT scan on 3/7/2020 does not show any evidence of disease recurrence.  Now again had a repeat CT scan in October 2021 with no concerning findings for recurrent disease.  There is a stable 9 mm nodule in the right middle lobe which has been stable for the last several years.  No routine CT imaging needed.  No s/s of relapse will monitor labs for now.  -Labs continue to be stable.  Continue to monitor  -No clinical evidence of recurrence.  No indication for repeat imaging presently.    Mild persistent anemia: Appears to be from chronic disease hemoglobin 10.5 repeat in 6 months.Positive stool Hemoccult test: Patient had a colonoscopy which was unremarkable this was in June 2021 hb has been stable. Could be persistent myelosupression post chemotherapy.  Hemoglobin is stable around 11.1.  No evidence of iron or B12 deficiency.  Continue to monitor    History of testicular lymphoma: In 2000    History of right knee osteoarthritis:     Benign prostatic hypertrophy followed by urology no worsening of symptoms.    History of liver lesion:  This was noted initially on PET scan.  Repeat MRI showed a 1.3 x 1.7 cm, rim-enhancing lesion which could not be biopsied.  His restaging PET scan on 9/8/16 does not show this lesion anymore.  No findings October 2021 CT.  No further imaging  needed.    Osteoporosis:  This was seen on DEXAscan 10/5/18.  Calcium vitamin D.  Continue Boniva          8.  Dysphagia: Advised patient to follow-up with GI to reschedule EGD.  He does not appear to be              motivated for this at this time.  Reported that his symptoms have resolved    6-month follow-up with repeat labs, sooner as needed

## 2024-12-19 ENCOUNTER — OFFICE VISIT (OUTPATIENT)
Dept: ONCOLOGY | Facility: CLINIC | Age: 79
End: 2024-12-19
Payer: MEDICARE

## 2024-12-19 VITALS
HEART RATE: 75 BPM | HEIGHT: 60 IN | TEMPERATURE: 97.4 F | SYSTOLIC BLOOD PRESSURE: 136 MMHG | OXYGEN SATURATION: 99 % | BODY MASS INDEX: 23.64 KG/M2 | WEIGHT: 120.4 LBS | DIASTOLIC BLOOD PRESSURE: 62 MMHG | RESPIRATION RATE: 15 BRPM

## 2024-12-19 DIAGNOSIS — D50.9 IRON DEFICIENCY ANEMIA, UNSPECIFIED IRON DEFICIENCY ANEMIA TYPE: Primary | ICD-10-CM

## 2024-12-19 DIAGNOSIS — K90.9 MALABSORPTION OF IRON: ICD-10-CM

## 2024-12-19 DIAGNOSIS — C85.90 NON-HODGKIN'S LYMPHOMA, UNSPECIFIED BODY REGION, UNSPECIFIED NON-HODGKIN LYMPHOMA TYPE: ICD-10-CM

## 2024-12-19 DIAGNOSIS — M81.8 OTHER OSTEOPOROSIS WITHOUT CURRENT PATHOLOGICAL FRACTURE: ICD-10-CM

## 2024-12-23 DIAGNOSIS — K21.9 GASTROESOPHAGEAL REFLUX DISEASE, UNSPECIFIED WHETHER ESOPHAGITIS PRESENT: ICD-10-CM

## 2024-12-23 RX ORDER — FAMOTIDINE 40 MG/1
40 TABLET, FILM COATED ORAL DAILY
Qty: 30 TABLET | Refills: 1 | Status: SHIPPED | OUTPATIENT
Start: 2024-12-23

## 2025-01-16 DIAGNOSIS — K21.9 GASTROESOPHAGEAL REFLUX DISEASE, UNSPECIFIED WHETHER ESOPHAGITIS PRESENT: ICD-10-CM

## 2025-01-16 RX ORDER — PANTOPRAZOLE SODIUM 40 MG/1
40 TABLET, DELAYED RELEASE ORAL DAILY
Qty: 90 TABLET | Refills: 0 | Status: SHIPPED | OUTPATIENT
Start: 2025-01-16

## 2025-01-20 DIAGNOSIS — I73.9 INTERMITTENT CLAUDICATION: ICD-10-CM

## 2025-01-20 RX ORDER — CILOSTAZOL 100 MG/1
100 TABLET ORAL 2 TIMES DAILY
Qty: 60 TABLET | Refills: 1 | Status: SHIPPED | OUTPATIENT
Start: 2025-01-20

## 2025-02-07 ENCOUNTER — OFFICE VISIT (OUTPATIENT)
Dept: CARDIOLOGY | Facility: CLINIC | Age: 80
End: 2025-02-07
Payer: MEDICARE

## 2025-02-07 VITALS
SYSTOLIC BLOOD PRESSURE: 132 MMHG | BODY MASS INDEX: 22.97 KG/M2 | HEIGHT: 60 IN | OXYGEN SATURATION: 97 % | WEIGHT: 117 LBS | DIASTOLIC BLOOD PRESSURE: 78 MMHG

## 2025-02-07 DIAGNOSIS — I10 PRIMARY HYPERTENSION: ICD-10-CM

## 2025-02-07 DIAGNOSIS — Z95.810 BIVENTRICULAR AUTOMATIC IMPLANTABLE CARDIOVERTER DEFIBRILLATOR IN SITU: ICD-10-CM

## 2025-02-07 DIAGNOSIS — I44.7 LBBB (LEFT BUNDLE BRANCH BLOCK): ICD-10-CM

## 2025-02-07 DIAGNOSIS — E78.2 MIXED HYPERLIPIDEMIA: ICD-10-CM

## 2025-02-07 DIAGNOSIS — I42.0 DILATED CARDIOMYOPATHY: Primary | ICD-10-CM

## 2025-02-07 PROCEDURE — 1159F MED LIST DOCD IN RCRD: CPT | Performed by: INTERNAL MEDICINE

## 2025-02-07 PROCEDURE — 93000 ELECTROCARDIOGRAM COMPLETE: CPT | Performed by: INTERNAL MEDICINE

## 2025-02-07 PROCEDURE — 3075F SYST BP GE 130 - 139MM HG: CPT | Performed by: INTERNAL MEDICINE

## 2025-02-07 PROCEDURE — 3078F DIAST BP <80 MM HG: CPT | Performed by: INTERNAL MEDICINE

## 2025-02-07 PROCEDURE — 1160F RVW MEDS BY RX/DR IN RCRD: CPT | Performed by: INTERNAL MEDICINE

## 2025-02-07 PROCEDURE — 99214 OFFICE O/P EST MOD 30 MIN: CPT | Performed by: INTERNAL MEDICINE

## 2025-02-07 NOTE — PROGRESS NOTES
CC---Cardiomyopathy      Sub  79-year-old male patient has biventricular ICD in situ which was implanted in April 2024 and comes in for follow-up.  Patient had left bundle branch block with severe LV dysfunction in the past with associated coronary artery disease and mild MR.  Patient has history of lymphoma followed by hematologist      Previous history attached below for reference    78-year-old male patient with complex medical problems came for evaluation because of cardiomyopathy.  Patient had severe degenerative knee disease.  Preoperatively came for cardiac evaluation and was found to have severe LV dysfunction with EF less than 35% with left bundle branch block with abnormal stress test.  Further cardiac catheterization showed 50% mid LAD stenosis and severe cardiomyopathy with EF less than 30% with mild mitral regurgitation.  Patient also has additional history of severe peripheral vascular disease followed by vascular surgery.  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  He is regularly followed by hematology and oncology and has been in remission for more than 5 years.  He denies any syncope or any stroke.        Past Medical History:   Diagnosis Date   • Anemia    • Arthralgia    • Diabetes    • Fatigue    • GERD (gastroesophageal reflux disease)    • Gout    • Hyperlipidemia    • Hypertension    • Liver lesion    • Non Hodgkin's lymphoma    • Osteoporosis    • Personal history of testicular cancer    • Vitamin D deficiency    • Weight loss      Past Surgical History:   Procedure Laterality Date   • ANGIOGRAM - CONVERTED  07/05/2022    aif by Dr Henriquez   • CARDIAC CATHETERIZATION N/A 7/5/2022    Procedure: PERIPHERAL ANGIOGRAPHY Right leg;  Surgeon: Theron Henriquez MD;  Location: [facility] CATH INVASIVE LOCATION;  Service: Cardiovascular;  Laterality:  N/A;   • CARDIAC CATHETERIZATION N/A 10/2/2023    Procedure: Right and Left Heart Cath with possible PCI;  Surgeon: Joey Kirkland DO;  Location: [facility] CATH INVASIVE LOCATION;  Service: Cardiovascular;  Laterality: N/A;  Local and IV sedation   • HIP SURGERY  2005   • PORTACATH PLACEMENT     • TESTICLE SURGERY Right 07/17/2000    right testicular excision     Family History   Family history unknown: Yes         Physical Exam    General:      well developed, well nourished, in no acute distress.    Head:      normocephalic and atraumatic.    Eyes:      PERRL/EOM intact, conjunctivae and sclerae clear without nystagmus.    Neck:      no  thyromegaly, trachea central with normal respiratory effort  Lungs:      clear bilaterally to auscultation.    Heart:       regular rate and rhythm, S1, S2 without murmurs, rubs, or gallops  Skin:      intact without lesions or rashes.    Psych:      alert and cooperative; normal mood and affect; normal attention span and concentration.        Assessment and plan    Post biventricular ICD placement and ICD function normal on home monitoring  Last creatinine 1.12, potassium 5.0  Significant cardiomyopathy with EF less than 35% with left bundle branch block status post biventricular ICD insertion currently on carvedilol, Farxiga, losartan  Diabetes on metformin and Farxiga  Hyperlipidemia on atorvastatin  Coronary artery disease with mid LAD 50% stenosis without angina on aspirin  Prior history of lymphoma followed by oncology team and prior history included CHOP regimen  Medications reviewed and follow-up appointments.  CHECK ECHO evaluate improvement of EF with medical treatment  Patient has knee arthritis and can safely undergo knee surgery if needed          ECG 12 Lead    Date/Time: 2/7/2025 2:42 PM  Performed by: Chago Lambert MD    Authorized by: Chago Lambert MD  Comparison: compared with previous ECG   Similar to previous ECG  Rhythm: sinus rhythm and  paced  Rate: normal      Electronically signed by Chaog Lambert MD, 02/07/25, 2:42 PM EST.

## 2025-02-07 NOTE — LETTER
February 7, 2025     CON Monahan  [address] [facility] [place] [address]    Patient: Ramon Silva   YOB: 1945   Date of Visit: 2/7/2025     Dear CON Monahan:       Thank you for referring Ramon Silva to me for evaluation. Below are the relevant portions of my assessment and plan of care.    If you have questions, please do not hesitate to call me. I look forward to following Ramon along with you.         Sincerely,        Chago Lambert MD        CC: No Recipients    Chago Lambert MD  02/07/25 1442  Sign when Signing Visit  CC---Cardiomyopathy      Sub  79-year-old male patient has biventricular ICD in situ which was implanted in April 2024 and comes in for follow-up.  Patient had left bundle branch block with severe LV dysfunction in the past with associated coronary artery disease and mild MR.  Patient has history of lymphoma followed by hematologist      Previous history attached below for reference    78-year-old male patient with complex medical problems came for evaluation because of cardiomyopathy.  Patient had severe degenerative knee disease.  Preoperatively came for cardiac evaluation and was found to have severe LV dysfunction with EF less than 35% with left bundle branch block with abnormal stress test.  Further cardiac catheterization showed 50% mid LAD stenosis and severe cardiomyopathy with EF less than 30% with mild mitral regurgitation.  Patient also has additional history of severe peripheral vascular disease followed by vascular surgery.  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  He is regularly followed by hematology and oncology and has been in remission for more than 5 years.  He denies any syncope or any stroke.        Past Medical History:   Diagnosis Date   • Anemia    • Arthralgia    •  Diabetes    • Fatigue    • GERD (gastroesophageal reflux disease)    • Gout    • Hyperlipidemia    • Hypertension    • Liver lesion    • Non Hodgkin's lymphoma    • Osteoporosis    • Personal history of testicular cancer    • Vitamin D deficiency    • Weight loss      Past Surgical History:   Procedure Laterality Date   • ANGIOGRAM - CONVERTED  07/05/2022    aif by Dr Henriquez   • CARDIAC CATHETERIZATION N/A 7/5/2022    Procedure: PERIPHERAL ANGIOGRAPHY Right leg;  Surgeon: Theron Henriquez MD;  Location:  [facility] CATH INVASIVE LOCATION;  Service: Cardiovascular;  Laterality: N/A;   • CARDIAC CATHETERIZATION N/A 10/2/2023    Procedure: Right and Left Heart Cath with possible PCI;  Surgeon: Joey Kirkland DO;  Location:  [facility] CATH INVASIVE LOCATION;  Service: Cardiovascular;  Laterality: N/A;  Local and IV sedation   • HIP SURGERY  2005   • PORTACATH PLACEMENT     • TESTICLE SURGERY Right 07/17/2000    right testicular excision     Family History   Family history unknown: Yes         Physical Exam    General:      well developed, well nourished, in no acute distress.    Head:      normocephalic and atraumatic.    Eyes:      PERRL/EOM intact, conjunctivae and sclerae clear without nystagmus.    Neck:      no  thyromegaly, trachea central with normal respiratory effort  Lungs:      clear bilaterally to auscultation.    Heart:       regular rate and rhythm, S1, S2 without murmurs, rubs, or gallops  Skin:      intact without lesions or rashes.    Psych:      alert and cooperative; normal mood and affect; normal attention span and concentration.        Assessment and plan    Post biventricular ICD placement and ICD function normal on home monitoring  Last creatinine 1.12, potassium 5.0  Significant cardiomyopathy with EF less than 35% with left bundle branch block status post biventricular ICD insertion currently on carvedilol, Farxiga, losartan  Diabetes on metformin and Farxiga  Hyperlipidemia on  atorvastatin  Coronary artery disease with mid LAD 50% stenosis without angina on aspirin  Prior history of lymphoma followed by oncology team and prior history included CHOP regimen  Medications reviewed and follow-up appointments.  CHECK ECHO evaluate improvement of EF with medical treatment  Patient has knee arthritis and can safely undergo knee surgery if needed          ECG 12 Lead    Date/Time: 2/7/2025 2:42 PM  Performed by: Chago Lambert MD    Authorized by: Chago Lambert MD  Comparison: compared with previous ECG   Similar to previous ECG  Rhythm: sinus rhythm and paced  Rate: normal      Electronically signed by Chago Lambert MD, 02/07/25, 2:42 PM EST.

## 2025-02-17 DIAGNOSIS — I73.9 INTERMITTENT CLAUDICATION: ICD-10-CM

## 2025-02-17 DIAGNOSIS — M25.50 ARTHRALGIA, UNSPECIFIED JOINT: ICD-10-CM

## 2025-02-17 RX ORDER — GABAPENTIN 300 MG/1
300 CAPSULE ORAL EVERY EVENING
Qty: 90 CAPSULE | Refills: 0 | Status: SHIPPED | OUTPATIENT
Start: 2025-02-17

## 2025-02-17 NOTE — TELEPHONE ENCOUNTER
Rx Refill Note  Requested Prescriptions     Pending Prescriptions Disp Refills    gabapentin (NEURONTIN) 300 MG capsule [Pharmacy Med Name: gabapentin 300 mg capsule] 90 capsule 0     Sig: TAKE ONE CAPSULE BY MOUTH EVERY EVENING      Last office visit with prescribing clinician: 11/12/2024   Last telemedicine visit with prescribing clinician: Visit date not found       UDS    None on file  CSA    None on file      INSPECT - SCAN - INSPECT/ [id number] [place]/ 02/17/2025 (02/17/2025)     Talia Rodriguez MA  02/17/25, 14:23 EST

## 2025-02-19 ENCOUNTER — OFFICE VISIT (OUTPATIENT)
Dept: FAMILY MEDICINE CLINIC | Facility: CLINIC | Age: 80
End: 2025-02-19
Payer: MEDICARE

## 2025-02-19 VITALS
BODY MASS INDEX: 24.26 KG/M2 | DIASTOLIC BLOOD PRESSURE: 62 MMHG | RESPIRATION RATE: 16 BRPM | SYSTOLIC BLOOD PRESSURE: 134 MMHG | HEART RATE: 74 BPM | TEMPERATURE: 98.6 F | OXYGEN SATURATION: 99 % | HEIGHT: 60 IN | WEIGHT: 123.6 LBS

## 2025-02-19 DIAGNOSIS — E11.65 TYPE 2 DIABETES MELLITUS WITH HYPERGLYCEMIA, WITHOUT LONG-TERM CURRENT USE OF INSULIN: Primary | ICD-10-CM

## 2025-02-19 DIAGNOSIS — E11.65 TYPE 2 DIABETES MELLITUS WITH HYPERGLYCEMIA: ICD-10-CM

## 2025-02-19 DIAGNOSIS — I10 PRIMARY HYPERTENSION: ICD-10-CM

## 2025-02-19 DIAGNOSIS — E78.2 MIXED HYPERLIPIDEMIA: ICD-10-CM

## 2025-02-19 PROCEDURE — 3075F SYST BP GE 130 - 139MM HG: CPT | Performed by: REGISTERED NURSE

## 2025-02-19 PROCEDURE — 1159F MED LIST DOCD IN RCRD: CPT | Performed by: REGISTERED NURSE

## 2025-02-19 PROCEDURE — 1160F RVW MEDS BY RX/DR IN RCRD: CPT | Performed by: REGISTERED NURSE

## 2025-02-19 PROCEDURE — 3078F DIAST BP <80 MM HG: CPT | Performed by: REGISTERED NURSE

## 2025-02-19 PROCEDURE — 1126F AMNT PAIN NOTED NONE PRSNT: CPT | Performed by: REGISTERED NURSE

## 2025-02-19 PROCEDURE — 99214 OFFICE O/P EST MOD 30 MIN: CPT | Performed by: REGISTERED NURSE

## 2025-02-19 NOTE — PROGRESS NOTES
Chief Complaint  Diabetes (Doing well, no complaints. Last eye exam unknown- encouraged to complete)    Subjective    History of Present Illness {CC  Problem List  Visit  Diagnosis   Encounters  Notes  Medications  Labs  Result Review Imaging  Media :23}     Ramon Silva presents to [facility] for Diabetes (Doing well, no complaints. Last eye exam unknown- encouraged to complete).      History of Present Illness  Patient comes in for routine follow-up of hypertension, hyperlipidemia, and type 2 diabetes.  For his blood pressure today it reads 134/62 and currently takes Carvedilol 25 mg twice daily and Losartan 50 mg once a day.  He reports no chest pain or shortness of breath at this time.        History of Present Illness  In regards to hypertension, he is on carvedilol and losartan for hypertension management.  His blood pressure is 134/62 today.  Patient denies any concerns or issues with his hypertension or hypertension medication at this time.    In regards to hyperlipidemia, he is on atorvastatin for hyperlipidemia management.  Patient shares that he tries to follow a low-fat diet when possible.  He denies any concerns with his hyperlipidemia or atorvastatin at this time.    In regards to type 2 diabetes, he is on metformin and Farxiga for type 2 diabetes management.  Patient does not have a log of glucose readings with him today.  Patient does try to follow low-carb diet when possible.  He shares that his daughter has been very helpful in reducing the amount of carbohydrates he has available to eat.  He denies any concerns in regards to his type 2 diabetes or his metformin or Farxiga at this time.    He reports no adverse reactions to his current medication regimen and does not require any refills at this time. He has expressed a need for a referral to an ophthalmologist for a diabetic eye examination, as he can not recall the date of his last ocular assessment.  "    MEDICATIONS  atorvastatin, carvedilol, losartan, Farxiga       Review of Systems   Constitutional: Negative.  Negative for activity change, chills, fatigue and fever.   HENT: Negative.  Negative for congestion, dental problem, ear pain, hearing loss, rhinorrhea, sinus pain, sore throat, tinnitus and trouble swallowing.    Eyes: Negative.  Negative for pain and visual disturbance.   Respiratory: Negative.  Negative for cough, chest tightness, shortness of breath and wheezing.    Cardiovascular: Negative.  Negative for chest pain, palpitations and leg swelling.   Gastrointestinal: Negative.  Negative for abdominal pain, diarrhea, nausea and vomiting.   Endocrine: Negative.  Negative for polydipsia, polyphagia and polyuria.   Genitourinary: Negative.  Negative for difficulty urinating, dysuria, frequency and urgency.   Musculoskeletal: Negative.  Negative for arthralgias, back pain and myalgias.   Skin: Negative.  Negative for color change, pallor, rash and wound.   Allergic/Immunologic: Negative.  Negative for environmental allergies.   Neurological: Negative.  Negative for dizziness, speech difficulty, weakness, light-headedness, numbness and headaches.   Hematological: Negative.    Psychiatric/Behavioral: Negative.  Negative for confusion, decreased concentration, self-injury and suicidal ideas. The patient is not nervous/anxious.    All other systems reviewed and are negative.       Objective     Vital Signs:   /62 (BP Location: Left arm, Patient Position: Sitting)   Pulse 74   Temp 98.6 °F (37 °C) (Oral)   Resp 16   Ht 152.4 cm (60\")   Wt 56.1 kg (123 lb 9.6 oz)   SpO2 99% Comment: Room air  BMI 24.14 kg/m²   Current Outpatient Medications on File Prior to Visit   Medication Sig Dispense Refill    Accu-Chek Guide test strip 1 each by Other route Daily. E 11.9 50 each 2    Accu-Chek Softclix Lancets lancets test daily as directed 100 each 2    aspirin 81 MG tablet Take 1 tablet by mouth Daily.      " atorvastatin (LIPITOR) 40 MG tablet TAKE ONE TABLET BY MOUTH EVERY NIGHT AT BEDTIME 90 tablet 1    Cetirizine HCl (ZyrTEC Allergy) 10 MG capsule Take 10 mg by mouth Daily.      cilostazol (PLETAL) 100 MG tablet TAKE ONE TABLET BY MOUTH TWICE DAILY 60 tablet 1    colestipol (COLESTID) 1 g tablet Take 1 tablet by mouth 2 (Two) Times a Day. 180 tablet 1    Diclofenac Sodium (VOLTAREN) 1 % gel gel Apply 4 g topically to the appropriate area as directed 4 (Four) Times a Day As Needed (APPLY 4 TIMES A DAY AS NEED TO THE APPROPRIATE AREA). 4 g 2    famotidine (PEPCID) 40 MG tablet TAKE ONE TABLET BY MOUTH ONCE DAILY 30 tablet 1    ferrous sulfate 324 (65 Fe) MG tablet delayed-release EC tablet Take 1 tablet by mouth Daily With Breakfast. 90 tablet 1    finasteride (PROSCAR) 5 MG tablet TAKE ONE TABLET BY MOUTH ONCE DAILY 90 tablet 1    folic acid (FOLVITE) 1 MG tablet Take 1 tablet by mouth Daily. 30 tablet 2    gabapentin (NEURONTIN) 300 MG capsule TAKE ONE CAPSULE BY MOUTH EVERY EVENING 90 capsule 0    ibandronate (BONIVA) 150 MG tablet TAKE ONE TABLET BY MOUTH EVERY 30 DAYS 1 tablet 5    losartan (COZAAR) 50 MG tablet TAKE ONE TABLET BY MOUTH ONCE DAILY 90 tablet 0    Menthol (GNP HERBAL MT) Apply  to the mouth or throat. Neuroflow plus      metFORMIN ER (GLUCOPHAGE-XR) 500 MG 24 hr tablet TAKE TWO TABLETS BY MOUTH TWICE DAILY BEFORE MEALS 360 tablet 1    Omega-3 Fatty Acids (OMEGA-3 FISH OIL PO) Take 1 capsule by mouth Daily.      ondansetron ODT (ZOFRAN-ODT) 4 MG disintegrating tablet Place 1 tablet on the tongue Every 12 (Twelve) Hours As Needed for Nausea or Vomiting. 60 tablet 1    pantoprazole (PROTONIX) 40 MG EC tablet TAKE ONE TABLET BY MOUTH ONCE DAILY 90 tablet 0    traMADol (ULTRAM) 50 MG tablet Take 1 tablet by mouth Every 12 (Twelve) Hours As Needed.      Vitamin D, Cholecalciferol, (CHOLECALCIFEROL) 10 MCG (400 UNIT) tablet Take 1 tablet by mouth Daily.      [DISCONTINUED] carvedilol (COREG) 25 MG tablet  TAKE ONE TABLET BY MOUTH TWICE DAILY WITH MEALS 180 tablet 0    [DISCONTINUED] dapagliflozin Propanediol (Farxiga) 10 MG tablet Take 10 mg by mouth Daily. 90 tablet 1     No current facility-administered medications on file prior to visit.        Past Medical History:   Diagnosis Date    Anemia     Arthralgia     Diabetes     Fatigue     GERD (gastroesophageal reflux disease)     Gout     Hyperlipidemia     Hypertension     Liver lesion     Non Hodgkin's lymphoma     Osteoporosis     Personal history of testicular cancer     Vitamin D deficiency     Weight loss       Past Surgical History:   Procedure Laterality Date    ANGIOGRAM - CONVERTED  07/05/2022    aif by Dr Henriquez    CARDIAC CATHETERIZATION N/A 7/5/2022    Procedure: PERIPHERAL ANGIOGRAPHY Right leg;  Surgeon: Theron Henriquez MD;  Location:  [facility];  Service: Cardiovascular;  Laterality: N/A;    CARDIAC CATHETERIZATION N/A 10/2/2023    Procedure: Right and Left Heart Cath with possible PCI;  Surgeon: Joey Kirkland DO;  Location:  [facility];  Service: Cardiovascular;  Laterality: N/A;  Local and IV sedation    CARDIAC ELECTROPHYSIOLOGY PROCEDURE N/A 4/17/2024    Procedure: ICD DC new [employer] notified 03/22/24;  Surgeon: Chago Lambert MD;  Location:  [facility];  Service: Cardiovascular;  Laterality: N/A;  Needs Port-a-Cath removal prior to ICD    HIP SURGERY  2005    PORTACATH PLACEMENT      TESTICLE SURGERY Right 07/17/2000    right testicular excision      Family History   Family history unknown: Yes      Social History     Socioeconomic History    Marital status:    Tobacco Use    Smoking status: Never     Passive exposure: Never    Smokeless tobacco: Never   Vaping Use    Vaping status: Never Used   Substance and Sexual Activity    Alcohol use: No    Drug use: Never    Sexual activity: Defer         Clinical Support on 12/12/2024   Component Date Value Ref  Range Status    Ferritin 12/12/2024 347.00  30.00 - 400.00 ng/mL Final    Iron 12/12/2024 64  59 - 158 mcg/dL Final    Iron Saturation (TSAT) 12/12/2024 28  20 - 50 % Final    Transferrin 12/12/2024 154 (L)  200 - 360 mg/dL Final    TIBC 12/12/2024 229 (L)  298 - 536 mcg/dL Final    Vitamin B-12 12/12/2024 430  211 - 946 pg/mL Final    Folate 12/12/2024 10.20  4.78 - 24.20 ng/mL Final    WBC 12/12/2024 4.70  3.40 - 10.80 10*3/mm3 Final    RBC 12/12/2024 3.76 (L)  4.14 - 5.80 10*6/mm3 Final    Hemoglobin 12/12/2024 11.1 (L)  13.0 - 17.7 g/dL Final    Hematocrit 12/12/2024 35.4 (L)  37.5 - 51.0 % Final    MCV 12/12/2024 94.1  79.0 - 97.0 fL Final    MCH 12/12/2024 29.5  26.6 - 33.0 pg Final    MCHC 12/12/2024 31.4 (L)  31.5 - 35.7 g/dL Final    RDW 12/12/2024 13.0  12.3 - 15.4 % Final    RDW-SD 12/12/2024 43.8  37.0 - 54.0 fl Final    MPV 12/12/2024 11.9  6.0 - 12.0 fL Final    Platelets 12/12/2024 159  140 - 450 10*3/mm3 Final    Neutrophil % 12/12/2024 56.6  42.7 - 76.0 % Final    Lymphocyte % 12/12/2024 32.6  19.6 - 45.3 % Final    Monocyte % 12/12/2024 7.2  5.0 - 12.0 % Final    Eosinophil % 12/12/2024 3.0  0.3 - 6.2 % Final    Basophil % 12/12/2024 0.6  0.0 - 1.5 % Final    Immature Grans % 12/12/2024 0.0  0.0 - 0.5 % Final    Neutrophils, Absolute 12/12/2024 2.66  1.70 - 7.00 10*3/mm3 Final    Lymphocytes, Absolute 12/12/2024 1.53  0.70 - 3.10 10*3/mm3 Final    Monocytes, Absolute 12/12/2024 0.34  0.10 - 0.90 10*3/mm3 Final    Eosinophils, Absolute 12/12/2024 0.14  0.00 - 0.40 10*3/mm3 Final    Basophils, Absolute 12/12/2024 0.03  0.00 - 0.20 10*3/mm3 Final    Immature Grans, Absolute 12/12/2024 0.00  0.00 - 0.05 10*3/mm3 Final    nRBC 12/12/2024 0.0  0.0 - 0.2 /100 WBC Final   Clinical Support on 12/02/2024   Component Date Value Ref Range Status    Glucose 12/02/2024 99  65 - 99 mg/dL Final    BUN 12/02/2024 21  8 - 23 mg/dL Final    Creatinine 12/02/2024 1.12  0.76 - 1.27 mg/dL Final    Sodium 12/02/2024 137   136 - 145 mmol/L Final    Potassium 12/02/2024 5.0  3.5 - 5.2 mmol/L Final    Chloride 12/02/2024 99  98 - 107 mmol/L Final    CO2 12/02/2024 25.5  22.0 - 29.0 mmol/L Final    Calcium 12/02/2024 9.6  8.6 - 10.5 mg/dL Final    Total Protein 12/02/2024 7.0  6.0 - 8.5 g/dL Final    Albumin 12/02/2024 4.2  3.5 - 5.2 g/dL Final    ALT (SGPT) 12/02/2024 8  1 - 41 U/L Final    AST (SGOT) 12/02/2024 15  1 - 40 U/L Final    Alkaline Phosphatase 12/02/2024 59  39 - 117 U/L Final    Total Bilirubin 12/02/2024 0.3  0.0 - 1.2 mg/dL Final    Globulin 12/02/2024 2.8  gm/dL Final    A/G Ratio 12/02/2024 1.5  g/dL Final    BUN/Creatinine Ratio 12/02/2024 18.8  7.0 - 25.0 Final    Anion Gap 12/02/2024 12.5  5.0 - 15.0 mmol/L Final    eGFR 12/02/2024 66.8  >60.0 mL/min/1.73 Final    Total Cholesterol 12/02/2024 120  0 - 200 mg/dL Final    Triglycerides 12/02/2024 123  0 - 150 mg/dL Final    HDL Cholesterol 12/02/2024 44  40 - 60 mg/dL Final    LDL Cholesterol  12/02/2024 54  0 - 100 mg/dL Final    VLDL Cholesterol 12/02/2024 22  5 - 40 mg/dL Final    LDL/HDL Ratio 12/02/2024 1.17   Final    Hemoglobin A1C 12/02/2024 6.10 (H)  4.80 - 5.60 % Final    TSH 12/02/2024 1.750  0.270 - 4.200 uIU/mL Final    WBC 12/02/2024 5.86  3.40 - 10.80 10*3/mm3 Final    RBC 12/02/2024 3.66 (L)  4.14 - 5.80 10*6/mm3 Final    Hemoglobin 12/02/2024 11.4 (L)  13.0 - 17.7 g/dL Final    Hematocrit 12/02/2024 34.6 (L)  37.5 - 51.0 % Final    MCV 12/02/2024 94.5  79.0 - 97.0 fL Final    MCH 12/02/2024 31.1  26.6 - 33.0 pg Final    MCHC 12/02/2024 32.9  31.5 - 35.7 g/dL Final    RDW 12/02/2024 12.9  12.3 - 15.4 % Final    RDW-SD 12/02/2024 43.9  37.0 - 54.0 fl Final    MPV 12/02/2024 11.3  6.0 - 12.0 fL Final    Platelets 12/02/2024 154  140 - 450 10*3/mm3 Final    Neutrophil % 12/02/2024 57.2  42.7 - 76.0 % Final    Lymphocyte % 12/02/2024 30.4  19.6 - 45.3 % Final    Monocyte % 12/02/2024 8.2  5.0 - 12.0 % Final    Eosinophil % 12/02/2024 3.6  0.3 - 6.2 % Final     Basophil % 12/02/2024 0.3  0.0 - 1.5 % Final    Immature Grans % 12/02/2024 0.3  0.0 - 0.5 % Final    Neutrophils, Absolute 12/02/2024 3.35  1.70 - 7.00 10*3/mm3 Final    Lymphocytes, Absolute 12/02/2024 1.78  0.70 - 3.10 10*3/mm3 Final    Monocytes, Absolute 12/02/2024 0.48  0.10 - 0.90 10*3/mm3 Final    Eosinophils, Absolute 12/02/2024 0.21  0.00 - 0.40 10*3/mm3 Final    Basophils, Absolute 12/02/2024 0.02  0.00 - 0.20 10*3/mm3 Final    Immature Grans, Absolute 12/02/2024 0.02  0.00 - 0.05 10*3/mm3 Final    nRBC 12/02/2024 0.0  0.0 - 0.2 /100 WBC Final         Physical Exam  Vitals and nursing note reviewed.   Constitutional:       Appearance: Normal appearance. He is normal weight.   HENT:      Head: Normocephalic and atraumatic.   Cardiovascular:      Rate and Rhythm: Normal rate and regular rhythm.      Pulses: Normal pulses.      Heart sounds: Normal heart sounds. No murmur heard.     No friction rub. No gallop.   Pulmonary:      Effort: Pulmonary effort is normal. No respiratory distress.      Breath sounds: Normal breath sounds. No stridor. No wheezing, rhonchi or rales.   Chest:      Chest wall: No tenderness.   Abdominal:      General: Abdomen is flat. Bowel sounds are normal. There is no distension.      Palpations: Abdomen is soft. There is no mass.      Tenderness: There is no abdominal tenderness. There is no right CVA tenderness, left CVA tenderness, guarding or rebound.      Hernia: No hernia is present.   Skin:     General: Skin is warm and dry.      Capillary Refill: Capillary refill takes less than 2 seconds.      Coloration: Skin is not jaundiced or pale.   Neurological:      General: No focal deficit present.      Mental Status: He is alert and oriented to person, place, and time. Mental status is at baseline.      Motor: No weakness.      Coordination: Coordination normal.      Gait: Gait normal.   Psychiatric:         Mood and Affect: Mood normal.         Behavior: Behavior normal.          Thought Content: Thought content normal.         Judgment: Judgment normal.          Physical Exam  Lungs were auscultated.  Heart was examined.    Vital Signs  Blood pressure is 134/62.       Result Review  Data Reviewed:{ Labs  Result Review  Imaging  Med Tab  Media :23}   I have reviewed this patient's chart.  I have reviewed previous labs, previous imaging, previous medications, and previous encounters with notes that were available in this patient's chart.    Results  Laboratory Studies  A1c decreased from 7.6 to 6.1 in December.              Assessment and Plan {CC Problem List  Visit Diagnosis  ROS  Review (Popup)  [facility]  BestPractice  Medications  SmartSets  SnapShot Encounters  Media :23}   Diagnoses and all orders for this visit:    1. Type 2 diabetes mellitus with hyperglycemia, without long-term current use of insulin (Primary)  -     Ambulatory Referral for Diabetic Eye Exam-Ophthalmology    2. Primary hypertension  -     carvedilol (COREG) 25 MG tablet; Take 1 tablet by mouth 2 (Two) Times a Day With Meals.  Dispense: 180 tablet; Refill: 0    3. Mixed hyperlipidemia    4. Type 2 diabetes mellitus with hyperglycemia        Assessment & Plan  1. Hypertension.  His blood pressure was rechecked and recorded at 134/62, which is within his normal range. He is currently taking carvedilol and losartan for hypertension management. He is advised to continue his current medication regimen. No new medications are needed at this time.    2. Hyperlipidemia.  He is currently taking atorvastatin for cholesterol management. He is advised to continue his current medication regimen. No new medications are needed at this time.    3. Type 2 Diabetes Mellitus.  His A1C levels have significantly improved from 7.6 to 6.1 in December, indicating effective management of his diabetes. He is currently taking Farxiga and metformin for diabetes management. He is advised to continue his current  medication regimen. A referral for a diabetic eye exam has been placed with Dr. Pearl in the [place] area. If there are any issues with the referral, he is instructed to call for assistance in finding another provider.         -ER red flags discussed with patient including risk versus benefit and education provided.  -Follow-up with me in 3 months or sooner if needed.    I spent 30 minutes caring for Ramon on this date of service. This time includes time spent by me in the following activities:preparing for the visit, reviewing tests, obtaining and/or reviewing a separately obtained history, performing a medically appropriate examination and/or evaluation , counseling and educating the patient/family/caregiver, ordering medications, tests, or procedures, referring and communicating with other health care professionals , documenting information in the medical record, independently interpreting results and communicating that information with the patient/family/caregiver, and care coordination.    Follow Up {Instructions Charge Capture  Follow-up Communications :23}     Patient was given instructions and counseling regarding his condition or for health maintenance advice. Please see specific information pulled into the AVS (placed there by myself) if appropriate.    Return in about 3 months (around 5/19/2025).    BMI is within normal parameters. No other follow-up for BMI required.         CON Monahan, [facility]      Patient or patient representative verbalized consent for the use of Ambient Listening during the visit with  CON Monahan for chart documentation. 2/28/2025  12:36 EST

## 2025-02-24 ENCOUNTER — TELEPHONE (OUTPATIENT)
Dept: FAMILY MEDICINE CLINIC | Facility: CLINIC | Age: 80
End: 2025-02-24
Payer: MEDICARE

## 2025-02-24 NOTE — TELEPHONE ENCOUNTER
Caller: HOANGREECE    Relationship: Emergency Contact    Best call back number:     [phone]       What is the best time to reach you: ANY    Who are you requesting to speak with (clinical staff, provider,  specific staff member): PCP    Do you know the name of the person who called:     What was the call regarding: PATIENT NEEDS LETTER STATING THAT HIS HEMOGLOBIN IS LESS THAN 7.5 OR HIS LAST BLOOD WORK RESULTS SENT OVER TO HIS ORTHOPEDICS OFFICE.     [facility] [phone]    Is it okay if the provider responds through MyChart:

## 2025-02-27 DIAGNOSIS — E11.65 TYPE 2 DIABETES MELLITUS WITH HYPERGLYCEMIA: ICD-10-CM

## 2025-02-28 RX ORDER — DAPAGLIFLOZIN 10 MG/1
1 TABLET, FILM COATED ORAL DAILY
Qty: 90 TABLET | Refills: 1 | Status: SHIPPED | OUTPATIENT
Start: 2025-02-28

## 2025-02-28 RX ORDER — CARVEDILOL 25 MG/1
25 TABLET ORAL 2 TIMES DAILY WITH MEALS
Qty: 180 TABLET | Refills: 0 | Status: SHIPPED | OUTPATIENT
Start: 2025-02-28

## 2025-03-20 ENCOUNTER — HOSPITAL ENCOUNTER (OUTPATIENT)
Dept: CARDIOLOGY | Facility: HOSPITAL | Age: 80
Discharge: HOME | End: 2025-03-20
Admitting: INTERNAL MEDICINE
Payer: MEDICARE

## 2025-03-20 VITALS
DIASTOLIC BLOOD PRESSURE: 65 MMHG | BODY MASS INDEX: 24.15 KG/M2 | SYSTOLIC BLOOD PRESSURE: 166 MMHG | WEIGHT: 123 LBS | HEIGHT: 60 IN

## 2025-03-20 DIAGNOSIS — I42.0 DILATED CARDIOMYOPATHY: ICD-10-CM

## 2025-03-20 DIAGNOSIS — I44.7 LBBB (LEFT BUNDLE BRANCH BLOCK): ICD-10-CM

## 2025-03-20 PROCEDURE — 93356 MYOCRD STRAIN IMG SPCKL TRCK: CPT | Performed by: INTERNAL MEDICINE

## 2025-03-20 PROCEDURE — 93306 TTE W/DOPPLER COMPLETE: CPT

## 2025-03-20 PROCEDURE — 93306 TTE W/DOPPLER COMPLETE: CPT | Performed by: INTERNAL MEDICINE

## 2025-03-20 PROCEDURE — 93356 MYOCRD STRAIN IMG SPCKL TRCK: CPT

## 2025-03-22 DIAGNOSIS — K21.9 GASTROESOPHAGEAL REFLUX DISEASE, UNSPECIFIED WHETHER ESOPHAGITIS PRESENT: ICD-10-CM

## 2025-03-24 ENCOUNTER — TELEPHONE (OUTPATIENT)
Dept: CARDIOLOGY | Facility: CLINIC | Age: 80
End: 2025-03-24
Payer: MEDICARE

## 2025-03-24 LAB
AORTIC DIMENSIONLESS INDEX: 0.82 (DI)
AV MEAN PRESS GRAD SYS DOP V1V2: 2.7 MMHG
AV VMAX SYS DOP: 119.9 CM/SEC
BH CV ECHO LEFT VENTRICLE GLOBAL LONGITUDINAL STRAIN: -17.1 %
BH CV ECHO MEAS - AI P1/2T: 631.4 MSEC
BH CV ECHO MEAS - AO MAX PG: 5.7 MMHG
BH CV ECHO MEAS - AO ROOT DIAM: 3.6 CM
BH CV ECHO MEAS - AO V2 VTI: 28.4 CM
BH CV ECHO MEAS - AVA(I,D): 2.7 CM2
BH CV ECHO MEAS - EDV(CUBED): 111.5 ML
BH CV ECHO MEAS - EDV(MOD-SP2): 98.2 ML
BH CV ECHO MEAS - EDV(MOD-SP4): 92.5 ML
BH CV ECHO MEAS - EF(MOD-SP2): 48.6 %
BH CV ECHO MEAS - EF(MOD-SP4): 45.7 %
BH CV ECHO MEAS - ESV(CUBED): 64.7 ML
BH CV ECHO MEAS - ESV(MOD-SP2): 50.5 ML
BH CV ECHO MEAS - ESV(MOD-SP4): 50.2 ML
BH CV ECHO MEAS - FS: 16.6 %
BH CV ECHO MEAS - IVS/LVPW: 0.97 CM
BH CV ECHO MEAS - IVSD: 1.02 CM
BH CV ECHO MEAS - LA DIMENSION: 3.7 CM
BH CV ECHO MEAS - LAT PEAK E' VEL: 8.1 CM/SEC
BH CV ECHO MEAS - LV DIASTOLIC VOL/BSA (35-75): 60.9 CM2
BH CV ECHO MEAS - LV MASS(C)D: 179.3 GRAMS
BH CV ECHO MEAS - LV MAX PG: 4.2 MMHG
BH CV ECHO MEAS - LV MEAN PG: 1.8 MMHG
BH CV ECHO MEAS - LV SYSTOLIC VOL/BSA (12-30): 33.1 CM2
BH CV ECHO MEAS - LV V1 MAX: 102 CM/SEC
BH CV ECHO MEAS - LV V1 VTI: 23.2 CM
BH CV ECHO MEAS - LVIDD: 4.8 CM
BH CV ECHO MEAS - LVIDS: 4 CM
BH CV ECHO MEAS - LVOT AREA: 3.3 CM2
BH CV ECHO MEAS - LVOT DIAM: 2.05 CM
BH CV ECHO MEAS - LVPWD: 1.05 CM
BH CV ECHO MEAS - MED PEAK E' VEL: 7 CM/SEC
BH CV ECHO MEAS - MR MAX PG: 72.3 MMHG
BH CV ECHO MEAS - MR MAX VEL: 422.9 CM/SEC
BH CV ECHO MEAS - MV A DUR: 0.16 SEC
BH CV ECHO MEAS - MV A MAX VEL: 84.5 CM/SEC
BH CV ECHO MEAS - MV DEC SLOPE: 257.3 CM/SEC2
BH CV ECHO MEAS - MV DEC TIME: 0.22 SEC
BH CV ECHO MEAS - MV E MAX VEL: 57.3 CM/SEC
BH CV ECHO MEAS - MV E/A: 0.68
BH CV ECHO MEAS - MV MAX PG: 3 MMHG
BH CV ECHO MEAS - MV MEAN PG: 1.23 MMHG
BH CV ECHO MEAS - MV V2 VTI: 20.6 CM
BH CV ECHO MEAS - MVA(VTI): 3.7 CM2
BH CV ECHO MEAS - PA ACC TIME: 0.09 SEC
BH CV ECHO MEAS - PA V2 MAX: 93.1 CM/SEC
BH CV ECHO MEAS - PULM A REVS DUR: 0.17 SEC
BH CV ECHO MEAS - PULM A REVS VEL: 27.5 CM/SEC
BH CV ECHO MEAS - PULM DIAS VEL: 39.1 CM/SEC
BH CV ECHO MEAS - PULM S/D: 0.81
BH CV ECHO MEAS - PULM SYS VEL: 31.6 CM/SEC
BH CV ECHO MEAS - RAP SYSTOLE: 8 MMHG
BH CV ECHO MEAS - RV MAX PG: 1.15 MMHG
BH CV ECHO MEAS - RV V1 MAX: 53.6 CM/SEC
BH CV ECHO MEAS - RV V1 VTI: 14.6 CM
BH CV ECHO MEAS - RVSP: 39.5 MMHG
BH CV ECHO MEAS - SV(LVOT): 76.5 ML
BH CV ECHO MEAS - SV(MOD-SP2): 47.8 ML
BH CV ECHO MEAS - SV(MOD-SP4): 42.3 ML
BH CV ECHO MEAS - SVI(LVOT): 50.4 ML/M2
BH CV ECHO MEAS - SVI(MOD-SP2): 31.5 ML/M2
BH CV ECHO MEAS - SVI(MOD-SP4): 27.8 ML/M2
BH CV ECHO MEAS - TAPSE (>1.6): 1.81 CM
BH CV ECHO MEAS - TR MAX PG: 31.5 MMHG
BH CV ECHO MEAS - TR MAX VEL: 280.1 CM/SEC
BH CV ECHO MEASUREMENTS AVERAGE E/E' RATIO: 7.59
LV EF BIPLANE MOD: 47 %

## 2025-03-25 RX ORDER — FAMOTIDINE 40 MG/1
40 TABLET, FILM COATED ORAL DAILY PRN
Qty: 90 TABLET | Refills: 1 | Status: SHIPPED | OUTPATIENT
Start: 2025-03-25

## 2025-04-07 DIAGNOSIS — I73.9 INTERMITTENT CLAUDICATION: ICD-10-CM

## 2025-04-07 RX ORDER — CILOSTAZOL 100 MG/1
100 TABLET ORAL 2 TIMES DAILY
Qty: 60 TABLET | Refills: 1 | Status: SHIPPED | OUTPATIENT
Start: 2025-04-07

## 2025-04-21 ENCOUNTER — OFFICE VISIT (OUTPATIENT)
Dept: FAMILY MEDICINE CLINIC | Facility: CLINIC | Age: 80
End: 2025-04-21
Payer: MEDICARE

## 2025-04-21 VITALS
HEIGHT: 60 IN | BODY MASS INDEX: 23.75 KG/M2 | TEMPERATURE: 96.7 F | RESPIRATION RATE: 18 BRPM | OXYGEN SATURATION: 96 % | WEIGHT: 121 LBS | DIASTOLIC BLOOD PRESSURE: 79 MMHG | SYSTOLIC BLOOD PRESSURE: 145 MMHG | HEART RATE: 80 BPM

## 2025-04-21 DIAGNOSIS — E11.65 TYPE 2 DIABETES MELLITUS WITH HYPERGLYCEMIA, WITHOUT LONG-TERM CURRENT USE OF INSULIN: ICD-10-CM

## 2025-04-21 DIAGNOSIS — I73.9 INTERMITTENT CLAUDICATION: ICD-10-CM

## 2025-04-21 DIAGNOSIS — Z01.818 PREOPERATIVE CLEARANCE: Primary | ICD-10-CM

## 2025-04-21 PROCEDURE — 3077F SYST BP >= 140 MM HG: CPT | Performed by: REGISTERED NURSE

## 2025-04-21 PROCEDURE — 1159F MED LIST DOCD IN RCRD: CPT | Performed by: REGISTERED NURSE

## 2025-04-21 PROCEDURE — 99213 OFFICE O/P EST LOW 20 MIN: CPT | Performed by: REGISTERED NURSE

## 2025-04-21 PROCEDURE — 1126F AMNT PAIN NOTED NONE PRSNT: CPT | Performed by: REGISTERED NURSE

## 2025-04-21 PROCEDURE — 1160F RVW MEDS BY RX/DR IN RCRD: CPT | Performed by: REGISTERED NURSE

## 2025-04-21 PROCEDURE — 3078F DIAST BP <80 MM HG: CPT | Performed by: REGISTERED NURSE

## 2025-04-21 NOTE — PROGRESS NOTES
Chief Complaint  surgery clearance (Left knee surgery and later right knee)    Subjective    History of Present Illness {CC  Problem List  Visit  Diagnosis   Encounters  Notes  Medications  Labs  Result Review Imaging  Media :23}     Ramon Silva presents to [facility] for surgery clearance (Left knee surgery and later right knee).      History of Present Illness  Patient is a 80 y.o. male who presents to the clinic today for preoperative clearance for left total knee replacement.  Patient denies any chest pain, shortness of breath, or any fevers.  Patient denies any known exposure to COVID, flu, or any other contagious illnesses.       History of Present Illness  In regards to preop clearance, he is scheduled to undergo a left total knee replacement on 05/05/2025, under the care of Dr. Gonzalez. An EKG was performed by the cardiology department on 04/07/2025. He is currently awaiting an appointment with Dr. Aparicio for cardiology clearance. Labs were completed at one of the [place] facilities 5 days ago, showing a slightly elevated BUN but normal kidney and liver function. The necessary paperwork for primary care clearance has not yet been received, and it is believed that his daughter may have it. No concerns or issues regarding the upcoming knee replacement surgery are expressed.  Primary care standpoint I am okay with clearing patient for surgery as long as he is cleared by cardiology.  I urged patient's family to contact cardiology for this clearance appointment as soon as possible and not wait till last minute.    Current medications include metformin, Pletal, and baby aspirin. Instructions were given to stop metformin on the day of surgery and resume it after discharge unless otherwise directed by the surgeon. Pletal should be stopped 5 days before surgery unless instructed otherwise by the surgeon, and baby aspirin use should be discussed with cardiology regarding when  "to stop prior to surgery.       Review of Systems   Constitutional: Negative.  Negative for activity change, chills, fatigue and fever.   HENT: Negative.  Negative for congestion, dental problem, ear pain, hearing loss, rhinorrhea, sinus pain, sore throat, tinnitus and trouble swallowing.    Eyes: Negative.  Negative for pain and visual disturbance.   Respiratory: Negative.  Negative for cough, chest tightness, shortness of breath and wheezing.    Cardiovascular: Negative.  Negative for chest pain, palpitations and leg swelling.   Gastrointestinal: Negative.  Negative for abdominal pain, diarrhea, nausea and vomiting.   Endocrine: Negative.  Negative for polydipsia, polyphagia and polyuria.   Genitourinary: Negative.  Negative for difficulty urinating, dysuria, frequency and urgency.   Musculoskeletal: Negative.  Positive for arthralgias, gait problem (Bilateral knee pain, worse in the left leg.) and joint swelling. Negative for back pain and myalgias.   Skin: Negative.  Negative for color change, pallor, rash and wound.   Allergic/Immunologic: Negative.  Negative for environmental allergies.   Neurological: Negative.  Negative for dizziness, speech difficulty, weakness, light-headedness, numbness and headaches.   Hematological: Negative.    Psychiatric/Behavioral: Negative.  Negative for confusion, decreased concentration, self-injury and suicidal ideas. The patient is not nervous/anxious.    All other systems reviewed and are negative.       Objective     Vital Signs:   /79 (BP Location: Right arm, Patient Position: Sitting, Cuff Size: Adult)   Pulse 80   Temp 96.7 °F (35.9 °C) (Temporal)   Resp 18   Ht 152.4 cm (60\")   Wt 54.9 kg (121 lb)   SpO2 96%   BMI 23.63 kg/m²   Current Outpatient Medications on File Prior to Visit   Medication Sig Dispense Refill    Accu-Chek Guide test strip 1 each by Other route Daily. E 11.9 50 each 2    Accu-Chek Softclix Lancets lancets test daily as directed 100 each 2 "    aspirin 81 MG tablet Take 1 tablet by mouth Daily.      atorvastatin (LIPITOR) 40 MG tablet TAKE ONE TABLET BY MOUTH EVERY NIGHT AT BEDTIME 90 tablet 1    carvedilol (COREG) 25 MG tablet Take 1 tablet by mouth 2 (Two) Times a Day With Meals. 180 tablet 0    Cetirizine HCl (ZyrTEC Allergy) 10 MG capsule Take 10 mg by mouth Daily.      cilostazol (PLETAL) 100 MG tablet TAKE ONE TABLET BY MOUTH TWICE DAILY 60 tablet 1    colestipol (COLESTID) 1 g tablet Take 1 tablet by mouth 2 (Two) Times a Day. 180 tablet 1    Diclofenac Sodium (VOLTAREN) 1 % gel gel Apply 4 g topically to the appropriate area as directed 4 (Four) Times a Day As Needed (APPLY 4 TIMES A DAY AS NEED TO THE APPROPRIATE AREA). 4 g 2    famotidine (PEPCID) 40 MG tablet Take 1 tablet by mouth Daily As Needed for Heartburn. Only if Pantoprazole is not working. Should not take daily, only as needed. 90 tablet 1    Farxiga 10 MG tablet TAKE ONE TABLET BY MOUTH EVERY DAY 90 tablet 1    ferrous sulfate 324 (65 Fe) MG tablet delayed-release EC tablet Take 1 tablet by mouth Daily With Breakfast. 90 tablet 1    finasteride (PROSCAR) 5 MG tablet TAKE ONE TABLET BY MOUTH ONCE DAILY 90 tablet 1    folic acid (FOLVITE) 1 MG tablet Take 1 tablet by mouth Daily. 30 tablet 2    gabapentin (NEURONTIN) 300 MG capsule TAKE ONE CAPSULE BY MOUTH EVERY EVENING 90 capsule 0    ibandronate (BONIVA) 150 MG tablet TAKE ONE TABLET BY MOUTH EVERY 30 DAYS 1 tablet 5    losartan (COZAAR) 50 MG tablet TAKE ONE TABLET BY MOUTH ONCE DAILY 90 tablet 0    Menthol (GNP HERBAL MT) Apply  to the mouth or throat. Neuroflow plus      metFORMIN ER (GLUCOPHAGE-XR) 500 MG 24 hr tablet TAKE TWO TABLETS BY MOUTH TWICE DAILY BEFORE MEALS 360 tablet 1    Omega-3 Fatty Acids (OMEGA-3 FISH OIL PO) Take 1 capsule by mouth Daily.      ondansetron ODT (ZOFRAN-ODT) 4 MG disintegrating tablet Place 1 tablet on the tongue Every 12 (Twelve) Hours As Needed for Nausea or Vomiting. 60 tablet 1    pantoprazole  (PROTONIX) 40 MG EC tablet TAKE ONE TABLET BY MOUTH ONCE DAILY 90 tablet 0    traMADol (ULTRAM) 50 MG tablet Take 1 tablet by mouth Every 12 (Twelve) Hours As Needed.      Vitamin D, Cholecalciferol, (CHOLECALCIFEROL) 10 MCG (400 UNIT) tablet Take 1 tablet by mouth Daily.       No current facility-administered medications on file prior to visit.        Past Medical History:   Diagnosis Date    Anemia     Arthralgia     Diabetes     Fatigue     GERD (gastroesophageal reflux disease)     Gout     Hyperlipidemia     Hypertension     Liver lesion     Non Hodgkin's lymphoma     Osteoporosis     Personal history of testicular cancer     Vitamin D deficiency     Weight loss       Past Surgical History:   Procedure Laterality Date    ANGIOGRAM - CONVERTED  07/05/2022    aif by Dr Henriquez    CARDIAC CATHETERIZATION N/A 7/5/2022    Procedure: PERIPHERAL ANGIOGRAPHY Right leg;  Surgeon: Theron Henriquez MD;  [facility]: [facility] CATH INVASIVE LOC[facility];  Service: Cardiovascular;  Laterality: N/A;    CARDIAC CATHETERIZATION N/A 10/2/2023    Procedure: Right and Left Heart Cath with possible PCI;  Surgeon: Joey Kirkland DO;  Location:  [facility] CATH INVASIVE LOCATION;  Service: Cardiovascular;  Laterality: N/A;  Local and IV sedation    CARDIAC ELECTROPHYSIOLOGY PROCEDURE N/A 4/17/2024    Procedure: ICD DC new [employer] notified 03/22/24;  Surgeon: Chago Lambert MD;  Location:  [facility] CATH INVASIVE LOCATION;  Service: Cardiovascular;  Laterality: N/A;  Needs Port-a-Cath removal prior to ICD    HIP SURGERY  2005    PORTACATH PLACEMENT      TESTICLE SURGERY Right 07/17/2000    right testicular excision      Family History   Problem Relation Age of Onset    Hypertension Mother       Social History     Socioeconomic History    Marital status:    Tobacco Use    Smoking status: Never     Passive exposure: Never    Smokeless tobacco: Never   Vaping Use    Vaping status: Never Used   Substance and Sexual  Activity    Alcohol use: No    Drug use: Never    Sexual activity: Defer         Hospital Outpatient Visit on 03/20/2025   Component Date Value Ref Range Status    LVIDd 03/20/2025 4.8  cm Final    LVIDs 03/20/2025 4.0  cm Final    IVSd 03/20/2025 1.02  cm Final    LVPWd 03/20/2025 1.05  cm Final    FS 03/20/2025 16.6  % Final    IVS/LVPW 03/20/2025 0.97  cm Final    ESV(cubed) 03/20/2025 64.7  ml Final    LV Sys Vol (BSA corrected) 03/20/2025 33.1  cm2 Final    EDV(cubed) 03/20/2025 111.5  ml Final    LV Irbera Vol (BSA corrected) 03/20/2025 60.9  cm2 Final    LV mass(C)d 03/20/2025 179.3  grams Final    LVOT area 03/20/2025 3.3  cm2 Final    LVOT diam 03/20/2025 2.05  cm Final    EDV(MOD-sp2) 03/20/2025 98.2  ml Final    EDV(MOD-sp4) 03/20/2025 92.5  ml Final    ESV(MOD-sp2) 03/20/2025 50.5  ml Final    ESV(MOD-sp4) 03/20/2025 50.2  ml Final    SV(MOD-sp2) 03/20/2025 47.8  ml Final    SV(MOD-sp4) 03/20/2025 42.3  ml Final    SVi(MOD-SP2) 03/20/2025 31.5  ml/m2 Final    SVi(MOD-SP4) 03/20/2025 27.8  ml/m2 Final    SVi (LVOT) 03/20/2025 50.4  ml/m2 Final    EF(MOD-sp2) 03/20/2025 48.6  % Final    EF(MOD-sp4) 03/20/2025 45.7  % Final    MV E max dhiraj 03/20/2025 57.3  cm/sec Final    MV A max dhiraj 03/20/2025 84.5  cm/sec Final    MV dec time 03/20/2025 0.22  sec Final    MV E/A 03/20/2025 0.68   Final    Pulm A Revs Dur 03/20/2025 0.17  sec Final    MV A dur 03/20/2025 0.16  sec Final    Med Peak E' Dhiraj 03/20/2025 7.0  cm/sec Final    Lat Peak E' Dhiraj 03/20/2025 8.1  cm/sec Final    TR max dhiraj 03/20/2025 280.1  cm/sec Final    Avg E/e' ratio 03/20/2025 7.59   Final    SV(LVOT) 03/20/2025 76.5  ml Final    TAPSE (>1.6) 03/20/2025 1.81  cm Final    LA dimension (2D)  03/20/2025 3.7  cm Final    Pulm Sys Dhiraj 03/20/2025 31.6  cm/sec Final    Pulm Ribera Dhiraj 03/20/2025 39.1  cm/sec Final    Pulm S/D 03/20/2025 0.81   Final    Pulm A Revs Dhiraj 03/20/2025 27.5  cm/sec Final    LV V1 max 03/20/2025 102.0  cm/sec Final    LV V1 max PG  03/20/2025 4.2  mmHg Final    LV V1 mean PG 03/20/2025 1.80  mmHg Final    LV V1 VTI 03/20/2025 23.2  cm Final    Ao pk jason 03/20/2025 119.9  cm/sec Final    Ao max PG 03/20/2025 5.7  mmHg Final    Ao mean PG 03/20/2025 2.7  mmHg Final    Ao V2 VTI 03/20/2025 28.4  cm Final    KEN(I,D) 03/20/2025 2.7  cm2 Final    Dimensionless Index 03/20/2025 0.82  (DI) Final    AI P1/2t 03/20/2025 631.4  msec Final    MV max PG 03/20/2025 3.0  mmHg Final    MV mean PG 03/20/2025 1.23  mmHg Final    MV V2 VTI 03/20/2025 20.6  cm Final    MVA(VTI) 03/20/2025 3.7  cm2 Final    MV dec slope 03/20/2025 257.3  cm/sec2 Final    MR max jason 03/20/2025 422.9  cm/sec Final    MR max PG 03/20/2025 72.3  mmHg Final    TR max PG 03/20/2025 31.5  mmHg Final    RVSP(TR) 03/20/2025 39.5  mmHg Final    RAP systole 03/20/2025 8.0  mmHg Final    RV V1 max PG 03/20/2025 1.15  mmHg Final    RV V1 max 03/20/2025 53.6  cm/sec Final    RV V1 VTI 03/20/2025 14.6  cm Final    PA V2 max 03/20/2025 93.1  cm/sec Final    PA acc time 03/20/2025 0.09  sec Final    Ao root diam 03/20/2025 3.6  cm Final    EF(MOD-bp) 03/20/2025 47.0  % Final    LV GLOBAL STRAIN  03/20/2025 -17.1  % Final         Physical Exam  Vitals and nursing note reviewed.   Constitutional:       Appearance: Normal appearance. He is normal weight.   HENT:      Head: Normocephalic and atraumatic.   Cardiovascular:      Rate and Rhythm: Normal rate and regular rhythm.      Pulses: Normal pulses.      Heart sounds: Normal heart sounds. No murmur heard.     No friction rub. No gallop.   Pulmonary:      Effort: Pulmonary effort is normal. No respiratory distress.      Breath sounds: Normal breath sounds. No stridor. No wheezing, rhonchi or rales.   Chest:      Chest wall: No tenderness.   Abdominal:      General: Abdomen is flat. Bowel sounds are normal. There is no distension.      Palpations: Abdomen is soft. There is no mass.      Tenderness: There is no abdominal tenderness. There is no right  CVA tenderness, left CVA tenderness, guarding or rebound.      Hernia: No hernia is present.   Skin:     General: Skin is warm and dry.      Capillary Refill: Capillary refill takes less than 2 seconds.      Coloration: Skin is not jaundiced or pale.   Neurological:      General: No focal deficit present.      Mental Status: He is alert and oriented to person, place, and time. Mental status is at baseline.      Motor: No weakness.      Coordination: Coordination normal.      Gait: Gait normal.   Psychiatric:         Mood and Affect: Mood normal.         Behavior: Behavior normal.         Thought Content: Thought content normal.         Judgment: Judgment normal.          Physical Exam         Result Review  Data Reviewed:{ Labs  Result Review  Imaging  Med Tab  Media :23}   I have reviewed this patient's chart.  I have reviewed previous labs, previous imaging, previous medications, and previous encounters with notes that were available in this patient's chart.    Results  Labs   - BUN: Slightly elevated   - Kidney function: Normal   - Liver function: Normal              Assessment and Plan {CC Problem List  Visit Diagnosis  ROS  Review (Popup)  [facility]  Quality  BestPractice  Medications  SmartSets  SnapShot Encounters  Media :23}   Diagnoses and all orders for this visit:    1. Preoperative clearance (Primary)    2. Intermittent claudication    3. Type 2 diabetes mellitus with hyperglycemia, without long-term current use of insulin        Assessment & Plan  1. Preoperative evaluation for left total knee replacement.  - Scheduled for surgery on 05/05/2025 with Dr. Gonzalez.  - Recent labs show BUN slightly elevated, but kidney and liver functions are within normal limits.  - Cardiological clearance pending; EKG performed on 04/07/2025.  - Advised to discontinue metformin on the day of surgery and resume post-discharge unless otherwise directed by the surgeon. Pletal to be stopped 5 days prior  to surgery unless instructed otherwise. Baby aspirin discontinuation to be confirmed with cardiology. All other medications to be resumed post-surgery unless otherwise directed by the surgeon.         -ER red flags discussed with patient including risk versus benefit and education provided.  -Follow-up with me 1 week after surgery, approximately May 7-May 12.    I spent 20 minutes caring for Ramon on this date of service. This time includes time spent by me in the following activities:preparing for the visit, reviewing tests, obtaining and/or reviewing a separately obtained history, performing a medically appropriate examination and/or evaluation , counseling and educating the patient/family/caregiver, ordering medications, tests, or procedures, referring and communicating with other health care professionals , documenting information in the medical record, independently interpreting results and communicating that information with the patient/family/caregiver, and care coordination.    Follow Up {Instructions Charge Capture  Follow-up Communications :23}     Patient was given instructions and counseling regarding his condition or for health maintenance advice. Please see specific information pulled into the AVS (placed there by myself) if appropriate.    Return in about 18 days (around 5/9/2025) for Hospital follow-up.    BMI is within normal parameters. No other follow-up for BMI required.         CON Monahan, [facility]      Patient or patient representative verbalized consent for the use of Ambient Listening during the visit with  CON Monahan for chart documentation. 4/21/2025  12:03 EDT

## 2025-04-21 NOTE — PATIENT INSTRUCTIONS
Metformin - Stop the day of surgery and resume after surgery unless told otherwise by surgeon.  Aspirin - discuss with cardiology how far in advance to stop prior to surgery.  Pletal - stop 5 days before surgery unless instructed otherwise from surgeon.    Resume all meds after surgery unless instructed otherwise by surgeon.

## 2025-05-30 ENCOUNTER — OFFICE VISIT (OUTPATIENT)
Dept: FAMILY MEDICINE CLINIC | Facility: CLINIC | Age: 80
End: 2025-05-30
Payer: MEDICARE

## 2025-05-30 VITALS
SYSTOLIC BLOOD PRESSURE: 108 MMHG | HEART RATE: 72 BPM | RESPIRATION RATE: 18 BRPM | TEMPERATURE: 98.3 F | WEIGHT: 113 LBS | DIASTOLIC BLOOD PRESSURE: 47 MMHG | OXYGEN SATURATION: 99 % | BODY MASS INDEX: 22.19 KG/M2 | HEIGHT: 60 IN

## 2025-05-30 DIAGNOSIS — E11.65 TYPE 2 DIABETES MELLITUS WITH HYPERGLYCEMIA, WITHOUT LONG-TERM CURRENT USE OF INSULIN: Primary | ICD-10-CM

## 2025-05-30 DIAGNOSIS — E78.2 MIXED HYPERLIPIDEMIA: ICD-10-CM

## 2025-05-30 DIAGNOSIS — E11.9 ENCOUNTER FOR DIABETIC FOOT EXAM: ICD-10-CM

## 2025-05-30 DIAGNOSIS — Z09 HOSPITAL DISCHARGE FOLLOW-UP: ICD-10-CM

## 2025-05-30 DIAGNOSIS — I10 PRIMARY HYPERTENSION: ICD-10-CM

## 2025-05-30 DIAGNOSIS — Z96.651 TOTAL KNEE REPLACEMENT STATUS, RIGHT: ICD-10-CM

## 2025-05-30 PROBLEM — I87.2 PERIPHERAL VENOUS INSUFFICIENCY: Status: ACTIVE | Noted: 2025-05-30

## 2025-05-30 PROCEDURE — 99215 OFFICE O/P EST HI 40 MIN: CPT | Performed by: REGISTERED NURSE

## 2025-05-30 PROCEDURE — 82570 ASSAY OF URINE CREATININE: CPT | Performed by: REGISTERED NURSE

## 2025-05-30 PROCEDURE — 82043 UR ALBUMIN QUANTITATIVE: CPT | Performed by: REGISTERED NURSE

## 2025-05-30 PROCEDURE — 3078F DIAST BP <80 MM HG: CPT | Performed by: REGISTERED NURSE

## 2025-05-30 PROCEDURE — 1125F AMNT PAIN NOTED PAIN PRSNT: CPT | Performed by: REGISTERED NURSE

## 2025-05-30 PROCEDURE — 3074F SYST BP LT 130 MM HG: CPT | Performed by: REGISTERED NURSE

## 2025-05-30 RX ORDER — OXYCODONE AND ACETAMINOPHEN 7.5; 325 MG/1; MG/1
TABLET ORAL
COMMUNITY
Start: 2025-05-05 | End: 2025-05-30

## 2025-05-30 RX ORDER — OXYCODONE AND ACETAMINOPHEN 5; 325 MG/1; MG/1
1 TABLET ORAL EVERY 12 HOURS PRN
Qty: 28 TABLET | Refills: 0 | Status: SHIPPED | OUTPATIENT
Start: 2025-05-30 | End: 2025-06-13

## 2025-05-30 NOTE — PROGRESS NOTES
Chief Complaint  Diabetes    Subjective    History of Present Illness {CC  Problem List  Visit  Diagnosis   Encounters  Notes  Medications  Labs  Result Review Imaging  Media :23}     Ramon Silva presents to [facility] for Diabetes.      History of Present Illness  Patient is a 80 y.o. male who presents to the clinic today for 3-month follow-up for hypertension, hyperlipidemia, type 2 diabetes, and hospital follow-up for knee replacement.  Patient denies any chest pain, shortness of breath, or any fevers.  Patient denies any known exposure to COVID, flu, or any other contagious illnesses.       History of Present Illness  In regards to hypertension, patient's blood pressure today is 108/47.  He is on carvedilol and losartan for hypertension.  Patient denies any concerns or issues with his hypertension or hypertension medication at this time.    In regards to hyperlipidemia, he is on atorvastatin for hyperlipidemia and tries to avoid extra fatty food when he can.  Patient denies any further concerns with his hyperlipidemia or atorvastatin at this time.    In regards to type 2 diabetes, he is on Farxiga and metformin for diabetes.  Patient tries eat a low-carb diet when possible.  He denies any concerns or issues with his type 2 diabetes or his Farxiga or metformin at this time.    In regards to hospital follow-up, he underwent a right knee replacement procedure 3 weeks ago and has been experiencing nerve pain post-surgery. He has been compliant with his physical therapy regimen and has a scheduled appointment today. His current medication regimen includes oxycodone for pain management.  Patient shares that he is healing okay but would like a few more days of Percocet to help him endure the pain.  Patient denies any further concerns or issues with his right knee replacement at this time.    PAST SURGICAL HISTORY:  Right knee replacement: 05/2025       Review of Systems  "  Constitutional: Negative.  Negative for activity change, chills, fatigue and fever.   HENT: Negative.  Negative for congestion, dental problem, ear pain, hearing loss, rhinorrhea, sinus pain, sore throat, tinnitus and trouble swallowing.    Eyes: Negative.  Negative for pain and visual disturbance.   Respiratory: Negative.  Negative for cough, chest tightness, shortness of breath and wheezing.    Cardiovascular: Negative.  Negative for chest pain, palpitations and leg swelling.   Gastrointestinal: Negative.  Negative for abdominal pain, diarrhea, nausea and vomiting.   Endocrine: Negative.  Negative for polydipsia, polyphagia and polyuria.   Genitourinary: Negative.  Negative for difficulty urinating, dysuria, frequency and urgency.   Musculoskeletal:  Positive for arthralgias (Right knee replacement). Negative for back pain and myalgias.   Skin: Negative.  Negative for color change, pallor, rash and wound.   Allergic/Immunologic: Negative.  Negative for environmental allergies.   Neurological: Negative.  Negative for dizziness, speech difficulty, weakness, light-headedness, numbness and headaches.   Hematological: Negative.    Psychiatric/Behavioral: Negative.  Negative for confusion, decreased concentration, self-injury and suicidal ideas. The patient is not nervous/anxious.    All other systems reviewed and are negative.       Objective     Vital Signs:   /47 (BP Location: Left arm, Patient Position: Sitting, Cuff Size: Adult)   Pulse 72   Temp 98.3 °F (36.8 °C) (Temporal)   Resp 18   Ht 152.4 cm (60\")   Wt 51.3 kg (113 lb)   SpO2 99%   BMI 22.07 kg/m²   Current Outpatient Medications on File Prior to Visit   Medication Sig Dispense Refill    Accu-Chek Guide test strip 1 each by Other route Daily. E 11.9 50 each 2    Accu-Chek Softclix Lancets lancets test daily as directed 100 each 2    aspirin 81 MG tablet Take 1 tablet by mouth Daily.      atorvastatin (LIPITOR) 40 MG tablet TAKE ONE TABLET BY " MOUTH EVERY NIGHT AT BEDTIME 90 tablet 1    carvedilol (COREG) 25 MG tablet Take 1 tablet by mouth 2 (Two) Times a Day With Meals. 180 tablet 0    Cetirizine HCl (ZyrTEC Allergy) 10 MG capsule Take 10 mg by mouth Daily.      cilostazol (PLETAL) 100 MG tablet TAKE ONE TABLET BY MOUTH TWICE DAILY 60 tablet 1    Diclofenac Sodium (VOLTAREN) 1 % gel gel Apply 4 g topically to the appropriate area as directed 4 (Four) Times a Day As Needed (APPLY 4 TIMES A DAY AS NEED TO THE APPROPRIATE AREA). 4 g 2    famotidine (PEPCID) 40 MG tablet Take 1 tablet by mouth Daily As Needed for Heartburn. Only if Pantoprazole is not working. Should not take daily, only as needed. 90 tablet 1    Farxiga 10 MG tablet TAKE ONE TABLET BY MOUTH EVERY DAY 90 tablet 1    ferrous sulfate 324 (65 Fe) MG tablet delayed-release EC tablet Take 1 tablet by mouth Daily With Breakfast. 90 tablet 1    folic acid (FOLVITE) 1 MG tablet Take 1 tablet by mouth Daily. 30 tablet 2    ibandronate (BONIVA) 150 MG tablet TAKE ONE TABLET BY MOUTH EVERY 30 DAYS 1 tablet 5    losartan (COZAAR) 50 MG tablet TAKE ONE TABLET BY MOUTH ONCE DAILY 90 tablet 0    Menthol (GNP HERBAL MT) Apply  to the mouth or throat. Neuroflow plus      metFORMIN ER (GLUCOPHAGE-XR) 500 MG 24 hr tablet TAKE TWO TABLETS BY MOUTH TWICE DAILY BEFORE MEALS 360 tablet 1    Omega-3 Fatty Acids (OMEGA-3 FISH OIL PO) Take 1 capsule by mouth Daily.      ondansetron ODT (ZOFRAN-ODT) 4 MG disintegrating tablet Place 1 tablet on the tongue Every 12 (Twelve) Hours As Needed for Nausea or Vomiting. 60 tablet 1    traMADol (ULTRAM) 50 MG tablet Take 1 tablet by mouth Every 12 (Twelve) Hours As Needed.      Vitamin D, Cholecalciferol, (CHOLECALCIFEROL) 10 MCG (400 UNIT) tablet Take 1 tablet by mouth Daily.       No current facility-administered medications on file prior to visit.        Past Medical History:   Diagnosis Date    Anemia     Arthralgia     Diabetes     Fatigue     GERD (gastroesophageal  reflux disease)     Gout     Hyperlipidemia     Hypertension     Liver lesion     Non Hodgkin's lymphoma     Osteoporosis     Personal history of testicular cancer     Vitamin D deficiency     Weight loss       Past Surgical History:   Procedure Laterality Date    ANGIOGRAM - CONVERTED  07/05/2022    aif by Dr Henriquez    CARDIAC CATHETERIZATION N/A 07/05/2022    Procedure: PERIPHERAL ANGIOGRAPHY Right leg;  Surgeon: Theron Henriquez MD;  Location:  [facility];  Service: Cardiovascular;  Laterality: N/A;    CARDIAC CATHETERIZATION N/A 10/02/2023    Procedure: Right and Left Heart Cath with possible PCI;  Surgeon: Joey Kirkland DO;  Location:  [facility];  Service: Cardiovascular;  Laterality: N/A;  Local and IV sedation    CARDIAC ELECTROPHYSIOLOGY PROCEDURE N/A 04/17/2024    Procedure: ICD DC new [employer] notified 03/22/24;  Surgeon: Chago Lambert MD;  Location:  [facility];  Service: Cardiovascular;  Laterality: N/A;  Needs Port-a-Cath removal prior to ICD    HIP SURGERY  2005    JOINT REPLACEMENT Right 05/08/2025    PORTACATH PLACEMENT      TESTICLE SURGERY Right 07/17/2000    right testicular excision      Family History   Problem Relation Age of Onset    Hypertension Mother       Social History     Socioeconomic History    Marital status:    Tobacco Use    Smoking status: Never     Passive exposure: Never    Smokeless tobacco: Never   Vaping Use    Vaping status: Never Used   Substance and Sexual Activity    Alcohol use: No    Drug use: Never    Sexual activity: Defer         Office Visit on 05/30/2025   Component Date Value Ref Range Status    Microalbumin/Creatinine Ratio 05/30/2025 17.7  0.0 - 29.0 mg/g Final    Creatinine, Urine 05/30/2025 180.6  mg/dL Final    Microalbumin, Urine 05/30/2025 3.2  mg/dL Final   Hospital Outpatient Visit on 03/20/2025   Component Date Value Ref Range Status    LVIDd 03/20/2025 4.8  cm  Final    LVIDs 03/20/2025 4.0  cm Final    IVSd 03/20/2025 1.02  cm Final    LVPWd 03/20/2025 1.05  cm Final    FS 03/20/2025 16.6  % Final    IVS/LVPW 03/20/2025 0.97  cm Final    ESV(cubed) 03/20/2025 64.7  ml Final    LV Sys Vol (BSA corrected) 03/20/2025 33.1  cm2 Final    EDV(cubed) 03/20/2025 111.5  ml Final    LV Ribera Vol (BSA corrected) 03/20/2025 60.9  cm2 Final    LV mass(C)d 03/20/2025 179.3  grams Final    LVOT area 03/20/2025 3.3  cm2 Final    LVOT diam 03/20/2025 2.05  cm Final    EDV(MOD-sp2) 03/20/2025 98.2  ml Final    EDV(MOD-sp4) 03/20/2025 92.5  ml Final    ESV(MOD-sp2) 03/20/2025 50.5  ml Final    ESV(MOD-sp4) 03/20/2025 50.2  ml Final    SV(MOD-sp2) 03/20/2025 47.8  ml Final    SV(MOD-sp4) 03/20/2025 42.3  ml Final    SVi(MOD-SP2) 03/20/2025 31.5  ml/m2 Final    SVi(MOD-SP4) 03/20/2025 27.8  ml/m2 Final    SVi (LVOT) 03/20/2025 50.4  ml/m2 Final    EF(MOD-sp2) 03/20/2025 48.6  % Final    EF(MOD-sp4) 03/20/2025 45.7  % Final    MV E max dhiraj 03/20/2025 57.3  cm/sec Final    MV A max dhiraj 03/20/2025 84.5  cm/sec Final    MV dec time 03/20/2025 0.22  sec Final    MV E/A 03/20/2025 0.68   Final    Pulm A Revs Dur 03/20/2025 0.17  sec Final    MV A dur 03/20/2025 0.16  sec Final    Med Peak E' Dhiraj 03/20/2025 7.0  cm/sec Final    Lat Peak E' Dhiraj 03/20/2025 8.1  cm/sec Final    TR max dhiraj 03/20/2025 280.1  cm/sec Final    Avg E/e' ratio 03/20/2025 7.59   Final    SV(LVOT) 03/20/2025 76.5  ml Final    TAPSE (>1.6) 03/20/2025 1.81  cm Final    LA dimension (2D)  03/20/2025 3.7  cm Final    Pulm Sys Dhiraj 03/20/2025 31.6  cm/sec Final    Pulm Ribera Dhiraj 03/20/2025 39.1  cm/sec Final    Pulm S/D 03/20/2025 0.81   Final    Pulm A Revs Dhiraj 03/20/2025 27.5  cm/sec Final    LV V1 max 03/20/2025 102.0  cm/sec Final    LV V1 max PG 03/20/2025 4.2  mmHg Final    LV V1 mean PG 03/20/2025 1.80  mmHg Final    LV V1 VTI 03/20/2025 23.2  cm Final    Ao pk dhiraj 03/20/2025 119.9  cm/sec Final    Ao max PG 03/20/2025 5.7  mmHg  Final    Ao mean PG 03/20/2025 2.7  mmHg Final    Ao V2 VTI 03/20/2025 28.4  cm Final    KEN(I,D) 03/20/2025 2.7  cm2 Final    Dimensionless Index 03/20/2025 0.82  (DI) Final    AI P1/2t 03/20/2025 631.4  msec Final    MV max PG 03/20/2025 3.0  mmHg Final    MV mean PG 03/20/2025 1.23  mmHg Final    MV V2 VTI 03/20/2025 20.6  cm Final    MVA(VTI) 03/20/2025 3.7  cm2 Final    MV dec slope 03/20/2025 257.3  cm/sec2 Final    MR max jason 03/20/2025 422.9  cm/sec Final    MR max PG 03/20/2025 72.3  mmHg Final    TR max PG 03/20/2025 31.5  mmHg Final    RVSP(TR) 03/20/2025 39.5  mmHg Final    RAP systole 03/20/2025 8.0  mmHg Final    RV V1 max PG 03/20/2025 1.15  mmHg Final    RV V1 max 03/20/2025 53.6  cm/sec Final    RV V1 VTI 03/20/2025 14.6  cm Final    PA V2 max 03/20/2025 93.1  cm/sec Final    PA acc time 03/20/2025 0.09  sec Final    Ao root diam 03/20/2025 3.6  cm Final    EF(MOD-bp) 03/20/2025 47.0  % Final    LV GLOBAL STRAIN  03/20/2025 -17.1  % Final         Physical Exam  Vitals and nursing note reviewed.   Constitutional:       Appearance: Normal appearance. He is normal weight.   HENT:      Head: Normocephalic and atraumatic.   Cardiovascular:      Rate and Rhythm: Normal rate and regular rhythm.      Pulses: Normal pulses.      Heart sounds: Normal heart sounds. No murmur heard.     No friction rub. No gallop.   Pulmonary:      Effort: Pulmonary effort is normal. No respiratory distress.      Breath sounds: Normal breath sounds. No stridor. No wheezing, rhonchi or rales.   Chest:      Chest wall: No tenderness.   Abdominal:      General: Abdomen is flat. Bowel sounds are normal. There is no distension.      Palpations: Abdomen is soft. There is no mass.      Tenderness: There is no abdominal tenderness. There is no right CVA tenderness, left CVA tenderness, guarding or rebound.      Hernia: No hernia is present.   Skin:     General: Skin is warm and dry.      Capillary Refill: Capillary refill takes less  than 2 seconds.      Coloration: Skin is not jaundiced or pale.   Neurological:      General: No focal deficit present.      Mental Status: He is alert and oriented to person, place, and time. Mental status is at baseline.      Motor: No weakness.      Coordination: Coordination normal.      Gait: Gait normal.   Psychiatric:         Mood and Affect: Mood normal.         Behavior: Behavior normal.         Thought Content: Thought content normal.         Judgment: Judgment normal.          Physical Exam  Respiratory: Clear to auscultation, no wheezing, rales or rhonchi  Cardiovascular: Regular rate and rhythm, no murmurs, rubs, or gallops  Extremities: No sores noted on left foot, good sensation       Result Review  Data Reviewed:{ Labs  Result Review  Imaging  Med Tab  Media :23}   I have reviewed this patient's chart.  I have reviewed previous labs, previous imaging, previous medications, and previous encounters with notes that were available in this patient's chart.    Results                Assessment and Plan {CC Problem List  Visit Diagnosis  ROS  Review (Popup)  [facility]  Quality  BestPractice  Medications  SmartSets  SnapShot Encounters  Media :23}   Diagnoses and all orders for this visit:    1. Type 2 diabetes mellitus with hyperglycemia, without long-term current use of insulin (Primary)  -     Microalbumin / Creatinine Urine Ratio - Urine, Clean Catch    2. Mixed hyperlipidemia    3. Primary hypertension    4. Encounter for diabetic foot exam    5. Total knee replacement status, right  -     oxyCODONE-acetaminophen (PERCOCET) 5-325 MG per tablet; Take 1 tablet by mouth Every 12 (Twelve) Hours As Needed for Severe Pain for up to 14 days.  Dispense: 28 tablet; Refill: 0    6. Hospital discharge follow-up        Assessment & Plan  1. Postoperative status following right knee replacement.  - The surgical site is demonstrating satisfactory healing progress, albeit with some  tenderness.  - He is currently managing his pain with oxycodone.  - A prescription for oxycodone, sufficient for a duration of 1 to 2 days, will be provided. He has been advised to utilize this medication only when necessary and to consider Tylenol as an alternative for milder pain.  - A urine microalbumin test will be conducted today.    2. Hypertension.  - Blood pressure is well-managed at 108/47, likely influenced by the pain medications.  - Currently taking carvedilol and losartan.  - No refills are needed at this time.  - Routine follow-up for hypertension.    3. Hyperlipidemia.  - Currently taking atorvastatin.  - Advised to avoid extra fatty foods when possible.  - No refills are needed at this time.  - Routine follow-up for hyperlipidemia.    4. Type 2 diabetes mellitus.  - Currently taking Farxiga and metformin.  - No refills are needed at this time.  - Routine follow-up for diabetes.       -  -ER red flags discussed with patient including risk versus benefit and education provided.  -Follow-up with me in 3 months at next routine visit or in 2 to 4 weeks if right knee pain persists and further medication discussion is needed.    I spent 40 minutes caring for Ramon on this date of service. This time includes time spent by me in the following activities:preparing for the visit, reviewing tests, obtaining and/or reviewing a separately obtained history, performing a medically appropriate examination and/or evaluation , counseling and educating the patient/family/caregiver, ordering medications, tests, or procedures, referring and communicating with other health care professionals , documenting information in the medical record, independently interpreting results and communicating that information with the patient/family/caregiver, and care coordination.    Follow Up {Instructions Charge Capture  Follow-up Communications :23}     Patient was given instructions and counseling regarding his condition or for  health maintenance advice. Please see specific information pulled into the AVS (placed there by myself) if appropriate.    Return in about 4 weeks (around 6/27/2025) for Recheck right knee replacement pain.    BMI is within normal parameters. No other follow-up for BMI required.         CON Monahan, [facility]      Patient or patient representative verbalized consent for the use of Ambient Listening during the visit with  CON Monahan for chart documentation. 6/23/2025  13:07 EDT

## 2025-05-31 LAB
ALBUMIN UR-MCNC: 3.2 MG/DL
CREAT UR-MCNC: 180.6 MG/DL
MICROALBUMIN/CREAT UR: 17.7 MG/G (ref 0–29)

## 2025-06-04 DIAGNOSIS — I73.9 INTERMITTENT CLAUDICATION: ICD-10-CM

## 2025-06-04 DIAGNOSIS — M25.50 ARTHRALGIA, UNSPECIFIED JOINT: ICD-10-CM

## 2025-06-05 NOTE — TELEPHONE ENCOUNTER
Rx Refill Note  Requested Prescriptions     Pending Prescriptions Disp Refills    gabapentin (NEURONTIN) 300 MG capsule [Pharmacy Med Name: gabapentin 300 mg capsule] 90 capsule 0     Sig: TAKE ONE CAPSULE BY MOUTH EVERY EVENING      Last office visit with prescribing clinician: 05/30/2025   Last telemedicine visit with prescribing clinician: Visit date not found   Next office visit with prescribing clinician: 11/14/2025      UDS Not on file  CSA Not on file    INSPECT - SCAN - Inspect/[facility]/ 06/05/2025 (06/05/2025)         Alissa Wang MA  06/05/25, 08:22 EDT

## 2025-06-06 RX ORDER — GABAPENTIN 300 MG/1
300 CAPSULE ORAL EVERY EVENING
Qty: 90 CAPSULE | Refills: 0 | Status: SHIPPED | OUTPATIENT
Start: 2025-06-06

## 2025-06-11 ENCOUNTER — OFFICE VISIT (OUTPATIENT)
Dept: FAMILY MEDICINE CLINIC | Facility: CLINIC | Age: 80
End: 2025-06-11
Payer: MEDICARE

## 2025-06-11 VITALS
OXYGEN SATURATION: 96 % | HEART RATE: 89 BPM | WEIGHT: 113.6 LBS | DIASTOLIC BLOOD PRESSURE: 60 MMHG | HEIGHT: 60 IN | BODY MASS INDEX: 22.3 KG/M2 | TEMPERATURE: 98.2 F | RESPIRATION RATE: 16 BRPM | SYSTOLIC BLOOD PRESSURE: 107 MMHG

## 2025-06-11 DIAGNOSIS — H66.91 RIGHT OTITIS MEDIA, UNSPECIFIED OTITIS MEDIA TYPE: Primary | ICD-10-CM

## 2025-06-11 PROCEDURE — 3078F DIAST BP <80 MM HG: CPT

## 2025-06-11 PROCEDURE — 1125F AMNT PAIN NOTED PAIN PRSNT: CPT

## 2025-06-11 PROCEDURE — 3074F SYST BP LT 130 MM HG: CPT

## 2025-06-11 PROCEDURE — 1160F RVW MEDS BY RX/DR IN RCRD: CPT

## 2025-06-11 PROCEDURE — 1159F MED LIST DOCD IN RCRD: CPT

## 2025-06-11 PROCEDURE — 99213 OFFICE O/P EST LOW 20 MIN: CPT

## 2025-06-11 NOTE — PROGRESS NOTES
"Chief Complaint  Earache (For 1 week. Pt has also noted drainage from his RT ear. )    Subjective    History of Present Illness {CC  Problem List  Visit  Diagnosis   Encounters  Notes  Medications  Labs  Result Review Imaging  Media :23}     Ramon Silva presents to [facility] for Earache (For 1 week. Pt has also noted drainage from his RT ear. ).      History of Present Illness     History of Present Illness  The patient is an 80-year-old individual who presents with a complaint of right ear pain.    They have been experiencing right ear pain for approximately one week, which has recently intensified. They report no associated symptoms such as fever, chills, cough, or congestion. They have not sought medical attention for this issue previously and have not taken any medications for it. They also report no discomfort in their left ear.    Additionally, they report experiencing immediate stomach pain after eating, which has been a consistent issue but has recently worsened. They describe the pain as cramping and note that it is accompanied by diarrhea. They do not experience severe right upper quadrant pain after eating.    FAMILY HISTORY  Their mother has irritable bowel syndrome.        Objective     Vital Signs:   /60   Pulse 89   Temp 98.2 °F (36.8 °C) (Temporal)   Resp 16   Ht 152.4 cm (60\")   Wt 51.5 kg (113 lb 9.6 oz)   SpO2 96%   BMI 22.19 kg/m²   Current Outpatient Medications on File Prior to Visit   Medication Sig Dispense Refill    Accu-Chek Guide test strip 1 each by Other route Daily. E 11.9 50 each 2    Accu-Chek Softclix Lancets lancets test daily as directed 100 each 2    apixaban (ELIQUIS) 2.5 MG tablet tablet Take 1 tablet by mouth.      aspirin 81 MG tablet Take 1 tablet by mouth Daily.      atorvastatin (LIPITOR) 40 MG tablet TAKE ONE TABLET BY MOUTH EVERY NIGHT AT BEDTIME 90 tablet 1    carvedilol (COREG) 25 MG tablet Take 1 tablet by mouth " 2 (Two) Times a Day With Meals. 180 tablet 0    Cetirizine HCl (ZyrTEC Allergy) 10 MG capsule Take 10 mg by mouth Daily.      cilostazol (PLETAL) 100 MG tablet TAKE ONE TABLET BY MOUTH TWICE DAILY 60 tablet 1    Diclofenac Sodium (VOLTAREN) 1 % gel gel Apply 4 g topically to the appropriate area as directed 4 (Four) Times a Day As Needed (APPLY 4 TIMES A DAY AS NEED TO THE APPROPRIATE AREA). 4 g 2    famotidine (PEPCID) 40 MG tablet Take 1 tablet by mouth Daily As Needed for Heartburn. Only if Pantoprazole is not working. Should not take daily, only as needed. 90 tablet 1    Farxiga 10 MG tablet TAKE ONE TABLET BY MOUTH EVERY DAY 90 tablet 1    ferrous sulfate 324 (65 Fe) MG tablet delayed-release EC tablet Take 1 tablet by mouth Daily With Breakfast. 90 tablet 1    folic acid (FOLVITE) 1 MG tablet Take 1 tablet by mouth Daily. 30 tablet 2    gabapentin (NEURONTIN) 300 MG capsule TAKE ONE CAPSULE BY MOUTH EVERY EVENING 90 capsule 0    ibandronate (BONIVA) 150 MG tablet TAKE ONE TABLET BY MOUTH EVERY 30 DAYS 1 tablet 5    losartan (COZAAR) 50 MG tablet TAKE ONE TABLET BY MOUTH ONCE DAILY 90 tablet 0    Menthol (GNP HERBAL MT) Apply  to the mouth or throat. Neuroflow plus      metFORMIN ER (GLUCOPHAGE-XR) 500 MG 24 hr tablet TAKE TWO TABLETS BY MOUTH TWICE DAILY BEFORE MEALS 360 tablet 1    Omega-3 Fatty Acids (OMEGA-3 FISH OIL PO) Take 1 capsule by mouth Daily.      ondansetron ODT (ZOFRAN-ODT) 4 MG disintegrating tablet Place 1 tablet on the tongue Every 12 (Twelve) Hours As Needed for Nausea or Vomiting. 60 tablet 1    traMADol (ULTRAM) 50 MG tablet Take 1 tablet by mouth Every 12 (Twelve) Hours As Needed.      Vitamin D, Cholecalciferol, (CHOLECALCIFEROL) 10 MCG (400 UNIT) tablet Take 1 tablet by mouth Daily.      [DISCONTINUED] finasteride (PROSCAR) 5 MG tablet TAKE ONE TABLET BY MOUTH ONCE DAILY 90 tablet 1    [DISCONTINUED] pantoprazole (PROTONIX) 40 MG EC tablet TAKE ONE TABLET BY MOUTH ONCE DAILY 90 tablet 0      No current facility-administered medications on file prior to visit.        Past Medical History:   Diagnosis Date    Anemia     Arthralgia     Diabetes     Fatigue     GERD (gastroesophageal reflux disease)     Gout     Hyperlipidemia     Hypertension     Liver lesion     Non Hodgkin's lymphoma     Osteoporosis     Personal history of testicular cancer     Vitamin D deficiency     Weight loss       Past Surgical History:   Procedure Laterality Date    ANGIOGRAM - CONVERTED  07/05/2022    aif by Dr Henriquez    CARDIAC CATHETERIZATION N/A 07/05/2022    Procedure: PERIPHERAL ANGIOGRAPHY Right leg;  Surgeon: Theron Henriquez MD;  Location:  [facility];  Service: Cardiovascular;  Laterality: N/A;    CARDIAC CATHETERIZATION N/A 10/02/2023    Procedure: Right and Left Heart Cath with possible PCI;  Surgeon: Joey Kirkland DO;  Location:  [facility];  Service: Cardiovascular;  Laterality: N/A;  Local and IV sedation    CARDIAC ELECTROPHYSIOLOGY PROCEDURE N/A 04/17/2024    Procedure: ICD DC new [employer] notified 03/22/24;  Surgeon: Chago Lambert MD;  Location:  [facility];  Service: Cardiovascular;  Laterality: N/A;  Needs Port-a-Cath removal prior to ICD    HIP SURGERY  2005    JOINT REPLACEMENT Right 05/08/2025    PORTACATH PLACEMENT      TESTICLE SURGERY Right 07/17/2000    right testicular excision      Family History   Problem Relation Age of Onset    Hypertension Mother       Social History     Socioeconomic History    Marital status:    Tobacco Use    Smoking status: Never     Passive exposure: Never    Smokeless tobacco: Never   Vaping Use    Vaping status: Never Used   Substance and Sexual Activity    Alcohol use: No    Drug use: Never    Sexual activity: Defer         Office Visit on 05/30/2025   Component Date Value Ref Range Status    Microalbumin/Creatinine Ratio 05/30/2025 17.7  0.0 - 29.0 mg/g Final    Creatinine, Urine  05/30/2025 180.6  mg/dL Final    Microalbumin, Urine 05/30/2025 3.2  mg/dL Final   Hospital Outpatient Visit on 03/20/2025   Component Date Value Ref Range Status    LVIDd 03/20/2025 4.8  cm Final    LVIDs 03/20/2025 4.0  cm Final    IVSd 03/20/2025 1.02  cm Final    LVPWd 03/20/2025 1.05  cm Final    FS 03/20/2025 16.6  % Final    IVS/LVPW 03/20/2025 0.97  cm Final    ESV(cubed) 03/20/2025 64.7  ml Final    LV Sys Vol (BSA corrected) 03/20/2025 33.1  cm2 Final    EDV(cubed) 03/20/2025 111.5  ml Final    LV Ribera Vol (BSA corrected) 03/20/2025 60.9  cm2 Final    LV mass(C)d 03/20/2025 179.3  grams Final    LVOT area 03/20/2025 3.3  cm2 Final    LVOT diam 03/20/2025 2.05  cm Final    EDV(MOD-sp2) 03/20/2025 98.2  ml Final    EDV(MOD-sp4) 03/20/2025 92.5  ml Final    ESV(MOD-sp2) 03/20/2025 50.5  ml Final    ESV(MOD-sp4) 03/20/2025 50.2  ml Final    SV(MOD-sp2) 03/20/2025 47.8  ml Final    SV(MOD-sp4) 03/20/2025 42.3  ml Final    SVi(MOD-SP2) 03/20/2025 31.5  ml/m2 Final    SVi(MOD-SP4) 03/20/2025 27.8  ml/m2 Final    SVi (LVOT) 03/20/2025 50.4  ml/m2 Final    EF(MOD-sp2) 03/20/2025 48.6  % Final    EF(MOD-sp4) 03/20/2025 45.7  % Final    MV E max dhiraj 03/20/2025 57.3  cm/sec Final    MV A max dhiraj 03/20/2025 84.5  cm/sec Final    MV dec time 03/20/2025 0.22  sec Final    MV E/A 03/20/2025 0.68   Final    Pulm A Revs Dur 03/20/2025 0.17  sec Final    MV A dur 03/20/2025 0.16  sec Final    Med Peak E' Dhiraj 03/20/2025 7.0  cm/sec Final    Lat Peak E' Dhiraj 03/20/2025 8.1  cm/sec Final    TR max dhiraj 03/20/2025 280.1  cm/sec Final    Avg E/e' ratio 03/20/2025 7.59   Final    SV(LVOT) 03/20/2025 76.5  ml Final    TAPSE (>1.6) 03/20/2025 1.81  cm Final    LA dimension (2D)  03/20/2025 3.7  cm Final    Pulm Sys Dhiraj 03/20/2025 31.6  cm/sec Final    Pulm Ribera Dhiraj 03/20/2025 39.1  cm/sec Final    Pulm S/D 03/20/2025 0.81   Final    Pulm A Revs Dhiraj 03/20/2025 27.5  cm/sec Final    LV V1 max 03/20/2025 102.0  cm/sec Final    LV V1 max  PG 03/20/2025 4.2  mmHg Final    LV V1 mean PG 03/20/2025 1.80  mmHg Final    LV V1 VTI 03/20/2025 23.2  cm Final    Ao pk jason 03/20/2025 119.9  cm/sec Final    Ao max PG 03/20/2025 5.7  mmHg Final    Ao mean PG 03/20/2025 2.7  mmHg Final    Ao V2 VTI 03/20/2025 28.4  cm Final    KEN(I,D) 03/20/2025 2.7  cm2 Final    Dimensionless Index 03/20/2025 0.82  (DI) Final    AI P1/2t 03/20/2025 631.4  msec Final    MV max PG 03/20/2025 3.0  mmHg Final    MV mean PG 03/20/2025 1.23  mmHg Final    MV V2 VTI 03/20/2025 20.6  cm Final    MVA(VTI) 03/20/2025 3.7  cm2 Final    MV dec slope 03/20/2025 257.3  cm/sec2 Final    MR max jason 03/20/2025 422.9  cm/sec Final    MR max PG 03/20/2025 72.3  mmHg Final    TR max PG 03/20/2025 31.5  mmHg Final    RVSP(TR) 03/20/2025 39.5  mmHg Final    RAP systole 03/20/2025 8.0  mmHg Final    RV V1 max PG 03/20/2025 1.15  mmHg Final    RV V1 max 03/20/2025 53.6  cm/sec Final    RV V1 VTI 03/20/2025 14.6  cm Final    PA V2 max 03/20/2025 93.1  cm/sec Final    PA acc time 03/20/2025 0.09  sec Final    Ao root diam 03/20/2025 3.6  cm Final    EF(MOD-bp) 03/20/2025 47.0  % Final    LV GLOBAL STRAIN  03/20/2025 -17.1  % Final         Physical Exam  Constitutional:       General: He is not in acute distress.     Appearance: He is ill-appearing.   HENT:      Head: Normocephalic and atraumatic.      Ears:      Comments: Right TM is moderately swollen and erythematous, erythema extending into the canal - no perforation or drainage in the canal. Left ear he has mild swelling but no significant erythema.      Nose: No congestion.      Mouth/Throat:      Mouth: Mucous membranes are moist.      Pharynx: No oropharyngeal exudate or posterior oropharyngeal erythema.   Eyes:      General:         Right eye: No discharge.         Left eye: No discharge.      Extraocular Movements: Extraocular movements intact.      Conjunctiva/sclera: Conjunctivae normal.      Pupils: Pupils are equal, round, and reactive to  light.   Cardiovascular:      Rate and Rhythm: Normal rate and regular rhythm.      Pulses: Normal pulses.      Heart sounds: Normal heart sounds. No murmur heard.     No friction rub. No gallop.   Pulmonary:      Effort: Pulmonary effort is normal. No respiratory distress.      Breath sounds: Normal breath sounds. No wheezing, rhonchi or rales.   Musculoskeletal:         General: Normal range of motion.      Cervical back: Normal range of motion and neck supple.   Lymphadenopathy:      Cervical: No cervical adenopathy.   Skin:     General: Skin is warm and dry.      Capillary Refill: Capillary refill takes less than 2 seconds.   Neurological:      General: No focal deficit present.      Mental Status: He is alert.   Psychiatric:         Mood and Affect: Mood normal.         Behavior: Behavior normal.         Thought Content: Thought content normal.         Judgment: Judgment normal.          Result Review  Data Reviewed:{ Labs  Result Review  Imaging  Med Tab  Media :23}   I have reviewed this patient's chart.  I have reviewed previous labs, previous imaging, previous medications, and previous encounters with notes that were available in this patient's chart.               Assessment and Plan {CC Problem List  Visit Diagnosis  ROS  Review (Popup)  [facility] Maintenance  Quality  BestPractice  Medications  SmartSets  SnapShot Encounters  Media :23}      Right otitis media, unspecified otitis media type    Orders:    amoxicillin-clavulanate (AUGMENTIN) 875-125 MG per tablet; Take 1 tablet by mouth 2 (Two) Times a Day for 7 days.         Assessment & Plan  1. Right otitis media  - Increased ear pain over the past week    - Advised to monitor for improvement within 3 to 4 days post-medication  - Augmentin 875 mg prescribed, to be taken orally twice daily for a week  -Discussed possible SE, recommended take with food and look for GI SE      -  -ER red flags discussed with patient including risk versus  benefit and education provided.      Follow Up {Instructions Charge Capture  Follow-up Communications :23}     Patient was given instructions and counseling regarding his condition or for health maintenance advice. Please see specific information pulled into the AVS (placed there by myself) if appropriate.    No follow-ups on file.      Lennie Estevez PA-C      Patient or patient representative verbalized consent for the use of Ambient Listening during the visit with  Lennie Estevez PA-C for chart documentation. 6/21/2025  20:52 EDT

## 2025-06-12 DIAGNOSIS — R19.7 DIARRHEA, UNSPECIFIED TYPE: ICD-10-CM

## 2025-06-13 ENCOUNTER — CLINICAL SUPPORT (OUTPATIENT)
Dept: FAMILY MEDICINE CLINIC | Facility: CLINIC | Age: 80
End: 2025-06-13
Payer: MEDICARE

## 2025-06-13 DIAGNOSIS — C85.90 NON-HODGKIN'S LYMPHOMA, UNSPECIFIED BODY REGION, UNSPECIFIED NON-HODGKIN LYMPHOMA TYPE: ICD-10-CM

## 2025-06-13 DIAGNOSIS — D50.9 IRON DEFICIENCY ANEMIA, UNSPECIFIED IRON DEFICIENCY ANEMIA TYPE: ICD-10-CM

## 2025-06-13 LAB
ALBUMIN SERPL-MCNC: 4.6 G/DL (ref 3.5–5.2)
ALBUMIN/GLOB SERPL: 2 G/DL
ALP SERPL-CCNC: 74 U/L (ref 39–117)
ALT SERPL W P-5'-P-CCNC: 6 U/L (ref 1–41)
ANION GAP SERPL CALCULATED.3IONS-SCNC: 15.1 MMOL/L (ref 5–15)
AST SERPL-CCNC: 14 U/L (ref 1–40)
BASOPHILS # BLD AUTO: 0.02 10*3/MM3 (ref 0–0.2)
BASOPHILS NFR BLD AUTO: 0.3 % (ref 0–1.5)
BILIRUB SERPL-MCNC: 0.3 MG/DL (ref 0–1.2)
BUN SERPL-MCNC: 22.7 MG/DL (ref 8–23)
BUN/CREAT SERPL: 23.9 (ref 7–25)
CALCIUM SPEC-SCNC: 10.1 MG/DL (ref 8.6–10.5)
CHLORIDE SERPL-SCNC: 101 MMOL/L (ref 98–107)
CO2 SERPL-SCNC: 20.9 MMOL/L (ref 22–29)
CREAT SERPL-MCNC: 0.95 MG/DL (ref 0.76–1.27)
DEPRECATED RDW RBC AUTO: 53 FL (ref 37–54)
EGFRCR SERPLBLD CKD-EPI 2021: 80.9 ML/MIN/1.73
EOSINOPHIL # BLD AUTO: 0.13 10*3/MM3 (ref 0–0.4)
EOSINOPHIL NFR BLD AUTO: 2.1 % (ref 0.3–6.2)
ERYTHROCYTE [DISTWIDTH] IN BLOOD BY AUTOMATED COUNT: 15.2 % (ref 12.3–15.4)
FERRITIN SERPL-MCNC: 289 NG/ML (ref 30–400)
GLOBULIN UR ELPH-MCNC: 2.3 GM/DL
GLUCOSE SERPL-MCNC: 90 MG/DL (ref 65–99)
HCT VFR BLD AUTO: 34.9 % (ref 37.5–51)
HGB BLD-MCNC: 10.4 G/DL (ref 13–17.7)
IMM GRANULOCYTES # BLD AUTO: 0.01 10*3/MM3 (ref 0–0.05)
IMM GRANULOCYTES NFR BLD AUTO: 0.2 % (ref 0–0.5)
IRON 24H UR-MRATE: 37 MCG/DL (ref 59–158)
IRON SATN MFR SERPL: 17 % (ref 20–50)
LDH SERPL-CCNC: 152 U/L (ref 135–225)
LYMPHOCYTES # BLD AUTO: 1.53 10*3/MM3 (ref 0.7–3.1)
LYMPHOCYTES NFR BLD AUTO: 24.3 % (ref 19.6–45.3)
MCH RBC QN AUTO: 28.6 PG (ref 26.6–33)
MCHC RBC AUTO-ENTMCNC: 29.8 G/DL (ref 31.5–35.7)
MCV RBC AUTO: 95.9 FL (ref 79–97)
MONOCYTES # BLD AUTO: 0.37 10*3/MM3 (ref 0.1–0.9)
MONOCYTES NFR BLD AUTO: 5.9 % (ref 5–12)
NEUTROPHILS NFR BLD AUTO: 4.24 10*3/MM3 (ref 1.7–7)
NEUTROPHILS NFR BLD AUTO: 67.2 % (ref 42.7–76)
NRBC BLD AUTO-RTO: 0 /100 WBC (ref 0–0.2)
PLATELET # BLD AUTO: 198 10*3/MM3 (ref 140–450)
PMV BLD AUTO: 10.5 FL (ref 6–12)
POTASSIUM SERPL-SCNC: 4.7 MMOL/L (ref 3.5–5.2)
PROT SERPL-MCNC: 6.9 G/DL (ref 6–8.5)
RBC # BLD AUTO: 3.64 10*6/MM3 (ref 4.14–5.8)
SODIUM SERPL-SCNC: 137 MMOL/L (ref 136–145)
TIBC SERPL-MCNC: 212 MCG/DL (ref 298–536)
TRANSFERRIN SERPL-MCNC: 142 MG/DL (ref 200–360)
WBC NRBC COR # BLD AUTO: 6.3 10*3/MM3 (ref 3.4–10.8)

## 2025-06-13 PROCEDURE — 80053 COMPREHEN METABOLIC PANEL: CPT | Performed by: STUDENT IN AN ORGANIZED HEALTH CARE EDUCATION/TRAINING PROGRAM

## 2025-06-13 PROCEDURE — 82728 ASSAY OF FERRITIN: CPT | Performed by: STUDENT IN AN ORGANIZED HEALTH CARE EDUCATION/TRAINING PROGRAM

## 2025-06-13 PROCEDURE — 83540 ASSAY OF IRON: CPT | Performed by: STUDENT IN AN ORGANIZED HEALTH CARE EDUCATION/TRAINING PROGRAM

## 2025-06-13 PROCEDURE — 85025 COMPLETE CBC W/AUTO DIFF WBC: CPT | Performed by: STUDENT IN AN ORGANIZED HEALTH CARE EDUCATION/TRAINING PROGRAM

## 2025-06-13 PROCEDURE — 84466 ASSAY OF TRANSFERRIN: CPT | Performed by: STUDENT IN AN ORGANIZED HEALTH CARE EDUCATION/TRAINING PROGRAM

## 2025-06-13 PROCEDURE — 83615 LACTATE (LD) (LDH) ENZYME: CPT | Performed by: STUDENT IN AN ORGANIZED HEALTH CARE EDUCATION/TRAINING PROGRAM

## 2025-06-13 RX ORDER — COLESTIPOL HYDROCHLORIDE 1 G/1
1 TABLET ORAL 2 TIMES DAILY
Qty: 180 TABLET | Refills: 1 | Status: SHIPPED | OUTPATIENT
Start: 2025-06-13

## 2025-06-13 NOTE — PROGRESS NOTES
Venipuncture Blood Specimen Collection  Venipuncture performed in RT ARM by Aj Holman with good hemostasis. Patient tolerated the procedure well without complications.   06/13/25   Aj Holman

## 2025-06-13 NOTE — TELEPHONE ENCOUNTER
Rx Refill Note  Requested Prescriptions     Pending Prescriptions Disp Refills    colestipol (COLESTID) 1 g tablet [Pharmacy Med Name: colestipol 1 gram tablet] 180 tablet 1     Sig: TAKE ONE TABLET BY MOUTH TWICE DAILY      Last office visit with prescribing clinician: 5/30/2025   Last telemedicine visit with prescribing clinician: Visit date not found   Next office visit with prescribing clinician: 11/14/2025                         Would you like a call back once the refill request has been completed: [] Yes [] No    If the office needs to give you a call back, can they leave a voicemail: [] Yes [] No    Talia Rodriguez MA  06/13/25, 07:31 EDT

## 2025-06-19 DIAGNOSIS — N40.1 BENIGN PROSTATIC HYPERPLASIA WITH URINARY OBSTRUCTION: ICD-10-CM

## 2025-06-19 DIAGNOSIS — K21.9 GASTROESOPHAGEAL REFLUX DISEASE, UNSPECIFIED WHETHER ESOPHAGITIS PRESENT: ICD-10-CM

## 2025-06-19 DIAGNOSIS — N13.8 BENIGN PROSTATIC HYPERPLASIA WITH URINARY OBSTRUCTION: ICD-10-CM

## 2025-06-21 RX ORDER — PANTOPRAZOLE SODIUM 40 MG/1
40 TABLET, DELAYED RELEASE ORAL DAILY
Qty: 90 TABLET | Refills: 0 | Status: SHIPPED | OUTPATIENT
Start: 2025-06-21

## 2025-06-21 RX ORDER — FINASTERIDE 5 MG/1
5 TABLET, FILM COATED ORAL DAILY
Qty: 90 TABLET | Refills: 0 | Status: SHIPPED | OUTPATIENT
Start: 2025-06-21

## 2025-07-05 DIAGNOSIS — I73.9 INTERMITTENT CLAUDICATION: ICD-10-CM

## 2025-07-07 RX ORDER — CILOSTAZOL 100 MG/1
100 TABLET ORAL 2 TIMES DAILY
Qty: 60 TABLET | Refills: 1 | Status: SHIPPED | OUTPATIENT
Start: 2025-07-07

## 2025-07-09 DIAGNOSIS — E11.65 TYPE 2 DIABETES MELLITUS WITH HYPERGLYCEMIA: ICD-10-CM

## 2025-07-09 RX ORDER — METFORMIN HYDROCHLORIDE 500 MG/1
TABLET, EXTENDED RELEASE ORAL
Qty: 360 TABLET | Refills: 1 | Status: SHIPPED | OUTPATIENT
Start: 2025-07-09

## 2025-07-11 NOTE — PROGRESS NOTES
HEMATOLOGY ONCOLOGY FOLLOW UP        Patient name: Ramon Silva  : 1945  MRN: [id number]  Primary Care Physician: Joey Crump APRN  Referring Physician: Joey Crump, *  Diagnosis:   Sinus Non-Hodgkin's lymphoma  Anemia  No chief complaint on file.      History of Present Illness:  Mr. Silva is a 80 y.o.  male with a history of testicular lymphoma diagnosed in , status post chemotherapy with R-CHOP, who has been in complete remission for several years.  He presented to his primary care provider, Mariam Price, with symptoms of left-sided facial pain radiating to the eye, left-sided headache, and he was referred to ENT, Dr. Harry, for further evaluation.  CT scan of the paranasal sinuses was performed without contrast on 11/30/15 and it showed marked bony destruction and an expansile soft tissue mass involving the left frontal, left ethmoid, left sphenoid and left maxillary sinus.  There was a soft tissue mass encroaching in the left orbit and left side of the face towards the muscles of mastication as well as through the superior wall of the left sphenoid sinus.  After evaluation, Dr. Harry ordered MRI of the brain with and without contrast and that showed a multi-lobulated mass filling and expanding the left maxillary sinus as well as the left ethmoid air cells in the left locule of the sphenoid sinus, with extension into the left orbit and left medial cranial fossa.  Dr. Harry performed fiberoptic nasal endoscopy and biopsy of the left nasal mass on 12/11/15.  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  Cyclin D1 was negative.  All of these stains were done by immunohistochemistry.  Sections of the nasal mass showed diffuse infiltrate by predominantly intermediate-to-large sized lymphoid cells.  The cells have a  vesicular nuclei and inconspicuous cytoplasm.  FISH was negative for rearrangements involving BCL6, MYC, and IGH/BCL-2 [t(14;18)].  FISH for MYC/IGH was negative.  The patient denied any B symptoms, such as fevers, chills, night sweats, weight loss or fatigue.  He did not report any lymphadenopathy.  Dr. Harry referred the patient to us for further evaluation of his lymphoma.    Today, 12/30/15, the patient presents for initial evaluation.  He denies any fever, chills, night sweats, weight loss or lymph node swelling.  His only symptom is left facial pain.  The patient also reports left eye blurry vision.  12/30/15 - WBC 8.3, hemoglobin 13.3, platelet count 233, MCV 87.5.  1/7/16 - Echocardiogram:  Ejection fraction 50-55%.  Normal LVEF.  There is slight impaired LV relaxation.  1/8/16 - PET/CT scan:  Soft tissue mass originating in the left maxillary sinus erodes through the medial sinus wall spilling out into the nasopharynx.  It abates the nasal septum.  Lesion measures about 4 x 4 cm in AP and transverse dimension.  It measures about 8 cm in the cephalocaudal dimension.  SUV is 10.8  In the abdomen, there is a focus of activity in the left lobe of the liver which measures about 2 cm and has SUV 9.4.    1/11/16 - Bone marrow biopsy:  Normal cellular bone marrow 25-35%.  Adequate iron stores.  No malignancy.  Flow cytometry is negative.  Normal karyotype.  1/22/16 - Patient underwent left subclavian Port-A-Cath placement.  1/25/16 - WBC 8.1, hemoglobin 12.2, platelet count 216, MCV 87.1.  1/26/16 - Patient was seen at [facility] by Dr. Jamal Pisano.  He has recommended R-GCVP combination chemo treatment.  If the patient does not achieve complete response or if he has liver involvement, he will consider autologous stem cell transplantation after high-dose chemotherapy.  If lymphoma is present in the liver, this tumor will likely represent a recurrent lymphoma rather than a  new primary lymphoma of the sinuses.  2/2/16 - Hepatitis panel negative.  2/4/16 - Patient started chemotherapy with Rituximab, Gemcitabine, Cytoxan, Vincristine, and Prednisolone (R-GCVP q. 21 days regimen).  2/24/16 - Patient received cycle 2 of R-GCVP.  3/1/16 - Patient received intrathecal chemotherapy cycle 1 with Cytarabine plus Hydrocortisone plus Methotrexate.  3/2/16 - Patient received cycle 2, day 8 of Gemcitabine.  WBC 3.9, platelet count 145,000.  3/9/16 - WBC 9.1, hemoglobin 9.2, platelet count 33,000.  3/10/16 - Platelet count 32,000.  3/16/16 - Patient received cycle 3 of R-GCVP day 1.  3/23/16 - Creatinine 0.85, BUN 19.  Retic count 19,950.  Ferritin 369.  Erythropoietin 26.9.  Iron saturation 11%, TIBC 22.  CBC showed WBC 7.3, hemoglobin 8.1, platelet count 395,000, MCV 87.  3/23/16 - Patient received cycle 3, day 8 of Gemcitabine.  Patient received Neulasta 6 mg.   3/26/16 - Brain MRI with and without contrast:  There is residual soft tissue mass located in the left ethmoid and sphenoid sinus which is much smaller compared to the previous exam, now measuring 2.8 x 1.8 cm.  There is better aeration in the left maxillary sinus.  3/26/16 - MRI abdomen with and without contrast:   A small 1.3 x 1.1 cm rim enhancing lesion is seen near the dome of the medial segment of the left lobe of liver.  This area corresponds to the hypermetabolic activity seen on the previous PET scan.  3/30/16 - Neutrophils 53,000, hemoglobin 9.5, platelet count 328,000.  4/6/16 - Patient received cycle 4, day 1 of R-GCVP.  WBC 17.9, hemoglobin 9.6, platelet count 256,000.    4/7/16 - Patient received intrathecal chemotherapy with Cytarabine plus Hydrocortisone plus Methotrexate.    4/13/16 - Patient received cycle 4, day 8 of Gemcitabine.  WBC 4.8, hemoglobin 8.1, platelet count 147,000.  The patient had hypotension and received IV fluids.    4/14/16 - Ultrasound of the liver:  There is a small nodule measuring 15 mm in the  left lobe of the liver.    4/18/16 - WBC 3.6, hemoglobin 8.1, platelet count 73,000.    4/27/16 - Patient received cycle 5, day 1 R-GCVP.  4/28/16 - Patient received intrathecal chemotherapy.    5/4/16 - Patient received cycle 5, day 8 of Gemcitabine.  5/18/16 - Patient received cycle 6, day 1 of R-GCVP.  CBC shows WBC 10.7, hemoglobin 9.7, platelet count 417,000.  5/19/16 - Patient received cycle 5 of intrathecal chemotherapy with Methotrexate, Cytarabine and Hydrocortisone.  6/10/16 - Brain MRI with and without contrast:  Residual abnormal signal involving the region of the left sphenoid sinus.  There appears to be mild improvement compared to prior.  Findings may be due to chronic inflammation of sinusitis.  Residual tumor is possible but less likely.  Overall, there is improvement.  No evidence of acute focal ischemia.  Nonspecific white matter changes.    6/16/16 - Patient received the last, sixth cycle of intrathecal chemotherapy.  7/7/16 - PET/CT scan:  Small focal area of increased nuclear activity in the lateral segment of the left lobe of the liver is no longer visualized.    8/1/16 to 8/19/16 - Patient received 15 fractions of radiation therapy, total of 30 Gy.  11/3/16 - PET/CT scan:  Stable exam with no hypermetabolic activity seen within the neck, chest, abdomen and pelvis.  New sub-centimeter non-calcified pulmonary nodules within the posterior lateral aspect of the right upper lobe.  There may be inflammatory or infectious.  Stable bilateral sub-centimeter non-calcified pulmonary nodules which are not hypermetabolic.    1/28/17 - MRI of brain with and without contrast:  Abnormal signal in the left sphenoid sinus is redemonstrated.  It is similar in size to the previous MRI from June 2016 and measures about 23 x 14 mm.  No evidence of progression.  No evidence of metastases.  2/17/17 - CT scan:  No abnormalities in the chest.  No lymphadenopathy.  5/8/17 - Patient underwent nasal endoscopy which  showed maxillary sinusitis.  No tumor reported.  9/8/17 - PET/CT scan:  No evidence of metastasis or disease recurrence.  No hypermetabolic activity anywhere.  Stable partial opacification of the left sphenoid sinus likely due to chronic sinusitis.    3/3/18 - WBC 7.2, hemoglobin 11.4, platelet count 181,000, MCV 91.8.  Creatinine 0.77.  LFTs normal.  Albumin 4.    3/30/18 - Brain MRI with and without contrast:  Decreasing size of the abnormal soft tissue within the left pterygomaxillary fissure.  This area demonstrates mild heterogeneous contrast enhancement which is similar to prior studies.  No new enlarging soft tissue mass.  Mucosal thickening with the left maxillary and sphenoid sinuses.  No evidence of malignancy.  No acute intracranial abnormality.    8/24/18 - CT scan of chest, abdomen and pelvis:  No evidence of lymphadenopathy.  A 1 cm pulmonary nodule in the right middle lobe is not significantly changed.  No evidence of lymphadenopathy.  CT abdomen is also unremarkable.  Enlarged heterogenous prostate gland, hyperplasia is noted.  Small hiatal hernia.  Moderate osteopenia and degenerative changes in hips and spine.  Left renal cyst.    8/28/18 - Vitamin D 31.  PSA 1.6.    10/5/18 - DEXAscan:  Osteoporosis with T-score of -2.5 in the distal forearm.    3/4/19 - WBC 6.9, hemoglobin 12.8, platelet count 187,000, MCV 91.  Creatinine 1.03.  Total bilirubin 0.5.  .  5/8/2019 25 hydroxy vitamin D 32  8/28/2019: Brain MRI:1. The abnormal material in the left sphenoid sinus does not appear significantly changed.The brain appears stable and within normal for age. 3. Minor chronic maxillary sinus mucosal thickening \  2/19/2020: WBC 8.8, hemoglobin 9.2, MCV 92.7, platelets 280, creatinine 0.88, alk phos 122, 25-hydroxy vitamin D 31.3, B12 640, TSH 0.886  3/7/2020: CT abdomen and pelvis- No evidence of lymphadenopathy in the abdomen pelvis or chest.  Stable right middle lobe noncalcified pulmonary nodule  unchanged since 2017.  Degenerative changes of the lumbar spine and hips.  Chest with contrast:.  Stable 1 cm right middle lobe lung nodule.  No evidence of malignancy.  3/10/2020: Iron 35 low, ferritin 92, iron saturation 14% low, TIBC 258, folate greater than 20, WBC 5.8, hemoglobin 9.4, MCV 91, platelets 228, ESR 25  6/30/2020: Creatinine 0.9, LFTs normal, , WBC 6.48, hemoglobin 10.6, platelets 202, ferritin 71, iron saturation 25%, TIBC 242,  9/1/2020: Creatinine 1.33, BUN 26, LFTs normal, WBC 6.7, hemoglobin 10.9, platelets 190,, ESR 15, CRP 0.1,  3/23/2021: Stool Hemoccult is positive  4/28/2021: WBC 7.13, hemoglobin 11.6, platelets 163, MCV 94.1, ferritin 77.3, iron 59, iron saturation 41%, TIBC 286, LFTs normal, , creatinine 1.06,  10/2/2021 : Stable 9 mm nodule in the medial segment of the right middle lobe.  8/1/2022 WBC 4.5, hemoglobin 10.5, hematocrit 32.4, platelet 179  1/6/2023 - WBC 6.27, Hb 10.7, hct 31.9, plt 98  1/24/24 - WBC 7.31, hb 12.4, plt 202,     12/19/24: Patient seen today for follow-up visit.  He was previously being seen by Dr. Kwon in this office.  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  He is in remission at this time and is only being followed with physical exam and labs.   Patient is accompanied by his family member who reported that patient had symptoms of dysphagia recently for which she was recommended to undergo an EGD but the patient did not show up for the procedure.  He denied having any symptoms at present.  Weight/appetite is stable.  No other B symptoms reported.  Unclear whether he is on any oral iron supplementation.    History of Present Illness  7/17/2025: The patient presents for evaluation of anemia. He underwent knee replacement surgery a few weeks ago, which was successful and without complications. He reports no new symptoms such as weight loss, changes in  appetite, or increased fatigue since the surgery. He is currently on Eliquis, a blood thinner, for DVT prophylaxis. It has been nearly a month since his surgery. He continues to take iron supplements.      Past Medical History:   Diagnosis Date    Anemia     Arthralgia     Diabetes     Fatigue     GERD (gastroesophageal reflux disease)     Gout     Hyperlipidemia     Hypertension     Liver lesion     Non Hodgkin's lymphoma     Osteoporosis     Personal history of testicular cancer     Vitamin D deficiency     Weight loss        Past Surgical History:   Procedure Laterality Date    ANGIOGRAM - CONVERTED  07/05/2022    aif by Dr Henriquez    CARDIAC CATHETERIZATION N/A 07/05/2022    Procedure: PERIPHERAL ANGIOGRAPHY Right leg;  Surgeon: Theron Henriquez MD;  Location:  [facility];  Service: Cardiovascular;  Laterality: N/A;    CARDIAC CATHETERIZATION N/A 10/02/2023    Procedure: Right and Left Heart Cath with possible PCI;  Surgeon: Joey Kirkland DO;  Location:  [facility];  Service: Cardiovascular;  Laterality: N/A;  Local and IV sedation    CARDIAC ELECTROPHYSIOLOGY PROCEDURE N/A 04/17/2024    Procedure: ICD DC new [employer] notified 03/22/24;  Surgeon: Chago Lambert MD;  Location:  [facility];  Service: Cardiovascular;  Laterality: N/A;  Needs Port-a-Cath removal prior to ICD    HIP SURGERY  2005    JOINT REPLACEMENT Right 05/08/2025    PORTACATH PLACEMENT      TESTICLE SURGERY Right 07/17/2000    right testicular excision       Current Outpatient Medications:     Accu-Chek Guide test strip, 1 each by Other route Daily. E 11.9, Disp: 50 each, Rfl: 2    Accu-Chek Softclix Lancets lancets, test daily as directed, Disp: 100 each, Rfl: 2    apixaban (ELIQUIS) 2.5 MG tablet tablet, Take 1 tablet by mouth., Disp: , Rfl:     aspirin 81 MG tablet, Take 1 tablet by mouth Daily., Disp: , Rfl:     atorvastatin (LIPITOR) 40 MG tablet, TAKE ONE  TABLET BY MOUTH EVERY NIGHT AT BEDTIME, Disp: 90 tablet, Rfl: 1    carvedilol (COREG) 25 MG tablet, Take 1 tablet by mouth 2 (Two) Times a Day With Meals., Disp: 180 tablet, Rfl: 0    Cetirizine HCl (ZyrTEC Allergy) 10 MG capsule, Take 10 mg by mouth Daily., Disp: , Rfl:     cilostazol (PLETAL) 100 MG tablet, TAKE ONE TABLET BY MOUTH TWICE DAILY, Disp: 60 tablet, Rfl: 1    colestipol (COLESTID) 1 g tablet, TAKE ONE TABLET BY MOUTH TWICE DAILY, Disp: 180 tablet, Rfl: 1    Diclofenac Sodium (VOLTAREN) 1 % gel gel, Apply 4 g topically to the appropriate area as directed 4 (Four) Times a Day As Needed (APPLY 4 TIMES A DAY AS NEED TO THE APPROPRIATE AREA)., Disp: 4 g, Rfl: 2    famotidine (PEPCID) 40 MG tablet, Take 1 tablet by mouth Daily As Needed for Heartburn. Only if Pantoprazole is not working. Should not take daily, only as needed., Disp: 90 tablet, Rfl: 1    Farxiga 10 MG tablet, TAKE ONE TABLET BY MOUTH EVERY DAY, Disp: 90 tablet, Rfl: 1    ferrous sulfate 324 (65 Fe) MG tablet delayed-release EC tablet, Take 1 tablet by mouth Daily With Breakfast., Disp: 90 tablet, Rfl: 1    finasteride (PROSCAR) 5 MG tablet, TAKE ONE TABLET BY MOUTH ONCE DAILY, Disp: 90 tablet, Rfl: 0    folic acid (FOLVITE) 1 MG tablet, Take 1 tablet by mouth Daily., Disp: 30 tablet, Rfl: 2    gabapentin (NEURONTIN) 300 MG capsule, TAKE ONE CAPSULE BY MOUTH EVERY EVENING, Disp: 90 capsule, Rfl: 0    ibandronate (BONIVA) 150 MG tablet, TAKE ONE TABLET BY MOUTH EVERY 30 DAYS, Disp: 1 tablet, Rfl: 5    losartan (COZAAR) 50 MG tablet, TAKE ONE TABLET BY MOUTH ONCE DAILY, Disp: 90 tablet, Rfl: 0    Menthol (GNP HERBAL MT), Apply  to the mouth or throat. Neuroflow plus, Disp: , Rfl:     metFORMIN ER (GLUCOPHAGE-XR) 500 MG 24 hr tablet, TAKE TWO TABLETS BY MOUTH TWICE DAILY BEFORE MEALS, Disp: 360 tablet, Rfl: 1    Omega-3 Fatty Acids (OMEGA-3 FISH OIL PO), Take 1 capsule by mouth Daily., Disp: , Rfl:     ondansetron ODT (ZOFRAN-ODT) 4 MG  "disintegrating tablet, Place 1 tablet on the tongue Every 12 (Twelve) Hours As Needed for Nausea or Vomiting., Disp: 60 tablet, Rfl: 1    pantoprazole (PROTONIX) 40 MG EC tablet, TAKE ONE TABLET BY MOUTH ONCE DAILY, Disp: 90 tablet, Rfl: 0    traMADol (ULTRAM) 50 MG tablet, Take 1 tablet by mouth Every 12 (Twelve) Hours As Needed., Disp: , Rfl:     Vitamin D, Cholecalciferol, (CHOLECALCIFEROL) 10 MCG (400 UNIT) tablet, Take 1 tablet by mouth Daily., Disp: , Rfl:     No Known Allergies    Family History   Problem Relation Age of Onset    Hypertension Mother        Cancer-related family history is not on file.    Social History     Tobacco Use    Smoking status: Never     Passive exposure: Never    Smokeless tobacco: Never   Vaping Use    Vaping status: Never Used   Substance Use Topics    Alcohol use: No    Drug use: Never     ROS:     12 point review system negative.  No significant fatigue.    Objective:    Vitals:    07/17/25 1013   BP: 111/60   Pulse: 74   Resp: 18   Temp: 97.6 °F (36.4 °C)   SpO2: 98%   Weight: 52.7 kg (116 lb 3.2 oz)   Height: 152.4 cm (60\")   PainSc: 0-No pain             ECOG  (1) Restricted in physically strenuous activity, ambulatory and able to do work of light nature    Physical Exam:     Physical Exam  Constitutional:       Appearance: Normal appearance.   HENT:      Head: Normocephalic and atraumatic.      Nose: Nose normal.   Eyes:      Extraocular Movements: Extraocular movements intact.      Pupils: Pupils are equal, round, and reactive to light.   Cardiovascular:      Rate and Rhythm: Normal rate and regular rhythm.      Pulses: Normal pulses.      Heart sounds: No murmur heard.  Pulmonary:      Effort: Pulmonary effort is normal.      Breath sounds: Normal breath sounds.   Abdominal:      General: There is no distension.      Palpations: Abdomen is soft. There is no mass.      Tenderness: There is no abdominal tenderness.   Musculoskeletal:         General: Normal range of motion. " "     Cervical back: Normal range of motion and neck supple.      Comments: Right foot wrapped with bandage   Skin:     General: Skin is warm.   Neurological:      General: No focal deficit present.      Mental Status: He is alert.   Psychiatric:         Mood and Affect: Mood normal.       Lab Results - Last 18 Months   Lab Units 06/13/25  1311 12/12/24  0150 12/02/24  0822   WBC 10*3/mm3 6.30 4.70 5.86   HEMOGLOBIN g/dL 10.4* 11.1* 11.4*   HEMATOCRIT % 34.9* 35.4* 34.6*   PLATELETS 10*3/mm3 198 159 154   MCV fL 95.9 94.1 94.5     Lab Results - Last 18 Months   Lab Units 06/13/25  1311 12/02/24  0822 07/25/24  1147   SODIUM mmol/L 137 137 141   POTASSIUM mmol/L 4.7 5.0 5.0   CHLORIDE mmol/L 101 99 106   CO2 mmol/L 20.9* 25.5 24.8   BUN mg/dL 22.7 21 28*   CREATININE mg/dL 0.95 1.12 1.50*   CALCIUM mg/dL 10.1 9.6 9.6   BILIRUBIN mg/dL 0.3 0.3 0.3   ALK PHOS U/L 74 59 80   ALT (SGPT) U/L 6 8 11   AST (SGOT) U/L 14 15 17   GLUCOSE mg/dL 90 99 136*       Lab Results   Component Value Date    GLUCOSE 90 06/13/2025    BUN 22.7 06/13/2025    CREATININE 0.95 06/13/2025    EGFRIFNONA 59 (L) 08/17/2021    EGFRIFAFRI 91 02/23/2021    BCR 23.9 06/13/2025    K 4.7 06/13/2025    CO2 20.9 (L) 06/13/2025    CALCIUM 10.1 06/13/2025    ALBUMIN 4.6 06/13/2025    AST 14 06/13/2025    ALT 6 06/13/2025       Lab Results - Last 18 Months   Lab Units 03/26/24  1131   INR  0.97   APTT seconds 25.6       Lab Results   Component Value Date    IRON 37 (L) 06/13/2025    TIBC 212 (L) 06/13/2025    FERRITIN 289.00 06/13/2025       Lab Results   Component Value Date    FOLATE 10.20 12/12/2024     No results found for: \"OCCULTBLD\"    No results found for: \"RETICCTPCT\"    Lab Results   Component Value Date    KHKRHLPU15 430 12/12/2024     No results found for: \"SPEP\", \"UPEP\"  LDH   Date Value Ref Range Status   06/13/2025 152 135 - 225 U/L Final     Lab Results   Component Value Date    SEDRATE 23 (H) 07/05/2022     No results found for: " "\"FIBRINOGEN\", \"HAPTOGLOBIN\"  Lab Results   Component Value Date    PTT 25.6 03/26/2024    INR 0.97 03/26/2024     No results found for: \"\"  No results found for: \"CEA\"  No components found for: \"CA-19-9\"  Lab Results   Component Value Date    PSA 1.520 05/25/2022     Assessment & Plan     Assessment:  History of high-grade non-Hodgkin’s B cell lymphoma involving the left facial sinus:  Stage IE (extranodal) lymphoma.  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  It is unclear whether the patient has recurrent versus new de zulma lymphoma.  His PET scan also showed an indeterminate lesion in the left lobe of the liver, which could not be biopsied.  He was treated with Rituximab-GCVP chemotherapy x6 cycles.  The patient also received six cycles of intrathecal chemotherapy with Methotrexate, Cytarabine, and Hydrocortisone.  PET/CT scan showed a complete response.  His restaging PET scan on 11/3/16 continues to show remission.  He also received consolidation radiation therapy for 30 Gy finished on 8/19/16.    PET/CT scan in September 2017 does not show any evidence of disease recurrence.  Patient was reporting left-sided headaches.  repeat MRI on 3/30/18 and it fails to show any evidence of disease progression or recurrence.  Restaging CT scan on 3/7/2020 does not show any evidence of disease recurrence.  Now again had a repeat CT scan in October 2021 with no concerning findings for recurrent disease.  There is a stable 9 mm nodule in the right middle lobe which has been stable for the last several years.  No routine CT imaging needed.  No s/s of relapse will monitor labs for now.  -Labs continue to be stable.  Continue to monitor  -No clinical evidence of recurrence.  No indication for repeat imaging presently.  - Patient denied any new signs/symptoms concerning for recurrence.    Mild persistent anemia: Appears to be from chronic " disease hemoglobin 10.5 repeat in 6 months.Positive stool Hemoccult test: Patient had a colonoscopy which was unremarkable this was in June 2021 hb has been stable. Could be persistent myelosupression post chemotherapy.  Hemoglobin is stable around 11.1.  No evidence of iron or B12 deficiency.  7/17/2025: Noted to have some decline in hemoglobin levels to 10.2 postoperatively after recent knee replacement surgery.  Continue to monitor.  Continue oral iron supplement daily.    History of testicular lymphoma: In 2000    History of right knee osteoarthritis:     Benign prostatic hypertrophy followed by urology no worsening of symptoms.    History of liver lesion:  This was noted initially on PET scan.  Repeat MRI showed a 1.3 x 1.7 cm, rim-enhancing lesion which could not be biopsied.  His restaging PET scan on 9/8/16 does not show this lesion anymore.  No findings October 2021 CT.  No further imaging needed.    Osteoporosis:  This was seen on DEXAscan 10/5/18.  Calcium vitamin D.  Continue Boniva          8.  Dysphagia: Advised patient to follow-up with GI to reschedule EGD.  Reported that his symptoms have resolved    6-month follow-up with repeat labs, sooner as needed  Check CBC and iron profile in 2 months.

## 2025-07-17 ENCOUNTER — OFFICE VISIT (OUTPATIENT)
Dept: ONCOLOGY | Facility: CLINIC | Age: 80
End: 2025-07-17
Payer: MEDICARE

## 2025-07-17 VITALS
HEART RATE: 74 BPM | RESPIRATION RATE: 18 BRPM | WEIGHT: 116.2 LBS | BODY MASS INDEX: 22.81 KG/M2 | SYSTOLIC BLOOD PRESSURE: 111 MMHG | OXYGEN SATURATION: 98 % | HEIGHT: 60 IN | DIASTOLIC BLOOD PRESSURE: 60 MMHG | TEMPERATURE: 97.6 F

## 2025-07-17 DIAGNOSIS — K90.9 MALABSORPTION OF IRON: ICD-10-CM

## 2025-07-17 DIAGNOSIS — C85.90 NON-HODGKIN'S LYMPHOMA, UNSPECIFIED BODY REGION, UNSPECIFIED NON-HODGKIN LYMPHOMA TYPE: ICD-10-CM

## 2025-07-17 DIAGNOSIS — D50.9 IRON DEFICIENCY ANEMIA, UNSPECIFIED IRON DEFICIENCY ANEMIA TYPE: Primary | ICD-10-CM

## 2025-07-17 PROCEDURE — 1126F AMNT PAIN NOTED NONE PRSNT: CPT | Performed by: STUDENT IN AN ORGANIZED HEALTH CARE EDUCATION/TRAINING PROGRAM

## 2025-07-17 PROCEDURE — 3074F SYST BP LT 130 MM HG: CPT | Performed by: STUDENT IN AN ORGANIZED HEALTH CARE EDUCATION/TRAINING PROGRAM

## 2025-07-17 PROCEDURE — 99214 OFFICE O/P EST MOD 30 MIN: CPT | Performed by: STUDENT IN AN ORGANIZED HEALTH CARE EDUCATION/TRAINING PROGRAM

## 2025-07-17 PROCEDURE — 3078F DIAST BP <80 MM HG: CPT | Performed by: STUDENT IN AN ORGANIZED HEALTH CARE EDUCATION/TRAINING PROGRAM

## 2025-07-30 ENCOUNTER — TELEPHONE (OUTPATIENT)
Dept: CARDIOLOGY | Facility: CLINIC | Age: 80
End: 2025-07-30

## 2025-08-06 DIAGNOSIS — E78.2 MIXED HYPERLIPIDEMIA: ICD-10-CM

## 2025-08-06 DIAGNOSIS — I10 PRIMARY HYPERTENSION: ICD-10-CM

## 2025-08-08 ENCOUNTER — OFFICE VISIT (OUTPATIENT)
Dept: CARDIOLOGY | Facility: CLINIC | Age: 80
End: 2025-08-08
Payer: MEDICARE

## 2025-08-08 VITALS
DIASTOLIC BLOOD PRESSURE: 73 MMHG | SYSTOLIC BLOOD PRESSURE: 158 MMHG | HEIGHT: 60 IN | BODY MASS INDEX: 22.78 KG/M2 | WEIGHT: 116 LBS | HEART RATE: 81 BPM | OXYGEN SATURATION: 98 %

## 2025-08-08 DIAGNOSIS — E78.2 MIXED HYPERLIPIDEMIA: ICD-10-CM

## 2025-08-08 DIAGNOSIS — Z95.810 BIVENTRICULAR AUTOMATIC IMPLANTABLE CARDIOVERTER DEFIBRILLATOR IN SITU: ICD-10-CM

## 2025-08-08 DIAGNOSIS — I42.0 DILATED CARDIOMYOPATHY: Primary | ICD-10-CM

## 2025-08-08 DIAGNOSIS — I10 PRIMARY HYPERTENSION: ICD-10-CM

## 2025-08-08 DIAGNOSIS — I44.7 LBBB (LEFT BUNDLE BRANCH BLOCK): ICD-10-CM

## 2025-08-08 PROCEDURE — 3078F DIAST BP <80 MM HG: CPT | Performed by: INTERNAL MEDICINE

## 2025-08-08 PROCEDURE — 1160F RVW MEDS BY RX/DR IN RCRD: CPT | Performed by: INTERNAL MEDICINE

## 2025-08-08 PROCEDURE — 99214 OFFICE O/P EST MOD 30 MIN: CPT | Performed by: INTERNAL MEDICINE

## 2025-08-08 PROCEDURE — 93000 ELECTROCARDIOGRAM COMPLETE: CPT | Performed by: INTERNAL MEDICINE

## 2025-08-08 PROCEDURE — 1159F MED LIST DOCD IN RCRD: CPT | Performed by: INTERNAL MEDICINE

## 2025-08-08 PROCEDURE — 3077F SYST BP >= 140 MM HG: CPT | Performed by: INTERNAL MEDICINE

## 2025-08-08 RX ORDER — CARVEDILOL 25 MG/1
25 TABLET ORAL 2 TIMES DAILY WITH MEALS
Qty: 180 TABLET | Refills: 0 | Status: SHIPPED | OUTPATIENT
Start: 2025-08-08

## 2025-08-08 RX ORDER — ATORVASTATIN CALCIUM 40 MG/1
40 TABLET, FILM COATED ORAL
Qty: 90 TABLET | Refills: 0 | Status: SHIPPED | OUTPATIENT
Start: 2025-08-08

## 2025-08-08 RX ORDER — SPIRONOLACTONE 25 MG/1
25 TABLET ORAL DAILY
Qty: 90 TABLET | Refills: 1 | Status: SHIPPED | OUTPATIENT
Start: 2025-08-08

## 2025-08-23 DIAGNOSIS — E11.65 TYPE 2 DIABETES MELLITUS WITH HYPERGLYCEMIA: ICD-10-CM

## 2025-08-25 RX ORDER — DAPAGLIFLOZIN 10 MG/1
1 TABLET, FILM COATED ORAL DAILY
Qty: 90 TABLET | Refills: 0 | Status: SHIPPED | OUTPATIENT
Start: 2025-08-25

## (undated) DEVICE — NAVICROSS SUPPORT CATHETER: Brand: NAVICROSS

## (undated) DEVICE — CATH DIAG IMPULSE FL4 6F 100CM

## (undated) DEVICE — INTRO SHEATH PRELUDE SNAP .038 8F 13CM W/SDPRT

## (undated) DEVICE — PACEMAKER CDS: Brand: MEDLINE INDUSTRIES, INC.

## (undated) DEVICE — CATH DIAG IMPULSE FR4 6F 100CM

## (undated) DEVICE — PINNACLE INTRODUCER SHEATH: Brand: PINNACLE

## (undated) DEVICE — 3M™ PATIENT PLATE, CORDED, SPLIT, LARGE, 40 PER CASE, 1179: Brand: 3M™

## (undated) DEVICE — ELECTRD DEFIB M/FUNC PROPADZ RADIOL 2PK

## (undated) DEVICE — GW PTFE EMERALD HEPCOAT FC J TIP STD .035 3MM 150CM

## (undated) DEVICE — RADIFOCUS GLIDEWIRE: Brand: GLIDEWIRE

## (undated) DEVICE — LN INJ CONTRST FLXCIL HP F/M LL 1200PSI72

## (undated) DEVICE — ST ACC MICROPUNCTURE STFF/CANN PLAT/TP 4F 21G 40CM

## (undated) DEVICE — SHT AIR TRANSFR COMFRT GLIDE LAT 40X80IN

## (undated) DEVICE — SUT MNCRYL 3/0 SH 27 IN Y416H

## (undated) DEVICE — MAJOR VASCULAR PACK-LF: Brand: MEDLINE INDUSTRIES, INC.

## (undated) DEVICE — SUT ETHIB 0/0 MO6 I8IN CX45D

## (undated) DEVICE — Device: Brand: WEBSTER CS

## (undated) DEVICE — PK TRY HEART CATH 50

## (undated) DEVICE — REFLEX ONE, SKIN STAPLER, 35 WIDE: Brand: REFLEX

## (undated) DEVICE — DRSNG WND BORDR/ADHS NONADHR/GZ LF 4X4IN STRL

## (undated) DEVICE — SUT MNCRYL 2/0 CT1 36IN

## (undated) DEVICE — IMMOB SHLDR CUT/AWAY UNIV

## (undated) DEVICE — CABL BIPOL W/ALLGTR CLIP/SM 12FT

## (undated) DEVICE — INTRO SHEATH PRELUDE SNAP .038 10F 13CM W/SDPRT FUSCHIA

## (undated) DEVICE — TBG PENCL TELESCP MEGADYNE SMOKE EVAC 10FT

## (undated) DEVICE — SWAN-GANZ TRUE SIZE THERMODILUTION "S" TIP: Brand: SWAN-GANZ TRUE SIZE

## (undated) DEVICE — RADIFOCUS TORQUE DEVICE MULTI-TORQUE VISE: Brand: RADIFOCUS TORQUE DEVICE

## (undated) DEVICE — DRAPE,INSTRUMENT,MAGNETIC,10X16: Brand: MEDLINE

## (undated) DEVICE — CATH GUIDE OUTR CPS DIRECT PL OC W/SHEATH 115DEG 7F/9F 47CM

## (undated) DEVICE — MICRO TIP WIPE: Brand: DEVON

## (undated) DEVICE — CATH DIAG IMPULSE PIG .056 6F 110CM

## (undated) DEVICE — GW CHOICE PT PLS .014 182CM

## (undated) DEVICE — CATH OMNI FLUSH 5FR

## (undated) DEVICE — TBG PRESS/MONITOR FIX M/F LL A/ 24IN STRL

## (undated) DEVICE — 3M™ IOBAN™ 2 ANTIMICROBIAL INCISE DRAPE 6650EZ: Brand: IOBAN™ 2